# Patient Record
Sex: MALE | Race: WHITE | NOT HISPANIC OR LATINO | Employment: OTHER | URBAN - METROPOLITAN AREA
[De-identification: names, ages, dates, MRNs, and addresses within clinical notes are randomized per-mention and may not be internally consistent; named-entity substitution may affect disease eponyms.]

---

## 2017-01-05 ENCOUNTER — OFFICE VISIT (OUTPATIENT)
Dept: CARDIOLOGY | Facility: MEDICAL CENTER | Age: 67
End: 2017-01-05
Payer: MEDICARE

## 2017-01-05 VITALS
BODY MASS INDEX: 26.53 KG/M2 | HEIGHT: 67 IN | SYSTOLIC BLOOD PRESSURE: 116 MMHG | OXYGEN SATURATION: 96 % | WEIGHT: 169 LBS | DIASTOLIC BLOOD PRESSURE: 68 MMHG | HEART RATE: 90 BPM

## 2017-01-05 DIAGNOSIS — I25.10 CORONARY ARTERY DISEASE DUE TO CALCIFIED CORONARY LESION: ICD-10-CM

## 2017-01-05 DIAGNOSIS — I25.84 CORONARY ARTERY DISEASE DUE TO CALCIFIED CORONARY LESION: ICD-10-CM

## 2017-01-05 DIAGNOSIS — E78.5 DYSLIPIDEMIA: Chronic | ICD-10-CM

## 2017-01-05 PROCEDURE — 99214 OFFICE O/P EST MOD 30 MIN: CPT | Performed by: INTERNAL MEDICINE

## 2017-01-05 PROCEDURE — G8598 ASA/ANTIPLAT THER USED: HCPCS | Performed by: INTERNAL MEDICINE

## 2017-01-05 PROCEDURE — 3017F COLORECTAL CA SCREEN DOC REV: CPT | Mod: 8P | Performed by: INTERNAL MEDICINE

## 2017-01-05 PROCEDURE — G8420 CALC BMI NORM PARAMETERS: HCPCS | Performed by: INTERNAL MEDICINE

## 2017-01-05 PROCEDURE — G8427 DOCREV CUR MEDS BY ELIG CLIN: HCPCS | Performed by: INTERNAL MEDICINE

## 2017-01-05 PROCEDURE — 4004F PT TOBACCO SCREEN RCVD TLK: CPT | Performed by: INTERNAL MEDICINE

## 2017-01-05 NOTE — Clinical Note
[facility]   [address]  [place] [address]  Phone: [phone]  Fax: [phone]              Orlando Arreguin  1950    Encounter Date: 1/5/2017    Gilberto Bryson M.D.          PROGRESS NOTE:  Subjective:   Orlando Arreguin is a 66 y.o. male who presents today for followup of his non-ST segment elevation myocardial infarction. Patient received a drug-eluting stent to the proximal LAD on August 6, 2015. He also had a 70% mid RCA lesion and some distal disease.    At the time of his last visit, we did give him some isosorbide mononitrate to try if he was exerting himself in cold weather to a significant degree. He really has not tried this but has had no significant chest discomfort. He actually cleared this now off his driveway this morning with a snowblower without difficulty.    He started to dyspnea, edema, palpitations or lightheadedness.    Past Medical History   Diagnosis Date   • Gastroparesis    • Hypothyroid    • Diabetes mellitus type II    • Interstitial cystitis    • Sciatic neuritis    • Coronary artery disease due to calcified coronary lesion 8/11/2015   • Hyperlipidemia      Past Surgical History   Procedure Laterality Date   • Appendectomy     • Other       L shoulder surgery (arthroscopic, [place], 2011)   • Pr transurethral elec-surg prostatectom     • Cardiac cath  8/6/15     YESIKA to LAD.  Has RCA 70% occulsion.     Family History   Problem Relation Age of Onset   • Heart Disease Mother    • Heart Attack Mother 55     heart attack     History   Smoking status   • Current Every Day Smoker -- 1.00 packs/day for 30 years   • Types: Cigarettes   Smokeless tobacco   • Never Used     Allergies   Allergen Reactions   • Pcn [Penicillins] Anaphylaxis   • Other Environmental      Hayfever   • Tetanus Toxoid      Outpatient Encounter Prescriptions as of 1/5/2017   Medication Sig Dispense Refill   • aspirin (ASA) 81 MG Chew Tab chewable tablet Take  1 Tab by mouth every day. 100 Tab 11   • alprazolam (XANAX) 0.25 MG Tab Take 1 Tab by mouth at bedtime as needed for Sleep. 30 Tab 0   • isosorbide mononitrate SR (IMDUR) 60 MG TABLET SR 24 HR Take 1 Tab by mouth every morning. 30 Tab 3   • levothyroxine (SYNTHROID) 112 MCG Tab TAKE ONE TABLET BY MOUTH ONCE DAILY IN THE MORNING ON AN EMPTY STOMACH 90 Tab 0   • erythromycin base (UMM-TAB) 250 MG Tab Take 1 Tab by mouth 2 Times a Day. Take with meals for gastroparesis 180 Tab 3   • Insulin Pen Needle (RELION MINI PEN NEEDLES) 31G X 6 MM Misc 1 Device by Other route every day. Use with solostar pen, E11.9 100 Each 3   • TRUEPLUS LANCETS 28G Misc 1 Device by Does not apply route 2 times daily, before breakfast and dinner. E11.9 100 Each 11   • atorvastatin (LIPITOR) 80 MG tablet Take 1 Tab by mouth every evening. 90 Tab 3   • lisinopril (PRINIVIL) 2.5 MG Tab Take 1 Tab by mouth every day. 90 Tab 3   • liothyronine (CYTOMEL) 25 MCG Tab TAKE ONE TABLET BY MOUTH ONCE DAILY 90 Tab 3   • Blood Glucose Monitoring Suppl SUPPLIES Misc Test strips order: Test strips for Relion glucometer, check blood sugar once a day before meal; E11.9 100 Strip 6   • metformin (GLUCOPHAGE) 1000 MG tablet Take 1 Tab by mouth 2 times a day, with meals. 180 Tab 3   • insulin glargine (LANTUS SOLOSTAR) 100 UNIT/ML Solution Pen-injector injection Inject 12 Units as instructed every evening. 1 PEN 11   • nitroglycerin (NITROSTAT) 0.4 MG SL Tab Place 1 Tab under tongue as needed for Chest Pain. 25 Tab 11   • vitamin D (CHOLECALCIFEROL) 1000 UNIT Tab Take 1,000 Units by mouth every day.     • prasugrel (EFFIENT) 10 MG Tab Take 1 Tab by mouth every day. (Patient not taking: Reported on 11/3/2016) 30 Tab 11   • aspirin 81 MG EC tablet Take 1 Tab by mouth every day. 100 Tab 10   • Riboflavin (VITAMIN B-2 PO) Take 1 Tab by mouth every day.       No facility-administered encounter medications on file as of 1/5/2017.     ROS       Objective:   /68 mmHg  " Pulse 90  Ht 1.702 m (5' 7.01\")  Wt 76.658 kg (169 lb)  BMI 26.46 kg/m2  SpO2 96%    Physical Exam   Neck: No JVD present.   Cardiovascular: Normal rate and regular rhythm.  Exam reveals no gallop.    Murmur (2/6 systolic at the base) heard.  Pulmonary/Chest: Effort normal. He has no rales.   Abdominal: Soft. There is no tenderness.   Musculoskeletal: He exhibits no edema.     Lab Results   Component Value Date/Time    CHOLESTEROL,TOT 83* 11/22/2016 09:23 AM    LDL 41 11/22/2016 09:23 AM    HDL 27* 11/22/2016 09:23 AM    TRIGLYCERIDES 73 11/22/2016 09:23 AM       Lab Results   Component Value Date/Time    SODIUM 141 11/22/2016 09:23 AM    POTASSIUM 4.8 11/22/2016 09:23 AM    CHLORIDE 103 11/22/2016 09:23 AM    CO2 22 11/22/2016 09:23 AM    GLUCOSE 125* 11/22/2016 09:23 AM    BUN 20 11/22/2016 09:23 AM    CREATININE 0.96 11/22/2016 09:23 AM    BUN-CREATININE RATIO 21 11/22/2016 09:23 AM     Lab Results   Component Value Date/Time    ALKALINE PHOSPHATASE 111 11/22/2016 09:23 AM    AST(SGOT) 27 11/22/2016 09:23 AM    ALT(SGPT) 29 11/22/2016 09:23 AM    TOTAL BILIRUBIN 0.4 11/22/2016 09:23 AM      Lab Results   Component Value Date/Time    B NATRIURETIC PEPTIDE 178.8* 08/19/2015 10:28 AM            Echo:  CONCLUSIONS  Normal left ventricular size and function.  Left ventricular ejection fraction is 65% to 70%.  Grade I diastolic dysfunction - mitral inflow E/A is <1.0.  Moderate concentric left ventricular hypertrophy.  Mild mitral regurgitation.  Mild aortic insufficiency.  Right ventricular systolic pressure is estimated to be 31 mmHg.  Exam Date: 08/08/2015     Cardiac catheterization:  1. Triple vessel coronary artery disease, but predominantly involved distal   segment of the major epicardial vessels and branches vessels with 95% ostial   left anterior descending artery stenosis and 70% mid right coronary   stenosis.  2. Hypokinetic anterolateral wall with moderately reduced left ventricular   systolic " function. Ejection fraction in the range of 35%-40%.  3. Status post stenting of the ostial left anterior descending artery with a   3.5 x 15 mm Xience Alpine drug-eluting stent.  August 6, 2015    Myocardial perfusion scan:  PERFUSION:  Small basal anterior infarct with small ame infarct ischemia  Moderate sized, moderate severity inferior, inferior-lateral infarct with   reversible ischemia in the infeior and infero-lateral apex.  1/2016  Addendum: Patient's perfusion study was reviewed by myself. Small area of basal anterior/lateral infarction with mild to moderate ischemia. No definite inferior wall infarction.    Assessment:     1. Coronary artery disease due to calcified coronary lesion: Proximal LAD stent in August 2015     2. Dyslipidemia         Medical Decision Making:  Today's Assessment / Status / Plan:     Mr. Arreguin is clinically stable. He has had no recurrent chest discomfort. His lipid status is under excellent control. He will follow-up in about 6 months with repeat lab work.      Charbel Jaffe M.D.  [address]  [address]  [facility] [address]  VIA In Basket

## 2017-01-05 NOTE — PROGRESS NOTES
Subjective:   Orlando Arreguin is a 66 y.o. male who presents today for followup of his non-ST segment elevation myocardial infarction. Patient received a drug-eluting stent to the proximal LAD on August 6, 2015. He also had a 70% mid RCA lesion and some distal disease.    At the time of his last visit, we did give him some isosorbide mononitrate to try if he was exerting himself in cold weather to a significant degree. He really has not tried this but has had no significant chest discomfort. He actually cleared this now off his driveway this morning with a snowblower without difficulty.    He started to dyspnea, edema, palpitations or lightheadedness.    Past Medical History   Diagnosis Date   • Gastroparesis    • Hypothyroid    • Diabetes mellitus type II    • Interstitial cystitis    • Sciatic neuritis    • Coronary artery disease due to calcified coronary lesion 8/11/2015   • Hyperlipidemia      Past Surgical History   Procedure Laterality Date   • Appendectomy     • Other       L shoulder surgery (arthroscopic, [place], 2011)   • Pr transurethral elec-surg prostatectom     • Cardiac cath  8/6/15     YESIKA to LAD.  Has RCA 70% occulsion.     Family History   Problem Relation Age of Onset   • Heart Disease Mother    • Heart Attack Mother 55     heart attack     History   Smoking status   • Current Every Day Smoker -- 1.00 packs/day for 30 years   • Types: Cigarettes   Smokeless tobacco   • Never Used     Allergies   Allergen Reactions   • Pcn [Penicillins] Anaphylaxis   • Other Environmental      Hayfever   • Tetanus Toxoid      Outpatient Encounter Prescriptions as of 1/5/2017   Medication Sig Dispense Refill   • aspirin (ASA) 81 MG Chew Tab chewable tablet Take 1 Tab by mouth every day. 100 Tab 11   • alprazolam (XANAX) 0.25 MG Tab Take 1 Tab by mouth at bedtime as needed for Sleep. 30 Tab 0   • isosorbide mononitrate SR (IMDUR) 60 MG TABLET SR 24 HR Take 1 Tab by mouth every morning. 30 Tab 3   • levothyroxine  "(SYNTHROID) 112 MCG Tab TAKE ONE TABLET BY MOUTH ONCE DAILY IN THE MORNING ON AN EMPTY STOMACH 90 Tab 0   • erythromycin base (UMM-TAB) 250 MG Tab Take 1 Tab by mouth 2 Times a Day. Take with meals for gastroparesis 180 Tab 3   • Insulin Pen Needle (RELION MINI PEN NEEDLES) 31G X 6 MM Misc 1 Device by Other route every day. Use with solostar pen, E11.9 100 Each 3   • TRUEPLUS LANCETS 28G Misc 1 Device by Does not apply route 2 times daily, before breakfast and dinner. E11.9 100 Each 11   • atorvastatin (LIPITOR) 80 MG tablet Take 1 Tab by mouth every evening. 90 Tab 3   • lisinopril (PRINIVIL) 2.5 MG Tab Take 1 Tab by mouth every day. 90 Tab 3   • liothyronine (CYTOMEL) 25 MCG Tab TAKE ONE TABLET BY MOUTH ONCE DAILY 90 Tab 3   • Blood Glucose Monitoring Suppl SUPPLIES Misc Test strips order: Test strips for Relion glucometer, check blood sugar once a day before meal; E11.9 100 Strip 6   • metformin (GLUCOPHAGE) 1000 MG tablet Take 1 Tab by mouth 2 times a day, with meals. 180 Tab 3   • insulin glargine (LANTUS SOLOSTAR) 100 UNIT/ML Solution Pen-injector injection Inject 12 Units as instructed every evening. 1 PEN 11   • nitroglycerin (NITROSTAT) 0.4 MG SL Tab Place 1 Tab under tongue as needed for Chest Pain. 25 Tab 11   • vitamin D (CHOLECALCIFEROL) 1000 UNIT Tab Take 1,000 Units by mouth every day.     • prasugrel (EFFIENT) 10 MG Tab Take 1 Tab by mouth every day. (Patient not taking: Reported on 11/3/2016) 30 Tab 11   • aspirin 81 MG EC tablet Take 1 Tab by mouth every day. 100 Tab 10   • Riboflavin (VITAMIN B-2 PO) Take 1 Tab by mouth every day.       No facility-administered encounter medications on file as of 1/5/2017.     ROS       Objective:   /68 mmHg  Pulse 90  Ht 1.702 m (5' 7.01\")  Wt 76.658 kg (169 lb)  BMI 26.46 kg/m2  SpO2 96%    Physical Exam   Neck: No JVD present.   Cardiovascular: Normal rate and regular rhythm.  Exam reveals no gallop.    Murmur (2/6 systolic at the base) " heard.  Pulmonary/Chest: Effort normal. He has no rales.   Abdominal: Soft. There is no tenderness.   Musculoskeletal: He exhibits no edema.     Lab Results   Component Value Date/Time    CHOLESTEROL,TOT 83* 11/22/2016 09:23 AM    LDL 41 11/22/2016 09:23 AM    HDL 27* 11/22/2016 09:23 AM    TRIGLYCERIDES 73 11/22/2016 09:23 AM       Lab Results   Component Value Date/Time    SODIUM 141 11/22/2016 09:23 AM    POTASSIUM 4.8 11/22/2016 09:23 AM    CHLORIDE 103 11/22/2016 09:23 AM    CO2 22 11/22/2016 09:23 AM    GLUCOSE 125* 11/22/2016 09:23 AM    BUN 20 11/22/2016 09:23 AM    CREATININE 0.96 11/22/2016 09:23 AM    BUN-CREATININE RATIO 21 11/22/2016 09:23 AM     Lab Results   Component Value Date/Time    ALKALINE PHOSPHATASE 111 11/22/2016 09:23 AM    AST(SGOT) 27 11/22/2016 09:23 AM    ALT(SGPT) 29 11/22/2016 09:23 AM    TOTAL BILIRUBIN 0.4 11/22/2016 09:23 AM      Lab Results   Component Value Date/Time    B NATRIURETIC PEPTIDE 178.8* 08/19/2015 10:28 AM            Echo:  CONCLUSIONS  Normal left ventricular size and function.  Left ventricular ejection fraction is 65% to 70%.  Grade I diastolic dysfunction - mitral inflow E/A is <1.0.  Moderate concentric left ventricular hypertrophy.  Mild mitral regurgitation.  Mild aortic insufficiency.  Right ventricular systolic pressure is estimated to be 31 mmHg.  Exam Date: 08/08/2015     Cardiac catheterization:  1. Triple vessel coronary artery disease, but predominantly involved distal   segment of the major epicardial vessels and branches vessels with 95% ostial   left anterior descending artery stenosis and 70% mid right coronary   stenosis.  2. Hypokinetic anterolateral wall with moderately reduced left ventricular   systolic function. Ejection fraction in the range of 35%-40%.  3. Status post stenting of the ostial left anterior descending artery with a   3.5 x 15 mm Xience Alpine drug-eluting stent.  August 6, 2015    Myocardial perfusion scan:  PERFUSION:  Small  basal anterior infarct with small ame infarct ischemia  Moderate sized, moderate severity inferior, inferior-lateral infarct with   reversible ischemia in the infeior and infero-lateral apex.  1/2016  Addendum: Patient's perfusion study was reviewed by myself. Small area of basal anterior/lateral infarction with mild to moderate ischemia. No definite inferior wall infarction.    Assessment:     1. Coronary artery disease due to calcified coronary lesion: Proximal LAD stent in August 2015     2. Dyslipidemia         Medical Decision Making:  Today's Assessment / Status / Plan:     Mr. Arreguin is clinically stable. He has had no recurrent chest discomfort. His lipid status is under excellent control. He will follow-up in about 6 months with repeat lab work.

## 2017-01-05 NOTE — MR AVS SNAPSHOT
"        Orlando Arreguin   2017 2:00 PM   Office Visit   MRN: [id number]    Department:  [facility]   Dept Phone:  [phone]    Description:  Male : 1950   Provider:  Gilberto Bryson M.D.           Reason for Visit     Follow-Up           Allergies as of 2017     Allergen Noted Reactions    Pcn [Penicillins] 2015   Anaphylaxis    Other Environmental 2015       Hayfever    Tetanus Toxoid 2016         You were diagnosed with     Coronary artery disease due to calcified coronary lesion   [4952083]       Dyslipidemia   [830269]         Vital Signs     Blood Pressure Pulse Height Weight Body Mass Index Oxygen Saturation    116/68 mmHg 90 1.702 m (5' 7.01\") 76.658 kg (169 lb) 26.46 kg/m2 96%    Smoking Status                   Current Every Day Smoker           Basic Information     Date Of Birth Sex Race Ethnicity Preferred Language    1950 Male White Non- English      Your appointments     2017  1:00 PM   Established Patient with Alex Stein M.D.   [facility]    [address], [address]  [facility] [id number] [phone]           You will be receiving a confirmation call a few days before your appointment from our automated call confirmation system.            May 30, 2017 11:20 AM   Established Patient with Charbel Jaffe M.D.   [facility]    [address]  [place] [id number] [phone]           You will be receiving a confirmation call a few days before your appointment from our automated call confirmation system.            2017 11:15 AM   FOLLOW UP with Gilberto Bryson M.D.   [facility] (--)    [address], [address]  [place] [address] [phone]              Problem List              ICD-10-CM Priority Class Noted - Resolved    History of allergic rhinitis Z87.09   2010 - Present   " Gastroparesis (Chronic) K31.84   8/21/2010 - Present    History of BPH Z87.438   8/21/2010 - Present    Interstitial cystitis N30.10   8/21/2010 - Present    Postpolio syndrome G14   8/21/2010 - Present    Hypothyroid (Chronic) E03.9   8/21/2010 - Present    On angiotensin-converting enzyme (ACE) inhibitors (for diabetes, not HTN) Z79.811   8/21/2010 - Present    Diabetes mellitus type 2, controlled (HCC) (Chronic) E11.9   9/10/2010 - Present    Retinal hemorrhage H35.60   9/10/2010 - Present    Preventative health care (Chronic) Z00.00   12/9/2010 - Present    Shoulder pain M25.519   12/9/2010 - Present    Insomnia G47.00   4/18/2012 - Present    Dyslipidemia (Chronic) E78.5   5/7/2013 - Present    Tobacco abuse (Chronic) Z72.0   7/29/2015 - Present    History of MI (myocardial infarction) (Chronic) I25.2   8/6/2015 - Present    Coronary artery disease due to calcified coronary lesion: Proximal LAD stent in August 2015 I25.10, I25.84   4/12/2016 - Present      Health Maintenance        Date Due Completion Dates    IMM DTaP/Tdap/Td Vaccine (1 - Tdap) 2/20/1969 ---    RETINAL SCREENING 12/9/2014 12/9/2013    IMM PNEUMOCOCCAL 65+ (ADULT) LOW/MEDIUM RISK SERIES (2 of 2 - PCV13) 8/8/2016 8/8/2015    A1C SCREENING 5/22/2017 11/22/2016, 5/19/2016, 11/23/2015, 7/15/2015, 3/21/2015, 10/17/2014, 9/18/2014 (Declined), 4/16/2014, 8/14/2013, 4/16/2013, 12/22/2011    Override on 9/18/2014: Patient Declined    DIABETES MONOFILAMTENT / LE EXAM 5/31/2017 5/31/2016, 7/31/2015, 9/18/2014, 9/18/2014 (Done), 4/16/2013 (Declined)    Override on 9/18/2014: Done    Override on 4/16/2013: Patient Declined    FASTING LIPID PROFILE 11/22/2017 11/22/2016, 4/5/2016, 10/7/2015, 8/21/2015, 8/8/2015, 9/18/2014 (Declined), 5/7/2013, 4/16/2013 (Declined)    Override on 9/18/2014: Patient Declined    Override on 4/16/2013: Patient Declined    URINE ACR / MICROALBUMIN 11/22/2017 11/22/2016, 5/19/2016, 11/23/2015, 8/21/2015, 9/18/2014 (Declined),  4/16/2013 (Declined)    Override on 9/18/2014: Patient Declined    Override on 4/16/2013: Patient Declined    SERUM CREATININE 11/22/2017 11/22/2016, 5/19/2016, 4/5/2016, 10/7/2015, 8/21/2015, 8/7/2015, 8/6/2015, 9/18/2014 (Declined), 5/7/2013, 5/6/2013, 5/6/2013, 4/16/2013, 2/10/2013, 12/18/2010, 12/17/2010    Override on 9/18/2014: Patient Declined    COLONOSCOPY 5/31/2026 5/31/2016 (Declined), 9/18/2014 (Declined), 4/16/2013 (Declined)    Override on 5/31/2016: Patient Declined    Override on 9/18/2014: Patient Declined    Override on 4/16/2013: Patient Declined            Current Immunizations     Influenza Vaccine Adult HD 12/5/2016, 12/5/2016    Pneumococcal polysaccharide vaccine (PPSV-23) 8/8/2015 10:55 AM    SHINGLES VACCINE 1/12/2016      Below and/or attached are the medications your provider expects you to take. Review all of your home medications and newly ordered medications with your provider and/or pharmacist. Follow medication instructions as directed by your provider and/or pharmacist. Please keep your medication list with you and share with your provider. Update the information when medications are discontinued, doses are changed, or new medications (including over-the-counter products) are added; and carry medication information at all times in the event of emergency situations     Allergies:  PCN - Anaphylaxis     OTHER ENVIRONMENTAL - (reactions not documented)     TETANUS TOXOID - (reactions not documented)               Medications  Valid as of: January 05, 2017 -  2:34 PM    Generic Name Brand Name Tablet Size Instructions for use    ALPRAZolam (Tab) XANAX 0.25 MG Take 1 Tab by mouth at bedtime as needed for Sleep.        Aspirin (Tablet Delayed Response) aspirin 81 MG Take 1 Tab by mouth every day.        Aspirin (Chew Tab) ASA 81 MG Take 1 Tab by mouth every day.        Atorvastatin Calcium (Tab) LIPITOR 80 MG Take 1 Tab by mouth every evening.        Blood Glucose Monitoring Suppl (Misc)  Blood Glucose Monitoring Suppl SUPPLIES Test strips order: Test strips for Relion glucometer, check blood sugar once a day before meal; E11.9        Cholecalciferol (Tab) cholecalciferol 1000 UNIT Take 1,000 Units by mouth every day.        Erythromycin Base (Tab) UMM- MG Take 1 Tab by mouth 2 Times a Day. Take with meals for gastroparesis        Insulin Glargine (Solution Pen-injector) LANTUS 100 UNIT/ML Inject 12 Units as instructed every evening.        Insulin Pen Needle (Misc) Insulin Pen Needle 31G X 6 MM 1 Device by Other route every day. Use with solostar pen, E11.9        Isosorbide Mononitrate (TABLET SR 24 HR) IMDUR 60 MG Take 1 Tab by mouth every morning.        Lancets (Misc) TRUEPLUS LANCETS 28G  1 Device by Does not apply route 2 times daily, before breakfast and dinner. E11.9        Levothyroxine Sodium (Tab) SYNTHROID 112 MCG TAKE ONE TABLET BY MOUTH ONCE DAILY IN THE MORNING ON AN EMPTY STOMACH        Liothyronine Sodium (Tab) CYTOMEL 25 MCG TAKE ONE TABLET BY MOUTH ONCE DAILY        Lisinopril (Tab) PRINIVIL 2.5 MG Take 1 Tab by mouth every day.        MetFORMIN HCl (Tab) GLUCOPHAGE 1000 MG Take 1 Tab by mouth 2 times a day, with meals.        Nitroglycerin (SL Tab) NITROSTAT 0.4 MG Place 1 Tab under tongue as needed for Chest Pain.        Prasugrel HCl (Tab) EFFIENT 10 MG Take 1 Tab by mouth every day.        Riboflavin   Take 1 Tab by mouth every day.        .                 Medicines prescribed today were sent to:     [facility] [place] - [address]    [address] [facility] [address]    Phone: [phone] Fax: [phone]    Open 24 Hours?: No      Medication refill instructions:       If your prescription bottle indicates you have medication refills left, it is not necessary to call your provider’s office. Please contact your pharmacy and they will refill your medication.    If your prescription bottle indicates you do not have any refills left, you  may request refills at any time through one of the following ways: The online [employer] system (except Urgent Care), by calling your provider’s office, or by asking your pharmacy to contact your provider’s office with a refill request. Medication refills are processed only during regular business hours and may not be available until the next business day. Your provider may request additional information or to have a follow-up visit with you prior to refilling your medication.   *Please Note: Medication refills are assigned a new Rx number when refilled electronically. Your pharmacy may indicate that no refills were authorized even though a new prescription for the same medication is available at the pharmacy. Please request the medicine by name with the pharmacy before contacting your provider for a refill.        Your To Do List     Future Labs/Procedures Complete By Expires    COMP METABOLIC PANEL  As directed 1/5/2018    LIPID PROFILE  As directed 1/5/2018      Other Notes About Your Plan     9/17/16 [facility] patient assistance fax number [phone] and phone [phone]  10/6/16 per patient [facility] will no longer provide erythromycin, prescription to be filled at [facility]  11/29/16 refill xanax                         [employer] Access Code: Activation code not generated  Current [employer] Status: Active

## 2017-01-12 RX ORDER — LEVOTHYROXINE SODIUM 112 UG/1
TABLET ORAL
Qty: 90 TAB | Refills: 0 | Status: SHIPPED | OUTPATIENT
Start: 2017-01-12 | End: 2017-05-30

## 2017-02-04 ENCOUNTER — PATIENT OUTREACH (OUTPATIENT)
Dept: HEALTH INFORMATION MANAGEMENT | Facility: OTHER | Age: 67
End: 2017-02-04

## 2017-02-04 NOTE — PROGRESS NOTES
Outcome: Left Message    Attempt # 1st    Annual Wellness Visit Scheduling  Scheduling Status: Left Message      Care Gap Scheduling (Attempt to Schedule EACH Overdue Care Gap!)  Health Maintenance Due   Topic Date Due   • Annual Wellness Visit  1950   • IMM DTaP/Tdap/Td Vaccine (1 - Tdap) 02/20/1969   • IMM PNEUMOCOCCAL 65+ (ADULT) LOW/MEDIUM RISK SERIES (2 of 2 - PCV13) 08/08/2016         MyChart Activation:  Already Active

## 2017-04-06 RX ORDER — LEVOTHYROXINE SODIUM 112 UG/1
TABLET ORAL
Qty: 90 TAB | Refills: 0 | Status: SHIPPED | OUTPATIENT
Start: 2017-04-06 | End: 2017-07-06 | Stop reason: SDUPTHER

## 2017-04-19 ENCOUNTER — OFFICE VISIT (OUTPATIENT)
Dept: ENDOCRINOLOGY | Facility: MEDICAL CENTER | Age: 67
End: 2017-04-19
Payer: MEDICARE

## 2017-04-19 VITALS — HEIGHT: 67 IN | WEIGHT: 168 LBS | BODY MASS INDEX: 26.37 KG/M2

## 2017-04-19 DIAGNOSIS — E03.9 HYPOTHYROIDISM, UNSPECIFIED TYPE: Chronic | ICD-10-CM

## 2017-04-19 PROCEDURE — G8598 ASA/ANTIPLAT THER USED: HCPCS | Performed by: INTERNAL MEDICINE

## 2017-04-19 PROCEDURE — 3017F COLORECTAL CA SCREEN DOC REV: CPT | Mod: 8P | Performed by: INTERNAL MEDICINE

## 2017-04-19 PROCEDURE — 4040F PNEUMOC VAC/ADMIN/RCVD: CPT | Performed by: INTERNAL MEDICINE

## 2017-04-19 PROCEDURE — 4004F PT TOBACCO SCREEN RCVD TLK: CPT | Performed by: INTERNAL MEDICINE

## 2017-04-19 PROCEDURE — 99213 OFFICE O/P EST LOW 20 MIN: CPT | Performed by: INTERNAL MEDICINE

## 2017-04-19 PROCEDURE — G8417 CALC BMI ABV UP PARAM F/U: HCPCS | Performed by: INTERNAL MEDICINE

## 2017-04-19 PROCEDURE — 1101F PT FALLS ASSESS-DOCD LE1/YR: CPT | Performed by: INTERNAL MEDICINE

## 2017-04-19 NOTE — MR AVS SNAPSHOT
"        Orlando Arreguin   2017 1:00 PM   Office Visit   MRN: [id number]    Department:  Endocrinology [facility]   Dept Phone:  [phone]    Description:  Male : 1950   Provider:  Alex Stein M.D.           Reason for Visit     Hypothyroidism           Allergies as of 2017     Allergen Noted Reactions    Pcn [Penicillins] 2015   Anaphylaxis    Other Environmental 2015       Hayfever    Tetanus Toxoid 2016         You were diagnosed with     Hypothyroidism, unspecified type   [8170607]         Vital Signs     Height Weight Body Mass Index Smoking Status          1.702 m (5' 7.01\") 76.204 kg (168 lb) 26.31 kg/m2 Current Every Day Smoker        Basic Information     Date Of Birth Sex Race Ethnicity Preferred Language    1950 Male White Non- English      Your appointments     May 30, 2017 11:20 AM   Established Patient with Charbel Jaffe M.D.   [facility]    [address]  [facility] [id number] [phone]           You will be receiving a confirmation call a few days before your appointment from our automated call confirmation system.            2017 11:15 AM   FOLLOW UP with Gilberto Bryson M.D.   [facility] (--)    [address]  [facility] [place] [id number] [phone]            Oct 18, 2017  1:20 PM   Established Patient with Alex Stein M.D.   [facility]    [address]  [facility] [place] [id number] [phone]           You will be receiving a confirmation call a few days before your appointment from our automated call confirmation system.              Problem List              ICD-10-CM Priority Class Noted - Resolved    History of allergic rhinitis Z87.09   2010 - Present    Gastroparesis (Chronic) K31.84   2010 - Present    History of BPH Z87.438   2010 - Present    Interstitial " cystitis N30.10   8/21/2010 - Present    Postpolio syndrome G14   8/21/2010 - Present    Hypothyroid (Chronic) E03.9   8/21/2010 - Present    On angiotensin-converting enzyme (ACE) inhibitors (for diabetes, not HTN) Z79.811   8/21/2010 - Present    Diabetes mellitus type 2, controlled (CMS-HCC) (Chronic) E11.9   9/10/2010 - Present    Retinal hemorrhage H35.60   9/10/2010 - Present    Preventative health care (Chronic) Z00.00   12/9/2010 - Present    Shoulder pain M25.519   12/9/2010 - Present    Insomnia G47.00   4/18/2012 - Present    Dyslipidemia (Chronic) E78.5   5/7/2013 - Present    Tobacco abuse (Chronic) Z72.0   7/29/2015 - Present    History of MI (myocardial infarction) (Chronic) I25.2   8/6/2015 - Present    Coronary artery disease due to calcified coronary lesion: Proximal LAD stent in August 2015 I25.10, I25.84   4/12/2016 - Present      Health Maintenance        Date Due Completion Dates    IMM DTaP/Tdap/Td Vaccine (1 - Tdap) 2/20/1969 ---    IMM PNEUMOCOCCAL 65+ (ADULT) LOW/MEDIUM RISK SERIES (2 of 2 - PCV13) 8/8/2016 8/8/2015, 10/10/2008    A1C SCREENING 5/22/2017 11/22/2016, 5/19/2016, 11/23/2015, 7/15/2015, 3/21/2015, 10/17/2014, 9/18/2014 (Declined), 4/16/2014, 8/14/2013, 4/16/2013, 12/22/2011    Override on 9/18/2014: Patient Declined    DIABETES MONOFILAMENT / LE EXAM 5/31/2017 5/31/2016, 7/31/2015, 9/18/2014, 9/18/2014 (Done), 4/16/2013 (Declined)    Override on 9/18/2014: Done    Override on 4/16/2013: Patient Declined    FASTING LIPID PROFILE 11/22/2017 11/22/2016, 4/5/2016, 10/7/2015, 8/21/2015, 8/8/2015, 9/18/2014 (Declined), 5/7/2013, 4/16/2013 (Declined)    Override on 9/18/2014: Patient Declined    Override on 4/16/2013: Patient Declined    URINE ACR / MICROALBUMIN 11/22/2017 11/22/2016, 5/19/2016, 11/23/2015, 8/21/2015, 9/18/2014 (Declined), 4/16/2013 (Declined)    Override on 9/18/2014: Patient Declined    Override on 4/16/2013: Patient Declined    SERUM CREATININE 11/22/2017  11/22/2016, 5/19/2016, 4/5/2016, 10/7/2015, 8/21/2015, 8/7/2015, 8/6/2015, 9/18/2014 (Declined), 5/7/2013, 5/6/2013, 5/6/2013, 4/16/2013, 2/10/2013, 12/18/2010, 12/17/2010    Override on 9/18/2014: Patient Declined    RETINAL SCREENING 1/9/2018 1/9/2017 (Done), 6/1/2016, 12/9/2013    Override on 1/9/2017: Done ()    COLONOSCOPY 5/31/2026 5/31/2016 (Declined), 9/18/2014 (Declined), 4/16/2013 (Declined)    Override on 5/31/2016: Patient Declined    Override on 9/18/2014: Patient Declined    Override on 4/16/2013: Patient Declined            Current Immunizations     Influenza Vaccine Adult HD 12/5/2016, 12/5/2016    Pneumococcal polysaccharide vaccine (PPSV-23) 8/8/2015 10:55 AM, 10/10/2008    SHINGLES VACCINE 1/12/2016      Below and/or attached are the medications your provider expects you to take. Review all of your home medications and newly ordered medications with your provider and/or pharmacist. Follow medication instructions as directed by your provider and/or pharmacist. Please keep your medication list with you and share with your provider. Update the information when medications are discontinued, doses are changed, or new medications (including over-the-counter products) are added; and carry medication information at all times in the event of emergency situations     Allergies:  PCN - Anaphylaxis     OTHER ENVIRONMENTAL - (reactions not documented)     TETANUS TOXOID - (reactions not documented)               Medications  Valid as of: April 19, 2017 -  1:38 PM    Generic Name Brand Name Tablet Size Instructions for use    ALPRAZolam (Tab) XANAX 0.25 MG Take 1 Tab by mouth at bedtime as needed for Sleep.        Aspirin (Tablet Delayed Response) aspirin 81 MG Take 1 Tab by mouth every day.        Aspirin (Chew Tab) ASA 81 MG Take 1 Tab by mouth every day.        Atorvastatin Calcium (Tab) LIPITOR 80 MG Take 1 Tab by mouth every evening.        Blood Glucose Monitoring Suppl (Misc) Blood Glucose  Monitoring Suppl SUPPLIES Test strips order: Test strips for Relion glucometer, check blood sugar once a day before meal; E11.9        Cholecalciferol (Tab) cholecalciferol 1000 UNIT Take 1,000 Units by mouth every day.        Erythromycin Base (Tab) UMM- MG Take 1 Tab by mouth 2 Times a Day. Take with meals for gastroparesis        Insulin Glargine (Solution Pen-injector) LANTUS 100 UNIT/ML Inject 12 Units as instructed every evening.        Insulin Pen Needle (Misc) Insulin Pen Needle 31G X 6 MM 1 Device by Other route every day. Use with solostar pen, E11.9        Isosorbide Mononitrate (TABLET SR 24 HR) IMDUR 60 MG Take 1 Tab by mouth every morning.        Lancets (Misc) TRUEPLUS LANCETS 28G  1 Device by Does not apply route 2 times daily, before breakfast and dinner. E11.9        Levothyroxine Sodium (Tab) SYNTHROID 112 MCG TAKE ONE TABLET BY MOUTH ONCE DAILY IN THE MORNING ON AN EMPTY STOMACH        Levothyroxine Sodium (Tab) SYNTHROID 112 MCG TAKE ONE TABLET BY MOUTH ONCE DAILY IN THE MORNING ON AN EMPTY STOMACH        Liothyronine Sodium (Tab) CYTOMEL 25 MCG TAKE ONE TABLET BY MOUTH ONCE DAILY        Lisinopril (Tab) PRINIVIL 2.5 MG Take 1 Tab by mouth every day.        MetFORMIN HCl (Tab) GLUCOPHAGE 1000 MG Take 1 Tab by mouth 2 times a day, with meals.        Nitroglycerin (SL Tab) NITROSTAT 0.4 MG Place 1 Tab under tongue as needed for Chest Pain.        Prasugrel HCl (Tab) EFFIENT 10 MG Take 1 Tab by mouth every day.        Riboflavin   Take 1 Tab by mouth every day.        .                 Medicines prescribed today were sent to:     [facility] [place], [place] - [address]    [address] [facility] [place] [address]    Phone: [phone] Fax: [phone]    Open 24 Hours?: No      Medication refill instructions:       If your prescription bottle indicates you have medication refills left, it is not necessary to call your provider’s office. Please contact your pharmacy and they  will refill your medication.    If your prescription bottle indicates you do not have any refills left, you may request refills at any time through one of the following ways: The online Spot[employer]ate system (except Urgent Care), by calling your provider’s office, or by asking your pharmacy to contact your provider’s office with a refill request. Medication refills are processed only during regular business hours and may not be available until the next business day. Your provider may request additional information or to have a follow-up visit with you prior to refilling your medication.   *Please Note: Medication refills are assigned a new Rx number when refilled electronically. Your pharmacy may indicate that no refills were authorized even though a new prescription for the same medication is available at the pharmacy. Please request the medicine by name with the pharmacy before contacting your provider for a refill.        Your To Do List     Future Labs/Procedures Complete By Expires    FREE THYROXINE  As directed 10/20/2017    T3 FREE  As directed 10/20/2017    TSH  As directed 10/20/2017      Other Notes About Your Plan     9/17/16 [facility] patient assistance fax number [phone] and phone [phone]  10/6/16 per patient [facility] will no longer provide erythromycin, prescription to be filled at [facility]  11/29/16 refill xanax                         Spotivate Access Code: Activation code not generated  Current Spotivate Status: Active          Quit Tobacco Information     Do you want to quit using tobacco?    Quitting tobacco decreases risks of cancer, heart and lung disease, increases life expectancy, improves sense of taste and smell, and increases spending money, among other benefits.    If you are thinking about quitting, we can help.  • [facility] Quit Tobacco Program: [phone]  o Program occurs weekly for four weeks and includes pharmacist consultation on products to support quitting smoking or chewing  tobacco. A provider referral is needed for pharmacist consultation.  • Tobacco Users Help Hotline: 9-800QUIT-NOW (401-6462) or https://[place].quitlogix.org/  o Free, confidential telephone and online coaching for [place] residents. Sessions are designed on a schedule that is convenient for you. Eligible clients receive free nicotine replacement therapy.  • Nationally: www.smokefree.gov  o Information and professional assistance to support both immediate and long-term needs as you become, and remain, a non-smoker. Smokefree.gov allows you to choose the help that best fits your needs.

## 2017-04-19 NOTE — PROGRESS NOTES
Chief Complaint   Patient presents with   • Hypothyroidism        HPI:    Hypothyroidism        The patient has had hypothyroidism for a long time and has a very small gland or substernal. Probably atrophic.         He feels his T4 and T3 level are perfect. Free T3 is upper normal 4.1 free T4 is low normal at 0.9. TSH is suppressed at 0.1 and he wishes were to be zero. He believes his thyroid levels are very beneficial to his gastroparesis. Anything less in terms of his thyroid dose would be harmful to him. He certainly does not show any symptoms or signs of excessive thyroid activity.          He is taking levothyroxine 112 µg trans-buccal every morning and liothyronine 12.5 mg twice a day trans-buccal.    ROS:   All other systems reported as negative or unchanged since last exam      Allergies:   Allergies   Allergen Reactions   • Pcn [Penicillins] Anaphylaxis   • Other Environmental      Hayfever   • Tetanus Toxoid        Current medicines including changes today:  Current Outpatient Prescriptions   Medication Sig Dispense Refill   • levothyroxine (SYNTHROID) 112 MCG Tab TAKE ONE TABLET BY MOUTH ONCE DAILY IN THE MORNING ON AN EMPTY STOMACH 90 Tab 0   • metformin (GLUCOPHAGE) 1000 MG tablet Take 1 Tab by mouth 2 times a day, with meals. 180 Tab 1   • insulin glargine (LANTUS SOLOSTAR) 100 UNIT/ML Solution Pen-injector injection Inject 12 Units as instructed every evening. 15 PEN 6   • levothyroxine (SYNTHROID) 112 MCG Tab TAKE ONE TABLET BY MOUTH ONCE DAILY IN THE MORNING ON AN EMPTY STOMACH 90 Tab 0   • aspirin (ASA) 81 MG Chew Tab chewable tablet Take 1 Tab by mouth every day. 100 Tab 11   • alprazolam (XANAX) 0.25 MG Tab Take 1 Tab by mouth at bedtime as needed for Sleep. 30 Tab 0   • isosorbide mononitrate SR (IMDUR) 60 MG TABLET SR 24 HR Take 1 Tab by mouth every morning. 30 Tab 3   • erythromycin base (UMM-TAB) 250 MG Tab Take 1 Tab by mouth 2 Times a Day. Take with meals for gastroparesis 180 Tab 3   •  "Insulin Pen Needle (RELION MINI PEN NEEDLES) 31G X 6 MM Misc 1 Device by Other route every day. Use with solostar pen, E11.9 100 Each 3   • TRUEPLUS LANCETS 28G Misc 1 Device by Does not apply route 2 times daily, before breakfast and dinner. E11.9 100 Each 11   • atorvastatin (LIPITOR) 80 MG tablet Take 1 Tab by mouth every evening. 90 Tab 3   • lisinopril (PRINIVIL) 2.5 MG Tab Take 1 Tab by mouth every day. 90 Tab 3   • liothyronine (CYTOMEL) 25 MCG Tab TAKE ONE TABLET BY MOUTH ONCE DAILY 90 Tab 3   • Blood Glucose Monitoring Suppl SUPPLIES Misc Test strips order: Test strips for Relion glucometer, check blood sugar once a day before meal; E11.9 100 Strip 6   • nitroglycerin (NITROSTAT) 0.4 MG SL Tab Place 1 Tab under tongue as needed for Chest Pain. 25 Tab 11   • vitamin D (CHOLECALCIFEROL) 1000 UNIT Tab Take 1,000 Units by mouth every day.     • prasugrel (EFFIENT) 10 MG Tab Take 1 Tab by mouth every day. (Patient not taking: Reported on 11/3/2016) 30 Tab 11   • aspirin 81 MG EC tablet Take 1 Tab by mouth every day. 100 Tab 10   • Riboflavin (VITAMIN B-2 PO) Take 1 Tab by mouth every day.       No current facility-administered medications for this visit.        Past Medical History   Diagnosis Date   • Gastroparesis    • Hypothyroid    • Diabetes mellitus type II    • Interstitial cystitis    • Sciatic neuritis    • Coronary artery disease due to calcified coronary lesion 8/11/2015   • Hyperlipidemia        PHYSICAL EXAM:    Ht 1.702 m (5' 7.01\")  Wt 76.204 kg (168 lb)  BMI 26.31 kg/m2    Blood pressure 128/80   pulse originally 98  and settled at 85 while I examine him.    Gen.   appears healthy     Skin   appropriate for sex and age    HEENT  unremarkable    Neck   no palpable thyroid or might be substernal.,    Heart  regular    Extremities  no edema    Neuro  gait and station normal, no tremor    Psych  appropriate, good spirits           ASSESSMENT AND RECOMMENDATIONS    1. Hypothyroidism, unspecified " type            No dose change indicated  - FREE THYROXINE; Future  - TSH; Future  - T3 FREE; Future      DISPOSITION: Return in about 6 months (around 10/19/2017).       Alex Stein M.D.    Copies to: Charbel Jaffe M.D. [phone]

## 2017-05-30 ENCOUNTER — OFFICE VISIT (OUTPATIENT)
Dept: MEDICAL GROUP | Facility: MEDICAL CENTER | Age: 67
End: 2017-05-30
Payer: MEDICARE

## 2017-05-30 VITALS
SYSTOLIC BLOOD PRESSURE: 104 MMHG | DIASTOLIC BLOOD PRESSURE: 66 MMHG | HEART RATE: 87 BPM | OXYGEN SATURATION: 97 % | WEIGHT: 166 LBS | HEIGHT: 67 IN | BODY MASS INDEX: 26.06 KG/M2 | TEMPERATURE: 97.8 F

## 2017-05-30 DIAGNOSIS — G14 POSTPOLIO SYNDROME: ICD-10-CM

## 2017-05-30 DIAGNOSIS — Z23 NEED FOR VACCINATION: ICD-10-CM

## 2017-05-30 DIAGNOSIS — E11.9 CONTROLLED TYPE 2 DIABETES MELLITUS WITHOUT COMPLICATION, WITHOUT LONG-TERM CURRENT USE OF INSULIN (HCC): Chronic | ICD-10-CM

## 2017-05-30 DIAGNOSIS — Z00.00 MEDICARE ANNUAL WELLNESS VISIT, SUBSEQUENT: ICD-10-CM

## 2017-05-30 DIAGNOSIS — E03.9 HYPOTHYROIDISM, UNSPECIFIED TYPE: Chronic | ICD-10-CM

## 2017-05-30 DIAGNOSIS — Z00.00 PREVENTATIVE HEALTH CARE: Chronic | ICD-10-CM

## 2017-05-30 DIAGNOSIS — Z79.899 ON ANGIOTENSIN-CONVERTING ENZYME (ACE) INHIBITORS: ICD-10-CM

## 2017-05-30 DIAGNOSIS — Z72.0 TOBACCO ABUSE: Chronic | ICD-10-CM

## 2017-05-30 DIAGNOSIS — E78.5 DYSLIPIDEMIA: Chronic | ICD-10-CM

## 2017-05-30 PROCEDURE — 99999 PR NO CHARGE: CPT | Performed by: INTERNAL MEDICINE

## 2017-05-30 PROCEDURE — 4004F PT TOBACCO SCREEN RCVD TLK: CPT | Performed by: INTERNAL MEDICINE

## 2017-05-30 PROCEDURE — 3044F HG A1C LEVEL LT 7.0%: CPT | Performed by: INTERNAL MEDICINE

## 2017-05-30 PROCEDURE — G0438 PPPS, INITIAL VISIT: HCPCS | Mod: 25 | Performed by: INTERNAL MEDICINE

## 2017-05-30 PROCEDURE — G0009 ADMIN PNEUMOCOCCAL VACCINE: HCPCS | Performed by: INTERNAL MEDICINE

## 2017-05-30 PROCEDURE — 90670 PCV13 VACCINE IM: CPT | Performed by: INTERNAL MEDICINE

## 2017-05-30 ASSESSMENT — PATIENT HEALTH QUESTIONNAIRE - PHQ9: CLINICAL INTERPRETATION OF PHQ2 SCORE: 0

## 2017-05-30 NOTE — MR AVS SNAPSHOT
"        Orlando Grewalolin   2017 11:20 AM   Office Visit   MRN: [id number]    Department:  [facility]   Dept Phone:  [phone]    Description:  Male : 1950   Provider:  Charbel Jaffe M.D.           Allergies as of 2017     Allergen Noted Reactions    Pcn [Penicillins] 2015   Anaphylaxis    Other Environmental 2015       Hayfever    Tetanus Toxoid 2016         You were diagnosed with     Medicare annual wellness visit, subsequent   [953451]       Tobacco abuse   [134655]       Hypothyroidism, unspecified type   [8083085]       Controlled type 2 diabetes mellitus without complication, without long-term current use of insulin (CMS-HCC)   [3960105]       Dyslipidemia   [787726]       Postpolio syndrome   [656931]       On angiotensin-converting enzyme (ACE) inhibitors   [5581108]       Need for vaccination   [424742]       Preventative health care   [534241]         Vital Signs     Blood Pressure Pulse Temperature Height Weight Body Mass Index    104/66 mmHg 87 36.6 °C (97.8 °F) 1.702 m (5' 7\") 75.297 kg (166 lb) 25.99 kg/m2    Oxygen Saturation Smoking Status                97% Current Every Day Smoker          Basic Information     Date Of Birth Sex Race Ethnicity Preferred Language    1950 Male White Non- English      Your appointments     2017 11:15 AM   FOLLOW UP with Gilberto Bryson M.D.   [facility] (--)    [address]  [place] [address] [phone]            Oct 18, 2017  1:20 PM   Established Patient with Alex Stein M.D.   [facility] ([facility])    [address]  [place] [address] [phone]           You will be receiving a confirmation call a few days before your appointment from our automated call confirmation system.            Dec 05, 2017 11:20 AM   Established Patient with Charbel Jaffe M.D.   [facility]" [facility])    [address]  [place] [address] [phone]           You will be receiving a confirmation call a few days before your appointment from our automated call confirmation system.              Problem List              ICD-10-CM Priority Class Noted - Resolved    History of allergic rhinitis Z87.09   8/21/2010 - Present    Gastroparesis (Chronic) K31.84   8/21/2010 - Present    History of BPH Z87.438   8/21/2010 - Present    Interstitial cystitis N30.10   8/21/2010 - Present    Postpolio syndrome G14   8/21/2010 - Present    Hypothyroid (Chronic) E03.9   8/21/2010 - Present    On angiotensin-converting enzyme (ACE) inhibitors (for diabetes, not HTN) Z79.811   8/21/2010 - Present    Diabetes mellitus type 2, controlled (CMS-HCC) (Chronic) E11.9   9/10/2010 - Present    Preventative health care (Chronic) Z00.00   12/9/2010 - Present    Shoulder pain M25.519   12/9/2010 - Present    Insomnia G47.00   4/18/2012 - Present    Dyslipidemia (Chronic) E78.5   5/7/2013 - Present    Tobacco abuse (Chronic) Z72.0   7/29/2015 - Present    History of MI (myocardial infarction) (Chronic) I25.2   8/6/2015 - Present      Health Maintenance        Date Due Completion Dates    IMM DTaP/Tdap/Td Vaccine (1 - Tdap) 2/20/1969 ---    IMM PNEUMOCOCCAL 65+ (ADULT) LOW/MEDIUM RISK SERIES (2 of 2 - PCV13) 8/8/2016 8/8/2015, 10/10/2008    DIABETES MONOFILAMENT / LE EXAM 5/31/2017 5/31/2016, 7/31/2015, 9/18/2014, 9/18/2014 (Done), 4/16/2013 (Declined)    Override on 9/18/2014: Done    Override on 4/16/2013: Patient Declined    A1C SCREENING 11/23/2017 5/23/2017, 11/22/2016, 5/19/2016, 11/23/2015, 7/15/2015, 3/21/2015, 10/17/2014, 9/18/2014 (Declined), 4/16/2014, 8/14/2013, 4/16/2013, 12/22/2011    Override on 9/18/2014: Patient Declined    RETINAL SCREENING 1/9/2018 1/9/2017 (Done), 6/1/2016, 12/9/2013    Override on 1/9/2017: Done ()    FASTING LIPID PROFILE 5/23/2018 5/23/2017, 11/22/2016,  4/5/2016, 10/7/2015, 8/21/2015, 8/8/2015, 9/18/2014 (Declined), 5/7/2013, 4/16/2013 (Declined)    Override on 9/18/2014: Patient Declined    Override on 4/16/2013: Patient Declined    URINE ACR / MICROALBUMIN 5/23/2018 5/23/2017, 11/22/2016, 5/19/2016, 11/23/2015, 8/21/2015, 9/18/2014 (Declined), 4/16/2013 (Declined)    Override on 9/18/2014: Patient Declined    Override on 4/16/2013: Patient Declined    SERUM CREATININE 5/23/2018 5/23/2017, 11/22/2016, 5/19/2016, 4/5/2016, 10/7/2015, 8/21/2015, 8/7/2015, 8/6/2015, 9/18/2014 (Declined), 5/7/2013, 5/6/2013, 5/6/2013, 4/16/2013, 2/10/2013, 12/18/2010, 12/17/2010    Override on 9/18/2014: Patient Declined    COLONOSCOPY 5/31/2026 5/31/2016 (Declined), 9/18/2014 (Declined), 4/16/2013 (Declined)    Override on 5/31/2016: Patient Declined    Override on 9/18/2014: Patient Declined    Override on 4/16/2013: Patient Declined            Current Immunizations     13-VALENT PCV PREVNAR  Incomplete    Influenza Vaccine Adult HD 12/5/2016, 12/5/2016    Pneumococcal polysaccharide vaccine (PPSV-23) 8/8/2015 10:55 AM, 10/10/2008    SHINGLES VACCINE 1/12/2016      Below and/or attached are the medications your provider expects you to take. Review all of your home medications and newly ordered medications with your provider and/or pharmacist. Follow medication instructions as directed by your provider and/or pharmacist. Please keep your medication list with you and share with your provider. Update the information when medications are discontinued, doses are changed, or new medications (including over-the-counter products) are added; and carry medication information at all times in the event of emergency situations     Allergies:  PCN - Anaphylaxis     OTHER ENVIRONMENTAL - (reactions not documented)     TETANUS TOXOID - (reactions not documented)               Medications  Valid as of: May 30, 2017 - 11:59 AM    Generic Name Brand Name Tablet Size Instructions for use    ALPRAZolam  (Tab) XANAX 0.25 MG Take 1 Tab by mouth at bedtime as needed for Sleep.        Aspirin (Chew Tab) ASA 81 MG Take 1 Tab by mouth every day.        Atorvastatin Calcium (Tab) LIPITOR 80 MG Take 1 Tab by mouth every evening.        Cholecalciferol (Tab) cholecalciferol 1000 UNIT Take 1,000 Units by mouth every day.        Erythromycin Base (Tab) UMM- MG Take 1 Tab by mouth 2 Times a Day. Take with meals for gastroparesis        Glucose Blood (Strip) glucose blood  1 Strip by Other route every day. Check blood sugar once a day before meal,E11.9        Insulin Glargine (Solution Pen-injector) LANTUS 100 UNIT/ML Inject 12 Units as instructed every evening.        Insulin Pen Needle (Misc) Insulin Pen Needle 31G X 6 MM 1 Device by Other route every day. Use with solostar pen, E11.9        Isosorbide Mononitrate (TABLET SR 24 HR) IMDUR 60 MG Take 1 Tab by mouth every morning.        Lancets (Misc) TRUEPLUS LANCETS 28G  1 Device by Does not apply route 2 times daily, before breakfast and dinner. E11.9        Levothyroxine Sodium (Tab) SYNTHROID 112 MCG TAKE ONE TABLET BY MOUTH ONCE DAILY IN THE MORNING ON AN EMPTY STOMACH        Liothyronine Sodium (Tab) CYTOMEL 25 MCG TAKE ONE TABLET BY MOUTH ONCE DAILY        Lisinopril (Tab) PRINIVIL 2.5 MG Take 1 Tab by mouth every day.        MetFORMIN HCl (Tab) GLUCOPHAGE 1000 MG Take 1 Tab by mouth 2 times a day, with meals.        Nitroglycerin (SL Tab) NITROSTAT 0.4 MG Place 1 Tab under tongue as needed for Chest Pain.        .                 Medicines prescribed today were sent to:     [facility] [employer] [place], [place] - [address]    [address] [place] [address]    Phone: [phone] Fax: [phone]    Open 24 Hours?: No      Medication refill instructions:       If your prescription bottle indicates you have medication refills left, it is not necessary to call your provider’s office. Please contact your pharmacy and they will refill your  medication.    If your prescription bottle indicates you do not have any refills left, you may request refills at any time through one of the following ways: The online Attend[employer] system (except Urgent Care), by calling your provider’s office, or by asking your pharmacy to contact your provider’s office with a refill request. Medication refills are processed only during regular business hours and may not be available until the next business day. Your provider may request additional information or to have a follow-up visit with you prior to refilling your medication.   *Please Note: Medication refills are assigned a new Rx number when refilled electronically. Your pharmacy may indicate that no refills were authorized even though a new prescription for the same medication is available at the pharmacy. Please request the medicine by name with the pharmacy before contacting your provider for a refill.        Other Notes About Your Plan     9/17/16 [facility] patient assistance fax number [phone] and phone [phone]  10/6/16 per patient [facility] will no longer provide erythromycin, prescription to be filled at [facility]  11/29/16 refill xanax  prevnar                Tobacco  7/31/15 tobacco 1 ppd not interested in stopping smoking classes or education  8/28/15 tobacco 1 ppd not interested in stopping smoking classes or education  8/28/15 declines PFT    11/30/15 still smoking 20 cigs per day declines stop smoking classes or medications  5/31/16 declines stop smoking classes and medications, not interested in stop smoking classes, smoking ppd x 40 plus years, declines CT lung cancer screening, pulmonary function testing  11/29/16 still smoking 1 ppd, started smoking age 20, declines stop smoking classes and medication, declines lung cancer screening program and PFT    Shoulder pain  12/9/10 left shoulder pain,  note MRI pending  2/11 MRI left shoulder [facility] mild to moderate rotator cuff tendinopathy, adhesive  capsulitis, small anterior and posterior labral tears  7/11 [facility] note  left shoulder adhesive capsulitis rec decompression  12/8/15 xray right shoulder at [facility] mild hydroxyapatite deposition adjacent to the greater tuberosity, no evidence of significant arthritis  5/11/16 MRI right shoulder thickening and increased signal with synovitis, suggestive of adhesive capsulitis, tendinopathy supraspinatus and infraspinatus, fatty atrophy paraspinous muscle  1/4/17 [facility] discharge note    Preventative health  8/23/15 pneumovax  1/12/16 zoster vaccine at [facility]  5/31/16 declines colonoscopy, FIT  11/23/16 psa 2.1  11/23/16 vit d 51  11/29/16 recommend influenza vaccine and pneumococcal 13 at pharmacy since we are out  11/29/16 declines colonoscopy, declines FIT card    Postpolio syndrome  6/04 saw neurology in [place] , notes indicate chronic non specific complaints with normal brain MRI, no evidence to support post polio syndrome.    On ACE inhibitor  4/04 p.thallium no ischemia EF 62%  12/10 p.thallium no ischemia, EF 59%  5/9/11 rec ACE instead of atenolol patient declines  10/17/11 discontinued atenolol, rec start lotensin 10 mg; he declines  5/7/13 echo normal LV function, EF 60%, grade 1 diastolic dysfunction  5/7/13 p.thallium fixed defect mid inferior, inferoseptal, mid inferior walls suggest subdiaphragmatic uptake or prior infarction, no ischemia noted, EF 57%  8/8/15 hospital discharge on coreg 12.5 mg bid,lisinopril 5 mg, brilinta 90 mg bid, asa 81 mg,lipitor 80 mg  8/26/15 cardiology note, decrease coreg to 6.25 mg bid due to lower blood pressure continue lisinopril 5 mg    8/28/15 decrease lisinopril to 2.5 mg daily and cont coreg 6.25 mg bid  11/24/15 urine mac<2.5 on lisinopril 2.5 mg and coreg 6.25 mg bid  4/12/16 cardiology note decrease coreg to 3.125 mg bid consider discontinuation of coreg next evaluation and continue lisinopril 2.5 mg  5/20/16 urine mac <3 on lisinopril 2.5  mg  7/19/16 cardiology note clinically stable, episodes of chest tightness related to stress, blood pressure running low, discontinue carvedilol, follow-up in a few months  11/23/16 urine mac 3.2 on lisinopril 2.5 mg  5/24/17 A1c 6.3% on lantus 12 units and metformin 1000 mg bid     Interstitial cystitis  5/02 urology note [facility] nonbacterial prostatitis vs interstitial cystitis given trial of flomax    Insomnia on Xanax as needed    Hypothyroid  10/08 tsh 0.126,free t4 1.4,free t3 2.7  8/10 tsh 0.199 taking levothyroxine 0.125 6 days a week, and 2 tabs on john  9/10/10 increase to levothyroxine 0.137 mg daily, repeat tsh in 6 weeks  10/10 tsh 0.9  12/10 tsh 1.2, free t4 1.4, free t3 2.9  4/11 tsh 0.8, thyroxine 9.6, free thyroxine uptake 3.4, free t3 uptake 35%  9/11 tsh 0.5, free t4 0.9, free t3 2.4 on levothyroxine 137 mcg  12/11 tsh 0.3,free t4 1.5,free t3 2.7 on levothyroxine 137 mcg  4/16/13 tsh 0.28,free t4 1.1, free t3 2.3 (2.4-4.2); on levothyroxine 137 mcg; change to armour thyroid 60 mg qday  5/6/13 tsh 0.3, free t4 1.0, free t3 2.5 on levothyroxine 137 mcg ? Change to armour 60 mg  5/9/13  note decrease levothyroxine to 125 mcg and add cytomel 5 mg bid  6/13 tsh 0.45, free t4 1.2, free t3 2.9, on synthroid 125 mcg and cytomel 5 mcg bid per   8/14/13 tsh 0.235,free t4 1.1,free t3 2.8 on synthroid 125 mcg and cytomel 5 mcg bid per ;change to synthroid 150 mcg and stop cytomel per pt request  10/16/13 tsh 0.4, free t4 1.34, free t3 2.2 on synthroid 150 mcg  10/23/13  endo note; change to synthroid 125 mcg and cytomel 5 mcg daily  4/16/14 tsh 0.67,free t4 1.0,free t3 2.4  4/23/14  endocrine note, increase levothyroxine to 137 mcg and cont cytomel 5 mcg  10/28/14  endocrine note, tsh 0.6,free t4 1.0, free t3 2.7 on thyroxine 137 mcg and cytomel 5 mcg; gastroparesis symptoms improve with cytomel, will reduce thryoxine to 125 mcg and  use cytomel 25 mcg to cut and take more than once per day as needed, return in 4 months  7/3/15 tsh 0.04, free t4 0.6 and free t3 3.0 on levothyroxine 125 mcg and cytomel 12.5 mcg daily  7/21/15  endocrine note; on levothyroxine 125 mcg and cytomel 12.5 mcg daily, tsh 0.04, free t4 0.6 and free t3 3.0, patient does not want to change dose, no changes continue same dosing regimen; follow up 6 months  8/8/15  endocrine phone note, decrease levothyroxine to 112 mcg daily, continue cytomel 12.5 mg daily  1/13/16 tsh 0.016,free t4 0.9,free t3 2.5 on levothyroxine 112 mcg and cytomel 12.5 mg daily  1/22/16  endocrinology note on levothyroxine 112 mcg and cytomel 12.5 mg daily, patient declines to change dosing regimen  4/5/16 tsh 0.012,free t4 0.97,free t3 3.6 on levothyroxine 112 mcg and cytomel 12.5 mg daily  4/21/16  endocrinology note, no changes  10/13/16 tsh 0.14,free t4 0.87,free t3 3.1 on levothyroxine 112 mcg and cytomel 25 mg  4/17/17 tsh 0.016,free t4 0.9,free t3 4.0 on levothyroxine 112 mcg and cytomel 25 mg  4/19/17  endocrinology note no changes follow up 6 months    History MI  8/6/15 hospital admission non-STEMI inverted T waves in aVL, ST depression in lead 1, initial troponin 3.4  8/6/15   8/6/15 cardiac catheterization left main free of disease, triple vessel disease prominently involving distal segment nature epicardial vessels and branches, 95% ostial LAD descending artery stenosis and 70% mid RCA stenosis, ejection fraction 35-40%, stenting ostial LAD with xience drug-eluting stent  8/7/15 echo normal LV function EF 65-70%, grade 1 diastolic dysfunction, moderate concentric LVH, mild MR and AR  8/8/15 hospital discharge on coreg 12.5 mg bid,lisinopril 5 mg, brilinta 90 mg bid, asa 81 mg,lipitor 80 mg  8/11/15 cardiology note; continue brilenta and asa for one year, some dyspnea, if no improvement could consider another antiplatelet  therapy, will recheck BNP in 2 weeks with followup visit, ultimately will need myocardial perfusion scan but will defer for a few months  8/26/15 cardiology note, decrease coreg to 6.25 mg bid due to lower blood pressure,continue lisinopril 5 mg, asa, lipitor,start effient 10 mg for one year due to brilinta causing shortness of breath, followup 2 months  10/14/15 cardiology note no chnges, repeat myocardial perfusion scan 3 months  11/30/15 cardiac rehab program  1/13/15 cardiology note nitroglycerin when necessary follow-up 3 months  1/12/16 persantine thallium small basal anterior inferior infarct with small ame-infarct ischemia, moderately sized moderate severity inferior, inferior lateral infarct with reversible ischemia  4/12/16 cardiology note decrease coreg to 3.125 mg bid consider discontinuation of coreg next evaluation and continue lisinopril 2.5 mg  7/19/16 cardiology note clinically stable, episodes of chest tightness related to stress, blood pressure running low, discontinue carvedilol, follow-up in a few months  11/3/16 cardiology note, isosorbide mononitrate with heavy exertion, follow-up in a few months to determine if further evaluation is necessary, could consider repeat perfusion study or dobutamine stress echo  1/5/17 cardiology note stable CAD, follow-up 6 months    History of BPH  10/04 cystoscopy and green light photovaporization of prostate in [place], [place]    Gastroparesis  4/04 gastric emptying study severe gatroparesis done in [place], no hx of DM or MS  5/04 CT thorax in [place] negative  5/04 MRI brain in [place] no evid of MS  On Emycin  4/16/13 declined gastric stimulator    Dyslipidemia  5/13 chol 158,trig 204,hdl 36,ldl 81  8/8/15 chol 97,trig 117,hdl 26,ldl 48 started on lipitor 80 mg on hospital discharge  8/21/15 chol 76,trig 85,hdl 24,ldl 35 on lipitor 80 mg  10/7/15 chol 67,trig 77,hdl 22,ldl 30 on lipitor 80 mg per cardiology  4/5/16 chol 64,trig 43,hdl 24,ldl 31 on lipitor 80 mg per  cardiology  11/23/16 chol 83,trig 73,hdl 27,ldl 41 on lipitor 80 mg per cardiology  5/24/17 chol 85,trig 81,hdl 28,ldl 41 on lipitor 80 mg per cardiology    Diabetes  11/08 A1c 6.3%  8/10 A1c 7.9%  10/10 A1c 7.7%  12/10 A1c 8.0%  1/11 add metformin 500 mg bid  2/11 A1c 7.9%  4/11 A1c 8.2% increase metformin to 1000 mg bid  10/11 A1c 6.5 %  12/11 A1c 6.1% on metformin 1000 mg bid  4/16/13 A1c 6.2% on metformin 1000 mg bid; declines to check blood sugars at home; bs 194 non fasting; declines ACE  8/14/13 A1c 6.5% on metformin 1000 mg bid  12/9/13  eye exam grade 1 diabetic background retinopathy  4/16/14 A1c 7.0% per  on metformin 1000 mg bid  7/3/15 A1c 8.3% on metformin 1000 mg bid per endocrine   7/31/15 start lantus 5 units and continue metformin 1000 mg bid, declines ACE,statin,asa  8/8/15 hospital discharge on coreg 12.5 mg bid,lisinopril 5 mg, brilinta 90 mg bid, asa 81 mg,lipitor 80 mg; on lantus 8 units and metformin 1000 mg bid  8/28/15 declines nutrition consultation and diabetic education regarding diet  8/28/15 recommend increasing lantus to 10 units and metformin 1000 mg bid  11/24/15 A1c 6.3% on lantus 10 units and metformin 1000 mg bid  11/30/15 on lantus 12 units and metformin 1000 mg bid  5/20/16 A1c 6.3% on lantus 12 units and metformin 1000 mg bid  11/23/16 A1c 6.1% on lantus 12 units and metformin 1000 mg bid  1/9/17  eye exam  5/24/17 A1c 6.3% on lantus 12 units and metformin 1000 mg bid   5/24/17 urine mac 2.5 on lisinopril 2.5 mg                    MyChart Access Code: Activation code not generated  Current MyChart Status: Active          Quit Tobacco Information     Do you want to quit using tobacco?    Quitting tobacco decreases risks of cancer, heart and lung disease, increases life expectancy, improves sense of taste and smell, and increases spending money, among other benefits.    If you are thinking about quitting, we can help.  • [facility] Quit  Tobacco Program: [phone]  o Program occurs weekly for four weeks and includes pharmacist consultation on products to support quitting smoking or chewing tobacco. A provider referral is needed for pharmacist consultation.  • Tobacco Users Help Hotline: 2-243-QUIT-NOW (276-0003) or https://[place].quitlogix.org/  o Free, confidential telephone and online coaching for [place] residents. Sessions are designed on a schedule that is convenient for you. Eligible clients receive free nicotine replacement therapy.  • Nationally: www.smokefree.gov  o Information and professional assistance to support both immediate and long-term needs as you become, and remain, a non-smoker. Smokefree.gov allows you to choose the help that best fits your needs.

## 2017-05-30 NOTE — PROGRESS NOTES
Subjective:      Orlando Arreguin is a 67 y.o. male who presents with wellness          HPI     HPI:  Orlando is a 67 y.o. here for Health Risk Assessment.     PCP: Charbel Jaffe M.D.  Sees  cardiology  Sees  endocrine  Sees  eye annually   meds and allergies reviewed  Tobacco one pack per day  Checking blood sugars once a day and sugars running , a1c 6.3%, keeps active and tries to limit sweets and candies in diet        Patient Active Problem List    Diagnosis Date Noted   • Coronary artery disease due to calcified coronary lesion: Proximal LAD stent in August 2015 04/12/2016   • History of MI (myocardial infarction) 08/06/2015   • Tobacco abuse 07/29/2015   • Dyslipidemia 05/07/2013   • Insomnia 04/18/2012   • Preventative health care 12/09/2010   • Shoulder pain 12/09/2010   • Diabetes mellitus type 2, controlled (CMS-[facility]) 09/10/2010   • History of allergic rhinitis 08/21/2010   • Gastroparesis 08/21/2010   • History of BPH 08/21/2010   • Interstitial cystitis 08/21/2010   • Postpolio syndrome 08/21/2010   • Hypothyroid 08/21/2010   • On angiotensin-converting enzyme (ACE) inhibitors (for diabetes, not HTN) 08/21/2010     Current Outpatient Prescriptions   Medication Sig Dispense Refill   • glucose blood (RELION PRIME TEST) strip 1 Strip by Other route every day. Check blood sugar once a day before meal,E11.9 100 Strip 3   • levothyroxine (SYNTHROID) 112 MCG Tab TAKE ONE TABLET BY MOUTH ONCE DAILY IN THE MORNING ON AN EMPTY STOMACH 90 Tab 0   • metformin (GLUCOPHAGE) 1000 MG tablet Take 1 Tab by mouth 2 times a day, with meals. 180 Tab 1   • insulin glargine (LANTUS SOLOSTAR) 100 UNIT/ML Solution Pen-injector injection Inject 12 Units as instructed every evening. 15 PEN 6   • aspirin (ASA) 81 MG Chew Tab chewable tablet Take 1 Tab by mouth every day. 100 Tab 11   • alprazolam (XANAX) 0.25 MG Tab Take 1 Tab by mouth at bedtime as needed for Sleep. 30 Tab 0   • isosorbide mononitrate  SR (IMDUR) 60 MG TABLET SR 24 HR Take 1 Tab by mouth every morning. 30 Tab 3   • erythromycin base (UMM-TAB) 250 MG Tab Take 1 Tab by mouth 2 Times a Day. Take with meals for gastroparesis 180 Tab 3   • Insulin Pen Needle (RELION MINI PEN NEEDLES) 31G X 6 MM Misc 1 Device by Other route every day. Use with solostar pen, E11.9 100 Each 3   • TRUEPLUS LANCETS 28G Misc 1 Device by Does not apply route 2 times daily, before breakfast and dinner. E11.9 100 Each 11   • atorvastatin (LIPITOR) 80 MG tablet Take 1 Tab by mouth every evening. 90 Tab 3   • lisinopril (PRINIVIL) 2.5 MG Tab Take 1 Tab by mouth every day. 90 Tab 3   • liothyronine (CYTOMEL) 25 MCG Tab TAKE ONE TABLET BY MOUTH ONCE DAILY 90 Tab 3   • nitroglycerin (NITROSTAT) 0.4 MG SL Tab Place 1 Tab under tongue as needed for Chest Pain. 25 Tab 11   • vitamin D (CHOLECALCIFEROL) 1000 UNIT Tab Take 1,000 Units by mouth every day.       No current facility-administered medications for this visit.      Current supplements: reviwed  Chronic narcotic pain medicines no  Allergies: Pcn; Other environmental; and Tetanus toxoid    Screening:reviewed  Depressive Symptoms: Denies feeling down, depressed or hopeless. Denies loss of interest or pleasure in doing things   ADLs: Denies needing help with using telephone, transportation, shopping, preparing meals, housework, laundry, or managing medication or money  Independent with bathing, hygiene, feeding, toileting, dressing    Memory concerns: Denies difficulty remembering details of conversations, events and upcoming appointments.  Hearing problems: Denies.   Recent falls no    Current social contact/activities: walking taking care of yard   Social History   Substance Use Topics   • Smoking status: Current Every Day Smoker -- 1.00 packs/day for 30 years     Types: Cigarettes   • Smokeless tobacco: Never Used   • Alcohol Use: No       Family History   Problem Relation Age of Onset   • Heart Disease Mother    • Heart Attack  Mother 55     heart attack     Past Surgical History   Procedure Laterality Date   • Appendectomy     • Other       L shoulder surgery (arthroscopic, [place], 2011)   • Pr transurethral elec-surg prostatectom     • Cardiac cath  8/6/15     YESIKA to LAD.  Has RCA 70% occulsion.         Ostomy or other tubes or amputations: no  Chronic oxygen use no  Gait: normal. Uses assistive device no    Health Care Screening recommendations reviewed with patient today and updated or ordered.  DPA/Advanced directive: Completed/Information provided.    Referrals for PT/OT/Nutrition counseling/Behavioral Health/Smoking cessation as above if indicated  Discussion today about general wellness and lifestyle habits:    · Prevent falls and reduce trip hazards;   · Have a working fire alarm and carbon monoxide detector;   · Engage in regular physical activity and social activities;   · Use sun protection when outdoors.    ·   ·           Current Outpatient Prescriptions   Medication Sig Dispense Refill   • glucose blood (RELION PRIME TEST) strip 1 Strip by Other route every day. Check blood sugar once a day before meal,E11.9 100 Strip 3   • levothyroxine (SYNTHROID) 112 MCG Tab TAKE ONE TABLET BY MOUTH ONCE DAILY IN THE MORNING ON AN EMPTY STOMACH 90 Tab 0   • metformin (GLUCOPHAGE) 1000 MG tablet Take 1 Tab by mouth 2 times a day, with meals. 180 Tab 1   • insulin glargine (LANTUS SOLOSTAR) 100 UNIT/ML Solution Pen-injector injection Inject 12 Units as instructed every evening. 15 PEN 6   • aspirin (ASA) 81 MG Chew Tab chewable tablet Take 1 Tab by mouth every day. 100 Tab 11   • alprazolam (XANAX) 0.25 MG Tab Take 1 Tab by mouth at bedtime as needed for Sleep. 30 Tab 0   • isosorbide mononitrate SR (IMDUR) 60 MG TABLET SR 24 HR Take 1 Tab by mouth every morning. 30 Tab 3   • erythromycin base (UMM-TAB) 250 MG Tab Take 1 Tab by mouth 2 Times a Day. Take with meals for gastroparesis 180 Tab 3   • Insulin Pen Needle (RELION MINI PEN NEEDLES)  31G X 6 MM Misc 1 Device by Other route every day. Use with solostar pen, E11.9 100 Each 3   • TRUEPLUS LANCETS 28G Misc 1 Device by Does not apply route 2 times daily, before breakfast and dinner. E11.9 100 Each 11   • atorvastatin (LIPITOR) 80 MG tablet Take 1 Tab by mouth every evening. 90 Tab 3   • lisinopril (PRINIVIL) 2.5 MG Tab Take 1 Tab by mouth every day. 90 Tab 3   • liothyronine (CYTOMEL) 25 MCG Tab TAKE ONE TABLET BY MOUTH ONCE DAILY 90 Tab 3   • nitroglycerin (NITROSTAT) 0.4 MG SL Tab Place 1 Tab under tongue as needed for Chest Pain. 25 Tab 11   • vitamin D (CHOLECALCIFEROL) 1000 UNIT Tab Take 1,000 Units by mouth every day.       No current facility-administered medications for this visit.     Depression Screening    Little interest or pleasure in doing things?  0 - not at all  Feeling down, depressed , or hopeless? 0 - not at all  Patient Health Questionnaire Score: 0   If depressive symptoms identified deferred to follow up visit unless specifically addressed in assessment and plan.    Interpretation of PHQ-9 Total Score   Score Severity   1-4 Minimal Depression   5-9 Mild Depression   10-14 Moderate Depression   15-19 Moderately Severe Depression   20-27 Severe Depression    Screening for Cognitive Impairment    Three Minute Recall (banana, sunrise, fence)  2/3    Draw clock face with all 12 numbers set to the hand to show 10 minures past 11 o'clock  1    If cognitive concerns identified deferred to follow up visit unless specifically addressed in assessment and plan.    Fall Risk Assessment    Has the patient had two or more falls in the last year or any fall with injury in the last year?  No  If Fall Risk identified deferred to follow up visit unless specifically addressed in assessment and plan.    Safety Assessment    Throw rugs on floor.  No  Handrails on all stairs.  Yes  Good lighting in all hallways.  Yes  Difficulty hearing.  No  Patient counseled about all safety risks that were  identified.    Functional Assessment ADLs    Are there any barriers preventing you from cooking for yourself or meeting nutritional needs?  No.    Are there any barriers preventing you from driving safely or obtaining transportation?  No.    Are there any barriers preventing you from using a telephone or calling for help?  No.    Are there any barriers preventing you from shopping?  No.    Are there any barriers preventing you from taking care of your own finances?  No.    Are there any barriers preventing you from managing your medications?  No.    Are currently engaing any exercise or physical activity?  Yes.       Health Maintenance Summary                IMM DTaP/Tdap/Td Vaccine Overdue 2/20/1969     IMM PNEUMOCOCCAL 65+ (ADULT) LOW/MEDIUM RISK SERIES Overdue 8/8/2016      Done 8/8/2015 Imm Admin: Pneumococcal polysaccharide vaccine (PPSV-23)     Patient has more history with this topic...    DIABETES MONOFILAMENT / LE EXAM Next Due 5/31/2017      Done 5/31/2016 AMB DIABETIC MONOFILAMENT LOWER EXTREMITY EXAM     Patient has more history with this topic...    A1C SCREENING Next Due 11/23/2017      Done 5/23/2017 HEMOGLOBIN A1C (A)     Patient has more history with this topic...    RETINAL SCREENING Next Due 1/9/2018      Done 1/9/2017      Patient has more history with this topic...    FASTING LIPID PROFILE Next Due 5/23/2018      Done 5/23/2017 LIPID PANEL (A)     Patient has more history with this topic...    URINE ACR / MICROALBUMIN Next Due 5/23/2018      Done 5/23/2017 MICROALB/CREAT RATIO RAND. UR     Patient has more history with this topic...    SERUM CREATININE Next Due 5/23/2018      Done 5/23/2017 COMP METABOLIC PANEL     Patient has more history with this topic...    COLONOSCOPY Next Due 5/31/2026      Patient Declined 5/31/2016      Patient has more history with this topic...          Patient Care Team:  Charbel Jaffe M.D. as PCP - General  Alex Stein M.D. as Consulting Physician  (Endocrinology)        Tobacco  7/31/15 tobacco 1 ppd not interested in stopping smoking classes or education  8/28/15 tobacco 1 ppd not interested in stopping smoking classes or education  8/28/15 declines PFT    11/30/15 still smoking 20 cigs per day declines stop smoking classes or medications  5/31/16 declines stop smoking classes and medications, not interested in stop smoking classes, smoking ppd x 40 plus years, declines CT lung cancer screening, pulmonary function testing  11/29/16 still smoking 1 ppd, started smoking age 20, declines stop smoking classes and medication, declines lung cancer screening program and PFT    Shoulder pain  12/9/10 left shoulder pain,  note MRI pending  2/11 MRI left shoulder [facility] mild to moderate rotator cuff tendinopathy, adhesive capsulitis, small anterior and posterior labral tears  7/11 [facility] note  left shoulder adhesive capsulitis rec decompression  12/8/15 xray right shoulder at [facility] mild hydroxyapatite deposition adjacent to the greater tuberosity, no evidence of significant arthritis  5/11/16 MRI right shoulder thickening and increased signal with synovitis, suggestive of adhesive capsulitis, tendinopathy supraspinatus and infraspinatus, fatty atrophy paraspinous muscle  1/4/17 [facility] discharge note    Preventative health  8/23/15 pneumovax  1/12/16 zoster vaccine at [facility]  5/31/16 declines colonoscopy, FIT  11/23/16 psa 2.1  11/23/16 vit d 51  11/29/16 recommend influenza vaccine and pneumococcal 13 at pharmacy since we are out  11/29/16 declines colonoscopy, declines FIT card    Postpolio syndrome  6/04 saw neurology in [place] , notes indicate chronic non specific complaints with normal brain MRI, no evidence to support post polio syndrome.    On ACE inhibitor  4/04 p.thallium no ischemia EF 62%  12/10 p.thallium no ischemia, EF 59%  5/9/11 rec ACE instead of atenolol patient declines  10/17/11 discontinued atenolol, rec start lotensin  10 mg; he declines  5/7/13 echo normal LV function, EF 60%, grade 1 diastolic dysfunction  5/7/13 p.thallium fixed defect mid inferior, inferoseptal, mid inferior walls suggest subdiaphragmatic uptake or prior infarction, no ischemia noted, EF 57%  8/8/15 hospital discharge on coreg 12.5 mg bid,lisinopril 5 mg, brilinta 90 mg bid, asa 81 mg,lipitor 80 mg  8/26/15 cardiology note, decrease coreg to 6.25 mg bid due to lower blood pressure continue lisinopril 5 mg    8/28/15 decrease lisinopril to 2.5 mg daily and cont coreg 6.25 mg bid  11/24/15 urine mac<2.5 on lisinopril 2.5 mg and coreg 6.25 mg bid  4/12/16 cardiology note decrease coreg to 3.125 mg bid consider discontinuation of coreg next evaluation and continue lisinopril 2.5 mg  5/20/16 urine mac <3 on lisinopril 2.5 mg  7/19/16 cardiology note clinically stable, episodes of chest tightness related to stress, blood pressure running low, discontinue carvedilol, follow-up in a few months  11/23/16 urine mac 3.2 on lisinopril 2.5 mg  5/24/17 A1c 6.3% on lantus 12 units and metformin 1000 mg bid     Interstitial cystitis  5/02 urology note [facility] nonbacterial prostatitis vs interstitial cystitis given trial of flomax    Insomnia on Xanax as needed    Hypothyroid  10/08 tsh 0.126,free t4 1.4,free t3 2.7  8/10 tsh 0.199 taking levothyroxine 0.125 6 days a week, and 2 tabs on john  9/10/10 increase to levothyroxine 0.137 mg daily, repeat tsh in 6 weeks  10/10 tsh 0.9  12/10 tsh 1.2, free t4 1.4, free t3 2.9  4/11 tsh 0.8, thyroxine 9.6, free thyroxine uptake 3.4, free t3 uptake 35%  9/11 tsh 0.5, free t4 0.9, free t3 2.4 on levothyroxine 137 mcg  12/11 tsh 0.3,free t4 1.5,free t3 2.7 on levothyroxine 137 mcg  4/16/13 tsh 0.28,free t4 1.1, free t3 2.3 (2.4-4.2); on levothyroxine 137 mcg; change to armour thyroid 60 mg qday  5/6/13 tsh 0.3, free t4 1.0, free t3 2.5 on levothyroxine 137 mcg ? Change to armour 60 mg  5/9/13  note decrease  levothyroxine to 125 mcg and add cytomel 5 mg bid  6/13 tsh 0.45, free t4 1.2, free t3 2.9, on synthroid 125 mcg and cytomel 5 mcg bid per   8/14/13 tsh 0.235,free t4 1.1,free t3 2.8 on synthroid 125 mcg and cytomel 5 mcg bid per ;change to synthroid 150 mcg and stop cytomel per pt request  10/16/13 tsh 0.4, free t4 1.34, free t3 2.2 on synthroid 150 mcg  10/23/13  endo note; change to synthroid 125 mcg and cytomel 5 mcg daily  4/16/14 tsh 0.67,free t4 1.0,free t3 2.4  4/23/14  endocrine note, increase levothyroxine to 137 mcg and cont cytomel 5 mcg  10/28/14  endocrine note, tsh 0.6,free t4 1.0, free t3 2.7 on thyroxine 137 mcg and cytomel 5 mcg; gastroparesis symptoms improve with cytomel, will reduce thryoxine to 125 mcg and use cytomel 25 mcg to cut and take more than once per day as needed, return in 4 months  7/3/15 tsh 0.04, free t4 0.6 and free t3 3.0 on levothyroxine 125 mcg and cytomel 12.5 mcg daily  7/21/15  endocrine note; on levothyroxine 125 mcg and cytomel 12.5 mcg daily, tsh 0.04, free t4 0.6 and free t3 3.0, patient does not want to change dose, no changes continue same dosing regimen; follow up 6 months  8/8/15  endocrine phone note, decrease levothyroxine to 112 mcg daily, continue cytomel 12.5 mg daily  1/13/16 tsh 0.016,free t4 0.9,free t3 2.5 on levothyroxine 112 mcg and cytomel 12.5 mg daily  1/22/16  endocrinology note on levothyroxine 112 mcg and cytomel 12.5 mg daily, patient declines to change dosing regimen  4/5/16 tsh 0.012,free t4 0.97,free t3 3.6 on levothyroxine 112 mcg and cytomel 12.5 mg daily  4/21/16  endocrinology note, no changes  10/13/16 tsh 0.14,free t4 0.87,free t3 3.1 on levothyroxine 112 mcg and cytomel 25 mg  4/17/17 tsh 0.016,free t4 0.9,free t3 4.0 on levothyroxine 112 mcg and cytomel 25 mg  4/19/17  endocrinology note no changes follow up 6 months    History  MI  8/6/15 hospital admission non-STEMI inverted T waves in aVL, ST depression in lead 1, initial troponin 3.4  8/6/15   8/6/15 cardiac catheterization left main free of disease, triple vessel disease prominently involving distal segment nature epicardial vessels and branches, 95% ostial LAD descending artery stenosis and 70% mid RCA stenosis, ejection fraction 35-40%, stenting ostial LAD with xience drug-eluting stent  8/7/15 echo normal LV function EF 65-70%, grade 1 diastolic dysfunction, moderate concentric LVH, mild MR and AR  8/8/15 hospital discharge on coreg 12.5 mg bid,lisinopril 5 mg, brilinta 90 mg bid, asa 81 mg,lipitor 80 mg  8/11/15 cardiology note; continue brilenta and asa for one year, some dyspnea, if no improvement could consider another antiplatelet therapy, will recheck BNP in 2 weeks with followup visit, ultimately will need myocardial perfusion scan but will defer for a few months  8/26/15 cardiology note, decrease coreg to 6.25 mg bid due to lower blood pressure,continue lisinopril 5 mg, asa, lipitor,start effient 10 mg for one year due to brilinta causing shortness of breath, followup 2 months  10/14/15 cardiology note no chnges, repeat myocardial perfusion scan 3 months  11/30/15 cardiac rehab program  1/13/15 cardiology note nitroglycerin when necessary follow-up 3 months  1/12/16 persantine thallium small basal anterior inferior infarct with small ame-infarct ischemia, moderately sized moderate severity inferior, inferior lateral infarct with reversible ischemia  4/12/16 cardiology note decrease coreg to 3.125 mg bid consider discontinuation of coreg next evaluation and continue lisinopril 2.5 mg  7/19/16 cardiology note clinically stable, episodes of chest tightness related to stress, blood pressure running low, discontinue carvedilol, follow-up in a few months  11/3/16 cardiology note, isosorbide mononitrate with heavy exertion, follow-up in a few months to determine if further  evaluation is necessary, could consider repeat perfusion study or dobutamine stress echo  1/5/17 cardiology note stable CAD, follow-up 6 months    History of BPH  10/04 cystoscopy and green light photovaporization of prostate in [place], [place]    Gastroparesis  4/04 gastric emptying study severe gatroparesis done in [place], no hx of DM or MS  5/04 CT thorax in [place] negative  5/04 MRI brain in [place] no evid of MS  On Emycin  4/16/13 declined gastric stimulator    Dyslipidemia  5/13 chol 158,trig 204,hdl 36,ldl 81  8/8/15 chol 97,trig 117,hdl 26,ldl 48 started on lipitor 80 mg on hospital discharge  8/21/15 chol 76,trig 85,hdl 24,ldl 35 on lipitor 80 mg  10/7/15 chol 67,trig 77,hdl 22,ldl 30 on lipitor 80 mg per cardiology  4/5/16 chol 64,trig 43,hdl 24,ldl 31 on lipitor 80 mg per cardiology  11/23/16 chol 83,trig 73,hdl 27,ldl 41 on lipitor 80 mg per cardiology  5/24/17 chol 85,trig 81,hdl 28,ldl 41 on lipitor 80 mg per cardiology    Diabetes  11/08 A1c 6.3%  8/10 A1c 7.9%  10/10 A1c 7.7%  12/10 A1c 8.0%  1/11 add metformin 500 mg bid  2/11 A1c 7.9%  4/11 A1c 8.2% increase metformin to 1000 mg bid  10/11 A1c 6.5 %  12/11 A1c 6.1% on metformin 1000 mg bid  4/16/13 A1c 6.2% on metformin 1000 mg bid; declines to check blood sugars at home; bs 194 non fasting; declines ACE  8/14/13 A1c 6.5% on metformin 1000 mg bid  12/9/13  eye exam grade 1 diabetic background retinopathy  4/16/14 A1c 7.0% per  on metformin 1000 mg bid  7/3/15 A1c 8.3% on metformin 1000 mg bid per endocrine   7/31/15 start lantus 5 units and continue metformin 1000 mg bid, declines ACE,statin,asa  8/8/15 hospital discharge on coreg 12.5 mg bid,lisinopril 5 mg, brilinta 90 mg bid, asa 81 mg,lipitor 80 mg; on lantus 8 units and metformin 1000 mg bid  8/28/15 declines nutrition consultation and diabetic education regarding diet  8/28/15 recommend increasing lantus to 10 units and metformin 1000 mg bid  11/24/15 A1c 6.3% on lantus 10  "units and metformin 1000 mg bid  11/30/15 on lantus 12 units and metformin 1000 mg bid  5/20/16 A1c 6.3% on lantus 12 units and metformin 1000 mg bid  11/23/16 A1c 6.1% on lantus 12 units and metformin 1000 mg bid  1/9/17  eye exam  5/24/17 A1c 6.3% on lantus 12 units and metformin 1000 mg bid   5/24/17 urine mac 2.5 on lisinopril 2.5 mg                 ROS       Objective:     /66 mmHg  Pulse 87  Temp(Src) 36.6 °C (97.8 °F)  Ht 1.702 m (5' 7\")  Wt 75.297 kg (166 lb)  BMI 25.99 kg/m2  SpO2 97%     Physical Exam   Constitutional: He appears well-developed and well-nourished. No distress.   HENT:   Head: Normocephalic and atraumatic.   Right Ear: External ear normal.   Left Ear: External ear normal.   Nose: Nose normal.   Mouth/Throat: Oropharynx is clear and moist.   Eyes: Conjunctivae are normal. Left eye exhibits no discharge. No scleral icterus.   Neck: No thyromegaly present.   Cardiovascular: Normal rate and regular rhythm.    No murmur heard.  Pulmonary/Chest: No respiratory distress. He has no wheezes.   Musculoskeletal: He exhibits no edema.   Skin: He is not diaphoretic.   Psychiatric: He has a normal mood and affect. His behavior is normal.   Nursing note and vitals reviewed.        Monofilament testing with a 10 gram force: sensation intact: Sensation intact bilateral Visual Inspection: Feet without maceration, ulcers, fissures.  Pedal pulses: Peripheral pulses dorsalis pedis 2/4 bilateral  No lower extremity edema         Assessment/Plan:     Assessment  #! Wellness assessment    #2 history of MI 8/6/15 cardiac catheterization left main free of disease, triple vessel disease prominently involving distal segment nature epicardial vessels and branches, 95% ostial LAD descending artery stenosis and 70% mid RCA stenosis, ejection fraction 35-40%, stenting ostial LAD with xience drug-eluting stent, followed by cardiology, stable and controlled 1/5/17 cardiology note stable CAD, follow-up " 6 months    #3 Gastroparesis on erythromycin stable and controlled    #4 dyslipidemia on Lipitor stable and controlled with no muscle pain or muscle aches, most recent lipid panel 5/24/17 chol 85,trig 81,hdl 28,ldl 41 on lipitor 80 mg per cardiology    #5 diabetes A1c stable 6.3% most recently, no hypoglycemia 5/24/17 A1c 6.3% on lantus 12 units and metformin 1000 mg bid and lisinopril 2.5 mg daily, statin, aspirin does have gastropathy, no history of neuropathy, retinopathy, nephropathy     #6 history of right shoulder pain, improved and resolved after physical therapy 5/11/16 MRI right shoulder thickening and increased signal with synovitis, suggestive of adhesive capsulitis, tendinopathy supraspinatus and infraspinatus, fatty atrophy paraspinous muscle    #7 Insomnia on Xanax as needed intermittently without side effects of drowsiness, sedation, depression, memory loss    #8 Hypothyroid followed by endocrinology 4/17/17 tsh 0.016,free t4 0.9,free t3 4.0 on levothyroxine 112 mcg and cytomel 25 mg, stable and controlled    #9 Preventative health  8/23/15 pneumovax  1/12/16 zoster vaccine at [facility]  5/31/16 declines colonoscopy, FIT  11/23/16 psa 2.1  11/23/16 vit d 51  11/29/16 recommend influenza vaccine and pneumococcal 13 at pharmacy since we are out  11/29/16 declines colonoscopy, declines FIT card    #10 tobacco abuse one pack per day denies shortness of breath, cough, decreased exercise tolerance, has declined stop smoking classes          Plan  #! Declines stop smoking classes, medications, understands smoking increases long-term risk for recurrent cardiovascular disease, lung cancer, COPD, malignancy, osteoporosis, peptic ulcer disease, vascular disease; declines pulmonary function testing and CT lung cancer screening    #2 health maintenance reviewed and updated    #3 declines physical therapy    #4 declines hearing test     #5 advanced directive paperwork provided as well as class schedule     #6 prevnar  provided today    #7 declines colonoscopy and FIT    #8 has had pneumovax vaccine , shingles vaccine    #9 repeat labs 6 months    #10 health maintenance reviewed and updated    #11 follow up with cardiology and endocrinology    #12 recommend annual ophthalmologic exam, check feet daily for open cuts or sores     #13 follow-up 6 months    #14 continue check blood sugar once a day and record    #15 continue check blood pressure daily and record

## 2017-06-12 RX ORDER — LIOTHYRONINE SODIUM 25 UG/1
12.5 TABLET ORAL DAILY
Qty: 45 TAB | Refills: 2 | Status: SHIPPED | OUTPATIENT
Start: 2017-06-12 | End: 2017-06-13 | Stop reason: SDUPTHER

## 2017-06-13 DIAGNOSIS — E03.9 ACQUIRED HYPOTHYROIDISM: Chronic | ICD-10-CM

## 2017-06-13 RX ORDER — LIOTHYRONINE SODIUM 25 UG/1
12.5 TABLET ORAL 2 TIMES DAILY
Qty: 90 TAB | Refills: 3 | Status: SHIPPED | OUTPATIENT
Start: 2017-06-13 | End: 2018-05-23 | Stop reason: SDUPTHER

## 2017-06-13 RX ORDER — LIOTHYRONINE SODIUM 25 UG/1
12.5 TABLET ORAL 2 TIMES DAILY
Qty: 90 TAB | Refills: 3 | Status: SHIPPED | OUTPATIENT
Start: 2017-06-13 | End: 2018-06-12

## 2017-07-06 DIAGNOSIS — I10 ESSENTIAL HYPERTENSION: ICD-10-CM

## 2017-07-06 DIAGNOSIS — E78.5 DYSLIPIDEMIA: ICD-10-CM

## 2017-07-06 LAB
ALBUMIN SERPL-MCNC: 3.9 G/DL (ref 3.6–4.8)
ALBUMIN/GLOB SERPL: 2 {RATIO} (ref 1.2–2.2)
ALP SERPL-CCNC: 99 IU/L (ref 39–117)
ALT SERPL-CCNC: 29 IU/L (ref 0–44)
AST SERPL-CCNC: 25 IU/L (ref 0–40)
BILIRUB SERPL-MCNC: 0.3 MG/DL (ref 0–1.2)
BUN SERPL-MCNC: 14 MG/DL (ref 8–27)
BUN/CREAT SERPL: 15 (ref 10–24)
CALCIUM SERPL-MCNC: 8.9 MG/DL (ref 8.6–10.2)
CHLORIDE SERPL-SCNC: 105 MMOL/L (ref 96–106)
CHOLEST SERPL-MCNC: 74 MG/DL (ref 100–199)
CO2 SERPL-SCNC: 22 MMOL/L (ref 18–29)
COMMENT 011824: ABNORMAL
CREAT SERPL-MCNC: 0.93 MG/DL (ref 0.76–1.27)
GLOBULIN SER CALC-MCNC: 2 G/DL (ref 1.5–4.5)
GLUCOSE SERPL-MCNC: 130 MG/DL (ref 65–99)
HDLC SERPL-MCNC: 24 MG/DL
LDLC SERPL CALC-MCNC: 32 MG/DL (ref 0–99)
POTASSIUM SERPL-SCNC: 5.1 MMOL/L (ref 3.5–5.2)
PROT SERPL-MCNC: 5.9 G/DL (ref 6–8.5)
SODIUM SERPL-SCNC: 140 MMOL/L (ref 134–144)
TRIGL SERPL-MCNC: 92 MG/DL (ref 0–149)
VLDLC SERPL CALC-MCNC: 18 MG/DL (ref 5–40)

## 2017-07-06 RX ORDER — LEVOTHYROXINE SODIUM 112 UG/1
TABLET ORAL
Qty: 90 TAB | Refills: 0 | Status: SHIPPED | OUTPATIENT
Start: 2017-07-06 | End: 2017-10-04 | Stop reason: SDUPTHER

## 2017-07-06 RX ORDER — LISINOPRIL 2.5 MG/1
2.5 TABLET ORAL DAILY
Qty: 90 TAB | Refills: 3 | OUTPATIENT
Start: 2017-07-06 | End: 2018-06-25 | Stop reason: SDUPTHER

## 2017-07-06 RX ORDER — ATORVASTATIN CALCIUM 80 MG/1
80 TABLET, FILM COATED ORAL EVERY EVENING
Qty: 90 TAB | Refills: 3 | OUTPATIENT
Start: 2017-07-06 | End: 2018-07-02 | Stop reason: SDUPTHER

## 2017-07-12 ENCOUNTER — OFFICE VISIT (OUTPATIENT)
Dept: CARDIOLOGY | Facility: MEDICAL CENTER | Age: 67
End: 2017-07-12
Payer: MEDICARE

## 2017-07-12 VITALS
BODY MASS INDEX: 26.06 KG/M2 | OXYGEN SATURATION: 97 % | WEIGHT: 166 LBS | DIASTOLIC BLOOD PRESSURE: 72 MMHG | HEIGHT: 67 IN | SYSTOLIC BLOOD PRESSURE: 118 MMHG | HEART RATE: 70 BPM

## 2017-07-12 DIAGNOSIS — Z72.0 TOBACCO ABUSE: Chronic | ICD-10-CM

## 2017-07-12 DIAGNOSIS — I25.84 CORONARY ARTERY DISEASE DUE TO CALCIFIED CORONARY LESION: ICD-10-CM

## 2017-07-12 DIAGNOSIS — I25.10 CORONARY ARTERY DISEASE DUE TO CALCIFIED CORONARY LESION: ICD-10-CM

## 2017-07-12 DIAGNOSIS — E78.5 DYSLIPIDEMIA: Chronic | ICD-10-CM

## 2017-07-12 PROCEDURE — 99214 OFFICE O/P EST MOD 30 MIN: CPT | Performed by: INTERNAL MEDICINE

## 2017-07-12 NOTE — MR AVS SNAPSHOT
"        Orlando Grewalolin   2017 11:15 AM   Office Visit   MRN: [id number]    Department:  [facility]   Dept Phone:  [phone]    Description:  Male : 1950   Provider:  Gilberto Bryson M.D.           Reason for Visit     Follow-Up           Allergies as of 2017     Allergen Noted Reactions    Pcn [Penicillins] 2015   Anaphylaxis    Other Environmental 2015       Hayfever    Tetanus Toxoid 2016         You were diagnosed with     Coronary artery disease due to calcified coronary lesion   [9571737]       Dyslipidemia   [397763]       Tobacco abuse   [408490]         Vital Signs     Blood Pressure Pulse Height Weight Body Mass Index Oxygen Saturation    118/72 mmHg 70 1.702 m (5' 7.01\") 75.297 kg (166 lb) 25.99 kg/m2 97%    Smoking Status                   Current Every Day Smoker           Basic Information     Date Of Birth Sex Race Ethnicity Preferred Language    1950 Male White Non- English      Your appointments     Oct 18, 2017  1:20 PM   Established Patient with Alex Stein M.D.   [facility] [place] [id number] [facility], [address]  [facility] [id number] [phone]           You will be receiving a confirmation call a few days before your appointment from our automated call confirmation system.            Dec 05, 2017 11:20 AM   Established Patient with Charbel Jaffe M.D.   [place] [id number]  [facility] [id number] [phone]           You will be receiving a confirmation call a few days before your appointment from our automated call confirmation system.              Problem List              ICD-10-CM Priority Class Noted - Resolved    History of allergic rhinitis Z87.09   2010 - Present    Gastroparesis (Chronic) K31.84   2010 - Present    History of BPH Z87.438   2010 - Present    Interstitial cystitis N30.10   2010 - Present   " Postpolio syndrome G14   8/21/2010 - Present    Hypothyroid (Chronic) E03.9   8/21/2010 - Present    On angiotensin-converting enzyme (ACE) inhibitors (for diabetes, not HTN) Z79.811   8/21/2010 - Present    Diabetes mellitus type 2, controlled (CMS-HCC) (Chronic) E11.9   9/10/2010 - Present    Preventative health care (Chronic) Z00.00   12/9/2010 - Present    Shoulder pain M25.519   12/9/2010 - Present    Insomnia G47.00   4/18/2012 - Present    Dyslipidemia (Chronic) E78.5   5/7/2013 - Present    Tobacco abuse (Chronic) Z72.0   7/29/2015 - Present    History of MI (myocardial infarction) (Chronic) I25.2   8/6/2015 - Present    Coronary artery disease due to calcified coronary lesion: LAD stent in August 2015 I25.10, I25.84   7/12/2017 - Present      Health Maintenance        Date Due Completion Dates    IMM DTaP/Tdap/Td Vaccine (1 - Tdap) 2/20/1969 ---    IMM INFLUENZA (1) 9/1/2017 12/5/2016    A1C SCREENING 11/23/2017 5/23/2017, 11/22/2016, 5/19/2016, 11/23/2015, 7/15/2015, 3/21/2015, 10/17/2014, 9/18/2014 (Declined), 4/16/2014, 8/14/2013, 4/16/2013, 12/22/2011    Override on 9/18/2014: Patient Declined    RETINAL SCREENING 1/9/2018 1/9/2017 (Done), 6/1/2016, 12/9/2013    Override on 1/9/2017: Done ()    URINE ACR / MICROALBUMIN 5/23/2018 5/23/2017, 11/22/2016, 5/19/2016, 11/23/2015, 8/21/2015, 9/18/2014 (Declined), 4/16/2013 (Declined)    Override on 9/18/2014: Patient Declined    Override on 4/16/2013: Patient Declined    DIABETES MONOFILAMENT / LE EXAM 5/30/2018 5/30/2017 (Done), 5/31/2016, 7/31/2015, 9/18/2014, 9/18/2014 (Done), 4/16/2013 (Declined)    Override on 5/30/2017: Done    Override on 9/18/2014: Done    Override on 4/16/2013: Patient Declined    FASTING LIPID PROFILE 7/5/2018 7/5/2017, 5/23/2017, 11/22/2016, 4/5/2016, 10/7/2015, 8/21/2015, 8/8/2015, 9/18/2014 (Declined), 5/7/2013, 4/16/2013 (Declined)    Override on 9/18/2014: Patient Declined    Override on 4/16/2013: Patient Declined     SERUM CREATININE 7/5/2018 7/5/2017, 5/23/2017, 11/22/2016, 5/19/2016, 4/5/2016, 10/7/2015, 8/21/2015, 8/7/2015, 8/6/2015, 9/18/2014 (Declined), 5/7/2013, 5/6/2013, 5/6/2013, 4/16/2013, 2/10/2013, 12/18/2010, 12/17/2010    Override on 9/18/2014: Patient Declined    COLONOSCOPY 5/31/2026 5/31/2016 (Declined), 9/18/2014 (Declined), 4/16/2013 (Declined)    Override on 5/31/2016: Patient Declined    Override on 9/18/2014: Patient Declined    Override on 4/16/2013: Patient Declined            Current Immunizations     13-VALENT PCV PREVNAR 5/30/2017 12:00 PM    Influenza Vaccine Adult HD 12/5/2016, 12/5/2016    Pneumococcal polysaccharide vaccine (PPSV-23) 8/8/2015 10:55 AM, 10/10/2008    SHINGLES VACCINE 1/12/2016      Below and/or attached are the medications your provider expects you to take. Review all of your home medications and newly ordered medications with your provider and/or pharmacist. Follow medication instructions as directed by your provider and/or pharmacist. Please keep your medication list with you and share with your provider. Update the information when medications are discontinued, doses are changed, or new medications (including over-the-counter products) are added; and carry medication information at all times in the event of emergency situations     Allergies:  PCN - Anaphylaxis     OTHER ENVIRONMENTAL - (reactions not documented)     TETANUS TOXOID - (reactions not documented)               Medications  Valid as of: July 12, 2017 - 11:28 AM    Generic Name Brand Name Tablet Size Instructions for use    ALPRAZolam (Tab) XANAX 0.25 MG Take 1 Tab by mouth at bedtime as needed for Sleep.        Aspirin (Chew Tab) ASA 81 MG Take 1 Tab by mouth every day.        Atorvastatin Calcium (Tab) LIPITOR 80 MG Take 1 Tab by mouth every evening.        Cholecalciferol (Tab) cholecalciferol 1000 UNIT Take 1,000 Units by mouth every day.        Erythromycin Base (Tab) UMM- MG Take 1 Tab by mouth 2 Times a  Day. Take with meals for gastroparesis        Glucose Blood (Strip) glucose blood  1 Strip by Other route every day. Check blood sugar once a day before meal,E11.9        Insulin Glargine (Solution Pen-injector) LANTUS 100 UNIT/ML Inject 12 Units as instructed every evening.        Insulin Pen Needle (Misc) Insulin Pen Needle 31G X 6 MM 1 Device by Other route every day. Use with solostar pen, E11.9        Isosorbide Mononitrate (TABLET SR 24 HR) IMDUR 60 MG Take 1 Tab by mouth every morning.        Lancets (Misc) TRUEPLUS LANCETS 28G  1 Device by Does not apply route 2 times daily, before breakfast and dinner. E11.9        Levothyroxine Sodium (Tab) SYNTHROID 112 MCG TAKE ONE TABLET BY MOUTH ONCE DAILY IN THE MORNING ON EMPTY STOMACH        Liothyronine Sodium (Tab) CYTOMEL 25 MCG Take 0.5 Tabs by mouth 2 Times a Day.        Liothyronine Sodium (Tab) CYTOMEL 25 MCG Take 0.5 Tabs by mouth 2 Times a Day.        Lisinopril (Tab) PRINIVIL 2.5 MG Take 1 Tab by mouth every day.        MetFORMIN HCl (Tab) GLUCOPHAGE 1000 MG Take 1 Tab by mouth 2 times a day, with meals.        Nitroglycerin (SL Tab) NITROSTAT 0.4 MG Place 1 Tab under tongue as needed for Chest Pain.        Riboflavin   Take  by mouth.        .                 Medicines prescribed today were sent to:     [facility] [address] [place] [address], [place] [address]    [address] [facility] [place] [address]    Phone: [phone] Fax: [phone]    Open 24 Hours?: No      Medication refill instructions:       If your prescription bottle indicates you have medication refills left, it is not necessary to call your provider’s office. Please contact your pharmacy and they will refill your medication.    If your prescription bottle indicates you do not have any refills left, you may request refills at any time through one of the following ways: The online [employer] system (except Urgent Care), by calling your provider’s office, or by asking your pharmacy to contact  your provider’s office with a refill request. Medication refills are processed only during regular business hours and may not be available until the next business day. Your provider may request additional information or to have a follow-up visit with you prior to refilling your medication.   *Please Note: Medication refills are assigned a new Rx number when refilled electronically. Your pharmacy may indicate that no refills were authorized even though a new prescription for the same medication is available at the pharmacy. Please request the medicine by name with the pharmacy before contacting your provider for a refill.        Your To Do List     Future Labs/Procedures Complete By Expires    COMP METABOLIC PANEL  As directed 7/12/2018    LIPID PROFILE  As directed 7/12/2018      Instructions    e4       Other Notes About Your Plan     9/17/16 [facility] patient assistance fax number [phone] and phone [phone]  10/6/16 per patient [facility] will no longer provide erythromycin, prescription to be filled at [facility]  11/29/16 refill xanax                          MyChart Access Code: Activation code not generated  Current MyChart Status: Active          Quit Tobacco Information     Do you want to quit using tobacco?    Quitting tobacco decreases risks of cancer, heart and lung disease, increases life expectancy, improves sense of taste and smell, and increases spending money, among other benefits.    If you are thinking about quitting, we can help.  • [facility] Quit Tobacco Program: [phone]  o Program occurs weekly for four weeks and includes pharmacist consultation on products to support quitting smoking or chewing tobacco. A provider referral is needed for pharmacist consultation.  • Tobacco Users Help Hotline: 1-169-QUIT-NOW (661-2038) or https://[place].quitlogix.org/  o Free, confidential telephone and online coaching for [place] residents. Sessions are designed on a schedule that is convenient for you.  Eligible clients receive free nicotine replacement therapy.  • Nationally: www.smokefree.gov  o Information and professional assistance to support both immediate and long-term needs as you become, and remain, a non-smoker. Smokefree.gov allows you to choose the help that best fits your needs.

## 2017-07-12 NOTE — Clinical Note
[facility]   [address]  [place] [address]  Phone: [phone]  Fax: [phone]              Orlando Arreguin  1950    Encounter Date: 7/12/2017    Gilberto Bryson M.D.          PROGRESS NOTE:  Subjective:   Orlando Arreguin is a 67 y.o. male who presents today for followup of his non-ST segment elevation myocardial infarction. Patient received a drug-eluting stent to the proximal LAD on August 6, 2015. He also had a 70% mid RCA lesion and some distal disease.    He's had no chest discomfort, dyspnea, edema, palpitations or lightheadedness.    Past Medical History   Diagnosis Date   • Gastroparesis    • Hypothyroid    • Diabetes mellitus type II    • Interstitial cystitis    • Sciatic neuritis    • Coronary artery disease due to calcified coronary lesion 8/11/2015   • Hyperlipidemia      Past Surgical History   Procedure Laterality Date   • Appendectomy     • Other       L shoulder surgery (arthroscopic, [place], 2011)   • Pr transurethral elec-surg prostatectom     • Cardiac cath  8/6/15     YESIKA to LAD.  Has RCA 70% occulsion.     Family History   Problem Relation Age of Onset   • Heart Disease Mother    • Heart Attack Mother 55     heart attack     History   Smoking status   • Current Every Day Smoker -- 1.00 packs/day for 30 years   • Types: Cigarettes   Smokeless tobacco   • Never Used     Allergies   Allergen Reactions   • Pcn [Penicillins] Anaphylaxis   • Other Environmental      Hayfever   • Tetanus Toxoid      Outpatient Encounter Prescriptions as of 7/12/2017   Medication Sig Dispense Refill   • Riboflavin (VITAMIN B-2 PO) Take  by mouth.     • lisinopril (PRINIVIL) 2.5 MG Tab Take 1 Tab by mouth every day. 90 Tab 3   • atorvastatin (LIPITOR) 80 MG tablet Take 1 Tab by mouth every evening. 90 Tab 3   • levothyroxine (SYNTHROID) 112 MCG Tab TAKE ONE TABLET BY MOUTH ONCE DAILY IN THE MORNING ON EMPTY STOMACH 90 Tab 0   • liothyronine (CYTOMEL) 25  "MCG Tab Take 0.5 Tabs by mouth 2 Times a Day. 90 Tab 3   • glucose blood (RELION PRIME TEST) strip 1 Strip by Other route every day. Check blood sugar once a day before meal,E11.9 100 Strip 3   • metformin (GLUCOPHAGE) 1000 MG tablet Take 1 Tab by mouth 2 times a day, with meals. 180 Tab 1   • insulin glargine (LANTUS SOLOSTAR) 100 UNIT/ML Solution Pen-injector injection Inject 12 Units as instructed every evening. 15 PEN 6   • aspirin (ASA) 81 MG Chew Tab chewable tablet Take 1 Tab by mouth every day. 100 Tab 11   • alprazolam (XANAX) 0.25 MG Tab Take 1 Tab by mouth at bedtime as needed for Sleep. 30 Tab 0   • isosorbide mononitrate SR (IMDUR) 60 MG TABLET SR 24 HR Take 1 Tab by mouth every morning. 30 Tab 3   • erythromycin base (UMM-TAB) 250 MG Tab Take 1 Tab by mouth 2 Times a Day. Take with meals for gastroparesis 180 Tab 3   • Insulin Pen Needle (RELION MINI PEN NEEDLES) 31G X 6 MM Misc 1 Device by Other route every day. Use with solostar pen, E11.9 100 Each 3   • TRUEPLUS LANCETS 28G Misc 1 Device by Does not apply route 2 times daily, before breakfast and dinner. E11.9 100 Each 11   • nitroglycerin (NITROSTAT) 0.4 MG SL Tab Place 1 Tab under tongue as needed for Chest Pain. 25 Tab 11   • vitamin D (CHOLECALCIFEROL) 1000 UNIT Tab Take 1,000 Units by mouth every day.     • liothyronine (CYTOMEL) 25 MCG Tab Take 0.5 Tabs by mouth 2 Times a Day. 90 Tab 3     No facility-administered encounter medications on file as of 7/12/2017.     ROS       Objective:   /72 mmHg  Pulse 70  Ht 1.702 m (5' 7.01\")  Wt 75.297 kg (166 lb)  BMI 25.99 kg/m2  SpO2 97%    Physical Exam   Neck: No JVD present.   Cardiovascular: Normal rate and regular rhythm.  Exam reveals no gallop.    Murmur (2/6 systolic at the base) heard.  Pulmonary/Chest: Effort normal. He has rhonchi. He has no rales.   Abdominal: Soft. There is no tenderness.   Musculoskeletal: He exhibits no edema.     Lab Results   Component Value Date/Time    " CHOLESTEROL,TOT 74* 07/05/2017 10:09 AM    LDL 32 07/05/2017 10:09 AM    HDL 24* 07/05/2017 10:09 AM    TRIGLYCERIDES 92 07/05/2017 10:09 AM       Lab Results   Component Value Date/Time    SODIUM 140 07/05/2017 10:09 AM    POTASSIUM 5.1 07/05/2017 10:09 AM    CHLORIDE 105 07/05/2017 10:09 AM    CO2 22 07/05/2017 10:09 AM    GLUCOSE 130* 07/05/2017 10:09 AM    BUN 14 07/05/2017 10:09 AM    CREATININE 0.93 07/05/2017 10:09 AM    BUN-CREATININE RATIO 15 07/05/2017 10:09 AM     Lab Results   Component Value Date/Time    ALKALINE PHOSPHATASE 99 07/05/2017 10:09 AM    AST(SGOT) 25 07/05/2017 10:09 AM    ALT(SGPT) 29 07/05/2017 10:09 AM    TOTAL BILIRUBIN 0.3 07/05/2017 10:09 AM      Lab Results   Component Value Date/Time    B NATRIURETIC PEPTIDE 178.8* 08/19/2015 10:28 AM            Echo:  CONCLUSIONS  Normal left ventricular size and function.  Left ventricular ejection fraction is 65% to 70%.  Grade I diastolic dysfunction - mitral inflow E/A is <1.0.  Moderate concentric left ventricular hypertrophy.  Mild mitral regurgitation.  Mild aortic insufficiency.  Right ventricular systolic pressure is estimated to be 31 mmHg.  Exam Date: 08/08/2015     Cardiac catheterization:  1. Triple vessel coronary artery disease, but predominantly involved distal   segment of the major epicardial vessels and branches vessels with 95% ostial   left anterior descending artery stenosis and 70% mid right coronary   stenosis.  2. Hypokinetic anterolateral wall with moderately reduced left ventricular   systolic function. Ejection fraction in the range of 35%-40%.  3. Status post stenting of the ostial left anterior descending artery with a   3.5 x 15 mm Xience Alpine drug-eluting stent.  August 6, 2015    Myocardial perfusion scan:  PERFUSION:  Small basal anterior infarct with small ame infarct ischemia  Moderate sized, moderate severity inferior, inferior-lateral infarct with   reversible ischemia in the infeior and infero-lateral  apex.  1/2016  Addendum: Patient's perfusion study was reviewed by myself. Small area of basal anterior/lateral infarction with mild to moderate ischemia. No definite inferior wall infarction.    Assessment:     1. Coronary artery disease due to calcified coronary lesion: LAD stent in August 2015     2. Dyslipidemia     3. Tobacco abuse         Medical Decision Making:  Today's Assessment / Status / Plan:     Mr. Arreguin is clinically stable. He is asymptomatic from a cardiovascular standpoint. His lipid status appears to be under excellent control. We discussed smoking cessation. At the present time, he is not inclined to quit. He will follow-up in a year with repeat lab work.      Charbel Jaffe M.D.  [address]  [address]  [facility] [address]  VIA In Basket

## 2017-07-12 NOTE — PROGRESS NOTES
Subjective:   Orlando Arreguin is a 67 y.o. male who presents today for followup of his non-ST segment elevation myocardial infarction. Patient received a drug-eluting stent to the proximal LAD on August 6, 2015. He also had a 70% mid RCA lesion and some distal disease.    He's had no chest discomfort, dyspnea, edema, palpitations or lightheadedness.    Past Medical History   Diagnosis Date   • Gastroparesis    • Hypothyroid    • Diabetes mellitus type II    • Interstitial cystitis    • Sciatic neuritis    • Coronary artery disease due to calcified coronary lesion 8/11/2015   • Hyperlipidemia      Past Surgical History   Procedure Laterality Date   • Appendectomy     • Other       L shoulder surgery (arthroscopic, [place], 2011)   • Pr transurethral elec-surg prostatectom     • Cardiac cath  8/6/15     YESIKA to LAD.  Has RCA 70% occulsion.     Family History   Problem Relation Age of Onset   • Heart Disease Mother    • Heart Attack Mother 55     heart attack     History   Smoking status   • Current Every Day Smoker -- 1.00 packs/day for 30 years   • Types: Cigarettes   Smokeless tobacco   • Never Used     Allergies   Allergen Reactions   • Pcn [Penicillins] Anaphylaxis   • Other Environmental      Hayfever   • Tetanus Toxoid      Outpatient Encounter Prescriptions as of 7/12/2017   Medication Sig Dispense Refill   • Riboflavin (VITAMIN B-2 PO) Take  by mouth.     • lisinopril (PRINIVIL) 2.5 MG Tab Take 1 Tab by mouth every day. 90 Tab 3   • atorvastatin (LIPITOR) 80 MG tablet Take 1 Tab by mouth every evening. 90 Tab 3   • levothyroxine (SYNTHROID) 112 MCG Tab TAKE ONE TABLET BY MOUTH ONCE DAILY IN THE MORNING ON EMPTY STOMACH 90 Tab 0   • liothyronine (CYTOMEL) 25 MCG Tab Take 0.5 Tabs by mouth 2 Times a Day. 90 Tab 3   • glucose blood (RELION PRIME TEST) strip 1 Strip by Other route every day. Check blood sugar once a day before meal,E11.9 100 Strip 3   • metformin (GLUCOPHAGE) 1000 MG tablet Take 1 Tab by mouth 2 times  "a day, with meals. 180 Tab 1   • insulin glargine (LANTUS SOLOSTAR) 100 UNIT/ML Solution Pen-injector injection Inject 12 Units as instructed every evening. 15 PEN 6   • aspirin (ASA) 81 MG Chew Tab chewable tablet Take 1 Tab by mouth every day. 100 Tab 11   • alprazolam (XANAX) 0.25 MG Tab Take 1 Tab by mouth at bedtime as needed for Sleep. 30 Tab 0   • isosorbide mononitrate SR (IMDUR) 60 MG TABLET SR 24 HR Take 1 Tab by mouth every morning. 30 Tab 3   • erythromycin base (UMM-TAB) 250 MG Tab Take 1 Tab by mouth 2 Times a Day. Take with meals for gastroparesis 180 Tab 3   • Insulin Pen Needle (RELION MINI PEN NEEDLES) 31G X 6 MM Misc 1 Device by Other route every day. Use with solostar pen, E11.9 100 Each 3   • TRUEPLUS LANCETS 28G Misc 1 Device by Does not apply route 2 times daily, before breakfast and dinner. E11.9 100 Each 11   • nitroglycerin (NITROSTAT) 0.4 MG SL Tab Place 1 Tab under tongue as needed for Chest Pain. 25 Tab 11   • vitamin D (CHOLECALCIFEROL) 1000 UNIT Tab Take 1,000 Units by mouth every day.     • liothyronine (CYTOMEL) 25 MCG Tab Take 0.5 Tabs by mouth 2 Times a Day. 90 Tab 3     No facility-administered encounter medications on file as of 7/12/2017.     ROS       Objective:   /72 mmHg  Pulse 70  Ht 1.702 m (5' 7.01\")  Wt 75.297 kg (166 lb)  BMI 25.99 kg/m2  SpO2 97%    Physical Exam   Neck: No JVD present.   Cardiovascular: Normal rate and regular rhythm.  Exam reveals no gallop.    Murmur (2/6 systolic at the base) heard.  Pulmonary/Chest: Effort normal. He has rhonchi. He has no rales.   Abdominal: Soft. There is no tenderness.   Musculoskeletal: He exhibits no edema.     Lab Results   Component Value Date/Time    CHOLESTEROL,TOT 74* 07/05/2017 10:09 AM    LDL 32 07/05/2017 10:09 AM    HDL 24* 07/05/2017 10:09 AM    TRIGLYCERIDES 92 07/05/2017 10:09 AM       Lab Results   Component Value Date/Time    SODIUM 140 07/05/2017 10:09 AM    POTASSIUM 5.1 07/05/2017 10:09 AM    " CHLORIDE 105 07/05/2017 10:09 AM    CO2 22 07/05/2017 10:09 AM    GLUCOSE 130* 07/05/2017 10:09 AM    BUN 14 07/05/2017 10:09 AM    CREATININE 0.93 07/05/2017 10:09 AM    BUN-CREATININE RATIO 15 07/05/2017 10:09 AM     Lab Results   Component Value Date/Time    ALKALINE PHOSPHATASE 99 07/05/2017 10:09 AM    AST(SGOT) 25 07/05/2017 10:09 AM    ALT(SGPT) 29 07/05/2017 10:09 AM    TOTAL BILIRUBIN 0.3 07/05/2017 10:09 AM      Lab Results   Component Value Date/Time    B NATRIURETIC PEPTIDE 178.8* 08/19/2015 10:28 AM            Echo:  CONCLUSIONS  Normal left ventricular size and function.  Left ventricular ejection fraction is 65% to 70%.  Grade I diastolic dysfunction - mitral inflow E/A is <1.0.  Moderate concentric left ventricular hypertrophy.  Mild mitral regurgitation.  Mild aortic insufficiency.  Right ventricular systolic pressure is estimated to be 31 mmHg.  Exam Date: 08/08/2015     Cardiac catheterization:  1. Triple vessel coronary artery disease, but predominantly involved distal   segment of the major epicardial vessels and branches vessels with 95% ostial   left anterior descending artery stenosis and 70% mid right coronary   stenosis.  2. Hypokinetic anterolateral wall with moderately reduced left ventricular   systolic function. Ejection fraction in the range of 35%-40%.  3. Status post stenting of the ostial left anterior descending artery with a   3.5 x 15 mm Xience Alpine drug-eluting stent.  August 6, 2015    Myocardial perfusion scan:  PERFUSION:  Small basal anterior infarct with small ame infarct ischemia  Moderate sized, moderate severity inferior, inferior-lateral infarct with   reversible ischemia in the infeior and infero-lateral apex.  1/2016  Addendum: Patient's perfusion study was reviewed by myself. Small area of basal anterior/lateral infarction with mild to moderate ischemia. No definite inferior wall infarction.    Assessment:     1. Coronary artery disease due to calcified coronary  lesion: LAD stent in August 2015     2. Dyslipidemia     3. Tobacco abuse         Medical Decision Making:  Today's Assessment / Status / Plan:     Mr. Arreguin is clinically stable. He is asymptomatic from a cardiovascular standpoint. His lipid status appears to be under excellent control. We discussed smoking cessation. At the present time, he is not inclined to quit. He will follow-up in a year with repeat lab work.

## 2017-10-04 RX ORDER — LEVOTHYROXINE SODIUM 112 UG/1
TABLET ORAL
Qty: 90 TAB | Refills: 0 | Status: SHIPPED | OUTPATIENT
Start: 2017-10-04 | End: 2018-01-02 | Stop reason: SDUPTHER

## 2017-10-10 DIAGNOSIS — K31.84 GASTROPARESIS: Chronic | ICD-10-CM

## 2017-10-10 RX ORDER — ERYTHROMYCIN 250 MG/1
250 TABLET, COATED ORAL 2 TIMES DAILY
Qty: 180 TAB | Refills: 3 | Status: SHIPPED | OUTPATIENT
Start: 2017-10-10 | End: 2017-10-13 | Stop reason: SDUPTHER

## 2017-10-11 ENCOUNTER — TELEPHONE (OUTPATIENT)
Dept: MEDICAL GROUP | Facility: MEDICAL CENTER | Age: 67
End: 2017-10-11

## 2017-10-11 DIAGNOSIS — E78.5 DYSLIPIDEMIA: Chronic | ICD-10-CM

## 2017-10-11 NOTE — TELEPHONE ENCOUNTER
Please call the patient, the pharmacist did notify us that the erythromycin and the atorvastatin that he has been on for quite some time potentially may have an interaction causing muscle pain or muscle aches.  Even though he has been on the atorvastatin and erythromycin for at least 2 years, I would recommend considering changing the atorvastatin to a different cholesterol medication in the same family, a medication called rosuvastatin that does not have the potential interaction with erythromycin.  If he is in agreement I can send a new prescription to his pharmacy for the rosuvastatin instead of the atorvastatin.

## 2017-10-11 NOTE — TELEPHONE ENCOUNTER
Pt returned call and states he has had muscle aches for years and does not have any way of knowing if this is caused by the medication. Pt is not wanting to change any medication until he discusses this with his Cardiologist. Notified pt you are advising this change and Pt is in no hurry to discuss this with his Cardiologist as he stated he may see him in January.

## 2017-10-12 LAB
T3FREE SERPL-MCNC: 3.5 PG/ML (ref 2–4.4)
T4 FREE SERPL-MCNC: 1 NG/DL (ref 0.82–1.77)
TSH SERPL DL<=0.005 MIU/L-ACNC: 0.01 UIU/ML (ref 0.45–4.5)

## 2017-10-16 ENCOUNTER — TELEPHONE (OUTPATIENT)
Dept: MEDICAL GROUP | Facility: MEDICAL CENTER | Age: 67
End: 2017-10-16

## 2017-10-16 DIAGNOSIS — K31.84 GASTROPARESIS: Chronic | ICD-10-CM

## 2017-10-16 NOTE — TELEPHONE ENCOUNTER
Ok to change to the coated tab, does it come in 250 mg coated?  Please check with the pharmacy.  I cannot find erythromycin 250 mg coated tablets in our formulary

## 2017-10-16 NOTE — TELEPHONE ENCOUNTER
1. Caller Name: Dianne @ [facility]                       Call Back Number: [phone]    2. Message: Pt Rx'd Erythrimyacin.    Pt preferes the coated tabs would like permission to change from ER to the coated tabs. Please advise.     3. Patient approves office to leave a detailed voicemail/MyChart message: N\A

## 2017-10-18 ENCOUNTER — OFFICE VISIT (OUTPATIENT)
Dept: ENDOCRINOLOGY | Facility: MEDICAL CENTER | Age: 67
End: 2017-10-18
Payer: MEDICARE

## 2017-10-18 VITALS
BODY MASS INDEX: 26.53 KG/M2 | SYSTOLIC BLOOD PRESSURE: 116 MMHG | HEIGHT: 67 IN | DIASTOLIC BLOOD PRESSURE: 70 MMHG | WEIGHT: 169 LBS | HEART RATE: 95 BPM | OXYGEN SATURATION: 96 %

## 2017-10-18 DIAGNOSIS — E03.9 ACQUIRED HYPOTHYROIDISM: Chronic | ICD-10-CM

## 2017-10-19 NOTE — PROGRESS NOTES
Chief Complaint   Patient presents with   • Hypothyroidism        HPI:    Hypothyroidism          The patient indicates he is doing very well. He has reviewed his recent thyroid tests. We have previously discussed the meaning and nothing has changed. He is satisfied with what he is doing with his thyroid as previously discussed.    ROS:   Our meeting today was cut short because I was running late and he was running late for another appointment. He had no questions about his status. He feels comfortable with thyroid replacement as is.      Allergies:   Allergies   Allergen Reactions   • Pcn [Penicillins] Anaphylaxis   • Other Environmental      Hayfever   • Tetanus Toxoid        Current medicines including changes today:  Current Outpatient Prescriptions   Medication Sig Dispense Refill   • erythromycin base (UMM-TAB) 250 MG Tablet Delayed Response Take 1 Tab by mouth 2 times a day. 180 Tab 1   • levothyroxine (SYNTHROID) 112 MCG Tab TAKE ONE TABLET BY MOUTH ONCE DAILY IN THE MORNING ON AN EMPTY STOMACH 90 Tab 0   • metformin (GLUCOPHAGE) 1000 MG tablet Take 1 Tab by mouth 2 times a day, with meals. 180 Tab 2   • TRUEPLUS LANCETS 28G Misc 1 Device by Other route 2 times a day. Before meals, E11.9 100 Each 11   • Riboflavin (VITAMIN B-2 PO) Take  by mouth.     • lisinopril (PRINIVIL) 2.5 MG Tab Take 1 Tab by mouth every day. 90 Tab 3   • atorvastatin (LIPITOR) 80 MG tablet Take 1 Tab by mouth every evening. 90 Tab 3   • liothyronine (CYTOMEL) 25 MCG Tab Take 0.5 Tabs by mouth 2 Times a Day. 90 Tab 3   • glucose blood (RELION PRIME TEST) strip 1 Strip by Other route every day. Check blood sugar once a day before meal,E11.9 100 Strip 3   • insulin glargine (LANTUS SOLOSTAR) 100 UNIT/ML Solution Pen-injector injection Inject 12 Units as instructed every evening. 15 PEN 6   • aspirin (ASA) 81 MG Chew Tab chewable tablet Take 1 Tab by mouth every day. 100 Tab 11   • isosorbide mononitrate SR (IMDUR) 60 MG TABLET SR 24 HR  "Take 1 Tab by mouth every morning. 30 Tab 3   • Insulin Pen Needle (RELION MINI PEN NEEDLES) 31G X 6 MM Misc 1 Device by Other route every day. Use with solostar pen, E11.9 100 Each 3   • vitamin D (CHOLECALCIFEROL) 1000 UNIT Tab Take 1,000 Units by mouth every day.     • liothyronine (CYTOMEL) 25 MCG Tab Take 0.5 Tabs by mouth 2 Times a Day. 90 Tab 3   • alprazolam (XANAX) 0.25 MG Tab Take 1 Tab by mouth at bedtime as needed for Sleep. 30 Tab 0   • nitroglycerin (NITROSTAT) 0.4 MG SL Tab Place 1 Tab under tongue as needed for Chest Pain. 25 Tab 11     No current facility-administered medications for this visit.         Past Medical History:   Diagnosis Date   • Coronary artery disease due to calcified coronary lesion 8/11/2015   • Diabetes mellitus type II    • Gastroparesis    • Hyperlipidemia    • Hypothyroid    • Interstitial cystitis    • Sciatic neuritis        PHYSICAL EXAM:    /70   Pulse 95   Ht 1.702 m (5' 7\")   Wt 76.7 kg (169 lb)   SpO2 96%   BMI 26.47 kg/m²     No examination done today. He was late for an appointment and felt his thyroid status is adequately treated per our previous discussions.    ASSESSMENT AND RECOMMENDATIONS    1. Acquired hypothyroidism             No change in status and no change in medications indicated  - FREE THYROXINE; Standing  - T3 FREE; Standing  - TSH; Standing      DISPOSITION: Return in about 6 months (around 4/18/2018).       Alex Stein M.D.    Copies to: Charbel Jaffe M.D. [phone]  "

## 2017-11-29 LAB
ALBUMIN SERPL-MCNC: 4.1 G/DL (ref 3.6–4.8)
ALBUMIN/CREAT UR: <4 MG/G CREAT (ref 0–30)
ALBUMIN/GLOB SERPL: 1.8 {RATIO} (ref 1.2–2.2)
ALP SERPL-CCNC: 106 IU/L (ref 39–117)
ALT SERPL-CCNC: 36 IU/L (ref 0–44)
AST SERPL-CCNC: 30 IU/L (ref 0–40)
BILIRUB SERPL-MCNC: 0.3 MG/DL (ref 0–1.2)
BUN SERPL-MCNC: 20 MG/DL (ref 8–27)
BUN/CREAT SERPL: 24 (ref 10–24)
CALCIUM SERPL-MCNC: 8.9 MG/DL (ref 8.6–10.2)
CHLORIDE SERPL-SCNC: 102 MMOL/L (ref 96–106)
CHOLEST SERPL-MCNC: 72 MG/DL (ref 100–199)
CO2 SERPL-SCNC: 23 MMOL/L (ref 18–29)
COMMENT 011824: ABNORMAL
CREAT SERPL-MCNC: 0.84 MG/DL (ref 0.76–1.27)
CREAT UR-MCNC: 74.9 MG/DL
GFR SERPLBLD CREATININE-BSD FMLA CKD-EPI: 105 ML/MIN/1.73
GFR SERPLBLD CREATININE-BSD FMLA CKD-EPI: 91 ML/MIN/1.73
GLOBULIN SER CALC-MCNC: 2.3 G/DL (ref 1.5–4.5)
GLUCOSE SERPL-MCNC: 113 MG/DL (ref 65–99)
HBA1C MFR BLD: 6.1 % (ref 4.8–5.6)
HDLC SERPL-MCNC: 24 MG/DL
LDLC SERPL CALC-MCNC: 39 MG/DL (ref 0–99)
MICROALBUMIN UR-MCNC: <3 UG/ML
POTASSIUM SERPL-SCNC: 4.7 MMOL/L (ref 3.5–5.2)
PROT SERPL-MCNC: 6.4 G/DL (ref 6–8.5)
SODIUM SERPL-SCNC: 139 MMOL/L (ref 134–144)
TRIGL SERPL-MCNC: 47 MG/DL (ref 0–149)
VLDLC SERPL CALC-MCNC: 9 MG/DL (ref 5–40)

## 2017-12-05 ENCOUNTER — OFFICE VISIT (OUTPATIENT)
Dept: MEDICAL GROUP | Facility: MEDICAL CENTER | Age: 67
End: 2017-12-05
Payer: MEDICARE

## 2017-12-05 VITALS
HEART RATE: 74 BPM | BODY MASS INDEX: 26.06 KG/M2 | HEIGHT: 67 IN | OXYGEN SATURATION: 98 % | DIASTOLIC BLOOD PRESSURE: 72 MMHG | TEMPERATURE: 97.7 F | SYSTOLIC BLOOD PRESSURE: 126 MMHG | WEIGHT: 166 LBS

## 2017-12-05 DIAGNOSIS — E78.5 DYSLIPIDEMIA: Chronic | ICD-10-CM

## 2017-12-05 DIAGNOSIS — I65.23 BILATERAL CAROTID ARTERY STENOSIS: ICD-10-CM

## 2017-12-05 DIAGNOSIS — Z23 NEEDS FLU SHOT: ICD-10-CM

## 2017-12-05 DIAGNOSIS — I73.9 PERIPHERAL VASCULAR DISEASE (HCC): ICD-10-CM

## 2017-12-05 DIAGNOSIS — Z79.899 ON ANGIOTENSIN-CONVERTING ENZYME (ACE) INHIBITORS: ICD-10-CM

## 2017-12-05 DIAGNOSIS — Z72.0 TOBACCO ABUSE: Chronic | ICD-10-CM

## 2017-12-05 DIAGNOSIS — I25.2 HISTORY OF MI (MYOCARDIAL INFARCTION): Chronic | ICD-10-CM

## 2017-12-05 DIAGNOSIS — K31.84 GASTROPARESIS: Chronic | ICD-10-CM

## 2017-12-05 DIAGNOSIS — E11.9 CONTROLLED TYPE 2 DIABETES MELLITUS WITHOUT COMPLICATION, WITHOUT LONG-TERM CURRENT USE OF INSULIN (HCC): Chronic | ICD-10-CM

## 2017-12-05 PROCEDURE — 99214 OFFICE O/P EST MOD 30 MIN: CPT | Mod: 25 | Performed by: INTERNAL MEDICINE

## 2017-12-05 PROCEDURE — 90662 IIV NO PRSV INCREASED AG IM: CPT | Performed by: INTERNAL MEDICINE

## 2017-12-05 PROCEDURE — G0008 ADMIN INFLUENZA VIRUS VAC: HCPCS | Performed by: INTERNAL MEDICINE

## 2017-12-05 NOTE — PROGRESS NOTES
Subjective:      Orlando Arreguin is a 67 y.o. male who presents with diabetes          HPI     Here follow up diabetes on metformin checking blood once a day sugars running 100-115 on lantus 12 units and metformin 1000 mg bid, no hypoglycemia, no vision changes, no numbness or tingling lower extremity bilateral, no sores feet.  Occasional hip, low back stiffness and walking.  Denies any color change lower extremities.  No muscle ache calf area.  Just hip stiffness at times.  Still smokes 1 ppd, not interested in smoking cessation , denies chest pain, shortness of breath, cough, fevers, chills, hemoptysis  CAD followed by cardiology, denies chest pain, palpitations, lightheadedness, syncope  Does not check blood pressure on a regular basis  Continues to smoke, no alcohol  Eye exam in january has seen    No open sores feet   Remains on Lipitor and erythromycin, denies any muscle pain or muscle aches related to statin therapy, no history of rhabdomyolysis, as been on this combination for over 2 years without side effects          Current Outpatient Prescriptions   Medication Sig Dispense Refill   • Insulin Pen Needle ([employer] MINI PEN NEEDLES) 31G X 6 MM Misc 1 Device by Other route every day. E11.9 100 Each 3   • erythromycin base (UMM-TAB) 250 MG Tablet Delayed Response Take 1 Tab by mouth 2 times a day. 180 Tab 1   • levothyroxine (SYNTHROID) 112 MCG Tab TAKE ONE TABLET BY MOUTH ONCE DAILY IN THE MORNING ON AN EMPTY STOMACH 90 Tab 0   • metformin (GLUCOPHAGE) 1000 MG tablet Take 1 Tab by mouth 2 times a day, with meals. 180 Tab 2   • TRUEPLUS LANCETS 28G Misc 1 Device by Other route 2 times a day. Before meals, E11.9 100 Each 11   • Riboflavin (VITAMIN B-2 PO) Take  by mouth.     • lisinopril (PRINIVIL) 2.5 MG Tab Take 1 Tab by mouth every day. 90 Tab 3   • atorvastatin (LIPITOR) 80 MG tablet Take 1 Tab by mouth every evening. 90 Tab 3   • liothyronine (CYTOMEL) 25 MCG Tab Take 0.5 Tabs by mouth 2 Times a  Day. 90 Tab 3   • liothyronine (CYTOMEL) 25 MCG Tab Take 0.5 Tabs by mouth 2 Times a Day. 90 Tab 3   • glucose blood (RELION PRIME TEST) strip 1 Strip by Other route every day. Check blood sugar once a day before meal,E11.9 100 Strip 3   • insulin glargine (LANTUS SOLOSTAR) 100 UNIT/ML Solution Pen-injector injection Inject 12 Units as instructed every evening. 15 PEN 6   • aspirin (ASA) 81 MG Chew Tab chewable tablet Take 1 Tab by mouth every day. 100 Tab 11   • alprazolam (XANAX) 0.25 MG Tab Take 1 Tab by mouth at bedtime as needed for Sleep. 30 Tab 0   • isosorbide mononitrate SR (IMDUR) 60 MG TABLET SR 24 HR Take 1 Tab by mouth every morning. 30 Tab 3   • nitroglycerin (NITROSTAT) 0.4 MG SL Tab Place 1 Tab under tongue as needed for Chest Pain. 25 Tab 11   • vitamin D (CHOLECALCIFEROL) 1000 UNIT Tab Take 1,000 Units by mouth every day.       No current facility-administered medications for this visit.      Patient Active Problem List   Diagnosis   • History of allergic rhinitis   • Gastroparesis   • History of BPH   • Interstitial cystitis   • Postpolio syndrome   • Hypothyroid   • On angiotensin-converting enzyme (ACE) inhibitors (for diabetes, not HTN)   • Diabetes mellitus type 2, controlled (CMS-[facility])   • Preventative health care   • Shoulder pain   • Insomnia   • Dyslipidemia   • Tobacco abuse   • History of MI (myocardial infarction)   • Coronary artery disease due to calcified coronary lesion: LAD stent in August 2015          Tobacco  7/31/15 tobacco 1 ppd not interested in stopping smoking classes or education  8/28/15 tobacco 1 ppd not interested in stopping smoking classes or education  8/28/15 declines PFT    11/30/15 still smoking 20 cigs per day declines stop smoking classes or medications  5/31/16 declines stop smoking classes and medications, not interested in stop smoking classes, smoking ppd x 40 plus years, declines CT lung cancer screening, pulmonary function testing  11/29/16 still smoking 1  ppd, started smoking age 20, declines stop smoking classes and medication, declines lung cancer screening program and PFT  5/30/17 still smoking 1 ppd declines stop smoking classes and medications, and lung cancer screening program and PFT     Shoulder pain  12/9/10 left shoulder pain,  note MRI pending  2/11 MRI left shoulder [facility] mild to moderate rotator cuff tendinopathy, adhesive capsulitis, small anterior and posterior labral tears  7/11 [facility] note  left shoulder adhesive capsulitis rec decompression  12/8/15 xray right shoulder at [facility] mild hydroxyapatite deposition adjacent to the greater tuberosity, no evidence of significant arthritis  5/11/16 MRI right shoulder thickening and increased signal with synovitis, suggestive of adhesive capsulitis, tendinopathy supraspinatus and infraspinatus, fatty atrophy paraspinous muscle  1/4/17 [facility] discharge note     Preventative health  8/23/15 pneumovax  1/12/16 zoster vaccine at [facility]  11/23/16 psa 2.1  11/23/16 vit d 51  5/30/17 declines colonoscopy, declines FIT card  5/30/17 prevnar      Postpolio syndrome  6/04 saw neurology in [place] , notes indicate chronic non specific complaints with normal brain MRI, no evidence to support post polio syndrome.     On ACE inhibitor  4/04 p.thallium no ischemia EF 62%  12/10 p.thallium no ischemia, EF 59%  5/9/11 rec ACE instead of atenolol patient declines  10/17/11 discontinued atenolol, rec start lotensin 10 mg; he declines  5/7/13 echo normal LV function, EF 60%, grade 1 diastolic dysfunction  5/7/13 p.thallium fixed defect mid inferior, inferoseptal, mid inferior walls suggest subdiaphragmatic uptake or prior infarction, no ischemia noted, EF 57%  8/8/15 hospital discharge on coreg 12.5 mg bid,lisinopril 5 mg, brilinta 90 mg bid, asa 81 mg,lipitor 80 mg  8/26/15 cardiology note, decrease coreg to 6.25 mg bid due to lower blood pressure continue lisinopril 5 mg    8/28/15 decrease  lisinopril to 2.5 mg daily and cont coreg 6.25 mg bid  11/24/15 urine mac<2.5 on lisinopril 2.5 mg and coreg 6.25 mg bid  4/12/16 cardiology note decrease coreg to 3.125 mg bid consider discontinuation of coreg next evaluation and continue lisinopril 2.5 mg  5/20/16 urine mac <3 on lisinopril 2.5 mg  7/19/16 cardiology note clinically stable, episodes of chest tightness related to stress, blood pressure running low, discontinue carvedilol, follow-up in a few months  11/23/16 urine mac 3.2 on lisinopril 2.5 mg  11/29/17 urine mac<4 on lisinopril 2.5 mg     Interstitial cystitis  5/02 urology note [facility] nonbacterial prostatitis vs interstitial cystitis given trial of flomax     Insomnia on Xanax as needed     Hypothyroid  10/08 tsh 0.126,free t4 1.4,free t3 2.7  8/10 tsh 0.199 taking levothyroxine 0.125 6 days a week, and 2 tabs on john  9/10/10 increase to levothyroxine 0.137 mg daily, repeat tsh in 6 weeks  10/10 tsh 0.9  12/10 tsh 1.2, free t4 1.4, free t3 2.9  4/11 tsh 0.8, thyroxine 9.6, free thyroxine uptake 3.4, free t3 uptake 35%  9/11 tsh 0.5, free t4 0.9, free t3 2.4 on levothyroxine 137 mcg  12/11 tsh 0.3,free t4 1.5,free t3 2.7 on levothyroxine 137 mcg  4/16/13 tsh 0.28,free t4 1.1, free t3 2.3 (2.4-4.2); on levothyroxine 137 mcg; change to armour thyroid 60 mg qday  5/6/13 tsh 0.3, free t4 1.0, free t3 2.5 on levothyroxine 137 mcg ? Change to armour 60 mg  5/9/13  note decrease levothyroxine to 125 mcg and add cytomel 5 mg bid  6/13 tsh 0.45, free t4 1.2, free t3 2.9, on synthroid 125 mcg and cytomel 5 mcg bid per   8/14/13 tsh 0.235,free t4 1.1,free t3 2.8 on synthroid 125 mcg and cytomel 5 mcg bid per ;change to synthroid 150 mcg and stop cytomel per pt request  10/16/13 tsh 0.4, free t4 1.34, free t3 2.2 on synthroid 150 mcg  10/23/13  endo note; change to synthroid 125 mcg and cytomel 5 mcg daily  4/16/14 tsh 0.67,free t4 1.0,free t3  2.4  4/23/14  endocrine note, increase levothyroxine to 137 mcg and cont cytomel 5 mcg  10/28/14  endocrine note, tsh 0.6,free t4 1.0, free t3 2.7 on thyroxine 137 mcg and cytomel 5 mcg; gastroparesis symptoms improve with cytomel, will reduce thryoxine to 125 mcg and use cytomel 25 mcg to cut and take more than once per day as needed, return in 4 months  7/3/15 tsh 0.04, free t4 0.6 and free t3 3.0 on levothyroxine 125 mcg and cytomel 12.5 mcg daily  7/21/15  endocrine note; on levothyroxine 125 mcg and cytomel 12.5 mcg daily, tsh 0.04, free t4 0.6 and free t3 3.0, patient does not want to change dose, no changes continue same dosing regimen; follow up 6 months  8/8/15  endocrine phone note, decrease levothyroxine to 112 mcg daily, continue cytomel 12.5 mg daily  1/13/16 tsh 0.016,free t4 0.9,free t3 2.5 on levothyroxine 112 mcg and cytomel 12.5 mg daily  1/22/16  endocrinology note on levothyroxine 112 mcg and cytomel 12.5 mg daily, patient declines to change dosing regimen  4/5/16 tsh 0.012,free t4 0.97,free t3 3.6 on levothyroxine 112 mcg and cytomel 12.5 mg daily  4/21/16  endocrinology note, no changes  10/13/16 tsh 0.14,free t4 0.87,free t3 3.1 on levothyroxine 112 mcg and cytomel 25 mg  4/17/17 tsh 0.016,free t4 0.9,free t3 4.0 on levothyroxine 112 mcg and cytomel 25 mg  4/19/17  endocrinology note no changes follow up 6 months  10/18/17  endocrine note no changes     History MI  8/6/15 hospital admission non-STEMI inverted T waves in aVL, ST depression in lead 1, initial troponin 3.4  8/6/15   8/6/15 cardiac catheterization left main free of disease, triple vessel disease prominently involving distal segment nature epicardial vessels and branches, 95% ostial LAD descending artery stenosis and 70% mid RCA stenosis, ejection fraction 35-40%, stenting ostial LAD with xience drug-eluting stent  8/7/15 echo normal LV  function EF 65-70%, grade 1 diastolic dysfunction, moderate concentric LVH, mild MR and AR  8/8/15 hospital discharge on coreg 12.5 mg bid,lisinopril 5 mg, brilinta 90 mg bid, asa 81 mg,lipitor 80 mg  8/11/15 cardiology note; continue brilenta and asa for one year, some dyspnea, if no improvement could consider another antiplatelet therapy, will recheck BNP in 2 weeks with followup visit, ultimately will need myocardial perfusion scan but will defer for a few months  8/26/15 cardiology note, decrease coreg to 6.25 mg bid due to lower blood pressure,continue lisinopril 5 mg, asa, lipitor,start effient 10 mg for one year due to brilinta causing shortness of breath, followup 2 months  10/14/15 cardiology note no chnges, repeat myocardial perfusion scan 3 months  11/30/15 cardiac rehab program  1/13/15 cardiology note nitroglycerin when necessary follow-up 3 months  1/12/16 persantine thallium small basal anterior inferior infarct with small ame-infarct ischemia, moderately sized moderate severity inferior, inferior lateral infarct with reversible ischemia  4/12/16 cardiology note decrease coreg to 3.125 mg bid consider discontinuation of coreg next evaluation and continue lisinopril 2.5 mg  7/19/16 cardiology note clinically stable, episodes of chest tightness related to stress, blood pressure running low, discontinue carvedilol, follow-up in a few months  11/3/16 cardiology note, isosorbide mononitrate with heavy exertion, follow-up in a few months to determine if further evaluation is necessary, could consider repeat perfusion study or dobutamine stress echo  1/5/17 cardiology note stable CAD, follow-up 6 months  7/12/17 cardiology note, likely stable discussed smoking cessation, patient declines to quit smoking, follow-up one year     History of BPH  10/04 cystoscopy and green light photovaporization of prostate in [place], [place]     Gastroparesis  4/04 gastric emptying study severe gatroparesis done in [place], no hx of  DM or MS  5/04 CT thorax in [place] negative  5/04 MRI brain in [place] no evid of MS  On Emycin  4/16/13 declined gastric stimulator     Dyslipidemia  5/13 chol 158,trig 204,hdl 36,ldl 81  8/8/15 chol 97,trig 117,hdl 26,ldl 48 started on lipitor 80 mg on hospital discharge  8/21/15 chol 76,trig 85,hdl 24,ldl 35 on lipitor 80 mg  10/7/15 chol 67,trig 77,hdl 22,ldl 30 on lipitor 80 mg per cardiology  4/5/16 chol 64,trig 43,hdl 24,ldl 31 on lipitor 80 mg per cardiology  11/23/16 chol 83,trig 73,hdl 27,ldl 41 on lipitor 80 mg per cardiology  5/24/17 chol 85,trig 81,hdl 28,ldl 41 on lipitor 80 mg per cardiology  10/10/17 pharmacy known interaction with lipitor and erythromycin base, consider changing to crestor, offer made to patient to change to crestor but he would like to discuss with his cardiologist first  11/29/17 chol 72,trig 47,hdl 24,ldl 39 on lipitor 80 mg and erythromycin base, previously recommended changing to crestor because of potential interaction, patient would like to discuss with cardiology first     Diabetes  11/08 A1c 6.3%  8/10 A1c 7.9%  10/10 A1c 7.7%  12/10 A1c 8.0%  1/11 add metformin 500 mg bid  2/11 A1c 7.9%  4/11 A1c 8.2% increase metformin to 1000 mg bid  10/11 A1c 6.5 %  12/11 A1c 6.1% on metformin 1000 mg bid  4/16/13 A1c 6.2% on metformin 1000 mg bid; declines to check blood sugars at home; bs 194 non fasting; declines ACE  8/14/13 A1c 6.5% on metformin 1000 mg bid  12/9/13  eye exam grade 1 diabetic background retinopathy  4/16/14 A1c 7.0% per  on metformin 1000 mg bid  7/3/15 A1c 8.3% on metformin 1000 mg bid per endocrine   7/31/15 start lantus 5 units and continue metformin 1000 mg bid, declines ACE,statin,asa  8/8/15 hospital discharge on coreg 12.5 mg bid,lisinopril 5 mg, brilinta 90 mg bid, asa 81 mg,lipitor 80 mg; on lantus 8 units and metformin 1000 mg bid  8/28/15 declines nutrition consultation and diabetic education regarding diet  8/28/15 recommend  increasing lantus to 10 units and metformin 1000 mg bid  11/24/15 A1c 6.3% on lantus 10 units and metformin 1000 mg bid  11/30/15 on lantus 12 units and metformin 1000 mg bid  5/20/16 A1c 6.3% on lantus 12 units and metformin 1000 mg bid  11/23/16 A1c 6.1% on lantus 12 units and metformin 1000 mg bid  1/9/17  eye exam  5/24/17 A1c 6.3% on lantus 12 units and metformin 1000 mg bid   5/24/17 urine mac 2.5 on lisinopril 2.5 mg  11/29/17 A1c 6.1% on lantus 12 units and metformin 1000 mg bid           Health Maintenance Summary                IMM DTaP/Tdap/Td Vaccine Overdue 2/20/1969     IMM INFLUENZA Overdue 9/1/2017      Done 12/5/2016 Imm Admin: Influenza Vaccine Adult HD    RETINAL SCREENING Next Due 1/9/2018      Done 1/9/2017      Patient has more history with this topic...    A1C SCREENING Next Due 5/28/2018      Done 11/28/2017 HEMOGLOBIN A1C (A)     Patient has more history with this topic...    DIABETES MONOFILAMENT / LE EXAM Next Due 5/30/2018      Done 5/30/2017      Patient has more history with this topic...    Annual Wellness Visit Next Due 5/31/2018      Done 5/30/2017 Visit Dx: Medicare annual wellness visit, subsequent    FASTING LIPID PROFILE Next Due 11/28/2018      Done 11/28/2017 LIPID PANEL (A)     Patient has more history with this topic...    URINE ACR / MICROALBUMIN Next Due 11/28/2018      Done 11/28/2017 MICROALB/CREAT RATIO RAND. UR     Patient has more history with this topic...    SERUM CREATININE Next Due 11/28/2018      Done 11/28/2017 COMP METABOLIC PANEL (A)     Patient has more history with this topic...    COLONOSCOPY Next Due 5/31/2026      Patient Declined 5/31/2016      Patient has more history with this topic...          Patient Care Team:  Charbel Jaffe M.D. as PCP - General  Alex Stein M.D. as Consulting Physician (Endocrinology)        ROS       Objective:          Physical Exam   Constitutional: He appears well-developed and well-nourished. No  distress.   HENT:   Head: Normocephalic and atraumatic.   Mouth/Throat: Oropharynx is clear and moist.   Eyes: Conjunctivae are normal. Right eye exhibits no discharge. Left eye exhibits no discharge.   Neck: No JVD present. No thyromegaly present.   Cardiovascular: Normal rate and regular rhythm.    Pulmonary/Chest: Effort normal and breath sounds normal. No respiratory distress. He has no wheezes.   Abdominal: He exhibits no distension.   Musculoskeletal: He exhibits no edema.   Neurological: He is alert.   Skin: He is not diaphoretic.   Psychiatric: He has a normal mood and affect. His behavior is normal.   Nursing note and vitals reviewed.       question carotid bruit       Assessment/Plan:     Assessment  #1 Diabetes mellitus recent A1c 6.1% on Lantus 12 units at metformin 1000 mg twice daily    #2 dyslipidemia 11/29/17 chol 72,trig 47,hdl 24,ldl 39 on lipitor 80 mg, Stable, no muscle pain or muscle aches on statin therapy.  Also on chronic erythromycin for gastroparesis, has not experienced any significant muscle cramping or muscle aches on combination pill for the last 2 years    #3 gastroparesis on chronic erythromycin has declined gastric stimulator    #4 history of MI no recurrent symptoms followed by cardiology    #5 hypothyroid on replacement followed by endocrinology     #6 tobacco abuse one pack per day, denies shortness of breath, cough, has declined pulm a function testing, CT lung cancer screening, smoking cessation classes    #7 occasional hip stiffness, does not sound like rhabdomyolysis, consider peripheral vascular disease given smoking history and diabetes        Plan  #1 smoking cessation recommended, understanding smoking increases his risk of COPD, cardiovascular disease, peripheral vascular disease, lung cancer, osteoporosis, malignancy    #2  declines stop smoking classes and medications, and lung cancer screening program and PFT     #3 follow up cardiology    #4 flu shot today    #5  declines colonoscopy and FIT understanding increased risk of colon cancer with aging    #6 continue check blood sugars daily, monitor for hypoglycemia, eye exam annually, check feet daily    #7 JOSSUE lower extremity bilateral    #8 carotid ultrasound    #9 patient understands increased risk of rhabdomyolysis or potential interaction with erythromycin and Lipitor, advised him to consider changing to a different statin, he declines, states that he has been fine for 2 years on this combination, and he understands the potential risk of interaction with the medications and rhabdomyolysis, he is competent to make that decision    #10 nutrition, exercise discussed    #12 follow-up 6 months, repeat labs prior to visit, slip provided

## 2017-12-22 ENCOUNTER — TELEPHONE (OUTPATIENT)
Dept: MEDICAL GROUP | Facility: MEDICAL CENTER | Age: 67
End: 2017-12-22

## 2017-12-22 ENCOUNTER — HOSPITAL ENCOUNTER (OUTPATIENT)
Dept: RADIOLOGY | Facility: MEDICAL CENTER | Age: 67
End: 2017-12-22
Attending: INTERNAL MEDICINE
Payer: MEDICARE

## 2017-12-22 DIAGNOSIS — I65.23 BILATERAL CAROTID ARTERY STENOSIS: ICD-10-CM

## 2017-12-22 PROCEDURE — 93880 EXTRACRANIAL BILAT STUDY: CPT

## 2017-12-23 NOTE — TELEPHONE ENCOUNTER
Called patient and left message, please notify him that the ultrasound of the blood vessels of his neck shows no significant blockage.

## 2017-12-26 ENCOUNTER — TELEPHONE (OUTPATIENT)
Dept: MEDICAL GROUP | Facility: MEDICAL CENTER | Age: 67
End: 2017-12-26

## 2017-12-26 NOTE — TELEPHONE ENCOUNTER
----- Message from Charbel Jaffe M.D. sent at 12/22/2017  5:15 PM PST -----  Called patient and left message, please notify him that the ultrasound of the blood vessels of his neck shows no significant blockage.

## 2017-12-28 ENCOUNTER — HOSPITAL ENCOUNTER (OUTPATIENT)
Dept: RADIOLOGY | Facility: MEDICAL CENTER | Age: 67
End: 2017-12-28
Attending: INTERNAL MEDICINE
Payer: MEDICARE

## 2017-12-28 ENCOUNTER — TELEPHONE (OUTPATIENT)
Dept: MEDICAL GROUP | Facility: MEDICAL CENTER | Age: 67
End: 2017-12-28

## 2017-12-28 DIAGNOSIS — I73.9 PERIPHERAL VASCULAR DISEASE (HCC): ICD-10-CM

## 2017-12-28 PROCEDURE — 93925 LOWER EXTREMITY STUDY: CPT

## 2017-12-28 PROCEDURE — 93922 UPR/L XTREMITY ART 2 LEVELS: CPT

## 2017-12-29 ENCOUNTER — TELEPHONE (OUTPATIENT)
Dept: MEDICAL GROUP | Facility: MEDICAL CENTER | Age: 67
End: 2017-12-29

## 2017-12-29 NOTE — TELEPHONE ENCOUNTER
Please notify patient that the ultrasound of the blood vessels in his legs do show some decreased blood flow, with some areas of flow around the blocked blood vessels, the block blood vessels or areas of decreased flow  Are related to smoking, have him continue to optimize his work on smoking cessation

## 2017-12-29 NOTE — TELEPHONE ENCOUNTER
----- Message from Charbel Jaffe M.D. sent at 12/28/2017 10:05 PM PST -----  Please notify patient that the ultrasound of the blood vessels in his legs do show some decreased blood flow, with some areas of flow around the blocked blood vessels, the block blood vessels or areas of decreased flow  Are related to smoking, have him continue to optimize his work on smoking cessation

## 2018-01-03 RX ORDER — LEVOTHYROXINE SODIUM 112 UG/1
TABLET ORAL
Qty: 90 TAB | Refills: 1 | Status: SHIPPED | OUTPATIENT
Start: 2018-01-03 | End: 2018-07-02 | Stop reason: SDUPTHER

## 2018-03-08 DIAGNOSIS — G47.00 INSOMNIA, UNSPECIFIED TYPE: ICD-10-CM

## 2018-03-08 RX ORDER — ALPRAZOLAM 0.25 MG/1
0.25 TABLET ORAL NIGHTLY PRN
Qty: 30 TAB | Refills: 0 | Status: SHIPPED
Start: 2018-03-08 | End: 2018-04-07

## 2018-04-11 LAB
T3FREE SERPL-MCNC: 4.7 PG/ML (ref 2–4.4)
T4 FREE SERPL-MCNC: 0.99 NG/DL (ref 0.82–1.77)
TSH SERPL DL<=0.005 MIU/L-ACNC: 0.01 UIU/ML (ref 0.45–4.5)

## 2018-04-18 ENCOUNTER — OFFICE VISIT (OUTPATIENT)
Dept: ENDOCRINOLOGY | Facility: MEDICAL CENTER | Age: 68
End: 2018-04-18
Payer: MEDICARE

## 2018-04-18 VITALS
BODY MASS INDEX: 25.46 KG/M2 | HEART RATE: 75 BPM | HEIGHT: 68 IN | SYSTOLIC BLOOD PRESSURE: 116 MMHG | OXYGEN SATURATION: 96 % | DIASTOLIC BLOOD PRESSURE: 74 MMHG | WEIGHT: 168 LBS

## 2018-04-18 DIAGNOSIS — E03.9 ACQUIRED HYPOTHYROIDISM: Chronic | ICD-10-CM

## 2018-04-18 PROCEDURE — 99213 OFFICE O/P EST LOW 20 MIN: CPT | Performed by: INTERNAL MEDICINE

## 2018-04-18 NOTE — PROGRESS NOTES
Chief Complaint   Patient presents with   • Hypothyroidism        HPI:    See assessment and recommendations below    ROS:   All other systems reported as negative or unchanged since last exam      Allergies:   Allergies   Allergen Reactions   • Pcn [Penicillins] Anaphylaxis   • Other Environmental      Hayfever   • Tetanus Toxoid        Current medicines including changes today:  Current Outpatient Prescriptions   Medication Sig Dispense Refill   • erythromycin base (UMM-TAB) 250 MG Tablet Delayed Response Take 1 Tab by mouth 2 times a day. 180 Tab 1   • metformin (GLUCOPHAGE) 1000 MG tablet Take 1 Tab by mouth 2 times a day, with meals. 180 Tab 2   • TRUEPLUS LANCETS 28G Misc 1 Device by Other route 2 times a day. Before meals, E11.9 100 Each 11   • Riboflavin (VITAMIN B-2 PO) Take  by mouth.     • lisinopril (PRINIVIL) 2.5 MG Tab Take 1 Tab by mouth every day. 90 Tab 3   • atorvastatin (LIPITOR) 80 MG tablet Take 1 Tab by mouth every evening. 90 Tab 3   • liothyronine (CYTOMEL) 25 MCG Tab Take 0.5 Tabs by mouth 2 Times a Day. 90 Tab 3   • glucose blood (RELION PRIME TEST) strip 1 Strip by Other route every day. Check blood sugar once a day before meal,E11.9 100 Strip 3   • insulin glargine (LANTUS SOLOSTAR) 100 UNIT/ML Solution Pen-injector injection Inject 12 Units as instructed every evening. 15 PEN 6   • aspirin (ASA) 81 MG Chew Tab chewable tablet Take 1 Tab by mouth every day. 100 Tab 11   • isosorbide mononitrate SR (IMDUR) 60 MG TABLET SR 24 HR Take 1 Tab by mouth every morning. 30 Tab 3   • vitamin D (CHOLECALCIFEROL) 1000 UNIT Tab Take 1,000 Units by mouth every day.     • levothyroxine (SYNTHROID) 112 MCG Tab TAKE ONE TABLET BY MOUTH IN THE MORNING ON  AN  EMPTY  STOMACH 90 Tab 1   • Insulin Pen Needle (ULTICARE MINI PEN NEEDLES) 31G X 6 MM Misc 1 Device by Other route every day. E11.9 100 Each 3   • liothyronine (CYTOMEL) 25 MCG Tab Take 0.5 Tabs by mouth 2 Times a Day. 90 Tab 3   • nitroglycerin  "(NITROSTAT) 0.4 MG SL Tab Place 1 Tab under tongue as needed for Chest Pain. 25 Tab 11     No current facility-administered medications for this visit.         Past Medical History:   Diagnosis Date   • Coronary artery disease due to calcified coronary lesion 8/11/2015   • Diabetes mellitus type II    • Gastroparesis    • Hyperlipidemia    • Hypothyroid    • Interstitial cystitis    • Sciatic neuritis        PHYSICAL EXAM:    /74   Pulse 75   Ht 1.715 m (5' 7.5\")   Wt 76.2 kg (168 lb)   SpO2 96%   BMI 25.92 kg/m²      Gen.   appears healthy     Skin   appropriate for sex and age    HEENT  unremarkable    Neck   no palpable thyroid. I think it is atrophic    Heart  regular    Extremities  no edema    Neuro  gait and station normal, no tremor     Psych  Appropriate, good spirits      ASSESSMENT AND RECOMMENDATIONS    1. Acquired hypothyroidism           This patient has gastroparesis which interferes with the absorption of his thyroid hormone. Most of the time he takes his thyroid transbuccal            Is taking levothyroxine 112 µg per day and liothyronine 12.5 µg twice a day           He is clinically euthyroid feeling quite well.           Current TSH is suppressed at 0.01. Free T4 is low normal at 0.9 and free T3 slightly elevated at 4.7 (2.0-4.4).           This T3 level is usually within the norms so the current level is a bit of an outlier. Could be lab variation because he has had no change in clinical status. When he swallows his thyroid gastroparesis with retained contents may change levels from time to time           He is feeling very well and does not want to change his dosing. We will leave as is for the moment and review again in 6 months. We can modify his dosing easily by eliminating one dose of liothyronine once or twice a week but for the moment not necessary.  - FREE THYROXINE; Future  - T3 FREE; Future  - TSH; Future      DISPOSITION: Return in 6 months       Alex Stein, " M.D.    Copies to: Charbel Jaffe M.D. [phone]

## 2018-05-24 RX ORDER — LIOTHYRONINE SODIUM 25 UG/1
TABLET ORAL
Qty: 990 TAB | Refills: 3 | Status: SHIPPED | OUTPATIENT
Start: 2018-05-24 | End: 2019-05-21 | Stop reason: SDUPTHER

## 2018-06-07 LAB
ALBUMIN SERPL-MCNC: 4.1 G/DL (ref 3.6–4.8)
ALBUMIN/CREAT UR: 3.8 MG/G CREAT (ref 0–30)
ALBUMIN/GLOB SERPL: 1.9 {RATIO} (ref 1.2–2.2)
ALP SERPL-CCNC: 102 IU/L (ref 39–117)
ALT SERPL-CCNC: 36 IU/L (ref 0–44)
APPEARANCE UR: CLEAR
AST SERPL-CCNC: 29 IU/L (ref 0–40)
BACTERIA #/AREA URNS HPF: NORMAL /[HPF]
BASOPHILS # BLD AUTO: 0 X10E3/UL (ref 0–0.2)
BASOPHILS NFR BLD AUTO: 0 %
BILIRUB SERPL-MCNC: 0.3 MG/DL (ref 0–1.2)
BILIRUB UR QL STRIP: NEGATIVE
BUN SERPL-MCNC: 15 MG/DL (ref 8–27)
BUN/CREAT SERPL: 17 (ref 10–24)
CALCIUM SERPL-MCNC: 8.8 MG/DL (ref 8.6–10.2)
CASTS URNS MICRO: NORMAL
CASTS URNS QL MICRO: NORMAL
CHLORIDE SERPL-SCNC: 106 MMOL/L (ref 96–106)
CHOLEST SERPL-MCNC: 78 MG/DL (ref 100–199)
CK SERPL-CCNC: 69 U/L (ref 24–204)
CO2 SERPL-SCNC: 18 MMOL/L (ref 18–29)
COLOR UR: YELLOW
CREAT SERPL-MCNC: 0.88 MG/DL (ref 0.76–1.27)
CREAT UR-MCNC: 87.2 MG/DL
CRYSTALS URNS MICRO: NORMAL
EOSINOPHIL # BLD AUTO: 0.1 X10E3/UL (ref 0–0.4)
EOSINOPHIL NFR BLD AUTO: 2 %
EPI CELLS #/AREA URNS HPF: NORMAL /HPF
ERYTHROCYTE [DISTWIDTH] IN BLOOD BY AUTOMATED COUNT: 14 % (ref 12.3–15.4)
GFR SERPLBLD CREATININE-BSD FMLA CKD-EPI: 102 ML/MIN/1.73
GFR SERPLBLD CREATININE-BSD FMLA CKD-EPI: 88 ML/MIN/1.73
GLOBULIN SER CALC-MCNC: 2.2 G/DL (ref 1.5–4.5)
GLUCOSE SERPL-MCNC: 157 MG/DL (ref 65–99)
GLUCOSE UR QL: NEGATIVE
HBA1C MFR BLD: 5.9 % (ref 4.8–5.6)
HCT VFR BLD AUTO: 43.7 % (ref 37.5–51)
HDLC SERPL-MCNC: 25 MG/DL
HGB BLD-MCNC: 15 G/DL (ref 13–17.7)
HGB UR QL STRIP: NEGATIVE
IMM GRANULOCYTES # BLD: 0 X10E3/UL (ref 0–0.1)
IMM GRANULOCYTES NFR BLD: 0 %
IMMATURE CELLS  115398: NORMAL
KETONES UR QL STRIP: NEGATIVE
LABORATORY COMMENT REPORT: ABNORMAL
LDLC SERPL CALC-MCNC: 39 MG/DL (ref 0–99)
LEUKOCYTE ESTERASE UR QL STRIP: NEGATIVE
LYMPHOCYTES # BLD AUTO: 1.7 X10E3/UL (ref 0.7–3.1)
LYMPHOCYTES NFR BLD AUTO: 25 %
MCH RBC QN AUTO: 30.4 PG (ref 26.6–33)
MCHC RBC AUTO-ENTMCNC: 34.3 G/DL (ref 31.5–35.7)
MCV RBC AUTO: 89 FL (ref 79–97)
MICRO URNS: NORMAL
MICRO URNS: NORMAL
MICROALBUMIN UR-MCNC: 3.3 UG/ML
MONOCYTES # BLD AUTO: 0.5 X10E3/UL (ref 0.1–0.9)
MONOCYTES NFR BLD AUTO: 7 %
MORPHOLOGY BLD-IMP: NORMAL
MUCOUS THREADS URNS QL MICRO: PRESENT
NEUTROPHILS # BLD AUTO: 4.5 X10E3/UL (ref 1.4–7)
NEUTROPHILS NFR BLD AUTO: 66 %
NITRITE UR QL STRIP: NEGATIVE
NRBC BLD AUTO-RTO: NORMAL %
PH UR STRIP: 5.5 [PH] (ref 5–7.5)
PLATELET # BLD AUTO: 234 X10E3/UL (ref 150–379)
POTASSIUM SERPL-SCNC: 4.6 MMOL/L (ref 3.5–5.2)
PROT SERPL-MCNC: 6.3 G/DL (ref 6–8.5)
PROT UR QL STRIP: NEGATIVE
RBC # BLD AUTO: 4.94 X10E6/UL (ref 4.14–5.8)
RBC #/AREA URNS HPF: NORMAL /HPF
RENAL EPI CELLS #/AREA URNS HPF: NORMAL /[HPF]
SODIUM SERPL-SCNC: 137 MMOL/L (ref 134–144)
SP GR UR: 1.02 (ref 1–1.03)
T VAGINALIS URNS QL MICRO: NORMAL
TRIGL SERPL-MCNC: 68 MG/DL (ref 0–149)
UNIDENT CRYS URNS QL MICRO: NORMAL
URINALYSIS REFLEX  377202: NORMAL
URNS CMNT MICRO: NORMAL
UROBILINOGEN UR STRIP-MCNC: 0.2 MG/DL (ref 0.2–1)
VLDLC SERPL CALC-MCNC: 14 MG/DL (ref 5–40)
WBC # BLD AUTO: 6.9 X10E3/UL (ref 3.4–10.8)
WBC #/AREA URNS HPF: NORMAL /HPF
YEAST #/AREA URNS HPF: NORMAL /[HPF]

## 2018-06-12 ENCOUNTER — OFFICE VISIT (OUTPATIENT)
Dept: MEDICAL GROUP | Facility: MEDICAL CENTER | Age: 68
End: 2018-06-12
Payer: MEDICARE

## 2018-06-12 VITALS
TEMPERATURE: 97.6 F | OXYGEN SATURATION: 96 % | DIASTOLIC BLOOD PRESSURE: 66 MMHG | WEIGHT: 166 LBS | SYSTOLIC BLOOD PRESSURE: 116 MMHG | HEART RATE: 86 BPM | BODY MASS INDEX: 25.62 KG/M2

## 2018-06-12 DIAGNOSIS — E11.9 CONTROLLED TYPE 2 DIABETES MELLITUS WITHOUT COMPLICATION, WITHOUT LONG-TERM CURRENT USE OF INSULIN (HCC): Chronic | ICD-10-CM

## 2018-06-12 DIAGNOSIS — E78.5 DYSLIPIDEMIA: Chronic | ICD-10-CM

## 2018-06-12 DIAGNOSIS — Z23 NEED FOR ZOSTER VACCINATION: ICD-10-CM

## 2018-06-12 DIAGNOSIS — E03.9 ACQUIRED HYPOTHYROIDISM: Chronic | ICD-10-CM

## 2018-06-12 DIAGNOSIS — I25.2 HISTORY OF MI (MYOCARDIAL INFARCTION): Chronic | ICD-10-CM

## 2018-06-12 DIAGNOSIS — Z00.00 PREVENTATIVE HEALTH CARE: Chronic | ICD-10-CM

## 2018-06-12 DIAGNOSIS — Z72.0 TOBACCO ABUSE: Chronic | ICD-10-CM

## 2018-06-12 PROCEDURE — 99214 OFFICE O/P EST MOD 30 MIN: CPT | Performed by: INTERNAL MEDICINE

## 2018-06-12 ASSESSMENT — PATIENT HEALTH QUESTIONNAIRE - PHQ9: CLINICAL INTERPRETATION OF PHQ2 SCORE: 0

## 2018-07-02 DIAGNOSIS — E78.5 DYSLIPIDEMIA: ICD-10-CM

## 2018-07-04 RX ORDER — ATORVASTATIN CALCIUM 80 MG/1
TABLET, FILM COATED ORAL
Qty: 90 TAB | Refills: 3 | Status: SHIPPED | OUTPATIENT
Start: 2018-07-04 | End: 2019-06-21 | Stop reason: SDUPTHER

## 2018-07-11 LAB
ALBUMIN SERPL-MCNC: 4 G/DL (ref 3.6–4.8)
ALBUMIN/GLOB SERPL: 1.6 {RATIO} (ref 1.2–2.2)
ALP SERPL-CCNC: 97 IU/L (ref 39–117)
ALT SERPL-CCNC: 29 IU/L (ref 0–44)
AST SERPL-CCNC: 25 IU/L (ref 0–40)
BILIRUB SERPL-MCNC: 0.3 MG/DL (ref 0–1.2)
BUN SERPL-MCNC: 15 MG/DL (ref 8–27)
BUN/CREAT SERPL: 16 (ref 10–24)
CALCIUM SERPL-MCNC: 9 MG/DL (ref 8.6–10.2)
CHLORIDE SERPL-SCNC: 106 MMOL/L (ref 96–106)
CHOLEST SERPL-MCNC: 85 MG/DL (ref 100–199)
CO2 SERPL-SCNC: 19 MMOL/L (ref 20–29)
CREAT SERPL-MCNC: 0.94 MG/DL (ref 0.76–1.27)
GLOBULIN SER CALC-MCNC: 2.5 G/DL (ref 1.5–4.5)
GLUCOSE SERPL-MCNC: 141 MG/DL (ref 65–99)
HDLC SERPL-MCNC: 25 MG/DL
IF AFRICAN AMERICAN  100797: 96 ML/MIN/1.73
IF NON AFRICAN AMER 100791: 83 ML/MIN/1.73
LABORATORY COMMENT REPORT: ABNORMAL
LDLC SERPL CALC-MCNC: 40 MG/DL (ref 0–99)
POTASSIUM SERPL-SCNC: 5 MMOL/L (ref 3.5–5.2)
PROT SERPL-MCNC: 6.5 G/DL (ref 6–8.5)
SODIUM SERPL-SCNC: 141 MMOL/L (ref 134–144)
TRIGL SERPL-MCNC: 102 MG/DL (ref 0–149)
VLDLC SERPL CALC-MCNC: 20 MG/DL (ref 5–40)

## 2018-07-23 ENCOUNTER — OFFICE VISIT (OUTPATIENT)
Dept: CARDIOLOGY | Facility: MEDICAL CENTER | Age: 68
End: 2018-07-23
Payer: MEDICARE

## 2018-07-23 VITALS
OXYGEN SATURATION: 96 % | HEART RATE: 86 BPM | DIASTOLIC BLOOD PRESSURE: 60 MMHG | SYSTOLIC BLOOD PRESSURE: 100 MMHG | WEIGHT: 166 LBS | BODY MASS INDEX: 25.62 KG/M2

## 2018-07-23 DIAGNOSIS — Z72.0 TOBACCO ABUSE: Chronic | ICD-10-CM

## 2018-07-23 DIAGNOSIS — I25.10 CORONARY ARTERY DISEASE DUE TO CALCIFIED CORONARY LESION: ICD-10-CM

## 2018-07-23 DIAGNOSIS — I25.84 CORONARY ARTERY DISEASE DUE TO CALCIFIED CORONARY LESION: ICD-10-CM

## 2018-07-23 DIAGNOSIS — E78.5 DYSLIPIDEMIA: Chronic | ICD-10-CM

## 2018-07-23 PROCEDURE — 99214 OFFICE O/P EST MOD 30 MIN: CPT | Performed by: INTERNAL MEDICINE

## 2018-07-23 NOTE — PROGRESS NOTES
Chief Complaint   Patient presents with   • Congestive Heart Failure     Follow up       Subjective:   Orlando Arreguin is a 68 y.o. male who presents today for follow-up of his coronary artery disease with prior history of LAD stent in 2015.  He also has dyslipidemia and continues to smoke.    He has been doing well clinically.  He does yard work and does about a 20 minute walk daily.  He is generally limited by orthopedic problems and not dyspnea.  He has a history of polio as a child and has chronic pain and weakness in his legs.  He denies any chest discomfort, dyspnea, edema, palpitations or lightheadedness.    He continues to smoke about a pack a day.    Past Medical History:   Diagnosis Date   • Coronary artery disease due to calcified coronary lesion 8/11/2015   • Diabetes mellitus type II    • Gastroparesis    • Hyperlipidemia    • Hypothyroid    • Interstitial cystitis    • Sciatic neuritis      Past Surgical History:   Procedure Laterality Date   • CARDIAC CATH  8/6/15    YESIKA to LAD.  Has RCA 70% occulsion.   • APPENDECTOMY     • OTHER      L shoulder surgery (arthroscopic, [facility], 2011)   • PB TRANSURETHRAL ELEC-SURG PBOSTATECTOM       Family History   Problem Relation Age of Onset   • Heart Disease Mother    • Heart Attack Mother 55     heart attack     Social History     Social History   • Marital status: Single     Spouse name: N/A   • Number of children: N/A   • Years of education: N/A     Occupational History   • Not on file.     Social History Main Topics   • Smoking status: Current Every Day Smoker     Packs/day: 1.00     Years: 30.00     Types: Cigarettes   • Smokeless tobacco: Never Used   • Alcohol use No   • Drug use: No   • Sexual activity: Not on file     Other Topics Concern   • Not on file     Social History Narrative   • No narrative on file     Allergies   Allergen Reactions   • Pcn [Penicillins] Anaphylaxis   • Other Environmental      Hayfever   • Tetanus Toxoid      Outpatient Encounter  Prescriptions as of 7/23/2018   Medication Sig Dispense Refill   • atorvastatin (LIPITOR) 80 MG tablet TAKE ONE TABLET BY MOUTH ONCE DAILY AT BEDTIME 90 Tab 3   • levothyroxine (SYNTHROID) 112 MCG Tab TAKE ONE TABLET BY MOUTH ONCE DAILY IN THE MORNING ON  AN  EMPTY  STOMACH 90 Tab 0   • lisinopril (PRINIVIL) 2.5 MG Tab TAKE ONE TABLET BY MOUTH ONCE DAILY 90 Tab 0   • glucose blood (RELION PRIME TEST) strip 1 Strip by Other route every day. Check blood sugar once a day before meal, E11.9 100 Strip 3   • metformin (GLUCOPHAGE) 1000 MG tablet Take 1 Tab by mouth 2 times a day, with meals. 180 Tab 3   • insulin glargine (LANTUS SOLOSTAR) 100 UNIT/ML Solution Pen-injector injection Inject 12 Units as instructed every evening. 15 PEN 6   • liothyronine (CYTOMEL) 25 MCG Tab TAKE ONE-HALF TABLET BY MOUTH TWICE DAILY 990 Tab 3   • Insulin Pen Needle (ULTICARE MINI PEN NEEDLES) 31G X 6 MM Misc 1 Device by Other route every day. E11.9 100 Each 3   • erythromycin base (UMM-TAB) 250 MG Tablet Delayed Response Take 1 Tab by mouth 2 times a day. 180 Tab 1   • TRUEPLUS LANCETS 28G Misc 1 Device by Other route 2 times a day. Before meals, E11.9 100 Each 11   • Riboflavin (VITAMIN B-2 PO) Take  by mouth.     • aspirin (ASA) 81 MG Chew Tab chewable tablet Take 1 Tab by mouth every day. 100 Tab 11     No facility-administered encounter medications on file as of 7/23/2018.      ROS     Objective:   /60   Pulse 86   Wt 75.3 kg (166 lb)   SpO2 96%   BMI 25.62 kg/m²     Physical Exam   Constitutional: He appears well-developed and well-nourished.   Neck: No JVD present.   Cardiovascular: Normal rate and regular rhythm.    No murmur heard.  Pulmonary/Chest: Effort normal and breath sounds normal. He has no rales.   Abdominal: Soft. There is no tenderness.   Musculoskeletal: He exhibits no edema.     Lab Results   Component Value Date/Time    CHOLSTRLTOT 85 (L) 07/10/2018 10:20 AM    CHOLSTRLTOT 97 (L) 08/08/2015 02:13 AM    LDL 40  07/10/2018 10:20 AM    LDL 48 08/08/2015 02:13 AM    HDL 25 (L) 07/10/2018 10:20 AM    HDL 26 (A) 08/08/2015 02:13 AM    TRIGLYCERIDE 102 07/10/2018 10:20 AM    TRIGLYCERIDE 117 08/08/2015 02:13 AM       Lab Results   Component Value Date/Time    SODIUM 141 07/10/2018 10:20 AM    SODIUM 137 08/07/2015 03:30 AM    POTASSIUM 5.0 07/10/2018 10:20 AM    POTASSIUM 3.8 08/07/2015 03:30 AM    CHLORIDE 106 07/10/2018 10:20 AM    CHLORIDE 108 08/07/2015 03:30 AM    CO2 19 (L) 07/10/2018 10:20 AM    CO2 20 08/07/2015 03:30 AM    GLUCOSE 141 (H) 07/10/2018 10:20 AM    GLUCOSE 176 (H) 08/07/2015 03:30 AM    BUN 15 07/10/2018 10:20 AM    BUN 13 08/07/2015 03:30 AM    CREATININE 0.94 07/10/2018 10:20 AM    CREATININE 0.83 08/07/2015 03:30 AM    BUNCREATRAT 16 07/10/2018 10:20 AM     Lab Results   Component Value Date/Time    ALKPHOSPHAT 97 07/10/2018 10:20 AM    ALKPHOSPHAT 88 08/06/2015 04:59 AM    ASTSGOT 25 07/10/2018 10:20 AM    ASTSGOT 49 (H) 08/06/2015 04:59 AM    ALTSGPT 29 07/10/2018 10:20 AM    ALTSGPT 33 08/06/2015 04:59 AM    TBILIRUBIN 0.3 07/10/2018 10:20 AM    TBILIRUBIN 0.3 08/06/2015 04:59 AM      Lab Results   Component Value Date/Time    BNPBTYPENAT 178.8 (H) 08/19/2015 10:28 AM    BNPBTYPENAT 299 (H) 08/06/2015 04:59 AM          Assessment:     1. Coronary artery disease due to calcified coronary lesion: LAD stent in August 2015     2. Dyslipidemia     3. Tobacco abuse         Medical Decision Making:  Today's Assessment / Status / Plan:     Mr. Arreguin is clinically stable.  He is asymptomatic from a cardiovascular standpoint.  Unfortunately, he is under significant life stress at the present time and is not inclined to cut back or discontinue smoking.  His lipid status appears to be under excellent control.  I feel he can follow-up in about a year with repeat lab work.  He does need to take erythromycin with his statin therapy.  He takes erythromycin for his gastroparesis.  He has been tolerating this with  his atorvastatin and has had no side effects.  I feel he can continue this combination as long as he develops no side effects.

## 2018-07-23 NOTE — LETTER
[facility]   [address], [facility], [place] [address]  [place] [address]  Phone: [phone]  Fax: [phone]              Orlando Arreguin  1950    Encounter Date: 7/23/2018    Gilberto Bryson M.D.          PROGRESS NOTE:  Chief Complaint   Patient presents with   • Congestive Heart Failure     Follow up       Subjective:   Orlando Arreguin is a 68 y.o. male who presents today for follow-up of his coronary artery disease with prior history of LAD stent in 2015.  He also has dyslipidemia and continues to smoke.    He has been doing well clinically.  He does yard work and does about a 20 minute walk daily.  He is generally limited by orthopedic problems and not dyspnea.  He has a history of polio as a child and has chronic pain and weakness in his legs.  He denies any chest discomfort, dyspnea, edema, palpitations or lightheadedness.    He continues to smoke about a pack a day.    Past Medical History:   Diagnosis Date   • Coronary artery disease due to calcified coronary lesion 8/11/2015   • Diabetes mellitus type II    • Gastroparesis    • Hyperlipidemia    • Hypothyroid    • Interstitial cystitis    • Sciatic neuritis      Past Surgical History:   Procedure Laterality Date   • CARDIAC CATH  8/6/15    YESIKA to LAD.  Has RCA 70% occulsion.   • APPENDECTOMY     • OTHER      L shoulder surgery (arthroscopic, [facility], 2011)   • PB TRANSURETHRAL ELEC-SURG PBOSTATECTOM       Family History   Problem Relation Age of Onset   • Heart Disease Mother    • Heart Attack Mother 55     heart attack     Social History     Social History   • Marital status: Single     Spouse name: N/A   • Number of children: N/A   • Years of education: N/A     Occupational History   • Not on file.     Social History Main Topics   • Smoking status: Current Every Day Smoker     Packs/day: 1.00     Years: 30.00     Types: Cigarettes   • Smokeless tobacco: Never Used   • Alcohol use No   • Drug use: No   • Sexual  activity: Not on file     Other Topics Concern   • Not on file     Social History Narrative   • No narrative on file     Allergies   Allergen Reactions   • Pcn [Penicillins] Anaphylaxis   • Other Environmental      Hayfever   • Tetanus Toxoid      Outpatient Encounter Prescriptions as of 7/23/2018   Medication Sig Dispense Refill   • atorvastatin (LIPITOR) 80 MG tablet TAKE ONE TABLET BY MOUTH ONCE DAILY AT BEDTIME 90 Tab 3   • levothyroxine (SYNTHROID) 112 MCG Tab TAKE ONE TABLET BY MOUTH ONCE DAILY IN THE MORNING ON  AN  EMPTY  STOMACH 90 Tab 0   • lisinopril (PRINIVIL) 2.5 MG Tab TAKE ONE TABLET BY MOUTH ONCE DAILY 90 Tab 0   • glucose blood (RELION PRIME TEST) strip 1 Strip by Other route every day. Check blood sugar once a day before meal, E11.9 100 Strip 3   • metformin (GLUCOPHAGE) 1000 MG tablet Take 1 Tab by mouth 2 times a day, with meals. 180 Tab 3   • insulin glargine (LANTUS SOLOSTAR) 100 UNIT/ML Solution Pen-injector injection Inject 12 Units as instructed every evening. 15 PEN 6   • liothyronine (CYTOMEL) 25 MCG Tab TAKE ONE-HALF TABLET BY MOUTH TWICE DAILY 990 Tab 3   • Insulin Pen Needle (ULTICARE MINI PEN NEEDLES) 31G X 6 MM Misc 1 Device by Other route every day. E11.9 100 Each 3   • erythromycin base (UMM-TAB) 250 MG Tablet Delayed Response Take 1 Tab by mouth 2 times a day. 180 Tab 1   • TRUEPLUS LANCETS 28G Misc 1 Device by Other route 2 times a day. Before meals, E11.9 100 Each 11   • Riboflavin (VITAMIN B-2 PO) Take  by mouth.     • aspirin (ASA) 81 MG Chew Tab chewable tablet Take 1 Tab by mouth every day. 100 Tab 11     No facility-administered encounter medications on file as of 7/23/2018.      ROS     Objective:   /60   Pulse 86   Wt 75.3 kg (166 lb)   SpO2 96%   BMI 25.62 kg/m²      Physical Exam   Constitutional: He appears well-developed and well-nourished.   Neck: No JVD present.   Cardiovascular: Normal rate and regular rhythm.    No murmur heard.  Pulmonary/Chest: Effort  normal and breath sounds normal. He has no rales.   Abdominal: Soft. There is no tenderness.   Musculoskeletal: He exhibits no edema.     Lab Results   Component Value Date/Time    CHOLSTRLTOT 85 (L) 07/10/2018 10:20 AM    CHOLSTRLTOT 97 (L) 08/08/2015 02:13 AM    LDL 40 07/10/2018 10:20 AM    LDL 48 08/08/2015 02:13 AM    HDL 25 (L) 07/10/2018 10:20 AM    HDL 26 (A) 08/08/2015 02:13 AM    TRIGLYCERIDE 102 07/10/2018 10:20 AM    TRIGLYCERIDE 117 08/08/2015 02:13 AM       Lab Results   Component Value Date/Time    SODIUM 141 07/10/2018 10:20 AM    SODIUM 137 08/07/2015 03:30 AM    POTASSIUM 5.0 07/10/2018 10:20 AM    POTASSIUM 3.8 08/07/2015 03:30 AM    CHLORIDE 106 07/10/2018 10:20 AM    CHLORIDE 108 08/07/2015 03:30 AM    CO2 19 (L) 07/10/2018 10:20 AM    CO2 20 08/07/2015 03:30 AM    GLUCOSE 141 (H) 07/10/2018 10:20 AM    GLUCOSE 176 (H) 08/07/2015 03:30 AM    BUN 15 07/10/2018 10:20 AM    BUN 13 08/07/2015 03:30 AM    CREATININE 0.94 07/10/2018 10:20 AM    CREATININE 0.83 08/07/2015 03:30 AM    BUNCREATRAT 16 07/10/2018 10:20 AM     Lab Results   Component Value Date/Time    ALKPHOSPHAT 97 07/10/2018 10:20 AM    ALKPHOSPHAT 88 08/06/2015 04:59 AM    ASTSGOT 25 07/10/2018 10:20 AM    ASTSGOT 49 (H) 08/06/2015 04:59 AM    ALTSGPT 29 07/10/2018 10:20 AM    ALTSGPT 33 08/06/2015 04:59 AM    TBILIRUBIN 0.3 07/10/2018 10:20 AM    TBILIRUBIN 0.3 08/06/2015 04:59 AM      Lab Results   Component Value Date/Time    BNPBTYPENAT 178.8 (H) 08/19/2015 10:28 AM    BNPBTYPENAT 299 (H) 08/06/2015 04:59 AM          Assessment:     1. Coronary artery disease due to calcified coronary lesion: LAD stent in August 2015     2. Dyslipidemia     3. Tobacco abuse         Medical Decision Making:  Today's Assessment / Status / Plan:     Mr. Arreguin is clinically stable.  He is asymptomatic from a cardiovascular standpoint.  Unfortunately, he is under significant life stress at the present time and is not inclined to cut back or  discontinue smoking.  His lipid status appears to be under excellent control.  I feel he can follow-up in about a year with repeat lab work.  He does need to take erythromycin with his statin therapy.  He takes erythromycin for his gastroparesis.  He has been tolerating his atorvastatin and has had no side effects.  I feel he can continue this combination as long as he develops no side effects.      Charbel Jaffe M.D.  [address]  [id number]  [facility] [address]  VIA In Basket

## 2018-09-19 DIAGNOSIS — I10 ESSENTIAL HYPERTENSION: ICD-10-CM

## 2018-09-20 RX ORDER — LISINOPRIL 2.5 MG/1
TABLET ORAL
Qty: 90 TAB | Refills: 2 | Status: SHIPPED | OUTPATIENT
Start: 2018-09-20 | End: 2019-06-12 | Stop reason: SDUPTHER

## 2018-09-26 DIAGNOSIS — K31.84 GASTROPARESIS: Chronic | ICD-10-CM

## 2018-09-26 RX ORDER — ERYTHROMYCIN 250 MG/1
250 TABLET, COATED ORAL 2 TIMES DAILY
Qty: 180 TAB | Refills: 3 | Status: SHIPPED | OUTPATIENT
Start: 2018-09-26 | End: 2020-03-15

## 2018-10-06 LAB
T3FREE SERPL-MCNC: 3.3 PG/ML (ref 2–4.4)
T4 FREE SERPL-MCNC: 1 NG/DL (ref 0.82–1.77)
TSH SERPL DL<=0.005 MIU/L-ACNC: 0.01 UIU/ML (ref 0.45–4.5)

## 2018-10-15 ENCOUNTER — OFFICE VISIT (OUTPATIENT)
Dept: ENDOCRINOLOGY | Facility: MEDICAL CENTER | Age: 68
End: 2018-10-15
Payer: MEDICARE

## 2018-10-15 VITALS
WEIGHT: 166 LBS | SYSTOLIC BLOOD PRESSURE: 104 MMHG | HEART RATE: 76 BPM | HEIGHT: 68 IN | OXYGEN SATURATION: 97 % | RESPIRATION RATE: 16 BRPM | DIASTOLIC BLOOD PRESSURE: 66 MMHG | BODY MASS INDEX: 25.16 KG/M2

## 2018-10-15 DIAGNOSIS — E03.9 ACQUIRED HYPOTHYROIDISM: Chronic | ICD-10-CM

## 2018-10-15 PROCEDURE — 99213 OFFICE O/P EST LOW 20 MIN: CPT | Performed by: INTERNAL MEDICINE

## 2018-10-15 NOTE — PROGRESS NOTES
Chief Complaint   Patient presents with   • Hypothyroidism        HPI:    Hypothyroidism          Clinically euthyroid taking levothyroxine 112 µg per day liothyronine 12.5 µg twice a day. He insists this is his best dose and he would be nonfunctional with less.          He does not have symptoms of thyrotoxicosis. Current TSH is suppressed as before 0.01. Free thyroxine mid range at 1.0 and free T3 also mid range at 3.3.          We have discussed previously he could have TRH/TSH defect leading to this circumstance. He understands and potential for side effects if he really is overtreated with thyroid. Sudden arrhythmia could occur. Complications related to his coronary disease could occur.             One adverse effect he might not be aware of his accelerated bone loss. I would like him to do a bone density to monitor this possibility. He will not do a bone density if Medicare does not cover it. I'm having difficulty finding a code for Medicare coverage right now. I will ask...    ROS:  Gastroparesis is effectively treated with erythromycin  All other systems reported as negative or unchanged since last exam      Allergies:   Allergies   Allergen Reactions   • Pcn [Penicillins] Anaphylaxis   • Other Environmental      Hayfever   • Tetanus Toxoid        Current medicines including changes today:  Current Outpatient Prescriptions   Medication Sig Dispense Refill   • TRUEPLUS LANCETS 28G Misc 1 Device by Does not apply route every day. Check blood sugar once a day, trueplus lancet, E11.9 100 Each 3   • erythromycin base (UMM-TAB) 250 MG Tab Take 1 Tab by mouth 2 Times a Day. 180 Tab 3   • lisinopril (PRINIVIL) 2.5 MG Tab TAKE 1 TABLET BY MOUTH ONCE DAILY 90 Tab 2   • atorvastatin (LIPITOR) 80 MG tablet TAKE ONE TABLET BY MOUTH ONCE DAILY AT BEDTIME 90 Tab 3   • levothyroxine (SYNTHROID) 112 MCG Tab TAKE ONE TABLET BY MOUTH ONCE DAILY IN THE MORNING ON  AN  EMPTY  STOMACH 90 Tab 0   • glucose blood (RELION PRIME  "TEST) strip 1 Strip by Other route every day. Check blood sugar once a day before meal, E11.9 100 Strip 3   • metformin (GLUCOPHAGE) 1000 MG tablet Take 1 Tab by mouth 2 times a day, with meals. 180 Tab 3   • insulin glargine (LANTUS SOLOSTAR) 100 UNIT/ML Solution Pen-injector injection Inject 12 Units as instructed every evening. 15 PEN 6   • liothyronine (CYTOMEL) 25 MCG Tab TAKE ONE-HALF TABLET BY MOUTH TWICE DAILY 990 Tab 3   • Insulin Pen Needle (ULTICARE MINI PEN NEEDLES) 31G X 6 MM Misc 1 Device by Other route every day. E11.9 100 Each 3   • Riboflavin (VITAMIN B-2 PO) Take  by mouth.     • aspirin (ASA) 81 MG Chew Tab chewable tablet Take 1 Tab by mouth every day. 100 Tab 11     No current facility-administered medications for this visit.         Past Medical History:   Diagnosis Date   • Coronary artery disease due to calcified coronary lesion 8/11/2015   • Diabetes mellitus type II    • Gastroparesis    • Hyperlipidemia    • Hypothyroid    • Interstitial cystitis    • Sciatic neuritis        PHYSICAL EXAM:    /66 (BP Location: Left arm, Patient Position: Sitting, BP Cuff Size: Adult)   Pulse 76   Resp 16   Ht 1.715 m (5' 7.5\")   Wt 75.3 kg (166 lb)   SpO2 97%   BMI 25.62 kg/m²      Gen.   appears healthy     Skin   appropriate for sex and age    HEENT  unremarkable    Neck   No palpable thyroid. It could be substernal  , no tremor    Heart  regular    Extremities  no edema    Neuro  gait and station normal    Psych  appropriate, calm, pleasant      ASSESSMENT AND RECOMMENDATIONS    1. Acquired hypothyroidism  Question of appropriate replacement dose. See history of present illness above                - FREE THYROXINE; Future  - T3 FREE; Future  - TSH; Future      DISPOSITION:  If stable return in 6 months       Alex Stein M.D.    Copies to: Charbel Jaffe M.D. [phone]  "

## 2018-10-31 DIAGNOSIS — E03.9 ACQUIRED HYPOTHYROIDISM: Primary | Chronic | ICD-10-CM

## 2018-10-31 RX ORDER — LEVOTHYROXINE SODIUM 112 UG/1
TABLET ORAL
Qty: 90 TAB | Refills: 0 | Status: SHIPPED | OUTPATIENT
Start: 2018-10-31 | End: 2018-10-31

## 2018-10-31 RX ORDER — LEVOTHYROXINE SODIUM 112 UG/1
112 TABLET ORAL
Qty: 90 TAB | Refills: 3 | Status: SHIPPED | OUTPATIENT
Start: 2018-10-31 | End: 2019-12-31

## 2018-12-04 LAB
ALBUMIN SERPL-MCNC: 4.2 G/DL (ref 3.6–4.8)
ALBUMIN/CREAT UR: <7.9 MG/G CREAT (ref 0–30)
ALBUMIN/GLOB SERPL: 1.9 {RATIO} (ref 1.2–2.2)
ALP SERPL-CCNC: 108 IU/L (ref 39–117)
ALT SERPL-CCNC: 44 IU/L (ref 0–44)
APPEARANCE UR: CLEAR
AST SERPL-CCNC: 36 IU/L (ref 0–40)
BACTERIA #/AREA URNS HPF: NORMAL /[HPF]
BASOPHILS # BLD AUTO: 0 X10E3/UL (ref 0–0.2)
BASOPHILS NFR BLD AUTO: 0 %
BILIRUB SERPL-MCNC: 0.4 MG/DL (ref 0–1.2)
BILIRUB UR QL STRIP: NEGATIVE
BUN SERPL-MCNC: 16 MG/DL (ref 8–27)
BUN/CREAT SERPL: 21 (ref 10–24)
CALCIUM SERPL-MCNC: 9.3 MG/DL (ref 8.6–10.2)
CASTS URNS MICRO: NORMAL
CASTS URNS QL MICRO: NORMAL
CHLORIDE SERPL-SCNC: 105 MMOL/L (ref 96–106)
CHOLEST SERPL-MCNC: 81 MG/DL (ref 100–199)
CK SERPL-CCNC: 81 U/L (ref 24–204)
CO2 SERPL-SCNC: 21 MMOL/L (ref 20–29)
COLOR UR: YELLOW
CREAT SERPL-MCNC: 0.77 MG/DL (ref 0.76–1.27)
CREAT UR-MCNC: 38.2 MG/DL
CRYSTALS URNS MICRO: NORMAL
EOSINOPHIL # BLD AUTO: 0.1 X10E3/UL (ref 0–0.4)
EOSINOPHIL NFR BLD AUTO: 1 %
EPI CELLS #/AREA URNS HPF: NORMAL /HPF
ERYTHROCYTE [DISTWIDTH] IN BLOOD BY AUTOMATED COUNT: 13.9 % (ref 12.3–15.4)
GLOBULIN SER CALC-MCNC: 2.2 G/DL (ref 1.5–4.5)
GLUCOSE SERPL-MCNC: 121 MG/DL (ref 65–99)
GLUCOSE UR QL: NEGATIVE
HBA1C MFR BLD: 6.2 % (ref 4.8–5.6)
HCT VFR BLD AUTO: 47.9 % (ref 37.5–51)
HDLC SERPL-MCNC: 26 MG/DL
HGB BLD-MCNC: 16.2 G/DL (ref 13–17.7)
HGB UR QL STRIP: NEGATIVE
IF AFRICAN AMERICAN  100797: 108 ML/MIN/1.73
IF NON AFRICAN AMER 100791: 93 ML/MIN/1.73
IMM GRANULOCYTES # BLD: 0 X10E3/UL (ref 0–0.1)
IMM GRANULOCYTES NFR BLD: 0 %
IMMATURE CELLS  115398: NORMAL
KETONES UR QL STRIP: NEGATIVE
LABORATORY COMMENT REPORT: ABNORMAL
LDLC SERPL CALC-MCNC: 38 MG/DL (ref 0–99)
LEUKOCYTE ESTERASE UR QL STRIP: NEGATIVE
LYMPHOCYTES # BLD AUTO: 2 X10E3/UL (ref 0.7–3.1)
LYMPHOCYTES NFR BLD AUTO: 24 %
MCH RBC QN AUTO: 30.3 PG (ref 26.6–33)
MCHC RBC AUTO-ENTMCNC: 33.8 G/DL (ref 31.5–35.7)
MCV RBC AUTO: 90 FL (ref 79–97)
MICRO URNS: NORMAL
MICRO URNS: NORMAL
MICROALBUMIN UR-MCNC: <3 UG/ML
MONOCYTES # BLD AUTO: 0.7 X10E3/UL (ref 0.1–0.9)
MONOCYTES NFR BLD AUTO: 8 %
MORPHOLOGY BLD-IMP: NORMAL
MUCOUS THREADS URNS QL MICRO: PRESENT
NEUTROPHILS # BLD AUTO: 5.4 X10E3/UL (ref 1.4–7)
NEUTROPHILS NFR BLD AUTO: 67 %
NITRITE UR QL STRIP: NEGATIVE
NRBC BLD AUTO-RTO: NORMAL %
PH UR STRIP: 5.5 [PH] (ref 5–7.5)
PLATELET # BLD AUTO: 240 X10E3/UL (ref 150–379)
POTASSIUM SERPL-SCNC: 5 MMOL/L (ref 3.5–5.2)
PROT SERPL-MCNC: 6.4 G/DL (ref 6–8.5)
PROT UR QL STRIP: NEGATIVE
RBC # BLD AUTO: 5.35 X10E6/UL (ref 4.14–5.8)
RBC #/AREA URNS HPF: NORMAL /HPF
RENAL EPI CELLS #/AREA URNS HPF: NORMAL /[HPF]
SODIUM SERPL-SCNC: 139 MMOL/L (ref 134–144)
SP GR UR: 1.02 (ref 1–1.03)
T VAGINALIS URNS QL MICRO: NORMAL
TRIGL SERPL-MCNC: 85 MG/DL (ref 0–149)
UNIDENT CRYS URNS QL MICRO: NORMAL
URINALYSIS REFLEX  377202: NORMAL
URNS CMNT MICRO: NORMAL
UROBILINOGEN UR STRIP-MCNC: 0.2 MG/DL (ref 0.2–1)
VLDLC SERPL CALC-MCNC: 17 MG/DL (ref 5–40)
WBC # BLD AUTO: 8.2 X10E3/UL (ref 3.4–10.8)
WBC #/AREA URNS HPF: NORMAL /HPF
YEAST #/AREA URNS HPF: NORMAL /[HPF]

## 2018-12-18 ENCOUNTER — OFFICE VISIT (OUTPATIENT)
Dept: MEDICAL GROUP | Facility: MEDICAL CENTER | Age: 68
End: 2018-12-18
Payer: MEDICARE

## 2018-12-18 VITALS
WEIGHT: 165 LBS | OXYGEN SATURATION: 98 % | BODY MASS INDEX: 25.9 KG/M2 | HEART RATE: 82 BPM | TEMPERATURE: 98.2 F | DIASTOLIC BLOOD PRESSURE: 62 MMHG | SYSTOLIC BLOOD PRESSURE: 134 MMHG | HEIGHT: 67 IN

## 2018-12-18 DIAGNOSIS — E11.9 CONTROLLED TYPE 2 DIABETES MELLITUS WITHOUT COMPLICATION, WITHOUT LONG-TERM CURRENT USE OF INSULIN (HCC): Chronic | ICD-10-CM

## 2018-12-18 DIAGNOSIS — E03.9 ACQUIRED HYPOTHYROIDISM: Chronic | ICD-10-CM

## 2018-12-18 DIAGNOSIS — E78.5 DYSLIPIDEMIA: Chronic | ICD-10-CM

## 2018-12-18 DIAGNOSIS — I73.9 PVD (PERIPHERAL VASCULAR DISEASE) (HCC): ICD-10-CM

## 2018-12-18 DIAGNOSIS — Z72.0 TOBACCO ABUSE: Chronic | ICD-10-CM

## 2018-12-18 DIAGNOSIS — Z00.00 PREVENTATIVE HEALTH CARE: Chronic | ICD-10-CM

## 2018-12-18 DIAGNOSIS — Z12.5 PROSTATE CANCER SCREENING: ICD-10-CM

## 2018-12-18 PROCEDURE — 99214 OFFICE O/P EST MOD 30 MIN: CPT | Performed by: INTERNAL MEDICINE

## 2018-12-18 ASSESSMENT — ENCOUNTER SYMPTOMS: GENERAL WELL-BEING: FAIR

## 2018-12-18 ASSESSMENT — ACTIVITIES OF DAILY LIVING (ADL): BATHING_REQUIRES_ASSISTANCE: 0

## 2018-12-18 ASSESSMENT — PATIENT HEALTH QUESTIONNAIRE - PHQ9: CLINICAL INTERPRETATION OF PHQ2 SCORE: 0

## 2018-12-18 NOTE — PROGRESS NOTES
Subjective:      Orlando Arreguin is a 68 y.o. male who presents diabetes        HPI   Here for follow up diabetes checking blood sugars daily running , no hypoglycemia, on lantus 12 units and metformin 1000 mg bid, no recent eye exam. No vision changes.  No history of retinopathy, neuropathy, nephropathy.  Sees Dr. Scott for annual eye exam.  Checks feet daily, no open sores, lesions, ulcerations, normal sensation lower extremities.  History of MI followed by cardiology no chest pain, shortness of breath, lightheadedness, syncope, palpitations remains on Lipitor no muscle pain or muscle aches with statin therapy, low-dose lisinopril 2.5 mg, hypothyroid on Synthroid 112 mcg followed by endocrinology.  Continues to smoke 1 pack/day not interested in cessation, denies shortness of breath, cough, hemoptysis, fevers chills.  Peripheral vascular disease denies any calf pain with ambulation, no color changes feet.  Declines follow-up vascular surgery.  No alcohol        Current Outpatient Prescriptions   Medication Sig Dispense Refill   • Insulin Pen Needle (RELION PEN NEEDLES) 31G X 6 MM Misc 1 Device by Does not apply route every day. Use with lantus E11.9 100 Each 6   • levothyroxine (SYNTHROID) 112 MCG Tab Take 1 Tab by mouth Every morning on an empty stomach. 90 Tab 3   • TRUEPLUS LANCETS 28G Misc 1 Device by Does not apply route every day. Check blood sugar once a day, trueplus lancet, E11.9 100 Each 3   • erythromycin base (UMM-TAB) 250 MG Tab Take 1 Tab by mouth 2 Times a Day. 180 Tab 3   • lisinopril (PRINIVIL) 2.5 MG Tab TAKE 1 TABLET BY MOUTH ONCE DAILY 90 Tab 2   • atorvastatin (LIPITOR) 80 MG tablet TAKE ONE TABLET BY MOUTH ONCE DAILY AT BEDTIME 90 Tab 3   • glucose blood (RELION PRIME TEST) strip 1 Strip by Other route every day. Check blood sugar once a day before meal, E11.9 100 Strip 3   • metformin (GLUCOPHAGE) 1000 MG tablet Take 1 Tab by mouth 2 times a day, with meals. 180 Tab 3   • insulin  glargine (LANTUS SOLOSTAR) 100 UNIT/ML Solution Pen-injector injection Inject 12 Units as instructed every evening. 15 PEN 6   • liothyronine (CYTOMEL) 25 MCG Tab TAKE ONE-HALF TABLET BY MOUTH TWICE DAILY 990 Tab 3   • Riboflavin (VITAMIN B-2 PO) Take  by mouth.     • aspirin (ASA) 81 MG Chew Tab chewable tablet Take 1 Tab by mouth every day. 100 Tab 11     No current facility-administered medications for this visit.      Tobacco  7/31/15 tobacco 1 ppd not interested in stopping smoking classes or education  8/28/15 tobacco 1 ppd not interested in stopping smoking classes or education  8/28/15 declines PFT    11/30/15 still smoking 20 cigs per day declines stop smoking classes or medications  5/31/16 declines stop smoking classes and medications, not interested in stop smoking classes, smoking ppd x 40 plus years, declines CT lung cancer screening, pulmonary function testing  11/29/16 still smoking 1 ppd, started smoking age 20, declines stop smoking classes and medication, declines lung cancer screening program and PFT  5/30/17 still smoking 1 ppd declines stop smoking classes and medications, and lung cancer screening program and PFT  12/5/17 still smokes 1 ppd declines stop smoking classes and medications, and lung cancer screening program and PFT   6/12/18  still smokes 1 ppd declines stop smoking classes and medications, and lung cancer screening program and PFT      Shoulder pain  12/9/10 left shoulder pain,  note MRI pending  2/11 MRI left shoulder [facility] mild to moderate rotator cuff tendinopathy, adhesive capsulitis, small anterior and posterior labral tears  7/11 [facility] note  left shoulder adhesive capsulitis rec decompression  12/8/15 xray right shoulder at [facility] mild hydroxyapatite deposition adjacent to the greater tuberosity, no evidence of significant arthritis  5/11/16 MRI right shoulder thickening and increased signal with synovitis, suggestive of adhesive capsulitis, tendinopathy  supraspinatus and infraspinatus, fatty atrophy paraspinous muscle  1/4/17 [facility] discharge note     Preventative health  8/23/15 pneumovax  1/12/16 zoster vaccine at [facility]  11/23/16 psa 2.1  11/23/16 vit d 51  5/30/17 declines colonoscopy, declines FIT card  5/30/17 prevnar   6/12/18 declines colonoscopy and FIT   6/12/18 shingrix vaccine written to get at pharmacy  10/9/18 flu at [facility]      Postpolio syndrome  6/04 saw neurology in [place] , notes indicate chronic non specific complaints with normal brain MRI, no evidence to support post polio syndrome.     Peripheral vascular disease  12/22/17 ultrasound carotid less than 50% stenosis bilateral  12/28/17 JOSSUE lower extremities, normal at rest, post exercise right JOSSUE 0.8, left 0.8 mild-to-moderate arterial disease bilateral  12/28/17 ultrasound vascular lower extremities, no arterial occlusive disease from common femoral to popliteal bilateral, 50-75% stenosis proximal right external iliac, 50-75% stenosis distal left external iliac, patent right tibial arteries, left posterior tibial appears occluded distally, normal flow left anterior tibial, peroneal arteries retrograde suggesting proximal occlusion     On ACE inhibitor  4/04 p.thallium no ischemia EF 62%  12/10 p.thallium no ischemia, EF 59%  5/9/11 rec ACE instead of atenolol patient declines  10/17/11 discontinued atenolol, rec start lotensin 10 mg; he declines  5/7/13 echo normal LV function, EF 60%, grade 1 diastolic dysfunction  5/7/13 p.thallium fixed defect mid inferior, inferoseptal, mid inferior walls suggest subdiaphragmatic uptake or prior infarction, no ischemia noted, EF 57%  8/8/15 hospital discharge on coreg 12.5 mg bid,lisinopril 5 mg, brilinta 90 mg bid, asa 81 mg,lipitor 80 mg  8/26/15 cardiology note, decrease coreg to 6.25 mg bid due to lower blood pressure continue lisinopril 5 mg    8/28/15 decrease lisinopril to 2.5 mg daily and cont coreg 6.25 mg bid  11/24/15 urine  mac<2.5 on lisinopril 2.5 mg and coreg 6.25 mg bid  4/12/16 cardiology note decrease coreg to 3.125 mg bid consider discontinuation of coreg next evaluation and continue lisinopril 2.5 mg  5/20/16 urine mac <3 on lisinopril 2.5 mg  7/19/16 cardiology note clinically stable, episodes of chest tightness related to stress, blood pressure running low, discontinue carvedilol, follow-up in a few months  11/23/16 urine mac 3.2 on lisinopril 2.5 mg  11/29/17 urine mac<4 on lisinopril 2.5 mg  6/7/18 urine mac 3.8 on lisinopril 2.5 mg     Interstitial cystitis  5/02 urology note [facility] nonbacterial prostatitis vs interstitial cystitis given trial of flomax     Insomnia on Xanax as needed     Hypothyroid  10/08 tsh 0.126,free t4 1.4,free t3 2.7  8/10 tsh 0.199 taking levothyroxine 0.125 6 days a week, and 2 tabs on john  9/10/10 increase to levothyroxine 0.137 mg daily, repeat tsh in 6 weeks  10/10 tsh 0.9  12/10 tsh 1.2, free t4 1.4, free t3 2.9  4/11 tsh 0.8, thyroxine 9.6, free thyroxine uptake 3.4, free t3 uptake 35%  9/11 tsh 0.5, free t4 0.9, free t3 2.4 on levothyroxine 137 mcg  12/11 tsh 0.3,free t4 1.5,free t3 2.7 on levothyroxine 137 mcg  4/16/13 tsh 0.28,free t4 1.1, free t3 2.3 (2.4-4.2); on levothyroxine 137 mcg; change to armour thyroid 60 mg qday  5/6/13 tsh 0.3, free t4 1.0, free t3 2.5 on levothyroxine 137 mcg ? Change to armour 60 mg  5/9/13  note decrease levothyroxine to 125 mcg and add cytomel 5 mg bid  6/13 tsh 0.45, free t4 1.2, free t3 2.9, on synthroid 125 mcg and cytomel 5 mcg bid per   8/14/13 tsh 0.235,free t4 1.1,free t3 2.8 on synthroid 125 mcg and cytomel 5 mcg bid per ;change to synthroid 150 mcg and stop cytomel per pt request  10/16/13 tsh 0.4, free t4 1.34, free t3 2.2 on synthroid 150 mcg  10/23/13  endo note; change to synthroid 125 mcg and cytomel 5 mcg daily  4/16/14 tsh 0.67,free t4 1.0,free t3 2.4  4/23/14  endocrine  note, increase levothyroxine to 137 mcg and cont cytomel 5 mcg  10/28/14  endocrine note, tsh 0.6,free t4 1.0, free t3 2.7 on thyroxine 137 mcg and cytomel 5 mcg; gastroparesis symptoms improve with cytomel, will reduce thryoxine to 125 mcg and use cytomel 25 mcg to cut and take more than once per day as needed, return in 4 months  7/3/15 tsh 0.04, free t4 0.6 and free t3 3.0 on levothyroxine 125 mcg and cytomel 12.5 mcg daily  7/21/15  endocrine note; on levothyroxine 125 mcg and cytomel 12.5 mcg daily, tsh 0.04, free t4 0.6 and free t3 3.0, patient does not want to change dose, no changes continue same dosing regimen; follow up 6 months  8/8/15  endocrine phone note, decrease levothyroxine to 112 mcg daily, continue cytomel 12.5 mg daily  1/13/16 tsh 0.016,free t4 0.9,free t3 2.5 on levothyroxine 112 mcg and cytomel 12.5 mg daily  1/22/16  endocrinology note on levothyroxine 112 mcg and cytomel 12.5 mg daily, patient declines to change dosing regimen  4/5/16 tsh 0.012,free t4 0.97,free t3 3.6 on levothyroxine 112 mcg and cytomel 12.5 mg daily  4/21/16  endocrinology note, no changes  10/13/16 tsh 0.14,free t4 0.87,free t3 3.1 on levothyroxine 112 mcg and cytomel 25 mg  4/17/17 tsh 0.016,free t4 0.9,free t3 4.0 on levothyroxine 112 mcg and cytomel 25 mg  4/19/17  endocrinology note no changes follow up 6 months  10/18/17  endocrine note no changes  4/12/18 tsh 0.012, free t4 0.99, free t3 4.7 on levothyroxine 112 mcg and cytomel 25 mg  4/18/18  endocrinology note, on levothyroxine 112 mcg daily and cytomel 12.5 mcg bid tsh 0.01, free 4 0.9, free t3 4.7, clinically feeling fine, no changes  10/15/18 endocrinology note repeat labs follow-up 6 months     History MI  8/6/15 hospital admission non-STEMI inverted T waves in aVL, ST depression in lead 1, initial troponin 3.4  8/6/15   8/6/15 cardiac catheterization left main  free of disease, triple vessel disease prominently involving distal segment nature epicardial vessels and branches, 95% ostial LAD descending artery stenosis and 70% mid RCA stenosis, ejection fraction 35-40%, stenting ostial LAD with xience drug-eluting stent  8/7/15 echo normal LV function EF 65-70%, grade 1 diastolic dysfunction, moderate concentric LVH, mild MR and AR  8/8/15 hospital discharge on coreg 12.5 mg bid,lisinopril 5 mg, brilinta 90 mg bid, asa 81 mg,lipitor 80 mg  8/11/15 cardiology note; continue brilenta and asa for one year, some dyspnea, if no improvement could consider another antiplatelet therapy, will recheck BNP in 2 weeks with followup visit, ultimately will need myocardial perfusion scan but will defer for a few months  8/26/15 cardiology note, decrease coreg to 6.25 mg bid due to lower blood pressure,continue lisinopril 5 mg, asa, lipitor,start effient 10 mg for one year due to brilinta causing shortness of breath, followup 2 months  10/14/15 cardiology note no chnges, repeat myocardial perfusion scan 3 months  11/30/15 cardiac rehab program  1/13/15 cardiology note nitroglycerin when necessary follow-up 3 months  1/12/16 persantine thallium small basal anterior inferior infarct with small ame-infarct ischemia, moderately sized moderate severity inferior, inferior lateral infarct with reversible ischemia  4/12/16 cardiology note decrease coreg to 3.125 mg bid consider discontinuation of coreg next evaluation and continue lisinopril 2.5 mg  7/19/16 cardiology note clinically stable, episodes of chest tightness related to stress, blood pressure running low, discontinue carvedilol, follow-up in a few months  11/3/16 cardiology note, isosorbide mononitrate with heavy exertion, follow-up in a few months to determine if further evaluation is necessary, could consider repeat perfusion study or dobutamine stress echo  1/5/17 cardiology note stable CAD, follow-up 6 months  7/12/17 cardiology note,  likely stable discussed smoking cessation, patient declines to quit smoking, follow-up one year  12/22/17 ultrasound carotid less than 50% stenosis bilateral  7/23/18 cardiology note asymptomatic, increased stress however, continues to smoke, follow-up 1 year continue atorvastatin plus erythromycin     History of BPH  10/04 cystoscopy and green light photovaporization of prostate in [place], [place]     Gastroparesis  4/04 gastric emptying study severe gatroparesis done in [place], no hx of DM or MS  5/04 CT thorax in [place] negative  5/04 MRI brain in [place] no evid of MS  On Emycin  4/16/13 declined gastric stimulator  12/5/17 remains on erythromycin     Dyslipidemia  5/13 chol 158,trig 204,hdl 36,ldl 81  8/8/15 chol 97,trig 117,hdl 26,ldl 48 started on lipitor 80 mg on hospital discharge  8/21/15 chol 76,trig 85,hdl 24,ldl 35 on lipitor 80 mg  10/7/15 chol 67,trig 77,hdl 22,ldl 30 on lipitor 80 mg per cardiology  4/5/16 chol 64,trig 43,hdl 24,ldl 31 on lipitor 80 mg per cardiology  11/23/16 chol 83,trig 73,hdl 27,ldl 41 on lipitor 80 mg per cardiology  5/24/17 chol 85,trig 81,hdl 28,ldl 41 on lipitor 80 mg per cardiology  10/10/17 pharmacy known interaction with lipitor and erythromycin base, consider changing to crestor, offer made to patient to change to crestor but he would like to discuss with his cardiologist first  11/29/17 chol 72,trig 47,hdl 24,ldl 39 on lipitor 80 mg and erythromycin base, previously recommended changing to crestor because of potential interaction, patient would like to discuss with cardiology first  12/5/17 declines to change from lipitor understanding potential interaction with erythromycin  6/7/18 chol 78,trig 68,hdl 25,ldl 39,cpk 69 on lipitor 80 mg  7/23/18 cardiology note asymptomatic, increased stress however, continues to smoke, follow-up 1 year continue atorvastatin plus erythromycin  12/3/18 chol 81,trig 85,hdl 26,ldl 38 on lipitor 80 mg     Diabetes  11/08 A1c 6.3%  8/10 A1c 7.9%  10/10 A1c  7.7%  12/10 A1c 8.0%  1/11 add metformin 500 mg bid  2/11 A1c 7.9%  4/11 A1c 8.2% increase metformin to 1000 mg bid  10/11 A1c 6.5 %  12/11 A1c 6.1% on metformin 1000 mg bid  4/16/13 A1c 6.2% on metformin 1000 mg bid; declines to check blood sugars at home; bs 194 non fasting; declines ACE  8/14/13 A1c 6.5% on metformin 1000 mg bid  12/9/13  eye exam grade 1 diabetic background retinopathy  4/16/14 A1c 7.0% per  on metformin 1000 mg bid  7/3/15 A1c 8.3% on metformin 1000 mg bid per endocrine   7/31/15 start lantus 5 units and continue metformin 1000 mg bid, declines ACE,statin,asa  8/8/15 hospital discharge on coreg 12.5 mg bid,lisinopril 5 mg, brilinta 90 mg bid, asa 81 mg,lipitor 80 mg; on lantus 8 units and metformin 1000 mg bid  8/28/15 declines nutrition consultation and diabetic education regarding diet  8/28/15 recommend increasing lantus to 10 units and metformin 1000 mg bid  11/24/15 A1c 6.3% on lantus 10 units and metformin 1000 mg bid  11/30/15 on lantus 12 units and metformin 1000 mg bid  5/20/16 A1c 6.3% on lantus 12 units and metformin 1000 mg bid  11/23/16 A1c 6.1% on lantus 12 units and metformin 1000 mg bid  1/9/17  eye exam  5/24/17 A1c 6.3% on lantus 12 units and metformin 1000 mg bid   5/24/17 urine mac 2.5 on lisinopril 2.5 mg  11/29/17 A1c 6.1% on lantus 12 units and metformin 1000 mg bid   6/7/18 A1c 5.9% on lantus 12 units and metformin 1000 mg bid   12/3/18 A1c 6.2% and urine mac< 3on lantus 12 units and metformin 1000 mg bid        Depression Screening    Little interest or pleasure in doing things?  0 - not at all  Feeling down, depressed , or hopeless? 0 - not at all  Patient Health Questionnaire Score: 0     If depressive symptoms identified deferred to follow up visit unless specifically addressed in assessment and plan.    Interpretation of PHQ-9 Total Score   Score Severity   1-4 No Depression   5-9 Mild Depression   10-14 Moderate Depression    15-19 Moderately Severe Depression   20-27 Severe Depression    Screening for Cognitive Impairment    Three Minute Recall (leader, season, table) 2/3    Jesus clock face with all 12 numbers and set the hands to show 10 past 11.  No    Cognitive concerns identified deferred for follow up unless specifically addressed in assessment and plan.    Fall Risk Assessment    Has the patient had two or more falls in the last year or any fall with injury in the last year?  No    Safety Assessment    Throw rugs on floor.  No  Handrails on all stairs.  Yes  Good lighting in all hallways.  Yes  Difficulty hearing.  No  Patient counseled about all safety risks that were identified.    Functional Assessment ADLs    Are there any barriers preventing you from cooking for yourself or meeting nutritional needs?  No.    Are there any barriers preventing you from driving safely or obtaining transportation?  No.    Are there any barriers preventing you from using a telephone or calling for help?  No.    Are there any barriers preventing you from shopping?  No.    Are there any barriers preventing you from taking care of your own finances?  No.    Are there any barriers preventing you from managing your medications?  No.    Are there any barriers preventing you from showering, bathing or dressing yourself?  No.    Are you currently engaging in any exercise or physical activity?  No.     What is your perception of your health?  Fair.      Health Maintenance Summary                IMM HEP B VACCINE Overdue 2[birth year]20/1969     IMM DTaP/Tdap/Td Vaccine Overdue 2/20/1969     IMM ZOSTER VACCINES Overdue 3/8/2016      Done 1/12/2016 Imm Admin: Zoster Vaccine Live (ZVL) (Zostavax)    RETINAL SCREENING Overdue 1/9/2018      Done 1/9/2017      Patient has more history with this topic...    Annual Wellness Visit Overdue 5/31/2018      Done 5/30/2017 Visit Dx: Medicare annual wellness visit, subsequent    A1C SCREENING Next Due 6/3/2019      Done  12/3/2018 HEMOGLOBIN A1C      Patient has more history with this topic...    DIABETES MONOFILAMENT / LE EXAM Next Due 6/12/2019      Done 6/12/2018 AMB DIABETIC MONOFILAMENT LOWER EXTREMITY EXAM     Patient has more history with this topic...    FASTING LIPID PROFILE Next Due 12/3/2019      Done 12/3/2018 LIPID PANEL      Patient has more history with this topic...    URINE ACR / MICROALBUMIN Next Due 12/3/2019      Done 12/3/2018 MICROALB/CREAT RATIO RAND. UR     Patient has more history with this topic...    SERUM CREATININE Next Due 12/3/2019      Done 12/3/2018 COMP METABOLIC PANEL      Patient has more history with this topic...    COLONOSCOPY Next Due 5/31/2026      Patient Declined 5/31/2016      Patient has more history with this topic...          Patient Care Team:  Charbel Jaffe M.D. as PCP - General  Alex Stein M.D. as Consulting Physician (Endocrinology)            Patient Active Problem List   Diagnosis   • History of allergic rhinitis   • Gastroparesis   • History of BPH   • Interstitial cystitis   • Postpolio syndrome   • Hypothyroid   • On angiotensin-converting enzyme (ACE) inhibitors (for diabetes, not HTN)   • Diabetes mellitus type 2, controlled ([facility])   • Preventative health care   • Shoulder pain   • Insomnia   • Dyslipidemia   • Tobacco abuse   • History of MI (myocardial infarction)   • Coronary artery disease due to calcified coronary lesion: LAD stent in August 2015   • PVD (peripheral vascular disease) ([facility])         ROS       Objective:        Physical Exam   Constitutional: He appears well-developed and well-nourished. No distress.   HENT:   Head: Normocephalic and atraumatic.   Eyes: Conjunctivae are normal. Right eye exhibits no discharge. Left eye exhibits no discharge.   Cardiovascular: Normal rate and regular rhythm.    Skin: He is not diaphoretic.   Nursing note and vitals reviewed.     Monofilament testing with a 10 gram force: sensation intact: intact Visual Inspection:  Feet without maceration, ulcers, fissures.  Pedal pulses: Dorsalis pedis 2/4 bilateral            Assessment/Plan:     Assessment  #!  Diabetes mellitus controlled on Lantus 12 units and metformin thousand milligrams twice daily with no hypoglycemia, blood sugars have been stable running  during the day, no retinopathy, neuropathy, nephropathy, on ACE, Lipitor    #2 tobacco abuse continues to smoke a pack of cigars per day    #3 dyslipidemia followed by cardiology 12/3/18 chol 81,trig 85,hdl 26,ldl 38 on lipitor 80 mg no muscle pain or muscle aches on statin therapy    #4 history of MI followed by cardiology asymptomatic no chest pain status post stent placement 2015 LAD    #5 peripheral vascular disease by ultrasound vascular, currently asymptomatic, no open sores or lesions lower extremities    #6 hypothyroid followed by endocrinology    #7 gastroparesis stable on erythromycin stable    Plan  #1 declines stop smoking classes and medications, and lung cancer screening program and PFT understands tobacco increases risk of lung cancer, recurrent cardiovascular disease, COPD, worsening peripheral vascular disease    #2 declines vascular surgery evaluation pvd    #3 has had flu shot, declines hep b and tdap    #4 shingrix will get at pharmacy     #5 declines colonoscopy and FIT     #6 eye exam annually, referral placed    #7 check blood sugars once a day and record    #8 check blood pressure periodically and record    #9 follow-up cardiology    #10 hydration emphasized, nutrition, diet, exercise discussed    #11 follow-up 6 months, repeat labs ordered

## 2019-04-09 LAB
T3FREE SERPL-MCNC: 3.4 PG/ML (ref 2–4.4)
T4 FREE SERPL-MCNC: 1.09 NG/DL (ref 0.82–1.77)
TSH SERPL DL<=0.005 MIU/L-ACNC: 0.01 UIU/ML (ref 0.45–4.5)

## 2019-04-15 ENCOUNTER — OFFICE VISIT (OUTPATIENT)
Dept: ENDOCRINOLOGY | Facility: MEDICAL CENTER | Age: 69
End: 2019-04-15
Payer: MEDICARE

## 2019-04-15 VITALS
BODY MASS INDEX: 26.02 KG/M2 | DIASTOLIC BLOOD PRESSURE: 68 MMHG | OXYGEN SATURATION: 96 % | SYSTOLIC BLOOD PRESSURE: 122 MMHG | WEIGHT: 165.8 LBS | HEIGHT: 67 IN | HEART RATE: 87 BPM

## 2019-04-15 DIAGNOSIS — E03.9 ACQUIRED HYPOTHYROIDISM: Chronic | ICD-10-CM

## 2019-04-15 DIAGNOSIS — K31.84 GASTROPARESIS: Chronic | ICD-10-CM

## 2019-04-15 PROCEDURE — 99213 OFFICE O/P EST LOW 20 MIN: CPT | Performed by: INTERNAL MEDICINE

## 2019-04-15 NOTE — PROGRESS NOTES
Chief Complaint   Patient presents with   • Hypothyroidism     Gastroparesis is partially treated with thyroid        HPI:    Hypothyroidism         I do not know the basis for this gentleman is hypothyroidism.  I do not think he has had antibodies measured and I have not been able to feel his thyroid which I assume is atrophic or substernal.          The importance of his thyroid replacement from his point of view is that he believes it is an effective treatment for his gastroparesis.  Apparently he has serious gastroparesis and he has found that is current thyroid replacement is essential for his treatment.  His T4 and T3 are usually in the expected range and TSH usually suppressed.  If he takes any less thyroid than he is right now he has difficulty with gastroparesis.  He never exhibited symptoms related to excessive thyroid replacement.          His current TSH is 0.  01 which is usual for him.  Free T4 is mid range at 1.0 and free T3 also mid range at 3.4.  He is taking levothyroxine 112 mcg daily and liothyronine 12.5 mcg twice daily    ROS:  Not much of an appetite but his weight is stable and he feels well.  No vomiting  All other systems reported as negative or unchanged since last exam      Allergies:   Allergies   Allergen Reactions   • Pcn [Penicillins] Anaphylaxis   • Other Environmental      Hayfever   • Tetanus Toxoid        Current medicines including changes today:  Current Outpatient Prescriptions   Medication Sig Dispense Refill   • Insulin Pen Needle (RELION PEN NEEDLES) 31G X 6 MM Misc 1 Device by Does not apply route every day. Use with lantus E11.9 100 Each 6   • levothyroxine (SYNTHROID) 112 MCG Tab Take 1 Tab by mouth Every morning on an empty stomach. 90 Tab 3   • TRUEPLUS LANCETS 28G Misc 1 Device by Does not apply route every day. Check blood sugar once a day, trueplus lancet, E11.9 100 Each 3   • erythromycin base (UMM-TAB) 250 MG Tab Take 1 Tab by mouth 2 Times a Day. 180 Tab 3   •  "lisinopril (PRINIVIL) 2.5 MG Tab TAKE 1 TABLET BY MOUTH ONCE DAILY 90 Tab 2   • atorvastatin (LIPITOR) 80 MG tablet TAKE ONE TABLET BY MOUTH ONCE DAILY AT BEDTIME 90 Tab 3   • glucose blood (RELION PRIME TEST) strip 1 Strip by Other route every day. Check blood sugar once a day before meal, E11.9 100 Strip 3   • metformin (GLUCOPHAGE) 1000 MG tablet Take 1 Tab by mouth 2 times a day, with meals. 180 Tab 3   • insulin glargine (LANTUS SOLOSTAR) 100 UNIT/ML Solution Pen-injector injection Inject 12 Units as instructed every evening. 15 PEN 6   • liothyronine (CYTOMEL) 25 MCG Tab TAKE ONE-HALF TABLET BY MOUTH TWICE DAILY 990 Tab 3   • Riboflavin (VITAMIN B-2 PO) Take  by mouth.     • aspirin (ASA) 81 MG Chew Tab chewable tablet Take 1 Tab by mouth every day. 100 Tab 11     No current facility-administered medications for this visit.         Past Medical History:   Diagnosis Date   • Coronary artery disease due to calcified coronary lesion 8/11/2015   • Diabetes mellitus type II    • Gastroparesis    • Hyperlipidemia    • Hypothyroid    • Interstitial cystitis    • Sciatic neuritis        PHYSICAL EXAM:    /68 (BP Location: Left arm, Patient Position: Sitting, BP Cuff Size: Adult)   Pulse 87   Ht 1.702 m (5' 7.01\")   Wt 75.2 kg (165 lb 12.8 oz)   SpO2 96%   BMI 25.96 kg/m²     Gen.   appears healthy.  Does not appear to be overtly thyrotoxic    Skin   appropriate for sex and age    HEENT  unremarkable    Neck   no palpable thyroid.  It might be atrophic or substernal    Heart  regular    Extremities  no edema    Neuro  gait and station normal, no tremor    Psych  appropriate, calm, pleasant    ASSESSMENT AND RECOMMENDATIONS    1. Acquired hypothyroidism               See HPI  - FREE THYROXINE; Future  - T3 FREE; Future  - TSH; Future                      I have tried to order a bone density but I cannot find a code that Medicare will accept  2. Gastroparesis            He insists that the current dose of " thyroid is necessary to treat his gastroparesis.             Anything less does not work.    DISPOSITION: Follow-up in 6 months      Alex Stein M.D.    Copies to: Charbel Jaffe M.D. [phone]

## 2019-05-21 DIAGNOSIS — E11.9 CONTROLLED TYPE 2 DIABETES MELLITUS WITHOUT COMPLICATION, WITH LONG-TERM CURRENT USE OF INSULIN (HCC): ICD-10-CM

## 2019-05-21 DIAGNOSIS — Z79.4 CONTROLLED TYPE 2 DIABETES MELLITUS WITHOUT COMPLICATION, WITH LONG-TERM CURRENT USE OF INSULIN (HCC): ICD-10-CM

## 2019-05-21 RX ORDER — LIOTHYRONINE SODIUM 25 UG/1
TABLET ORAL
Qty: 90 TAB | Refills: 3 | Status: SHIPPED | OUTPATIENT
Start: 2019-05-21 | End: 2020-02-10

## 2019-05-21 NOTE — TELEPHONE ENCOUNTER
Was the patient seen in the last year in this department? Yes 04/15/19    Does patient have an active prescription for medications requested? No     Received Request Via: Pharmacy     liothyronine (CYTOMEL) 25 MCG Tab  TAKE 1/2 (ONE-HALF) TABLET BY MOUTH TWICE DAILY

## 2019-05-30 DIAGNOSIS — G47.00 INSOMNIA, UNSPECIFIED TYPE: ICD-10-CM

## 2019-05-30 RX ORDER — ALPRAZOLAM 0.25 MG/1
0.25 TABLET ORAL NIGHTLY PRN
Qty: 30 TAB | Refills: 0 | Status: SHIPPED
Start: 2019-05-30 | End: 2019-06-29

## 2019-06-04 PROBLEM — E11.3299 MILD NON PROLIFERATIVE DIABETIC RETINOPATHY (HCC): Status: ACTIVE | Noted: 2019-06-04

## 2019-06-10 ENCOUNTER — HOSPITAL ENCOUNTER (OUTPATIENT)
Dept: LAB | Facility: MEDICAL CENTER | Age: 69
End: 2019-06-10
Attending: INTERNAL MEDICINE
Payer: MEDICARE

## 2019-06-10 DIAGNOSIS — Z12.5 PROSTATE CANCER SCREENING: ICD-10-CM

## 2019-06-10 DIAGNOSIS — E11.9 CONTROLLED TYPE 2 DIABETES MELLITUS WITHOUT COMPLICATION, WITHOUT LONG-TERM CURRENT USE OF INSULIN (HCC): Chronic | ICD-10-CM

## 2019-06-10 DIAGNOSIS — E78.5 DYSLIPIDEMIA: Chronic | ICD-10-CM

## 2019-06-10 LAB
ALBUMIN SERPL BCP-MCNC: 4.2 G/DL (ref 3.2–4.9)
ALBUMIN/GLOB SERPL: 1.6 G/DL
ALP SERPL-CCNC: 98 U/L (ref 30–99)
ALT SERPL-CCNC: 33 U/L (ref 2–50)
ANION GAP SERPL CALC-SCNC: 5 MMOL/L (ref 0–11.9)
APPEARANCE UR: CLEAR
AST SERPL-CCNC: 28 U/L (ref 12–45)
BASOPHILS # BLD AUTO: 0.9 % (ref 0–1.8)
BASOPHILS # BLD: 0.07 K/UL (ref 0–0.12)
BILIRUB SERPL-MCNC: 0.4 MG/DL (ref 0.1–1.5)
BILIRUB UR QL STRIP.AUTO: NEGATIVE
BUN SERPL-MCNC: 18 MG/DL (ref 8–22)
CALCIUM SERPL-MCNC: 8.8 MG/DL (ref 8.5–10.5)
CHLORIDE SERPL-SCNC: 108 MMOL/L (ref 96–112)
CHOLEST SERPL-MCNC: 64 MG/DL (ref 100–199)
CO2 SERPL-SCNC: 24 MMOL/L (ref 20–33)
COLOR UR: YELLOW
CREAT SERPL-MCNC: 0.97 MG/DL (ref 0.5–1.4)
EOSINOPHIL # BLD AUTO: 0.1 K/UL (ref 0–0.51)
EOSINOPHIL NFR BLD: 1.3 % (ref 0–6.9)
ERYTHROCYTE [DISTWIDTH] IN BLOOD BY AUTOMATED COUNT: 47.5 FL (ref 35.9–50)
FASTING STATUS PATIENT QL REPORTED: NORMAL
GLOBULIN SER CALC-MCNC: 2.7 G/DL (ref 1.9–3.5)
GLUCOSE SERPL-MCNC: 140 MG/DL (ref 65–99)
GLUCOSE UR STRIP.AUTO-MCNC: NEGATIVE MG/DL
HCT VFR BLD AUTO: 49.4 % (ref 42–52)
HDLC SERPL-MCNC: 20 MG/DL
HGB BLD-MCNC: 15.7 G/DL (ref 14–18)
IMM GRANULOCYTES # BLD AUTO: 0.02 K/UL (ref 0–0.11)
IMM GRANULOCYTES NFR BLD AUTO: 0.3 % (ref 0–0.9)
KETONES UR STRIP.AUTO-MCNC: NEGATIVE MG/DL
LDLC SERPL CALC-MCNC: 30 MG/DL
LEUKOCYTE ESTERASE UR QL STRIP.AUTO: NEGATIVE
LYMPHOCYTES # BLD AUTO: 1.92 K/UL (ref 1–4.8)
LYMPHOCYTES NFR BLD: 25.7 % (ref 22–41)
MCH RBC QN AUTO: 29.3 PG (ref 27–33)
MCHC RBC AUTO-ENTMCNC: 31.8 G/DL (ref 33.7–35.3)
MCV RBC AUTO: 92.3 FL (ref 81.4–97.8)
MICRO URNS: NORMAL
MONOCYTES # BLD AUTO: 0.58 K/UL (ref 0–0.85)
MONOCYTES NFR BLD AUTO: 7.8 % (ref 0–13.4)
NEUTROPHILS # BLD AUTO: 4.79 K/UL (ref 1.82–7.42)
NEUTROPHILS NFR BLD: 64 % (ref 44–72)
NITRITE UR QL STRIP.AUTO: NEGATIVE
NRBC # BLD AUTO: 0 K/UL
NRBC BLD-RTO: 0 /100 WBC
PH UR STRIP.AUTO: 5.5 [PH]
PLATELET # BLD AUTO: 218 K/UL (ref 164–446)
PMV BLD AUTO: 10.5 FL (ref 9–12.9)
POTASSIUM SERPL-SCNC: 4.7 MMOL/L (ref 3.6–5.5)
PROT SERPL-MCNC: 6.9 G/DL (ref 6–8.2)
PROT UR QL STRIP: NEGATIVE MG/DL
RBC # BLD AUTO: 5.35 M/UL (ref 4.7–6.1)
RBC UR QL AUTO: NEGATIVE
SODIUM SERPL-SCNC: 137 MMOL/L (ref 135–145)
SP GR UR STRIP.AUTO: 1.01
TRIGL SERPL-MCNC: 70 MG/DL (ref 0–149)
UROBILINOGEN UR STRIP.AUTO-MCNC: 0.2 MG/DL
WBC # BLD AUTO: 7.5 K/UL (ref 4.8–10.8)

## 2019-06-10 PROCEDURE — 82570 ASSAY OF URINE CREATININE: CPT

## 2019-06-10 PROCEDURE — 81003 URINALYSIS AUTO W/O SCOPE: CPT

## 2019-06-10 PROCEDURE — 85025 COMPLETE CBC W/AUTO DIFF WBC: CPT

## 2019-06-10 PROCEDURE — 36415 COLL VENOUS BLD VENIPUNCTURE: CPT

## 2019-06-10 PROCEDURE — 80061 LIPID PANEL: CPT

## 2019-06-10 PROCEDURE — 84443 ASSAY THYROID STIM HORMONE: CPT

## 2019-06-10 PROCEDURE — 80053 COMPREHEN METABOLIC PANEL: CPT

## 2019-06-10 PROCEDURE — 82043 UR ALBUMIN QUANTITATIVE: CPT

## 2019-06-10 PROCEDURE — 84153 ASSAY OF PSA TOTAL: CPT | Mod: GA

## 2019-06-10 PROCEDURE — 83036 HEMOGLOBIN GLYCOSYLATED A1C: CPT | Mod: GA

## 2019-06-11 LAB
CREAT UR-MCNC: 35.2 MG/DL
EST. AVERAGE GLUCOSE BLD GHB EST-MCNC: 137 MG/DL
HBA1C MFR BLD: 6.4 % (ref 0–5.6)
MICROALBUMIN UR-MCNC: <0.7 MG/DL
MICROALBUMIN/CREAT UR: NORMAL MG/G (ref 0–30)
PSA SERPL-MCNC: 1.98 NG/ML (ref 0–4)
TSH SERPL DL<=0.005 MIU/L-ACNC: 0.02 UIU/ML (ref 0.38–5.33)

## 2019-06-12 DIAGNOSIS — I10 ESSENTIAL HYPERTENSION: ICD-10-CM

## 2019-06-12 RX ORDER — LISINOPRIL 2.5 MG/1
TABLET ORAL
Qty: 90 TAB | Refills: 0 | Status: SHIPPED | OUTPATIENT
Start: 2019-06-12 | End: 2019-09-09 | Stop reason: SDUPTHER

## 2019-06-18 ENCOUNTER — OFFICE VISIT (OUTPATIENT)
Dept: MEDICAL GROUP | Facility: MEDICAL CENTER | Age: 69
End: 2019-06-18
Payer: MEDICARE

## 2019-06-18 ENCOUNTER — TELEPHONE (OUTPATIENT)
Dept: MEDICAL GROUP | Facility: MEDICAL CENTER | Age: 69
End: 2019-06-18

## 2019-06-18 VITALS
OXYGEN SATURATION: 96 % | HEART RATE: 87 BPM | BODY MASS INDEX: 26.06 KG/M2 | HEIGHT: 67 IN | SYSTOLIC BLOOD PRESSURE: 108 MMHG | WEIGHT: 166 LBS | TEMPERATURE: 99 F | DIASTOLIC BLOOD PRESSURE: 64 MMHG

## 2019-06-18 DIAGNOSIS — E11.3293 MILD NONPROLIFERATIVE DIABETIC RETINOPATHY OF BOTH EYES WITHOUT MACULAR EDEMA ASSOCIATED WITH TYPE 2 DIABETES MELLITUS (HCC): ICD-10-CM

## 2019-06-18 DIAGNOSIS — I73.9 PVD (PERIPHERAL VASCULAR DISEASE) (HCC): ICD-10-CM

## 2019-06-18 DIAGNOSIS — Z00.00 PREVENTATIVE HEALTH CARE: Chronic | ICD-10-CM

## 2019-06-18 DIAGNOSIS — E03.9 ACQUIRED HYPOTHYROIDISM: Chronic | ICD-10-CM

## 2019-06-18 DIAGNOSIS — E11.9 CONTROLLED TYPE 2 DIABETES MELLITUS WITHOUT COMPLICATION, WITHOUT LONG-TERM CURRENT USE OF INSULIN (HCC): Chronic | ICD-10-CM

## 2019-06-18 DIAGNOSIS — Z72.0 TOBACCO ABUSE: Chronic | ICD-10-CM

## 2019-06-18 PROCEDURE — 99214 OFFICE O/P EST MOD 30 MIN: CPT | Performed by: INTERNAL MEDICINE

## 2019-06-18 ASSESSMENT — PATIENT HEALTH QUESTIONNAIRE - PHQ9: CLINICAL INTERPRETATION OF PHQ2 SCORE: 0

## 2019-06-18 NOTE — PROGRESS NOTES
Subjective:      Orlando Arreguin is a 69 y.o. male who presents with diabetes Follow-Up            HPI    Patient here follow-up diabetes.  Currently on metformin thousand milligrams twice daily and Lantus 12 units daily.  Patient has been diligently checking his blood sugars daily.  Brings his blood pressure log with him for evaluation, his daily blood sugars are running , high sugars 120s on lantus 12 units and metformin 1000 mg bid and A1c 6.4% done 6/10/19 with no hypoglycemia. Eye exam  mild diabetic non proliferative retiopathy, no history of diabetic retinopathy or neuropathy.  Hypothyroid followed by endocrinology  CAD status post stent followed by cardiology still keeps active, no chest pain with activity, continues to smoke 1 pack/day denies any shortness of breath, no recurrent upper respiratory tract infections.  No claudication or leg pain symptoms during the day, occasional leg cramping at night.  Dyslipidemia on Lipitor no previous statin intolerance with muscle pain or muscle aches related to therapy  Remains on low-dose lisinopril 2.5 mg with no lightheadedness or dizziness  Peripheral vascular disease by vascular ultrasound December 2017, continues to smoke 1 pack/day  Hypothyroid on replacement followed by endocrinology  Occasional Xanax for anxiety, does not cause drowsiness, sedation, memory loss, confusion, 30 tablets last 1 year, no alcohol with medication  Occasional left hip pain, no radiation down his legs, no trauma  Occasional left elbow pain, no trauma, no repetitive use over use, no sensory changes      Current Outpatient Prescriptions   Medication Sig Dispense Refill   • lisinopril (PRINIVIL) 2.5 MG Tab TAKE 1 TABLET BY MOUTH ONCE DAILY 90 Tab 0   • metformin (GLUCOPHAGE) 1000 MG tablet Take 1 Tab by mouth 2 times a day, with meals. 180 Tab 1   • liothyronine (CYTOMEL) 25 MCG Tab TAKE 1/2 (ONE-HALF) TABLET BY MOUTH TWICE DAILY 90 Tab 3   • levothyroxine (SYNTHROID) 112 MCG  "Tab Take 1 Tab by mouth Every morning on an empty stomach. 90 Tab 3   • erythromycin base (UMM-TAB) 250 MG Tab Take 1 Tab by mouth 2 Times a Day. 180 Tab 3   • atorvastatin (LIPITOR) 80 MG tablet TAKE ONE TABLET BY MOUTH ONCE DAILY AT BEDTIME 90 Tab 3   • insulin glargine (LANTUS SOLOSTAR) 100 UNIT/ML Solution Pen-injector injection Inject 12 Units as instructed every evening. 15 PEN 6   • Riboflavin (VITAMIN B-2 PO) Take  by mouth.     • aspirin (ASA) 81 MG Chew Tab chewable tablet Take 1 Tab by mouth every day. 100 Tab 11   • ALPRAZolam (XANAX) 0.25 MG Tab Take 1 Tab by mouth at bedtime as needed for Sleep for up to 30 days. 30 Tab 0   • Insulin Pen Needle (RELION PEN NEEDLES) 31G X 6 MM Misc 1 Device by Does not apply route every day. Use with lantus E11.9 100 Each 6   • TRUEPLUS LANCETS 28G Misc 1 Device by Does not apply route every day. Check blood sugar once a day, trueplus lancet, E11.9 100 Each 3   • glucose blood (RELION PRIME TEST) strip 1 Strip by Other route every day. Check blood sugar once a day before meal, E11.9 100 Strip 3     No current facility-administered medications for this visit.      Patient Active Problem List   Diagnosis   • History of allergic rhinitis   • Gastroparesis   • History of BPH   • Interstitial cystitis   • Postpolio syndrome   • Hypothyroid   • On angiotensin-converting enzyme (ACE) inhibitors (for diabetes, not HTN)   • Diabetes mellitus type 2, controlled ([facility])   • Preventative health care   • Shoulder pain   • Insomnia   • Dyslipidemia   • Tobacco abuse   • History of MI (myocardial infarction)   • Coronary artery disease due to calcified coronary lesion: LAD stent in August 2015   • PVD (peripheral vascular disease) ([facility])   • Mild non proliferative diabetic retinopathy ([facility])       ROS       Objective:     /64 (BP Location: Right arm, Patient Position: Sitting, BP Cuff Size: Adult)   Pulse 87   Temp 37.2 °C (99 °F)   Ht 1.702 m (5' 7\")   Wt 75.3 kg (166 lb)   " SpO2 96%   BMI 26.00 kg/m²      Physical Exam   Constitutional: He appears well-developed and well-nourished. No distress.   HENT:   Head: Normocephalic and atraumatic.   Right Ear: External ear normal.   Left Ear: External ear normal.   Eyes: Conjunctivae are normal. Right eye exhibits no discharge. Left eye exhibits no discharge.   Neck: Neck supple. No JVD present. No thyromegaly present.   Cardiovascular: Normal rate and regular rhythm.    Pulmonary/Chest: Effort normal and breath sounds normal. No respiratory distress.   Musculoskeletal: He exhibits no edema.   Neurological: He is alert.   Skin: Skin is warm. He is not diaphoretic.   Psychiatric: He has a normal mood and affect. His behavior is normal.   Nursing note and vitals reviewed.    Left elbow normal range of motion, no edema, normal  strength, mild tenderness lateral epicondyle region  Left hip no tenderness to palpation, normal flexion, extension, internal and external rotation, normal hip flexion extension and back flexion extension            Assessment/Plan:     Assessment  #! Diabetes type 2 on lantus 12 units and metformin 1000 mg twice daily    #2 recent eye exam 6/4/19  eye exam, mild nonproliferative retinopathy stable    #3 peripheral vascular disease by ultrasound no claudication symptoms during the day, some nighttime symptoms question restless leg 12/28/17 ultrasound vascular lower extremities, no arterial occlusive disease from common femoral to popliteal bilateral, 50-75% stenosis proximal right external iliac, 50-75% stenosis distal left external iliac, patent right tibial arteries, left posterior tibial appears occluded distally, normal flow left anterior tibial, peroneal arteries retrograde suggesting proximal occlusion    #4 tobacco abuse 1 pack/day    #5 history of MI status post stent followed by cardiology no chest pain, remains on Lipitor 80 mg, aspirin 81 mg, lisinopril 2.5 mg    #6 left elbow tendinitis    #7  left hip discomfort question arthritis, no evidence of sciatica    #8 occasional Xanax use anxiety/insomnia 30 tablets last 1 year no daytime drowsiness or hangover effect      Plan  #! Eye exam annually    #2 declines vaccines tdap, hep b, shingrix     #3 declines stop smoking classes and meds, declines PFT, CT screening understanding tobacco increases risk for COPD and lung disease, recurrent cardiovascular disease, peripheral vascular disease    #4 declines colonoscopy and cologuard for colon cancer screening    #5 benzodiazepine use discussed and risk of dementia    #6 ultrasound arterial lower extremities follow-up peripheral vascular disease    #7 continue check blood sugars once a day and record monitor for hypoglycemia    #8 Ensure adequate hydration avoid dehydration    #9 offered physical therapy for possible arthritis, hip pain, he declines, no need for imaging at this time    #10 follow-up 6 months lab slips printed to mail

## 2019-06-21 DIAGNOSIS — E78.5 DYSLIPIDEMIA: ICD-10-CM

## 2019-06-24 RX ORDER — ATORVASTATIN CALCIUM 80 MG/1
TABLET, FILM COATED ORAL
Qty: 90 TAB | Refills: 3 | Status: SHIPPED | OUTPATIENT
Start: 2019-06-24 | End: 2020-05-26 | Stop reason: SDUPTHER

## 2019-07-04 ENCOUNTER — TELEPHONE (OUTPATIENT)
Dept: MEDICAL GROUP | Facility: MEDICAL CENTER | Age: 69
End: 2019-07-04

## 2019-07-05 NOTE — TELEPHONE ENCOUNTER
----- Message from Fatemeh Talley sent at 7/2/2019  9:45 AM PDT -----  Regarding: FW: Procedure Question  Contact: [phone]      ----- Message -----  From: Orlando Arreguin  Sent: 7/2/2019   9:42 AM  To: Snehal Zafar  Subject: Procedure Question                               Dear Dr. Jaffe,    Please order the Lipid Profile test for our next visit. Otherwise it will be almost a year before I have it again.    If that is ok, please have it mailed to me. I tried the [facility] on [place] (again) and was not happy with how it went (again) so I will be having future tests done at [facility].    Regards,    Orlando Arreguin

## 2019-07-11 ENCOUNTER — HOSPITAL ENCOUNTER (OUTPATIENT)
Dept: RADIOLOGY | Facility: MEDICAL CENTER | Age: 69
End: 2019-07-11
Attending: INTERNAL MEDICINE
Payer: MEDICARE

## 2019-07-11 ENCOUNTER — TELEPHONE (OUTPATIENT)
Dept: MEDICAL GROUP | Facility: MEDICAL CENTER | Age: 69
End: 2019-07-11

## 2019-07-11 DIAGNOSIS — I73.9 PVD (PERIPHERAL VASCULAR DISEASE) (HCC): ICD-10-CM

## 2019-07-11 PROCEDURE — 93922 UPR/L XTREMITY ART 2 LEVELS: CPT

## 2019-07-11 PROCEDURE — 93925 LOWER EXTREMITY STUDY: CPT

## 2019-07-12 ENCOUNTER — TELEPHONE (OUTPATIENT)
Dept: MEDICAL GROUP | Facility: MEDICAL CENTER | Age: 69
End: 2019-07-12

## 2019-07-12 NOTE — TELEPHONE ENCOUNTER
Pt would like you to place that referral.     Pt is very upset that I will not give him my personal extension and says that the switchboard states that you have reached [facility]  Would you like to pick another provider. He stated that if this was your way of telling him it was time to get another provider, he wasn't impressed.     I called myself and that was not the message I got. He took my name and then stated that he was not satisfied with my response and that he wanted to know what I did with the REAL Mariam.     He is very agitated.

## 2019-07-12 NOTE — TELEPHONE ENCOUNTER
----- Message from Charbel Jaffe M.D. sent at 7/11/2019 10:52 PM PDT -----  Called patient left message.  Please notify him that the ultrasound study of the blood flow in his legs does show some plaque buildup, and it is more significant on the left side.  The blockage can cause pain in his legs, I would recommend seeing a blood , as at some point it may require correction to improve the blood flow.    If he is interested in seeing the blood vessel/vascular specialist let me know and I will place that referral.

## 2019-07-12 NOTE — TELEPHONE ENCOUNTER
Called patient left message.  Please notify him that the ultrasound study of the blood flow in his legs does show some plaque buildup, and it is more significant on the left side.  The blockage can cause pain in his legs, I would recommend seeing a blood , as at some point it may require correction to improve the blood flow.    If he is interested in seeing the blood vessel/vascular specialist let me know and I will place that referral.

## 2019-07-14 ENCOUNTER — TELEPHONE (OUTPATIENT)
Dept: MEDICAL GROUP | Facility: MEDICAL CENTER | Age: 69
End: 2019-07-14

## 2019-07-14 DIAGNOSIS — I73.9 PVD (PERIPHERAL VASCULAR DISEASE) (HCC): ICD-10-CM

## 2019-07-15 ENCOUNTER — OFFICE VISIT (OUTPATIENT)
Dept: CARDIOLOGY | Facility: MEDICAL CENTER | Age: 69
End: 2019-07-15
Payer: MEDICARE

## 2019-07-15 VITALS
HEART RATE: 84 BPM | OXYGEN SATURATION: 97 % | BODY MASS INDEX: 25.9 KG/M2 | SYSTOLIC BLOOD PRESSURE: 122 MMHG | DIASTOLIC BLOOD PRESSURE: 60 MMHG | HEIGHT: 67 IN | WEIGHT: 165 LBS

## 2019-07-15 DIAGNOSIS — I10 ESSENTIAL HYPERTENSION: ICD-10-CM

## 2019-07-15 DIAGNOSIS — I25.84 CORONARY ARTERY DISEASE DUE TO CALCIFIED CORONARY LESION: ICD-10-CM

## 2019-07-15 DIAGNOSIS — I25.10 CORONARY ARTERY DISEASE DUE TO CALCIFIED CORONARY LESION: ICD-10-CM

## 2019-07-15 DIAGNOSIS — E78.5 DYSLIPIDEMIA: Chronic | ICD-10-CM

## 2019-07-15 PROCEDURE — 99214 OFFICE O/P EST MOD 30 MIN: CPT | Performed by: INTERNAL MEDICINE

## 2019-07-15 NOTE — PROGRESS NOTES
Chief Complaint   Patient presents with   • Coronary Artery Disease       Subjective:   Orlando Arreguin is a 69 y.o. male who presents today for follow-up of his coronary artery disease with prior history of LAD stent in 2015.  He also has dyslipidemia and continues to smoke.    He did have an episode of hypotension with a blood pressure of about 95/60.  This occurred after he was working in his yard for about an hour and a half in the heat.    He has had no recurrent episodes of hypotension.    He denies any chest discomfort, dyspnea, edema, palpitations or lightheadedness.       Past Medical History:   Diagnosis Date   • Coronary artery disease due to calcified coronary lesion 8/11/2015   • Diabetes mellitus type II    • Gastroparesis    • Hyperlipidemia    • Hypothyroid    • Interstitial cystitis    • Sciatic neuritis      Past Surgical History:   Procedure Laterality Date   • ZZZ CARDIAC CATH  8/6/15    YESIKA to LAD.  Has RCA 70% occulsion.   • APPENDECTOMY     • OTHER      L shoulder surgery (arthroscopic, [place], 2011)   • PB TRANSURETHRAL ELEC-SURG PBOSTATECTOM       Family History   Problem Relation Age of Onset   • Heart Disease Mother    • Heart Attack Mother 55        heart attack     Social History     Social History   • Marital status: Single     Spouse name: N/A   • Number of children: N/A   • Years of education: N/A     Occupational History   • Not on file.     Social History Main Topics   • Smoking status: Current Every Day Smoker     Packs/day: 1.00     Years: 30.00     Types: Cigarettes   • Smokeless tobacco: Never Used   • Alcohol use No   • Drug use: No   • Sexual activity: Not on file     Other Topics Concern   • Not on file     Social History Narrative   • No narrative on file     Allergies   Allergen Reactions   • Pcn [Penicillins] Anaphylaxis   • Other Environmental      Hayfever   • Tetanus Toxoid      Outpatient Encounter Prescriptions as of 7/15/2019   Medication Sig Dispense Refill   •  "glucose blood (RELION PRIME TEST) strip 1 Strip by Other route every day. Check blood sugar once a day before meals. E11.9 100 Strip 3   • atorvastatin (LIPITOR) 80 MG tablet TAKE 1 TABLET BY MOUTH ONCE DAILY AT BEDTIME 90 Tab 3   • lisinopril (PRINIVIL) 2.5 MG Tab TAKE 1 TABLET BY MOUTH ONCE DAILY 90 Tab 0   • metformin (GLUCOPHAGE) 1000 MG tablet Take 1 Tab by mouth 2 times a day, with meals. 180 Tab 1   • liothyronine (CYTOMEL) 25 MCG Tab TAKE 1/2 (ONE-HALF) TABLET BY MOUTH TWICE DAILY 90 Tab 3   • Insulin Pen Needle (RELION PEN NEEDLES) 31G X 6 MM Misc 1 Device by Does not apply route every day. Use with lantus E11.9 100 Each 6   • levothyroxine (SYNTHROID) 112 MCG Tab Take 1 Tab by mouth Every morning on an empty stomach. 90 Tab 3   • TRUEPLUS LANCETS 28G Misc 1 Device by Does not apply route every day. Check blood sugar once a day, trueplus lancet, E11.9 100 Each 3   • erythromycin base (UMM-TAB) 250 MG Tab Take 1 Tab by mouth 2 Times a Day. 180 Tab 3   • insulin glargine (LANTUS SOLOSTAR) 100 UNIT/ML Solution Pen-injector injection Inject 12 Units as instructed every evening. 15 PEN 6   • Riboflavin (VITAMIN B-2 PO) Take  by mouth.     • aspirin (ASA) 81 MG Chew Tab chewable tablet Take 1 Tab by mouth every day. 100 Tab 11     No facility-administered encounter medications on file as of 7/15/2019.      ROS     Objective:   /60 (BP Location: Left arm, Patient Position: Sitting, BP Cuff Size: Adult)   Pulse 84   Ht 1.702 m (5' 7\")   Wt 74.8 kg (165 lb)   SpO2 97%   BMI 25.84 kg/m²     Physical Exam   Constitutional: He appears well-developed and well-nourished.   Neck: No JVD present.   Cardiovascular: Normal rate and regular rhythm.    No murmur heard.  Pulmonary/Chest: Effort normal and breath sounds normal. He has no rales.   Patient had some transient rhonchi which cleared with coughing   Abdominal: Soft. There is no tenderness.   Musculoskeletal: He exhibits no edema.     Lab Results   Component " Value Date/Time    CHOLSTRLTOT 64 (L) 06/10/2019 11:14 AM    LDL 30 06/10/2019 11:14 AM    HDL 20 (A) 06/10/2019 11:14 AM    TRIGLYCERIDE 70 06/10/2019 11:14 AM       Lab Results   Component Value Date/Time    SODIUM 137 06/10/2019 11:14 AM    POTASSIUM 4.7 06/10/2019 11:14 AM    CHLORIDE 108 06/10/2019 11:14 AM    CO2 24 06/10/2019 11:14 AM    GLUCOSE 140 (H) 06/10/2019 11:14 AM    BUN 18 06/10/2019 11:14 AM    CREATININE 0.97 06/10/2019 11:14 AM    BUNCREATRAT 21 12/03/2018 11:25 AM     Lab Results   Component Value Date/Time    ALKPHOSPHAT 98 06/10/2019 11:14 AM    ASTSGOT 28 06/10/2019 11:14 AM    ALTSGPT 33 06/10/2019 11:14 AM    TBILIRUBIN 0.4 06/10/2019 11:14 AM      Lab Results   Component Value Date/Time    BNPBTYPENAT 178.8 (H) 08/19/2015 10:28 AM    BNPBTYPENAT 299 (H) 08/06/2015 04:59 AM          Assessment:     1. Coronary artery disease due to calcified coronary lesion: LAD stent in August 2015     2. Dyslipidemia     3. Essential hypertension         Medical Decision Making:  Today's Assessment / Status / Plan:     Mr. Arreguin is clinically stable.  Unfortunately, he continues to smoke and is not inclined to quit.  His blood pressure and lipid status are under excellent control.  He will follow-up in about a year with repeat lab work.

## 2019-07-15 NOTE — LETTER
[facility]   [address]   [address]  [place] [address]  Phone: [phone]  Fax: [phone]              Orlando Arreguin  1950    Encounter Date: 7/15/2019    Gilberto Bryson M.D.          PROGRESS NOTE:  Chief Complaint   Patient presents with   • Coronary Artery Disease       Subjective:   Orlando Arreguin is a 69 y.o. male who presents today for follow-up of his coronary artery disease with prior history of LAD stent in 2015.  He also has dyslipidemia and continues to smoke.    He did have an episode of hypotension with a blood pressure of about 95/60.  This occurred after he was working in his yard for about an hour and a half in the heat.    He has had no recurrent episodes of hypotension.    He denies any chest discomfort, dyspnea, edema, palpitations or lightheadedness.       Past Medical History:   Diagnosis Date   • Coronary artery disease due to calcified coronary lesion 8/11/2015   • Diabetes mellitus type II    • Gastroparesis    • Hyperlipidemia    • Hypothyroid    • Interstitial cystitis    • Sciatic neuritis      Past Surgical History:   Procedure Laterality Date   • ZZZ CARDIAC CATH  8/6/15    YESIKA to LAD.  Has RCA 70% occulsion.   • APPENDECTOMY     • OTHER      L shoulder surgery (arthroscopic, [place], 2011)   • PB TRANSURETHRAL ELEC-SURG PBOSTATECTOM       Family History   Problem Relation Age of Onset   • Heart Disease Mother    • Heart Attack Mother 55        heart attack     Social History     Social History   • Marital status: Single     Spouse name: N/A   • Number of children: N/A   • Years of education: N/A     Occupational History   • Not on file.     Social History Main Topics   • Smoking status: Current Every Day Smoker     Packs/day: 1.00     Years: 30.00     Types: Cigarettes   • Smokeless tobacco: Never Used   • Alcohol use No   • Drug use: No   • Sexual activity: Not on file     Other Topics Concern   • Not on file  "    Social History Narrative   • No narrative on file     Allergies   Allergen Reactions   • Pcn [Penicillins] Anaphylaxis   • Other Environmental      Hayfever   • Tetanus Toxoid      Outpatient Encounter Prescriptions as of 7/15/2019   Medication Sig Dispense Refill   • glucose blood (RELION PRIME TEST) strip 1 Strip by Other route every day. Check blood sugar once a day before meals. E11.9 100 Strip 3   • atorvastatin (LIPITOR) 80 MG tablet TAKE 1 TABLET BY MOUTH ONCE DAILY AT BEDTIME 90 Tab 3   • lisinopril (PRINIVIL) 2.5 MG Tab TAKE 1 TABLET BY MOUTH ONCE DAILY 90 Tab 0   • metformin (GLUCOPHAGE) 1000 MG tablet Take 1 Tab by mouth 2 times a day, with meals. 180 Tab 1   • liothyronine (CYTOMEL) 25 MCG Tab TAKE 1/2 (ONE-HALF) TABLET BY MOUTH TWICE DAILY 90 Tab 3   • Insulin Pen Needle (RELION PEN NEEDLES) 31G X 6 MM Misc 1 Device by Does not apply route every day. Use with lantus E11.9 100 Each 6   • levothyroxine (SYNTHROID) 112 MCG Tab Take 1 Tab by mouth Every morning on an empty stomach. 90 Tab 3   • TRUEPLUS LANCETS 28G Misc 1 Device by Does not apply route every day. Check blood sugar once a day, trueplus lancet, E11.9 100 Each 3   • erythromycin base (UMM-TAB) 250 MG Tab Take 1 Tab by mouth 2 Times a Day. 180 Tab 3   • insulin glargine (LANTUS SOLOSTAR) 100 UNIT/ML Solution Pen-injector injection Inject 12 Units as instructed every evening. 15 PEN 6   • Riboflavin (VITAMIN B-2 PO) Take  by mouth.     • aspirin (ASA) 81 MG Chew Tab chewable tablet Take 1 Tab by mouth every day. 100 Tab 11     No facility-administered encounter medications on file as of 7/15/2019.      ROS     Objective:   /60 (BP Location: Left arm, Patient Position: Sitting, BP Cuff Size: Adult)   Pulse 84   Ht 1.702 m (5' 7\")   Wt 74.8 kg (165 lb)   SpO2 97%   BMI 25.84 kg/m²      Physical Exam   Constitutional: He appears well-developed and well-nourished.   Neck: No JVD present.   Cardiovascular: Normal rate and regular " rhythm.    No murmur heard.  Pulmonary/Chest: Effort normal and breath sounds normal. He has no rales.   Patient had some transient rhonchi which cleared with coughing   Abdominal: Soft. There is no tenderness.   Musculoskeletal: He exhibits no edema.     Lab Results   Component Value Date/Time    CHOLSTRLTOT 64 (L) 06/10/2019 11:14 AM    LDL 30 06/10/2019 11:14 AM    HDL 20 (A) 06/10/2019 11:14 AM    TRIGLYCERIDE 70 06/10/2019 11:14 AM       Lab Results   Component Value Date/Time    SODIUM 137 06/10/2019 11:14 AM    POTASSIUM 4.7 06/10/2019 11:14 AM    CHLORIDE 108 06/10/2019 11:14 AM    CO2 24 06/10/2019 11:14 AM    GLUCOSE 140 (H) 06/10/2019 11:14 AM    BUN 18 06/10/2019 11:14 AM    CREATININE 0.97 06/10/2019 11:14 AM    BUNCREATRAT 21 12/03/2018 11:25 AM     Lab Results   Component Value Date/Time    ALKPHOSPHAT 98 06/10/2019 11:14 AM    ASTSGOT 28 06/10/2019 11:14 AM    ALTSGPT 33 06/10/2019 11:14 AM    TBILIRUBIN 0.4 06/10/2019 11:14 AM      Lab Results   Component Value Date/Time    BNPBTYPENAT 178.8 (H) 08/19/2015 10:28 AM    BNPBTYPENAT 299 (H) 08/06/2015 04:59 AM          Assessment:     1. Coronary artery disease due to calcified coronary lesion: LAD stent in August 2015     2. Dyslipidemia     3. Essential hypertension         Medical Decision Making:  Today's Assessment / Status / Plan:     Mr. Arreguin is clinically stable.  Unfortunately, he continues to smoke and is not inclined to quit.  His blood pressure and lipid status are under excellent control.  He will follow-up in about a year with repeat lab work.      Charbel Jaffe M.D.  [address]  [address]  [place] [id number]  VIA In Basket

## 2019-09-09 DIAGNOSIS — I10 ESSENTIAL HYPERTENSION: ICD-10-CM

## 2019-09-10 DIAGNOSIS — I10 ESSENTIAL HYPERTENSION: ICD-10-CM

## 2019-09-11 RX ORDER — LISINOPRIL 2.5 MG/1
2.5 TABLET ORAL DAILY
Qty: 90 TAB | Refills: 3 | Status: SHIPPED | OUTPATIENT
Start: 2019-09-11 | End: 2020-06-18

## 2019-09-11 RX ORDER — LISINOPRIL 2.5 MG/1
TABLET ORAL
Qty: 90 TAB | Refills: 3 | Status: SHIPPED | OUTPATIENT
Start: 2019-09-11 | End: 2019-09-11

## 2019-10-08 ENCOUNTER — HOSPITAL ENCOUNTER (OUTPATIENT)
Dept: LAB | Facility: MEDICAL CENTER | Age: 69
End: 2019-10-08
Attending: INTERNAL MEDICINE
Payer: MEDICARE

## 2019-10-08 DIAGNOSIS — E03.9 ACQUIRED HYPOTHYROIDISM: Chronic | ICD-10-CM

## 2019-10-08 LAB
T3FREE SERPL-MCNC: 2.9 PG/ML (ref 2.4–4.2)
T4 FREE SERPL-MCNC: 0.8 NG/DL (ref 0.53–1.43)
TSH SERPL DL<=0.005 MIU/L-ACNC: 0.01 UIU/ML (ref 0.38–5.33)

## 2019-10-08 PROCEDURE — 84439 ASSAY OF FREE THYROXINE: CPT

## 2019-10-08 PROCEDURE — 84443 ASSAY THYROID STIM HORMONE: CPT

## 2019-10-08 PROCEDURE — 84481 FREE ASSAY (FT-3): CPT

## 2019-10-08 PROCEDURE — 36415 COLL VENOUS BLD VENIPUNCTURE: CPT

## 2019-10-14 ENCOUNTER — OFFICE VISIT (OUTPATIENT)
Dept: ENDOCRINOLOGY | Facility: MEDICAL CENTER | Age: 69
End: 2019-10-14
Payer: MEDICARE

## 2019-10-14 VITALS — HEIGHT: 67 IN | BODY MASS INDEX: 25.62 KG/M2 | WEIGHT: 163.2 LBS

## 2019-10-14 DIAGNOSIS — E03.9 ACQUIRED HYPOTHYROIDISM: Chronic | ICD-10-CM

## 2019-10-14 DIAGNOSIS — K31.84 GASTROPARESIS: Chronic | ICD-10-CM

## 2019-10-14 PROCEDURE — 99213 OFFICE O/P EST LOW 20 MIN: CPT | Performed by: INTERNAL MEDICINE

## 2019-11-07 DIAGNOSIS — E11.9 DIABETES MELLITUS WITHOUT COMPLICATION (HCC): ICD-10-CM

## 2019-11-07 DIAGNOSIS — Z79.4 CONTROLLED TYPE 2 DIABETES MELLITUS WITHOUT COMPLICATION, WITH LONG-TERM CURRENT USE OF INSULIN (HCC): ICD-10-CM

## 2019-11-07 DIAGNOSIS — E11.9 CONTROLLED TYPE 2 DIABETES MELLITUS WITHOUT COMPLICATION, WITH LONG-TERM CURRENT USE OF INSULIN (HCC): ICD-10-CM

## 2019-11-07 RX ORDER — LANCETS 28 GAUGE
1 EACH MISCELLANEOUS DAILY
Qty: 100 EACH | Refills: 3 | Status: SHIPPED | OUTPATIENT
Start: 2019-11-07 | End: 2019-11-08

## 2019-11-07 RX ORDER — GLUCOSAM/CHON-MSM1/C/MANG/BOSW 500-416.6
1 TABLET ORAL DAILY
Qty: 100 EACH | Refills: 3 | OUTPATIENT
Start: 2019-11-07

## 2019-11-07 NOTE — TELEPHONE ENCOUNTER
Lancets (Pt requesting Relion)   Metformin    Was the patient seen in the last year in this department? Yes    Does patient have an active prescription for medications requested? No     Received Request Via: Pharmacy

## 2019-11-08 ENCOUNTER — TELEPHONE (OUTPATIENT)
Dept: MEDICAL GROUP | Facility: MEDICAL CENTER | Age: 69
End: 2019-11-08

## 2019-11-08 DIAGNOSIS — E11.9 DIABETES MELLITUS WITHOUT COMPLICATION (HCC): ICD-10-CM

## 2019-11-08 RX ORDER — LANCETS 30 GAUGE
EACH MISCELLANEOUS
Qty: 100 EACH | Refills: 3 | Status: SHIPPED | OUTPATIENT
Start: 2019-11-08 | End: 2020-06-18

## 2019-11-08 NOTE — TELEPHONE ENCOUNTER
When you refilled the pts lancet yesterday you specified a specific brand name. Because it is Medicare they have to follow the Rx to the letter. Pharmacy would like you to send a new RX stating LANCETS 100 qty, They can then give the pt the one he requested which was Relion.

## 2019-12-04 ENCOUNTER — HOSPITAL ENCOUNTER (OUTPATIENT)
Dept: LAB | Facility: MEDICAL CENTER | Age: 69
End: 2019-12-04
Attending: INTERNAL MEDICINE
Payer: MEDICARE

## 2019-12-04 DIAGNOSIS — E11.9 CONTROLLED TYPE 2 DIABETES MELLITUS WITHOUT COMPLICATION, WITHOUT LONG-TERM CURRENT USE OF INSULIN (HCC): Chronic | ICD-10-CM

## 2019-12-04 LAB
ALBUMIN SERPL BCP-MCNC: 3.9 G/DL (ref 3.2–4.9)
ALBUMIN/GLOB SERPL: 1.8 G/DL
ALP SERPL-CCNC: 100 U/L (ref 30–99)
ALT SERPL-CCNC: 34 U/L (ref 2–50)
ANION GAP SERPL CALC-SCNC: 4 MMOL/L (ref 0–11.9)
AST SERPL-CCNC: 23 U/L (ref 12–45)
BILIRUB SERPL-MCNC: 0.3 MG/DL (ref 0.1–1.5)
BUN SERPL-MCNC: 19 MG/DL (ref 8–22)
CALCIUM SERPL-MCNC: 8.5 MG/DL (ref 8.5–10.5)
CHLORIDE SERPL-SCNC: 111 MMOL/L (ref 96–112)
CO2 SERPL-SCNC: 23 MMOL/L (ref 20–33)
CREAT SERPL-MCNC: 0.83 MG/DL (ref 0.5–1.4)
CREAT UR-MCNC: 103.7 MG/DL
FASTING STATUS PATIENT QL REPORTED: NORMAL
GLOBULIN SER CALC-MCNC: 2.2 G/DL (ref 1.9–3.5)
GLUCOSE SERPL-MCNC: 166 MG/DL (ref 65–99)
MICROALBUMIN UR-MCNC: <0.7 MG/DL
MICROALBUMIN/CREAT UR: NORMAL MG/G (ref 0–30)
POTASSIUM SERPL-SCNC: 4.7 MMOL/L (ref 3.6–5.5)
PROT SERPL-MCNC: 6.1 G/DL (ref 6–8.2)
SODIUM SERPL-SCNC: 138 MMOL/L (ref 135–145)

## 2019-12-04 PROCEDURE — 82570 ASSAY OF URINE CREATININE: CPT

## 2019-12-04 PROCEDURE — 80053 COMPREHEN METABOLIC PANEL: CPT

## 2019-12-04 PROCEDURE — 36415 COLL VENOUS BLD VENIPUNCTURE: CPT

## 2019-12-04 PROCEDURE — 83036 HEMOGLOBIN GLYCOSYLATED A1C: CPT | Mod: GA

## 2019-12-04 PROCEDURE — 82043 UR ALBUMIN QUANTITATIVE: CPT

## 2019-12-05 LAB
EST. AVERAGE GLUCOSE BLD GHB EST-MCNC: 131 MG/DL
HBA1C MFR BLD: 6.2 % (ref 0–5.6)

## 2019-12-09 DIAGNOSIS — E11.9 DIABETES MELLITUS WITHOUT COMPLICATION (HCC): ICD-10-CM

## 2019-12-10 ENCOUNTER — OFFICE VISIT (OUTPATIENT)
Dept: MEDICAL GROUP | Facility: MEDICAL CENTER | Age: 69
End: 2019-12-10
Payer: MEDICARE

## 2019-12-10 VITALS
OXYGEN SATURATION: 98 % | HEART RATE: 79 BPM | TEMPERATURE: 97.8 F | HEIGHT: 67 IN | BODY MASS INDEX: 25.71 KG/M2 | SYSTOLIC BLOOD PRESSURE: 138 MMHG | WEIGHT: 163.8 LBS | DIASTOLIC BLOOD PRESSURE: 70 MMHG

## 2019-12-10 DIAGNOSIS — Z12.5 PROSTATE CANCER SCREENING: ICD-10-CM

## 2019-12-10 DIAGNOSIS — L98.9 SKIN LESION: ICD-10-CM

## 2019-12-10 DIAGNOSIS — Z72.0 TOBACCO ABUSE: Chronic | ICD-10-CM

## 2019-12-10 DIAGNOSIS — E11.65 CONTROLLED TYPE 2 DIABETES MELLITUS WITH HYPERGLYCEMIA, WITHOUT LONG-TERM CURRENT USE OF INSULIN (HCC): Chronic | ICD-10-CM

## 2019-12-10 DIAGNOSIS — Z00.00 PREVENTATIVE HEALTH CARE: Chronic | ICD-10-CM

## 2019-12-10 DIAGNOSIS — E78.5 DYSLIPIDEMIA: Chronic | ICD-10-CM

## 2019-12-10 DIAGNOSIS — I73.9 PVD (PERIPHERAL VASCULAR DISEASE) (HCC): ICD-10-CM

## 2019-12-10 PROCEDURE — 17110 DESTRUCTION B9 LES UP TO 14: CPT | Performed by: INTERNAL MEDICINE

## 2019-12-10 PROCEDURE — 99213 OFFICE O/P EST LOW 20 MIN: CPT | Mod: 25 | Performed by: INTERNAL MEDICINE

## 2019-12-10 NOTE — PROGRESS NOTES
Subjective:      Orlando Arreguin is a 69 y.o. male who presents with diabetes Follow-Up            HPI     Here follow up diabetes on lantus 12 units and metformin 1000 mg bid, low blood sugar 86 but asymptomatic.  Has been checking his blood sugars daily, and is doing an excellent job documenting his blood sugars.  Patient keeps active, walks around his property on a regular basis but not as much during the winter, does not use his exercise bike consistently.  Tries to limit the amount of sweets and candies.  Dyslipidemia remains on Lipitor followed by cardiology, hypothyroid on replacement followed by endocrinology, history of MI followed by cardiology, continues to smoke, no recurrent chest pain  Has noticed decreased hearing left ear, chronic tinnitus no change, has not had a hearing check recently  Skin lesion left cheek in front of left ear, no personal history of skin cancer  Peripheral vascular disease by JOSSUE, did not follow-up with vascular surgery, denies pain in calves or legs with ambulation, no open sores or lesions lower extremities, continues to smoke a pack per day not interested in smoking cessation or cancer screening    Current Outpatient Medications   Medication Sig Dispense Refill   • metformin (GLUCOPHAGE) 1000 MG tablet Take 1 Tab by mouth 2 times a day, with meals. 180 Tab 1   • lisinopril (PRINIVIL) 2.5 MG Tab Take 1 Tab by mouth every day. 90 Tab 3   • insulin glargine (LANTUS SOLOSTAR) 100 UNIT/ML Solution Pen-injector injection Inject 12 Units as instructed every evening. 15 mL 6   • atorvastatin (LIPITOR) 80 MG tablet TAKE 1 TABLET BY MOUTH ONCE DAILY AT BEDTIME 90 Tab 3   • liothyronine (CYTOMEL) 25 MCG Tab TAKE 1/2 (ONE-HALF) TABLET BY MOUTH TWICE DAILY 90 Tab 3   • levothyroxine (SYNTHROID) 112 MCG Tab Take 1 Tab by mouth Every morning on an empty stomach. 90 Tab 3   • erythromycin base (UMM-TAB) 250 MG Tab Take 1 Tab by mouth 2 Times a Day. 180 Tab 3   • Riboflavin (VITAMIN B-2 PO) Take   by mouth.     • aspirin (ASA) 81 MG Chew Tab chewable tablet Take 1 Tab by mouth every day. 100 Tab 11   • Insulin Pen Needle (RELION PEN NEEDLES) 31G X 6 MM Misc 1 Device by Other route every day. Use with lantus once daily E11.9 100 Each 6   • Lancets Lancets check blood sugar once a day, E11.9 100 Each 3   • glucose blood (RELION PRIME TEST) strip 1 Strip by Other route every day. Check blood sugar once a day before meals. E11.9 100 Strip 3     No current facility-administered medications for this visit.        Patient Active Problem List   Diagnosis   • History of allergic rhinitis   • Gastroparesis   • History of BPH   • Interstitial cystitis   • Postpolio syndrome   • Hypothyroid   • On angiotensin-converting enzyme (ACE) inhibitors (for diabetes, not HTN)   • Diabetes mellitus type 2, controlled (HCC)   • Preventative health care   • Shoulder pain   • Insomnia   • Dyslipidemia   • Tobacco abuse   • History of MI (myocardial infarction)   • Coronary artery disease due to calcified coronary lesion: LAD stent in August 2015   • PVD (peripheral vascular disease) (HCC)   • Mild non proliferative diabetic retinopathy (HCC)            Health Maintenance Summary                HEPATITIS C SCREENING Overdue 1950     IMM ZOSTER VACCINES Overdue 3/8/2016      Done 1/12/2016 Imm Admin: Zoster Vaccine Live (ZVL) (Zostavax)    Annual Wellness Visit Overdue 5/31/2018      Done 5/30/2017 Visit Dx: Medicare annual wellness visit, subsequent    DIABETES MONOFILAMENT / LE EXAM Next Due 12/18/2019      Done 12/18/2018 AMB DIABETIC MONOFILAMENT LOWER EXTREMITY EXAM     Patient has more history with this topic...    IMM DTaP/Tdap/Td Vaccine Postponed 12/10/2020 Originally 2/20/1961. Patient Refused    A1C SCREENING Next Due 6/4/2020      Done 12/4/2019 HEMOGLOBIN A1C     Patient has more history with this topic...    FASTING LIPID PROFILE Next Due 6/10/2020      Done 6/10/2019 LIPID PROFILE     Patient has more history with  "this topic...    URINE ACR / MICROALBUMIN Next Due 12/4/2020      Done 12/4/2019 MICROALBUMIN CREAT RATIO URINE     Patient has more history with this topic...    SERUM CREATININE Next Due 12/4/2020      Done 12/4/2019 COMP METABOLIC PANEL     Patient has more history with this topic...    RETINAL SCREENING Next Due 12/10/2020      Done 12/10/2019 6/4/19      Patient has more history with this topic...    COLONOSCOPY Next Due 5/31/2026      Patient Declined 5/31/2016      Patient has more history with this topic...          Patient Care Team:  Charbel Jaffe M.D. as PCP - General  Alex Stein M.D. as Consulting Physician (Endocrinology)      ROS       Objective:     /70 (BP Location: Right arm, Patient Position: Sitting, BP Cuff Size: Adult)   Pulse 79   Temp 36.6 °C (97.8 °F)   Ht 1.702 m (5' 7\")   Wt 74.3 kg (163 lb 12.8 oz)   SpO2 98%   BMI 25.65 kg/m²      Physical Exam  Vitals signs and nursing note reviewed.   Constitutional:       Appearance: Normal appearance.   HENT:      Head: Normocephalic and atraumatic.   Eyes:      General:         Right eye: No discharge.         Left eye: No discharge.   Cardiovascular:      Rate and Rhythm: Normal rate and regular rhythm.   Pulmonary:      Effort: Pulmonary effort is normal.      Breath sounds: Normal breath sounds.   Abdominal:      General: There is no distension.   Musculoskeletal:         General: No swelling.   Skin:     General: Skin is warm.   Neurological:      General: No focal deficit present.      Mental Status: He is alert.   Psychiatric:         Mood and Affect: Mood normal.         Behavior: Behavior normal.     Skin lesion anterior aspect left ear mandible region, 1 x 1 cm irregular border, raised tannish colored lesion consistent with benign skin lesion type wart, no drainage, discharge, no erythema or induration       Monofilament testing with a 10 gram force: sensation intact:  intact  Visual Inspection: Feet without " maceration, ulcers, fissures.  Pedal pulses:  intact       Assessment/Plan:       Assessment  #! Diabetes mellitus type II A1c 6.2% on Lantus 12 units daily and metformin 1000 mg twice daily no hypoglycemia, good control of blood sugars    #2 dyslipidemia followed by cardiology on Lipitor 80 mg no side effects of muscle pain    #3 tobacco abuse pack per day, asymptomatic, declines smoking cessation, lung cancer screening, PFT    #4 decrease hearing left ear    #5 skin lesion anterior left mandible area 1 x 1 cm benign-appearing    #6 peripheral vascular disease by JOSSUE, asymptomatic referral made to vascular surgery, patient did not go to vascular surgery, denies any symptoms    Plan  #! Has had flu vaccine    #2 declines tdap, hep b vaccine    #3 declines colonoscopy and cologuard     #4 recommend [place] [facility] referral patient declines    #5 recommend cessation tobacco understanding smoking increases risk for COPD, lung cancer, peripheral vascular disease, cardiovascular disease, patient declines smoking cessation clinic    #6 declines PFT and CT lung cancer screening    #7 declines audiology referral     #8 liquid nitrogen cryotherapy to skin lesion left mandible anterior ear area without complications, avoid sun exposure    #9 follow up cardiology     #10 continue check blood sugar daily and record    #11 follow-up 6 months    #12 annual ophthalmologic exam, check feet daily

## 2019-12-30 DIAGNOSIS — E03.9 ACQUIRED HYPOTHYROIDISM: Chronic | ICD-10-CM

## 2019-12-31 RX ORDER — LEVOTHYROXINE SODIUM 112 UG/1
112 TABLET ORAL
Qty: 90 TAB | Refills: 3 | Status: SHIPPED | OUTPATIENT
Start: 2019-12-31 | End: 2020-06-18

## 2019-12-31 RX ORDER — LEVOTHYROXINE SODIUM 112 UG/1
TABLET ORAL
Qty: 90 TAB | Refills: 0 | Status: SHIPPED | OUTPATIENT
Start: 2019-12-31 | End: 2020-06-18

## 2019-12-31 NOTE — TELEPHONE ENCOUNTER
Was the patient seen in the last year in this department? Yes    Does patient have an active prescription for medications requested? No     Received Request Via: Pharmacy       Last seen 10/14/2019

## 2020-02-10 RX ORDER — LIOTHYRONINE SODIUM 25 UG/1
TABLET ORAL
Qty: 90 TAB | Refills: 0 | Status: SHIPPED | OUTPATIENT
Start: 2020-02-10 | End: 2020-02-10 | Stop reason: SDUPTHER

## 2020-02-10 RX ORDER — LIOTHYRONINE SODIUM 25 UG/1
TABLET ORAL
Qty: 90 TAB | Refills: 0 | Status: SHIPPED | OUTPATIENT
Start: 2020-02-10 | End: 2020-04-27 | Stop reason: SDUPTHER

## 2020-02-10 NOTE — TELEPHONE ENCOUNTER
Received request via: Pharmacy    Was the patient seen in the last year in this department? Yes LOV: 10/14/19    Does the patient have an active prescription (recently filled or refills available) for medication(s) requested? No     liothyronine (CYTOMEL) 25 MCG Tab        Sig: TAKE 1/2 (ONE-HALF) TABLET BY MOUTH TWICE DAILY

## 2020-04-14 ENCOUNTER — HOSPITAL ENCOUNTER (OUTPATIENT)
Dept: LAB | Facility: MEDICAL CENTER | Age: 70
End: 2020-04-14
Attending: INTERNAL MEDICINE
Payer: MEDICARE

## 2020-04-14 DIAGNOSIS — E03.9 ACQUIRED HYPOTHYROIDISM: Chronic | ICD-10-CM

## 2020-04-14 LAB
T3FREE SERPL-MCNC: 3.65 PG/ML (ref 2.4–4.2)
T4 FREE SERPL-MCNC: 1.05 NG/DL (ref 0.53–1.43)
TSH SERPL DL<=0.005 MIU/L-ACNC: 0.01 UIU/ML (ref 0.38–5.33)

## 2020-04-14 PROCEDURE — 84481 FREE ASSAY (FT-3): CPT

## 2020-04-14 PROCEDURE — 84439 ASSAY OF FREE THYROXINE: CPT

## 2020-04-14 PROCEDURE — 36415 COLL VENOUS BLD VENIPUNCTURE: CPT

## 2020-04-14 PROCEDURE — 84443 ASSAY THYROID STIM HORMONE: CPT

## 2020-04-20 ENCOUNTER — OFFICE VISIT (OUTPATIENT)
Dept: ENDOCRINOLOGY | Facility: MEDICAL CENTER | Age: 70
End: 2020-04-20
Payer: MEDICARE

## 2020-04-20 ENCOUNTER — TELEPHONE (OUTPATIENT)
Dept: ENDOCRINOLOGY | Facility: MEDICAL CENTER | Age: 70
End: 2020-04-20

## 2020-04-20 DIAGNOSIS — E03.9 ACQUIRED HYPOTHYROIDISM: Chronic | ICD-10-CM

## 2020-04-20 DIAGNOSIS — K31.84 GASTROPARESIS: Chronic | ICD-10-CM

## 2020-04-20 PROCEDURE — 99213 OFFICE O/P EST LOW 20 MIN: CPT | Performed by: INTERNAL MEDICINE

## 2020-04-21 NOTE — PROGRESS NOTES
Telephone Appointment Visit   As a means of avoiding spread of COVID-19, this visit is being conducted by telephone. This telephone visit was initiated by the patient and they [id number] consented.    Time at start of call: 11:06am    Reason for Call:  Medication Follow-up    Patient Comments / History:        Patient has continued to tolerate a a dose of thyroid that successfully treats his gastroparesis.  This is very important to him and he does not want to lower the dose.  His free T4 and free T3 are in good range but TSH remains suppressed at 0.006.  He had one brief episode of a palpitation lasting a second or 2.  1 only.  No chest pain and not alarming to him.  If it begins to recur he will let me know adjust his dose.    Labs / Images Reviewed   Current TSH is 0.006.  Free T4 is mid range at 1.0 and free T3 is upper normal at 3.6    Assessment and Plan:     1. Acquired hypothyroidism              As above he is not willing to adjust his thyroid because it has been successful in controlling gastroparesis  2. Gastroparesis              Effectively treated thyroid per the above    Follow-up: Return in 6 months    Time at end of call: 11:15  Total Time Spent: 5-10 minutes    Alex Stein M.D.

## 2020-04-27 RX ORDER — LIOTHYRONINE SODIUM 25 UG/1
TABLET ORAL
Qty: 90 TAB | Refills: 0 | Status: SHIPPED | OUTPATIENT
Start: 2020-04-27 | End: 2020-06-18

## 2020-04-27 NOTE — TELEPHONE ENCOUNTER
Received request via: Patient    Was the patient seen in the last year in this department? Yes    Does the patient have an active prescription (recently filled or refills available) for medication(s) requested? No      liothyronine (CYTOMEL) 25 MCG Tab

## 2020-05-26 DIAGNOSIS — E78.5 DYSLIPIDEMIA: ICD-10-CM

## 2020-05-28 RX ORDER — ATORVASTATIN CALCIUM 80 MG/1
80 TABLET, FILM COATED ORAL DAILY
Qty: 90 TAB | Refills: 0 | Status: SHIPPED | OUTPATIENT
Start: 2020-05-28 | End: 2020-06-18

## 2020-06-10 ENCOUNTER — HOSPITAL ENCOUNTER (OUTPATIENT)
Dept: LAB | Facility: MEDICAL CENTER | Age: 70
End: 2020-06-10
Attending: INTERNAL MEDICINE
Payer: MEDICARE

## 2020-06-10 DIAGNOSIS — E11.65 CONTROLLED TYPE 2 DIABETES MELLITUS WITH HYPERGLYCEMIA, WITHOUT LONG-TERM CURRENT USE OF INSULIN (HCC): Chronic | ICD-10-CM

## 2020-06-10 DIAGNOSIS — Z12.5 PROSTATE CANCER SCREENING: ICD-10-CM

## 2020-06-10 DIAGNOSIS — E78.5 DYSLIPIDEMIA: Chronic | ICD-10-CM

## 2020-06-10 LAB
ALBUMIN SERPL BCP-MCNC: 4.3 G/DL (ref 3.2–4.9)
ALBUMIN/GLOB SERPL: 2 G/DL
ALP SERPL-CCNC: 100 U/L (ref 30–99)
ALT SERPL-CCNC: 37 U/L (ref 2–50)
ANION GAP SERPL CALC-SCNC: 8 MMOL/L (ref 7–16)
AST SERPL-CCNC: 31 U/L (ref 12–45)
BASOPHILS # BLD AUTO: 0.7 % (ref 0–1.8)
BASOPHILS # BLD: 0.05 K/UL (ref 0–0.12)
BILIRUB SERPL-MCNC: 0.3 MG/DL (ref 0.1–1.5)
BUN SERPL-MCNC: 12 MG/DL (ref 8–22)
CALCIUM SERPL-MCNC: 8.5 MG/DL (ref 8.5–10.5)
CHLORIDE SERPL-SCNC: 110 MMOL/L (ref 96–112)
CO2 SERPL-SCNC: 20 MMOL/L (ref 20–33)
CREAT SERPL-MCNC: 0.83 MG/DL (ref 0.5–1.4)
EOSINOPHIL # BLD AUTO: 0.09 K/UL (ref 0–0.51)
EOSINOPHIL NFR BLD: 1.2 % (ref 0–6.9)
ERYTHROCYTE [DISTWIDTH] IN BLOOD BY AUTOMATED COUNT: 45.8 FL (ref 35.9–50)
EST. AVERAGE GLUCOSE BLD GHB EST-MCNC: 140 MG/DL
GLOBULIN SER CALC-MCNC: 2.1 G/DL (ref 1.9–3.5)
GLUCOSE SERPL-MCNC: 138 MG/DL (ref 65–99)
HBA1C MFR BLD: 6.5 % (ref 0–5.6)
HCT VFR BLD AUTO: 47.9 % (ref 42–52)
HGB BLD-MCNC: 15.4 G/DL (ref 14–18)
IMM GRANULOCYTES # BLD AUTO: 0.03 K/UL (ref 0–0.11)
IMM GRANULOCYTES NFR BLD AUTO: 0.4 % (ref 0–0.9)
LYMPHOCYTES # BLD AUTO: 1.51 K/UL (ref 1–4.8)
LYMPHOCYTES NFR BLD: 20.5 % (ref 22–41)
MCH RBC QN AUTO: 29.7 PG (ref 27–33)
MCHC RBC AUTO-ENTMCNC: 32.2 G/DL (ref 33.7–35.3)
MCV RBC AUTO: 92.5 FL (ref 81.4–97.8)
MONOCYTES # BLD AUTO: 0.59 K/UL (ref 0–0.85)
MONOCYTES NFR BLD AUTO: 8 % (ref 0–13.4)
NEUTROPHILS # BLD AUTO: 5.11 K/UL (ref 1.82–7.42)
NEUTROPHILS NFR BLD: 69.2 % (ref 44–72)
NRBC # BLD AUTO: 0 K/UL
NRBC BLD-RTO: 0 /100 WBC
PLATELET # BLD AUTO: 227 K/UL (ref 164–446)
PMV BLD AUTO: 10.6 FL (ref 9–12.9)
POTASSIUM SERPL-SCNC: 4.3 MMOL/L (ref 3.6–5.5)
PROT SERPL-MCNC: 6.4 G/DL (ref 6–8.2)
RBC # BLD AUTO: 5.18 M/UL (ref 4.7–6.1)
SODIUM SERPL-SCNC: 138 MMOL/L (ref 135–145)
WBC # BLD AUTO: 7.4 K/UL (ref 4.8–10.8)

## 2020-06-10 PROCEDURE — 83036 HEMOGLOBIN GLYCOSYLATED A1C: CPT | Mod: GA

## 2020-06-10 PROCEDURE — 80053 COMPREHEN METABOLIC PANEL: CPT

## 2020-06-10 PROCEDURE — 81003 URINALYSIS AUTO W/O SCOPE: CPT

## 2020-06-10 PROCEDURE — 85025 COMPLETE CBC W/AUTO DIFF WBC: CPT

## 2020-06-10 PROCEDURE — 84153 ASSAY OF PSA TOTAL: CPT | Mod: GA

## 2020-06-10 PROCEDURE — 82043 UR ALBUMIN QUANTITATIVE: CPT

## 2020-06-10 PROCEDURE — 82570 ASSAY OF URINE CREATININE: CPT

## 2020-06-10 PROCEDURE — 36415 COLL VENOUS BLD VENIPUNCTURE: CPT | Mod: GA

## 2020-06-10 PROCEDURE — 84443 ASSAY THYROID STIM HORMONE: CPT

## 2020-06-10 PROCEDURE — 80061 LIPID PANEL: CPT

## 2020-06-11 LAB
APPEARANCE UR: CLEAR
BILIRUB UR QL STRIP.AUTO: NEGATIVE
COLOR UR: NORMAL
CREAT UR-MCNC: 135.44 MG/DL
GLUCOSE UR STRIP.AUTO-MCNC: NEGATIVE MG/DL
KETONES UR STRIP.AUTO-MCNC: NEGATIVE MG/DL
LEUKOCYTE ESTERASE UR QL STRIP.AUTO: NEGATIVE
MICRO URNS: NORMAL
MICROALBUMIN UR-MCNC: <1.2 MG/DL
MICROALBUMIN/CREAT UR: NORMAL MG/G (ref 0–30)
NITRITE UR QL STRIP.AUTO: NEGATIVE
PH UR STRIP.AUTO: 5 [PH] (ref 5–8)
PROT UR QL STRIP: NEGATIVE MG/DL
PSA SERPL-MCNC: 1.86 NG/ML (ref 0–4)
RBC UR QL AUTO: NEGATIVE
SP GR UR STRIP.AUTO: 1.02
TSH SERPL DL<=0.005 MIU/L-ACNC: 0.01 UIU/ML (ref 0.38–5.33)
UROBILINOGEN UR STRIP.AUTO-MCNC: 0.2 MG/DL

## 2020-06-12 ENCOUNTER — TELEPHONE (OUTPATIENT)
Dept: MEDICAL GROUP | Facility: MEDICAL CENTER | Age: 70
End: 2020-06-12

## 2020-06-12 DIAGNOSIS — E78.5 DYSLIPIDEMIA: Chronic | ICD-10-CM

## 2020-06-12 LAB
CHOLEST SERPL-MCNC: 81 MG/DL (ref 100–199)
HDLC SERPL-MCNC: 25 MG/DL
LDLC SERPL CALC-MCNC: 41 MG/DL
TRIGL SERPL-MCNC: 74 MG/DL (ref 0–149)

## 2020-06-18 ENCOUNTER — APPOINTMENT (OUTPATIENT)
Dept: RADIOLOGY | Facility: MEDICAL CENTER | Age: 70
End: 2020-06-18
Attending: EMERGENCY MEDICINE
Payer: MEDICARE

## 2020-06-18 ENCOUNTER — HOSPITAL ENCOUNTER (OUTPATIENT)
Facility: MEDICAL CENTER | Age: 70
End: 2020-06-19
Attending: EMERGENCY MEDICINE | Admitting: INTERNAL MEDICINE
Payer: MEDICARE

## 2020-06-18 ENCOUNTER — OFFICE VISIT (OUTPATIENT)
Dept: MEDICAL GROUP | Facility: MEDICAL CENTER | Age: 70
End: 2020-06-18
Payer: MEDICARE

## 2020-06-18 VITALS
TEMPERATURE: 98.3 F | HEART RATE: 97 BPM | DIASTOLIC BLOOD PRESSURE: 70 MMHG | HEIGHT: 67 IN | WEIGHT: 159 LBS | BODY MASS INDEX: 24.96 KG/M2 | SYSTOLIC BLOOD PRESSURE: 140 MMHG | OXYGEN SATURATION: 97 %

## 2020-06-18 DIAGNOSIS — R07.9 CHEST PAIN, UNSPECIFIED TYPE: ICD-10-CM

## 2020-06-18 DIAGNOSIS — Z95.5 H/O HEART ARTERY STENT: ICD-10-CM

## 2020-06-18 DIAGNOSIS — I73.9 PVD (PERIPHERAL VASCULAR DISEASE) (HCC): ICD-10-CM

## 2020-06-18 DIAGNOSIS — R07.9 ACUTE CHEST PAIN: ICD-10-CM

## 2020-06-18 DIAGNOSIS — I25.119 CORONARY ARTERY DISEASE INVOLVING NATIVE CORONARY ARTERY OF NATIVE HEART WITH ANGINA PECTORIS (HCC): ICD-10-CM

## 2020-06-18 DIAGNOSIS — E78.5 DYSLIPIDEMIA: ICD-10-CM

## 2020-06-18 DIAGNOSIS — E03.9 ACQUIRED HYPOTHYROIDISM: ICD-10-CM

## 2020-06-18 DIAGNOSIS — E11.65 CONTROLLED TYPE 2 DIABETES MELLITUS WITH HYPERGLYCEMIA, WITHOUT LONG-TERM CURRENT USE OF INSULIN (HCC): ICD-10-CM

## 2020-06-18 DIAGNOSIS — R79.89 ELEVATED TROPONIN: ICD-10-CM

## 2020-06-18 LAB
ALBUMIN SERPL BCP-MCNC: 4.4 G/DL (ref 3.2–4.9)
ALBUMIN/GLOB SERPL: 1.5 G/DL
ALP SERPL-CCNC: 105 U/L (ref 30–99)
ALT SERPL-CCNC: 27 U/L (ref 2–50)
ANION GAP SERPL CALC-SCNC: 13 MMOL/L (ref 7–16)
AST SERPL-CCNC: 24 U/L (ref 12–45)
BASOPHILS # BLD AUTO: 0.6 % (ref 0–1.8)
BASOPHILS # BLD: 0.04 K/UL (ref 0–0.12)
BILIRUB SERPL-MCNC: 0.6 MG/DL (ref 0.1–1.5)
BUN SERPL-MCNC: 13 MG/DL (ref 8–22)
CALCIUM SERPL-MCNC: 9.3 MG/DL (ref 8.4–10.2)
CHLORIDE SERPL-SCNC: 108 MMOL/L (ref 96–112)
CO2 SERPL-SCNC: 18 MMOL/L (ref 20–33)
CREAT SERPL-MCNC: 0.93 MG/DL (ref 0.5–1.4)
EKG IMPRESSION: NORMAL
EOSINOPHIL # BLD AUTO: 0.02 K/UL (ref 0–0.51)
EOSINOPHIL NFR BLD: 0.3 % (ref 0–6.9)
ERYTHROCYTE [DISTWIDTH] IN BLOOD BY AUTOMATED COUNT: 43.7 FL (ref 35.9–50)
GLOBULIN SER CALC-MCNC: 2.9 G/DL (ref 1.9–3.5)
GLUCOSE BLD-MCNC: 116 MG/DL (ref 65–99)
GLUCOSE BLD-MCNC: 156 MG/DL (ref 65–99)
GLUCOSE SERPL-MCNC: 114 MG/DL (ref 65–99)
HCT VFR BLD AUTO: 50.1 % (ref 42–52)
HGB BLD-MCNC: 16.5 G/DL (ref 14–18)
IMM GRANULOCYTES # BLD AUTO: 0.02 K/UL (ref 0–0.11)
IMM GRANULOCYTES NFR BLD AUTO: 0.3 % (ref 0–0.9)
LYMPHOCYTES # BLD AUTO: 1.07 K/UL (ref 1–4.8)
LYMPHOCYTES NFR BLD: 16.4 % (ref 22–41)
MCH RBC QN AUTO: 29.4 PG (ref 27–33)
MCHC RBC AUTO-ENTMCNC: 32.9 G/DL (ref 33.7–35.3)
MCV RBC AUTO: 89.1 FL (ref 81.4–97.8)
MONOCYTES # BLD AUTO: 0.41 K/UL (ref 0–0.85)
MONOCYTES NFR BLD AUTO: 6.3 % (ref 0–13.4)
NEUTROPHILS # BLD AUTO: 4.95 K/UL (ref 1.82–7.42)
NEUTROPHILS NFR BLD: 76.1 % (ref 44–72)
NRBC # BLD AUTO: 0 K/UL
NRBC BLD-RTO: 0 /100 WBC
PLATELET # BLD AUTO: 246 K/UL (ref 164–446)
PMV BLD AUTO: 9.9 FL (ref 9–12.9)
POTASSIUM SERPL-SCNC: 4.1 MMOL/L (ref 3.6–5.5)
PROT SERPL-MCNC: 7.3 G/DL (ref 6–8.2)
RBC # BLD AUTO: 5.62 M/UL (ref 4.7–6.1)
SODIUM SERPL-SCNC: 139 MMOL/L (ref 135–145)
TROPONIN T SERPL-MCNC: 112 NG/L (ref 6–19)
TROPONIN T SERPL-MCNC: 171 NG/L (ref 6–19)
TROPONIN T SERPL-MCNC: 27 NG/L (ref 6–19)
WBC # BLD AUTO: 6.5 K/UL (ref 4.8–10.8)

## 2020-06-18 PROCEDURE — 96372 THER/PROPH/DIAG INJ SC/IM: CPT

## 2020-06-18 PROCEDURE — 93005 ELECTROCARDIOGRAM TRACING: CPT | Performed by: EMERGENCY MEDICINE

## 2020-06-18 PROCEDURE — G0378 HOSPITAL OBSERVATION PER HR: HCPCS

## 2020-06-18 PROCEDURE — 99285 EMERGENCY DEPT VISIT HI MDM: CPT

## 2020-06-18 PROCEDURE — A9270 NON-COVERED ITEM OR SERVICE: HCPCS | Performed by: INTERNAL MEDICINE

## 2020-06-18 PROCEDURE — 700111 HCHG RX REV CODE 636 W/ 250 OVERRIDE (IP): Performed by: INTERNAL MEDICINE

## 2020-06-18 PROCEDURE — 99214 OFFICE O/P EST MOD 30 MIN: CPT | Performed by: INTERNAL MEDICINE

## 2020-06-18 PROCEDURE — 71045 X-RAY EXAM CHEST 1 VIEW: CPT

## 2020-06-18 PROCEDURE — 700102 HCHG RX REV CODE 250 W/ 637 OVERRIDE(OP): Performed by: INTERNAL MEDICINE

## 2020-06-18 PROCEDURE — A9270 NON-COVERED ITEM OR SERVICE: HCPCS | Performed by: EMERGENCY MEDICINE

## 2020-06-18 PROCEDURE — 82962 GLUCOSE BLOOD TEST: CPT | Mod: 91

## 2020-06-18 PROCEDURE — 700102 HCHG RX REV CODE 250 W/ 637 OVERRIDE(OP): Performed by: EMERGENCY MEDICINE

## 2020-06-18 PROCEDURE — 84484 ASSAY OF TROPONIN QUANT: CPT

## 2020-06-18 PROCEDURE — 80053 COMPREHEN METABOLIC PANEL: CPT

## 2020-06-18 PROCEDURE — 93005 ELECTROCARDIOGRAM TRACING: CPT

## 2020-06-18 PROCEDURE — 99220 PR INITIAL OBSERVATION CARE,LEVL III: CPT | Mod: 25 | Performed by: INTERNAL MEDICINE

## 2020-06-18 PROCEDURE — 99406 BEHAV CHNG SMOKING 3-10 MIN: CPT | Performed by: INTERNAL MEDICINE

## 2020-06-18 PROCEDURE — 85025 COMPLETE CBC W/AUTO DIFF WBC: CPT

## 2020-06-18 RX ORDER — ERYTHROMYCIN 250 MG/1
250 TABLET, COATED ORAL DAILY
COMMUNITY
End: 2023-07-06

## 2020-06-18 RX ORDER — AMINOPHYLLINE 25 MG/ML
100 INJECTION, SOLUTION INTRAVENOUS
Status: DISCONTINUED | OUTPATIENT
Start: 2020-06-18 | End: 2020-06-19 | Stop reason: HOSPADM

## 2020-06-18 RX ORDER — LIOTHYRONINE SODIUM 25 UG/1
12.5 TABLET ORAL 2 TIMES DAILY
COMMUNITY
End: 2020-11-02

## 2020-06-18 RX ORDER — ATORVASTATIN CALCIUM 40 MG/1
80 TABLET, FILM COATED ORAL NIGHTLY
Status: DISCONTINUED | OUTPATIENT
Start: 2020-06-18 | End: 2020-06-19 | Stop reason: HOSPADM

## 2020-06-18 RX ORDER — LEVOTHYROXINE SODIUM 112 UG/1
112 TABLET ORAL
COMMUNITY
End: 2020-12-19

## 2020-06-18 RX ORDER — LEVOTHYROXINE SODIUM 112 UG/1
112 TABLET ORAL
Status: DISCONTINUED | OUTPATIENT
Start: 2020-06-18 | End: 2020-06-19 | Stop reason: HOSPADM

## 2020-06-18 RX ORDER — ONDANSETRON 2 MG/ML
4 INJECTION INTRAMUSCULAR; INTRAVENOUS EVERY 4 HOURS PRN
Status: DISCONTINUED | OUTPATIENT
Start: 2020-06-18 | End: 2020-06-19

## 2020-06-18 RX ORDER — ASPIRIN 81 MG/1
324 TABLET, CHEWABLE ORAL ONCE
Status: COMPLETED | OUTPATIENT
Start: 2020-06-18 | End: 2020-06-18

## 2020-06-18 RX ORDER — INSULIN GLARGINE 100 [IU]/ML
12 INJECTION, SOLUTION SUBCUTANEOUS EVERY EVENING
Status: DISCONTINUED | OUTPATIENT
Start: 2020-06-18 | End: 2020-06-19 | Stop reason: HOSPADM

## 2020-06-18 RX ORDER — ONDANSETRON 4 MG/1
4 TABLET, ORALLY DISINTEGRATING ORAL EVERY 4 HOURS PRN
Status: DISCONTINUED | OUTPATIENT
Start: 2020-06-18 | End: 2020-06-19

## 2020-06-18 RX ORDER — ATORVASTATIN CALCIUM 80 MG/1
80 TABLET, FILM COATED ORAL NIGHTLY
COMMUNITY
End: 2020-09-04 | Stop reason: SDUPTHER

## 2020-06-18 RX ORDER — AMOXICILLIN 250 MG
2 CAPSULE ORAL 2 TIMES DAILY
Status: DISCONTINUED | OUTPATIENT
Start: 2020-06-18 | End: 2020-06-19

## 2020-06-18 RX ORDER — LISINOPRIL 2.5 MG/1
2.5 TABLET ORAL DAILY
COMMUNITY
End: 2020-09-15 | Stop reason: SDUPTHER

## 2020-06-18 RX ORDER — DEXTROSE MONOHYDRATE 25 G/50ML
50 INJECTION, SOLUTION INTRAVENOUS
Status: DISCONTINUED | OUTPATIENT
Start: 2020-06-18 | End: 2020-06-19

## 2020-06-18 RX ORDER — ACETAMINOPHEN 325 MG/1
650 TABLET ORAL EVERY 6 HOURS PRN
Status: DISCONTINUED | OUTPATIENT
Start: 2020-06-18 | End: 2020-06-19

## 2020-06-18 RX ORDER — LISINOPRIL 2.5 MG/1
2.5 TABLET ORAL DAILY
Status: DISCONTINUED | OUTPATIENT
Start: 2020-06-18 | End: 2020-06-19 | Stop reason: HOSPADM

## 2020-06-18 RX ORDER — REGADENOSON 0.08 MG/ML
0.4 INJECTION, SOLUTION INTRAVENOUS
Status: DISCONTINUED | OUTPATIENT
Start: 2020-06-18 | End: 2020-06-19 | Stop reason: HOSPADM

## 2020-06-18 RX ORDER — POLYETHYLENE GLYCOL 3350 17 G/17G
1 POWDER, FOR SOLUTION ORAL
Status: DISCONTINUED | OUTPATIENT
Start: 2020-06-18 | End: 2020-06-19

## 2020-06-18 RX ORDER — BISACODYL 10 MG
10 SUPPOSITORY, RECTAL RECTAL
Status: DISCONTINUED | OUTPATIENT
Start: 2020-06-18 | End: 2020-06-19

## 2020-06-18 RX ORDER — LIOTHYRONINE SODIUM 5 UG/1
12.5 TABLET ORAL 2 TIMES DAILY
Status: DISCONTINUED | OUTPATIENT
Start: 2020-06-18 | End: 2020-06-19

## 2020-06-18 RX ORDER — ERYTHROMYCIN 250 MG/1
250 CAPSULE, DELAYED RELEASE ORAL DAILY
Status: DISCONTINUED | OUTPATIENT
Start: 2020-06-18 | End: 2020-06-19 | Stop reason: HOSPADM

## 2020-06-18 RX ADMIN — ERYTHROMYCIN 250 MG: 250 CAPSULE, DELAYED RELEASE PELLETS ORAL at 17:58

## 2020-06-18 RX ADMIN — ASPIRIN 81 MG CHEWABLE TABLET 324 MG: 81 TABLET CHEWABLE at 13:07

## 2020-06-18 RX ADMIN — ATORVASTATIN CALCIUM 80 MG: 40 TABLET, FILM COATED ORAL at 21:24

## 2020-06-18 RX ADMIN — ENOXAPARIN SODIUM 80 MG: 100 INJECTION SUBCUTANEOUS at 22:18

## 2020-06-18 RX ADMIN — INSULIN GLARGINE 12 UNITS: 100 INJECTION, SOLUTION SUBCUTANEOUS at 18:01

## 2020-06-18 RX ADMIN — LIOTHYRONINE SODIUM 12.5 MCG: 5 TABLET ORAL at 17:59

## 2020-06-18 RX ADMIN — LISINOPRIL 2.5 MG: 5 TABLET ORAL at 17:51

## 2020-06-18 ASSESSMENT — COGNITIVE AND FUNCTIONAL STATUS - GENERAL
SUGGESTED CMS G CODE MODIFIER DAILY ACTIVITY: CH
MOBILITY SCORE: 24
DAILY ACTIVITIY SCORE: 24
SUGGESTED CMS G CODE MODIFIER MOBILITY: CH

## 2020-06-18 ASSESSMENT — ENCOUNTER SYMPTOMS
WHEEZING: 0
BACK PAIN: 0
SHORTNESS OF BREATH: 1
DIZZINESS: 0
HEARTBURN: 0
HALLUCINATIONS: 0
FOCAL WEAKNESS: 0
NAUSEA: 0
DEPRESSION: 0
DIARRHEA: 0
CHILLS: 0
BLOOD IN STOOL: 0
COUGH: 0
WEAKNESS: 0
PALPITATIONS: 1
FEVER: 0
VOMITING: 0
SORE THROAT: 0
MYALGIAS: 0
ABDOMINAL PAIN: 0
HEADACHES: 0
PND: 0

## 2020-06-18 ASSESSMENT — LIFESTYLE VARIABLES
CONSUMPTION TOTAL: NEGATIVE
HAVE PEOPLE ANNOYED YOU BY CRITICIZING YOUR DRINKING: NO
ON A TYPICAL DAY WHEN YOU DRINK ALCOHOL HOW MANY DRINKS DO YOU HAVE: 0
TOTAL SCORE: 0
ALCOHOL_USE: NO
HOW MANY TIMES IN THE PAST YEAR HAVE YOU HAD 5 OR MORE DRINKS IN A DAY: 0
AVERAGE NUMBER OF DAYS PER WEEK YOU HAVE A DRINK CONTAINING ALCOHOL: 0
TOTAL SCORE: 0
EVER FELT BAD OR GUILTY ABOUT YOUR DRINKING: NO
EVER_SMOKED: YES
EVER HAD A DRINK FIRST THING IN THE MORNING TO STEADY YOUR NERVES TO GET RID OF A HANGOVER: NO
TOTAL SCORE: 0
HAVE YOU EVER FELT YOU SHOULD CUT DOWN ON YOUR DRINKING: NO

## 2020-06-18 ASSESSMENT — PATIENT HEALTH QUESTIONNAIRE - PHQ9
1. LITTLE INTEREST OR PLEASURE IN DOING THINGS: NOT AT ALL
CLINICAL INTERPRETATION OF PHQ2 SCORE: 0
SUM OF ALL RESPONSES TO PHQ9 QUESTIONS 1 AND 2: 0
2. FEELING DOWN, DEPRESSED, IRRITABLE, OR HOPELESS: NOT AT ALL

## 2020-06-18 ASSESSMENT — COPD QUESTIONNAIRES
IN THE PAST 12 MONTHS DO YOU DO LESS THAN YOU USED TO BECAUSE OF YOUR BREATHING PROBLEMS: DISAGREE/UNSURE
COPD SCREENING SCORE: 6
HAVE YOU SMOKED AT LEAST 100 CIGARETTES IN YOUR ENTIRE LIFE: YES
DO YOU EVER COUGH UP ANY MUCUS OR PHLEGM?: YES, A FEW DAYS A WEEK OR MONTH
DURING THE PAST 4 WEEKS HOW MUCH DID YOU FEEL SHORT OF BREATH: SOME OF THE TIME

## 2020-06-18 ASSESSMENT — FIBROSIS 4 INDEX
FIB4 SCORE: 1.57
FIB4 SCORE: 1.57

## 2020-06-18 NOTE — PROGRESS NOTES
Subjective:      Orlando Arreguin is a 70 y.o. male who presents with Follow-Up (chest discomfort started 6/18 am)            HPI      Patient was scheduled for his normal diabetes follow-up appointment, but he had a new complaint. This morning patient had onset of chest pain while changing cat litter, onset mid chest area described as a dull ache with no radiation lasted from 830 to 1000, although he has had intermittent jaw pain since Monday, he states he normally has throat discomfort with elevated blood pressure, and his blood pressure at 830 this morning was 150/82 and his blood pressure will normally run 114/70, patient did have sob this am, none currently, no cough, no palpitations today but did have some Monday, no lightheadedness or syncope, no nausea today but has had some nausea off and on since Monday.  History of CAD. Sees cardiology for history of MI 2015 and LAD stent and recently saw cardiology.  On lisinopril low-dose 2.5 mg, aspirin, Lipitor 80 mg.  Hypothyroid followed by endocrinology on Synthroid and Cytomel patient states his TSH is always decreased and myself and endocrinology recommended that he cut back on his thyroid dose however he states that causes worsening gastroparesis.  Gastroparesis since 2004, on erythromycin, has declined a gastric stimulator in the past  Dyslipidemia currently on Lipitor no muscle pain or muscle aches  Diabetes on metformin and Lantus, blood sugars have been stable no hypoglycemia, most recent A1c on June 11 6.5%  Continues to smoke      Current Outpatient Medications   Medication Sig Dispense Refill   • Cholecalciferol (VITAMIN D3 PO) Take  by mouth.     • atorvastatin (LIPITOR) 80 MG tablet Take 1 Tab by mouth every day. For further refills please contact new cardiologist. Thank you 90 Tab 0   • metformin (GLUCOPHAGE) 1000 MG tablet Take 1 Tab by mouth 2 times a day, with meals. 180 Tab 1   • liothyronine (CYTOMEL) 25 MCG Tab TAKE 1/2 (ONE-HALF) TABLET BY MOUTH  TWICE DAILY 90 Tab 0   • erythromycin base (UMM-TAB) 250 MG Tab TAKE 1 TABLET BY MOUTH TWICE DAILY 180 Tab 3   • levothyroxine (SYNTHROID) 112 MCG Tab Take 1 Tab by mouth Every morning on an empty stomach. 90 Tab 3   • lisinopril (PRINIVIL) 2.5 MG Tab Take 1 Tab by mouth every day. 90 Tab 3   • insulin glargine (LANTUS SOLOSTAR) 100 UNIT/ML Solution Pen-injector injection Inject 12 Units as instructed every evening. 15 mL 6   • Riboflavin (VITAMIN B-2 PO) Take  by mouth.     • aspirin (ASA) 81 MG Chew Tab chewable tablet Take 1 Tab by mouth every day. 100 Tab 11   • levothyroxine (SYNTHROID) 112 MCG Tab TAKE 1 TABLET BY MOUTH ONCE DAILY IN THE MORNING ON  AN  EMPTY  STOMACH 90 Tab 0   • Insulin Pen Needle (RELION PEN NEEDLES) 31G X 6 MM Misc 1 Device by Other route every day. Use with lantus once daily E11.9 100 Each 6   • Lancets Lancets check blood sugar once a day, E11.9 100 Each 3   • glucose blood (RELION PRIME TEST) strip 1 Strip by Other route every day. Check blood sugar once a day before meals. E11.9 100 Strip 3     No current facility-administered medications for this visit.      Patient Active Problem List   Diagnosis   • History of allergic rhinitis   • Gastroparesis   • History of BPH   • Interstitial cystitis   • Postpolio syndrome   • Hypothyroid   • On angiotensin-converting enzyme (ACE) inhibitors (for diabetes, not HTN)   • Diabetes mellitus type 2, controlled ([facility])   • Preventative health care   • history shoulder pain   • Insomnia   • Dyslipidemia   • Tobacco abuse   • History of MI (myocardial infarction)   • Coronary artery disease due to calcified coronary lesion: LAD stent in August 2015   • PVD (peripheral vascular disease) ([facility])   • Mild non proliferative diabetic retinopathy ([facility])     Depression Screening    Little interest or pleasure in doing things?  0 - not at all  Feeling down, depressed , or hopeless? 0 - not at all  Patient Health Questionnaire Score: 0        Tobacco  7/31/15  tobacco 1 ppd not interested in stopping smoking classes or education  8/28/15 tobacco 1 ppd not interested in stopping smoking classes or education  8/28/15 declines PFT    11/30/15 still smoking 20 cigs per day declines stop smoking classes or medications  5/31/16 declines stop smoking classes and medications, not interested in stop smoking classes, smoking ppd x 40 plus years, declines CT lung cancer screening, pulmonary function testing  11/29/16 still smoking 1 ppd, started smoking age 20, declines stop smoking classes and medication, declines lung cancer screening program and PFT  5/30/17 still smoking 1 ppd declines stop smoking classes and medications, and lung cancer screening program and PFT  12/5/17 still smokes 1 ppd declines stop smoking classes and medications, and lung cancer screening program and PFT   6/12/18  still smokes 1 ppd declines stop smoking classes and medications, and lung cancer screening program and PFT   6/18/19 still smokes 1 ppd declines stop smoking classes and medications, and lung cancer screening program and PFT   12/10/19 still smoking 1 pack/day, declines stop smoking classes, medications, lung cancer screening, PFT     Preventative health  8/23/15 pneumovax  1/12/16 zoster vaccine at [facility]  11/23/16 psa 2.1  11/23/16 vit d 51  5/30/17 declines colonoscopy, declines FIT card  5/30/17 prevnar   6/12/18 declines colonoscopy and FIT   6/12/18 shingrix vaccine written to get at pharmacy  10/9/18 flu at [facility]      Postpolio syndrome  6/04 saw neurology in [place] , notes indicate chronic non specific complaints with normal brain MRI, no evidence to support post polio syndrome.     Peripheral vascular disease  12/22/17 ultrasound carotid less than 50% stenosis bilateral  12/28/17 JOSSUE lower extremities, normal at rest, post exercise right JOSSUE 0.8, left 0.8 mild-to-moderate arterial disease bilateral  12/28/17 ultrasound vascular lower extremities, no arterial  occlusive disease from common femoral to popliteal bilateral, 50-75% stenosis proximal right external iliac, 50-75% stenosis distal left external iliac, patent right tibial arteries, left posterior tibial appears occluded distally, normal flow left anterior tibial, peroneal arteries retrograde suggesting proximal occlusion  7/11/19 ultrasound arterial lower extremities, duplex right stenosis distal external iliac greater than 50%, elevated velocities proximal portion posterior tibial and peroneal, left greater than 50% stenosis mid external iliac artery, occluded proximal portion posterior tibial artery with distal reconstitution ankle, short segment occlusion mid peroneal artery right JOSSUE 1.09, left JOSSUE 1.07  7/14/19 referral vascular surgery placed  12/10/19 declines vascular surgery follow-up asymptomatic     On ACE inhibitor  4/04 p.thallium no ischemia EF 62%  12/10 p.thallium no ischemia, EF 59%  5/9/11 rec ACE instead of atenolol patient declines  10/17/11 discontinued atenolol, rec start lotensin 10 mg; he declines  5/7/13 echo normal LV function, EF 60%, grade 1 diastolic dysfunction  5/7/13 p.thallium fixed defect mid inferior, inferoseptal, mid inferior walls suggest subdiaphragmatic uptake or prior infarction, no ischemia noted, EF 57%  8/8/15 hospital discharge on coreg 12.5 mg bid,lisinopril 5 mg, brilinta 90 mg bid, asa 81 mg,lipitor 80 mg  8/26/15 cardiology note, decrease coreg to 6.25 mg bid due to lower blood pressure continue lisinopril 5 mg    8/28/15 decrease lisinopril to 2.5 mg daily and cont coreg 6.25 mg bid  11/24/15 urine mac<2.5 on lisinopril 2.5 mg and coreg 6.25 mg bid  4/12/16 cardiology note decrease coreg to 3.125 mg bid consider discontinuation of coreg next evaluation and continue lisinopril 2.5 mg  5/20/16 urine mac <3 on lisinopril 2.5 mg  7/19/16 cardiology note clinically stable, episodes of chest tightness related to stress, blood pressure running low, discontinue carvedilol,  follow-up in a few months  11/23/16 urine mac 3.2 on lisinopril 2.5 mg  11/29/17 urine mac<4 on lisinopril 2.5 mg  6/7/18 urine mac 3.8 on lisinopril 2.5 mg  6/10/19 urine mac<0.7 on lisinopril 2.5 mg  12/5/19 urine mac<0.7 on lisinopril 2.5 mg  6/11/20 urine mac<1.2 on lisinopril 2.5 mg    mild nonproliferative diabetic retinopathy  6/4/19  eye exam, mild nonproliferative retinopathy     Interstitial cystitis  5/02 urology note [facility] nonbacterial prostatitis vs interstitial cystitis given trial of flomax     Insomnia on Xanax as needed     Hypothyroid  10/08 tsh 0.126,free t4 1.4,free t3 2.7  8/10 tsh 0.199 taking levothyroxine 0.125 6 days a week, and 2 tabs on john  9/10/10 increase to levothyroxine 0.137 mg daily, repeat tsh in 6 weeks  10/10 tsh 0.9  12/10 tsh 1.2, free t4 1.4, free t3 2.9  4/11 tsh 0.8, thyroxine 9.6, free thyroxine uptake 3.4, free t3 uptake 35%  9/11 tsh 0.5, free t4 0.9, free t3 2.4 on levothyroxine 137 mcg  12/11 tsh 0.3,free t4 1.5,free t3 2.7 on levothyroxine 137 mcg  4/16/13 tsh 0.28,free t4 1.1, free t3 2.3 (2.4-4.2); on levothyroxine 137 mcg; change to armour thyroid 60 mg qday  5/6/13 tsh 0.3, free t4 1.0, free t3 2.5 on levothyroxine 137 mcg ? Change to armour 60 mg  5/9/13  note decrease levothyroxine to 125 mcg and add cytomel 5 mg bid  6/13 tsh 0.45, free t4 1.2, free t3 2.9, on synthroid 125 mcg and cytomel 5 mcg bid per   8/14/13 tsh 0.235,free t4 1.1,free t3 2.8 on synthroid 125 mcg and cytomel 5 mcg bid per ;change to synthroid 150 mcg and stop cytomel per pt request  10/16/13 tsh 0.4, free t4 1.34, free t3 2.2 on synthroid 150 mcg  10/23/13  endo note; change to synthroid 125 mcg and cytomel 5 mcg daily  4/16/14 tsh 0.67,free t4 1.0,free t3 2.4  4/23/14  endocrine note, increase levothyroxine to 137 mcg and cont cytomel 5 mcg  10/28/14  endocrine note, tsh 0.6,free t4 1.0, free t3 2.7 on  thyroxine 137 mcg and cytomel 5 mcg; gastroparesis symptoms improve with cytomel, will reduce thryoxine to 125 mcg and use cytomel 25 mcg to cut and take more than once per day as needed, return in 4 months  7/3/15 tsh 0.04, free t4 0.6 and free t3 3.0 on levothyroxine 125 mcg and cytomel 12.5 mcg daily  7/21/15  endocrine note; on levothyroxine 125 mcg and cytomel 12.5 mcg daily, tsh 0.04, free t4 0.6 and free t3 3.0, patient does not want to change dose, no changes continue same dosing regimen; follow up 6 months  8/8/15  endocrine phone note, decrease levothyroxine to 112 mcg daily, continue cytomel 12.5 mg daily  1/13/16 tsh 0.016,free t4 0.9,free t3 2.5 on levothyroxine 112 mcg and cytomel 12.5 mg daily  1/22/16  endocrinology note on levothyroxine 112 mcg and cytomel 12.5 mg daily, patient declines to change dosing regimen  4/5/16 tsh 0.012,free t4 0.97,free t3 3.6 on levothyroxine 112 mcg and cytomel 12.5 mg daily  4/21/16  endocrinology note, no changes  10/13/16 tsh 0.14,free t4 0.87,free t3 3.1 on levothyroxine 112 mcg and cytomel 25 mg  4/17/17 tsh 0.016,free t4 0.9,free t3 4.0 on levothyroxine 112 mcg and cytomel 25 mg  4/19/17  endocrinology note no changes follow up 6 months  10/18/17  endocrine note no changes  4/12/18 tsh 0.012, free t4 0.99, free t3 4.7 on levothyroxine 112 mcg and cytomel 25 mg  4/18/18  endocrinology note, on levothyroxine 112 mcg daily and cytomel 12.5 mcg bid tsh 0.01, free 4 0.9, free t3 4.7, clinically feeling fine, no changes  10/15/18 endocrinology note repeat labs follow-up 6 months  10/14/19 endocrinology note, continue same thyroid medication dosing no changes follow-up 6 months  4/20/20 endocrinology note patient not willing to adjust his thyroid as it has been successful in controlling gastroparesis, follow-up 6 months  6/12/20 tsh 0.006 on levothyroxine 112 mcg daily and cytomel 12.5 mcg bid  per endocrinology    History shoulder pain  12/9/10 left shoulder pain,  note MRI pending  2/11 MRI left shoulder [facility] mild to moderate rotator cuff tendinopathy, adhesive capsulitis, small anterior and posterior labral tears  7/11 [facility] note  left shoulder adhesive capsulitis rec decompression  12/8/15 xray right shoulder at [facility] mild hydroxyapatite deposition adjacent to the greater tuberosity, no evidence of significant arthritis  5/11/16 MRI right shoulder thickening and increased signal with synovitis, suggestive of adhesive capsulitis, tendinopathy supraspinatus and infraspinatus, fatty atrophy paraspinous muscle  1/4/17 [facility] discharge note     History MI  8/6/15 hospital admission non-STEMI inverted T waves in aVL, ST depression in lead 1, initial troponin 3.4  8/6/15   8/6/15 cardiac catheterization left main free of disease, triple vessel disease prominently involving distal segment nature epicardial vessels and branches, 95% ostial LAD descending artery stenosis and 70% mid RCA stenosis, ejection fraction 35-40%, stenting ostial LAD with xience drug-eluting stent  8/7/15 echo normal LV function EF 65-70%, grade 1 diastolic dysfunction, moderate concentric LVH, mild MR and AR  8/8/15 hospital discharge on coreg 12.5 mg bid,lisinopril 5 mg, brilinta 90 mg bid, asa 81 mg,lipitor 80 mg  8/11/15 cardiology note; continue brilenta and asa for one year, some dyspnea, if no improvement could consider another antiplatelet therapy, will recheck BNP in 2 weeks with followup visit, ultimately will need myocardial perfusion scan but will defer for a few months  8/26/15 cardiology note, decrease coreg to 6.25 mg bid due to lower blood pressure,continue lisinopril 5 mg, asa, lipitor,start effient 10 mg for one year due to brilinta causing shortness of breath, followup 2 months  10/14/15 cardiology note no chnges, repeat myocardial perfusion scan 3 months  11/30/15 cardiac rehab  program  1/13/15 cardiology note nitroglycerin when necessary follow-up 3 months  1/12/16 persantine thallium small basal anterior inferior infarct with small ame-infarct ischemia, moderately sized moderate severity inferior, inferior lateral infarct with reversible ischemia  4/12/16 cardiology note decrease coreg to 3.125 mg bid consider discontinuation of coreg next evaluation and continue lisinopril 2.5 mg  7/19/16 cardiology note clinically stable, episodes of chest tightness related to stress, blood pressure running low, discontinue carvedilol, follow-up in a few months  11/3/16 cardiology note, isosorbide mononitrate with heavy exertion, follow-up in a few months to determine if further evaluation is necessary, could consider repeat perfusion study or dobutamine stress echo  1/5/17 cardiology note stable CAD, follow-up 6 months  7/12/17 cardiology note, likely stable discussed smoking cessation, patient declines to quit smoking, follow-up one year  12/22/17 ultrasound carotid less than 50% stenosis bilateral  7/23/18 cardiology note asymptomatic, increased stress however, continues to smoke, follow-up 1 year continue atorvastatin plus erythromycin  7/15/19 cardiology note stable continues to smoke advised to quit follow-up 1 year     History of BPH  10/04 cystoscopy and green light photovaporization of prostate in [place], [place]     Gastroparesis  4/04 gastric emptying study severe gatroparesis done in [place], no hx of DM or MS  5/04 CT thorax in [place] negative  5/04 MRI brain in [place] no evid of MS  On Emycin  4/16/13 declined gastric stimulator  12/5/17 remains on erythromycin     Dyslipidemia  5/13 chol 158,trig 204,hdl 36,ldl 81  8/8/15 chol 97,trig 117,hdl 26,ldl 48 started on lipitor 80 mg on hospital discharge  8/21/15 chol 76,trig 85,hdl 24,ldl 35 on lipitor 80 mg  10/7/15 chol 67,trig 77,hdl 22,ldl 30 on lipitor 80 mg per cardiology  4/5/16 chol 64,trig 43,hdl 24,ldl 31 on lipitor 80 mg per cardiology  11/23/16  chol 83,trig 73,hdl 27,ldl 41 on lipitor 80 mg per cardiology  5/24/17 chol 85,trig 81,hdl 28,ldl 41 on lipitor 80 mg per cardiology  10/10/17 pharmacy known interaction with lipitor and erythromycin base, consider changing to crestor, offer made to patient to change to crestor but he would like to discuss with his cardiologist first  11/29/17 chol 72,trig 47,hdl 24,ldl 39 on lipitor 80 mg and erythromycin base, previously recommended changing to crestor because of potential interaction, patient would like to discuss with cardiology first  12/5/17 declines to change from lipitor understanding potential interaction with erythromycin  6/7/18 chol 78,trig 68,hdl 25,ldl 39,cpk 69 on lipitor 80 mg  7/23/18 cardiology note asymptomatic, increased stress however, continues to smoke, follow-up 1 year continue atorvastatin plus erythromycin  12/3/18 chol 81,trig 85,hdl 26,ldl 38 on lipitor 80 mg  6/10/19 chol 64,trig 70,hdl 20,ldl 30 on lipitor 80 mg  6/10/20 chol 81,trig 74,hdl 25,ldl 41 on lipitor 80 mg    Diabetes  11/08 A1c 6.3%  8/10 A1c 7.9%  10/10 A1c 7.7%  12/10 A1c 8.0%  1/11 add metformin 500 mg bid  2/11 A1c 7.9%  4/11 A1c 8.2% increase metformin to 1000 mg bid  10/11 A1c 6.5 %  12/11 A1c 6.1% on metformin 1000 mg bid  4/16/13 A1c 6.2% on metformin 1000 mg bid; declines to check blood sugars at home; bs 194 non fasting; declines ACE  8/14/13 A1c 6.5% on metformin 1000 mg bid  12/9/13  eye exam grade 1 diabetic background retinopathy  4/16/14 A1c 7.0% per  on metformin 1000 mg bid  7/3/15 A1c 8.3% on metformin 1000 mg bid per endocrine   7/31/15 start lantus 5 units and continue metformin 1000 mg bid, declines ACE,statin,asa  8/8/15 hospital discharge on coreg 12.5 mg bid,lisinopril 5 mg, brilinta 90 mg bid, asa 81 mg,lipitor 80 mg; on lantus 8 units and metformin 1000 mg bid  8/28/15 declines nutrition consultation and diabetic education regarding diet  8/28/15 recommend increasing  lantus to 10 units and metformin 1000 mg bid  11/24/15 A1c 6.3% on lantus 10 units and metformin 1000 mg bid  11/30/15 on lantus 12 units and metformin 1000 mg bid  5/20/16 A1c 6.3% on lantus 12 units and metformin 1000 mg bid  11/23/16 A1c 6.1% on lantus 12 units and metformin 1000 mg bid  1/9/17  eye exam  5/24/17 A1c 6.3% on lantus 12 units and metformin 1000 mg bid   5/24/17 urine mac 2.5 on lisinopril 2.5 mg  11/29/17 A1c 6.1% on lantus 12 units and metformin 1000 mg bid   6/7/18 A1c 5.9% on lantus 12 units and metformin 1000 mg bid   12/3/18 A1c 6.2% and urine mac< 3on lantus 12 units and metformin 1000 mg bid   6/4/19  eye exam, mild nonproliferative retinopathy  6/10/19 A1c 6.4% on lantus 12 units and metformin 1000 mg bid   12/5/19 A1c 6.2% on lantus 12 units and metformin 1000 mg bid   6/11/20 A1c 6.5% on lantus 12 units and metformin 1000 mg bid                 Health Maintenance Summary                HEPATITIS C SCREENING Overdue 1950     IMM ZOSTER VACCINES Overdue 3/8/2016      Done 1/12/2016 Imm Admin: Zoster Vaccine Live (ZVL) (Zostavax)    Annual Wellness Visit Overdue 5/31/2018      Done 5/30/2017 Visit Dx: Medicare annual wellness visit, subsequent    IMM DTaP/Tdap/Td Vaccine Postponed 12/10/2020 Originally 2/20/1969. Patient Refused    RETINAL SCREENING Next Due 12/10/2020      Done 12/10/2019 6/4/19      Patient has more history with this topic...    A1C SCREENING Next Due 12/10/2020      Done 6/10/2020 HEMOGLOBIN A1C     Patient has more history with this topic...    DIABETES MONOFILAMENT / LE EXAM Next Due 12/10/2020      Done 12/10/2019 AMB DIABETIC MONOFILAMENT LOWER EXTREMITY EXAM     Patient has more history with this topic...    FASTING LIPID PROFILE Next Due 6/10/2021      Done 6/10/2020 LIPID PROFILE     Patient has more history with this topic...    URINE ACR / MICROALBUMIN Next Due 6/10/2021      Done 6/10/2020 MICROALBUMIN CREAT RATIO URINE      "Patient has more history with this topic...    SERUM CREATININE Next Due 6/10/2021      Done 6/10/2020 COMP METABOLIC PANEL     Patient has more history with this topic...    COLONOSCOPY Next Due 5/31/2026      Patient Declined 5/31/2016      Patient has more history with this topic...          Patient Care Team:  Charbel Jaffe M.D. as PCP - General  Alex Stein M.D. as Consulting Physician (Endocrinology)      ROS       Objective:     /70 (BP Location: Left arm, Patient Position: Sitting, BP Cuff Size: Adult)   Pulse 97   Temp 36.8 °C (98.3 °F)   Ht 1.702 m (5' 7\")   Wt 72.1 kg (159 lb)   SpO2 97%   BMI 24.90 kg/m²      Physical Exam  Vitals signs and nursing note reviewed.   Constitutional:       Appearance: Normal appearance.   HENT:      Head: Normocephalic and atraumatic.   Eyes:      Conjunctiva/sclera: Conjunctivae normal.   Neck:      Musculoskeletal: Neck supple.   Cardiovascular:      Rate and Rhythm: Normal rate and regular rhythm.      Heart sounds: Normal heart sounds. No murmur.   Pulmonary:      Effort: Pulmonary effort is normal. No respiratory distress.      Breath sounds: Normal breath sounds.   Abdominal:      General: There is no distension.   Musculoskeletal:         General: No swelling.   Skin:     General: Skin is warm.   Neurological:      General: No focal deficit present.      Mental Status: He is alert.   Psychiatric:         Mood and Affect: Mood normal.         Behavior: Behavior normal.                 Assessment/Plan:     Assessment  #1 new onset chest pain this morning, currently pain-free on exam, had chest pain described as dull ache which resolved only since he has been in the exam room, vital signs stable, EKG shows no acute changes, patient with history MI in 2015, cardiac catheterization and stent LAD placed at that time followed by cardiology most recent visit July of last year, last Persantine thallium in 2016    #2 diabetes mellitus stable, Lantus 12 " units, and metformin thousand milligrams twice daily, A1c on June 11 6.5%, on low-dose ACE inhibitor with no history of hypertension    #3 dyslipidemia on Lipitor 80 mg, most recent cholesterol panel showed 10 cholesterol 81, HDL 25, LDL 41 on Lipitor 80 mg    #4 hypothyroid on replacement followed by endocrinology, on levothyroxine 112 mcg and Cytomel 12.5 mcg twice daily, most recent TSH 0.006, he has been advised numerous times by myself and endocrinology to decrease his thyroid dose since hypothyroidism may increase his risk for palpitations and cardiac arrhythmia, patient has always declined stating that his current thyroid level helps with his gastroparesis    #5 peripheral vascular disease, asymptomatic, patient declined vascular surgery evaluation    Plan  #1 given new onset chest pain with cardiac history, despite EKG being normal, recommend patient go to the ER for further evaluation, patient understands and agrees    #2 spoke to emergency room physician at [facility], about patient coming in for evaluation    #3 I took the patient via wheelchair myself to the emergency room to check in with the nurse    #4 follow-up after disposition arranged    #5 follow-up cardiology    #6 continue check blood sugars daily and record    #7 again advised patient that he needs to decrease his thyroid dose as recommended by endocrinology, and he declines understanding risk of arrhythmia and cardiac disease with hyperthyroid state    #8 smoking cessation recommended

## 2020-06-18 NOTE — ED NOTES
Med Rec Updated and Complete per Pt at bedside  Allergies Reviewed    Pt is on a maintenance dose of Erythromycin Base 250mg ONCE daily. Pt reports he is supposed to be taking this medication twice daily but reduces it to once daily due to the cost of the medication.

## 2020-06-18 NOTE — ED TRIAGE NOTES
Pt sent by PCP. Pt has CHF and hx of MI with a stent. Pt states on Monday had an episode of heart palpitations with increase in BP. Pt thought he was dehydrated so drank a lot of water and states the palpitations went away. States has loss of appetite and fatigue. States this morning had an episode of high blood pressure with CP and jaw pain and SOB.

## 2020-06-18 NOTE — ASSESSMENT & PLAN NOTE
Recent lipid panel well controlled  Continue atorvastatin 80   Urology Progress Note





- Objective


Lab Studies: Reviewed (for now - no prostate biopsy  we need to discuss voiding 

issues and eloquis)


Lab Results Last 24 Hours: 


 Laboratory Results - last 24 hr











  02/19/18 02/19/18





  07:00 07:00


 


WBC  4.4 L 


 


RBC  4.70 


 


Hgb  13.3 L 


 


Hct  40.4 L 


 


MCV  86.0 


 


MCH  28.3 


 


MCHC  32.9 


 


RDW  15.4 H 


 


Plt Count  158 


 


MPV  9.9 


 


Gran %  52.2 


 


Lymph % (Auto)  26.4 


 


Mono % (Auto)  18.2 H 


 


Eos % (Auto)  2.3 


 


Baso % (Auto)  0.9 


 


Gran #  2.32 


 


Lymph # (Auto)  1.2 


 


Mono # (Auto)  0.8 H 


 


Eos # (Auto)  0.1 


 


Baso # (Auto)  0.04 


 


PT   16.7 H


 


INR   1.44 H


 


APTT   43.5 H











Intake & Output: 


 Intake & Output











 02/18/18 02/19/18 02/19/18





 18:59 06:59 18:59


 


Intake Total 780  


 


Output Total 1500  


 


Balance -720  


 


Intake:   


 


  Oral 780  


 


Output:   


 


  Urine 1500  


 


    Urethral (Fraire) 1500  


 


Other:   


 


  # Bowel Movements 1  











Vital Signs: 


 Vital Signs - 24 hr











  02/18/18 02/18/18 02/18/18





  11:01 11:05 12:00


 


Temperature   98 F


 


Pulse Rate 85 85 88


 


Respiratory   20





Rate   


 


Blood Pressure 113/81 113/81 135/88


 


O2 Sat by Pulse   





Oximetry   














  02/18/18 02/18/18 02/19/18





  17:25 17:48 06:00


 


Temperature 97.7 F  97.9 F


 


Pulse Rate 62 74 88


 


Respiratory 18  18





Rate   


 


Blood Pressure 104/79 136/76 115/88


 


O2 Sat by Pulse 100  100





Oximetry

## 2020-06-18 NOTE — ASSESSMENT & PLAN NOTE
Due to longstanding diabetes.  A1c recently, 1 week ago was 6.5.  Continue Lantus 12 units daily  Add sliding scale  Add PPI due to intermittent reflux symptoms

## 2020-06-18 NOTE — ASSESSMENT & PLAN NOTE
He often gets chest and epigastric pain from gastroparesis, but this time he had associated palpitations.  Trop was mildly elevated at 27 but has trended up to 229  Cardiology - Dr Wilcox to consult.  EKG no ischemia  GI cocktail  He does NOT want reglan  Trial of PPI or pepcid  ASA, statin  Recent A1C and FLP reviewed and well controlled

## 2020-06-18 NOTE — ASSESSMENT & PLAN NOTE
He had a stent placed in 2016.  Lately he has been having decreased exercise tolerance and increased shortness of breath with minimal exertion.  Cardiology consult

## 2020-06-18 NOTE — H&P
Hospital Medicine History & Physical Note    Date of Service  6/18/2020    Primary Care Physician  Charbel Jaffe M.D.    Code Status  Full Code    Chief Complaint  Chief Complaint   Patient presents with   • Chest Pain   • Shortness of Breath       History of Presenting Illness  70 y.o. male with a history of tobacco abuse, hyperlipidemia, coronary artery disease status post stent, type 2 diabetes complicated by gastroparesis who presented 6/18/2020 with chest and epigastric pain.    Patient states that over the last few weeks he has felt more fatigued than usual with minimal exertion.  He feels slightly short of breath when he exerts himself, especially when he bends over.  He has not had any swelling of the legs, cough or dizziness.  For the last 3 days he has been having palpitations.  Drinking water helps this and made the palpitations resolve.  This morning, he developed epigastric discomfort.  Usually, with his history of gastroparesis he does experience epigastric pain intermittently however, this was different as he was also having intermittent palpitations.  This was enough to bring him to the ER for evaluation.    Review of Systems  Review of Systems   Constitutional: Negative for chills, fever and malaise/fatigue.   HENT: Negative for sore throat.    Respiratory: Positive for shortness of breath. Negative for cough (Baseline) and wheezing.    Cardiovascular: Positive for chest pain and palpitations. Negative for leg swelling and PND.   Gastrointestinal: Negative for abdominal pain, blood in stool, diarrhea, heartburn, nausea and vomiting.   Genitourinary: Negative for dysuria and frequency.   Musculoskeletal: Negative for back pain and myalgias.   Neurological: Negative for dizziness, focal weakness, weakness and headaches.   Psychiatric/Behavioral: Negative for depression and hallucinations.   All other systems reviewed and are negative.      Past Medical History   has a past medical history of  Coronary artery disease due to calcified coronary lesion (8/11/2015), Diabetes mellitus type II, Gastroparesis, Hyperlipidemia, Hypothyroid, Interstitial cystitis, and Sciatic neuritis.    Surgical History   has a past surgical history that includes appendectomy; other; pr transurethral elec-surg prostatectom; and zzz cardiac cath (8/6/15).     Family History  family history includes Heart Attack (age of onset: 55) in his mother; Heart Disease in his mother.     Social History   reports that he has been smoking cigarettes. He has a 30.00 pack-year smoking history. He has never used smokeless tobacco. He reports that he does not drink alcohol or use drugs.    Allergies  Allergies   Allergen Reactions   • Pcn [Penicillins] Anaphylaxis   • Other Environmental      Hayfever   • Tetanus Toxoid        Medications  Prior to Admission Medications   Prescriptions Last Dose Informant Patient Reported? Taking?   Cholecalciferol (VITAMIN D3 PO) 6/17/2020 at 1200 Patient Yes No   Sig: Take 1 Dose by mouth every day. Unknown OTC Strength.   Riboflavin (VITAMIN B-2 PO) 6/17/2020 at 1200 Patient Yes No   Sig: Take 1 Dose by mouth every day. Unknown OTC Strength.   atorvastatin (LIPITOR) 80 MG tablet 6/17/2020 at PM Patient Yes Yes   Sig: Take 80 mg by mouth every evening.   erythromycin base (UMM-TAB) 250 MG Tab 6/17/2020 at 1200 Patient Yes Yes   Sig: Take 250 mg by mouth every day. Maintenance Medication.   insulin glargine (LANTUS SOLOSTAR) 100 UNIT/ML Solution Pen-injector injection 6/17/2020 at PM Patient No No   Sig: Inject 12 Units as instructed every evening.   levothyroxine (SYNTHROID) 112 MCG Tab 6/17/2020 at PM Patient Yes Yes   Sig: Take 112 mcg by mouth Every morning on an empty stomach.   liothyronine (CYTOMEL) 25 MCG Tab 6/18/2020 at AM Patient Yes Yes   Sig: Take 12.5 mcg by mouth 2 Times a Day.   lisinopril (PRINIVIL) 2.5 MG Tab 6/17/2020 at 1200 Patient Yes Yes   Sig: Take 2.5 mg by mouth every day.   metformin  (GLUCOPHAGE) 1000 MG tablet 6/17/2020 at PM Patient Yes Yes   Sig: Take 1,000 mg by mouth 2 times a day, with meals.      Facility-Administered Medications: None       Physical Exam  Temp:  [36.5 °C (97.7 °F)] 36.5 °C (97.7 °F)  Pulse:  [73-87] 75  Resp:  [18-19] 19  BP: (106-127)/(62-72) 112/69  SpO2:  [97 %-98 %] 97 %    Physical Exam  Constitutional:       Appearance: Normal appearance.   HENT:      Head: Normocephalic and atraumatic.      Nose: Nose normal.      Mouth/Throat:      Mouth: Mucous membranes are moist.      Comments: White coating over tongue, mild  Eyes:      Extraocular Movements: Extraocular movements intact.      Pupils: Pupils are equal, round, and reactive to light.   Neck:      Musculoskeletal: Normal range of motion and neck supple.   Cardiovascular:      Rate and Rhythm: Normal rate and regular rhythm.      Pulses: Normal pulses.      Heart sounds: No murmur.   Pulmonary:      Effort: Pulmonary effort is normal. No respiratory distress.      Breath sounds: No stridor. Rhonchi (Cleared with cough) present.   Abdominal:      General: Abdomen is flat. Bowel sounds are normal. There is no distension.      Palpations: Abdomen is soft.      Tenderness: There is no abdominal tenderness.   Musculoskeletal:         General: No swelling, tenderness or deformity.   Skin:     General: Skin is warm and dry.      Coloration: Skin is not pale.      Comments: Very long nails   Neurological:      General: No focal deficit present.      Mental Status: He is alert and oriented to person, place, and time. Mental status is at baseline.   Psychiatric:         Mood and Affect: Mood normal.         Behavior: Behavior normal.         Thought Content: Thought content normal.         Laboratory:  Recent Labs     06/18/20  1109   WBC 6.5   RBC 5.62   HEMOGLOBIN 16.5   HEMATOCRIT 50.1   MCV 89.1   MCH 29.4   MCHC 32.9*   RDW 43.7   PLATELETCT 246   MPV 9.9     Recent Labs     06/18/20  1109   SODIUM 139   POTASSIUM 4.1    CHLORIDE 108   CO2 18*   GLUCOSE 114*   BUN 13   CREATININE 0.93   CALCIUM 9.3     Recent Labs     06/18/20  1109   ALTSGPT 27   ASTSGOT 24   ALKPHOSPHAT 105*   TBILIRUBIN 0.6   GLUCOSE 114*         No results for input(s): NTPROBNP in the last 72 hours.      Recent Labs     06/18/20  1109   TROPONINT 27*       Imaging:  DX-CHEST-PORTABLE (1 VIEW)   Final Result         1. No acute cardiopulmonary abnormalities are identified.      NM-CARDIAC STRESS TEST    (Results Pending)         Assessment/Plan:    * Chest pain  Assessment & Plan  He often gets chest and epigastric pain from gastroparesis, but this time he had associated palpitations.  Trop mildly elevated at 27  EKG no ischemia  Trend trop  Tele  Stress in AM  GI cocktail  He does NOT want reglan  Trial of PPI or pepcid  ASA, statin  Recent A1C and FLP reviewed and well controlled    History of MI (myocardial infarction)- (present on admission)  Assessment & Plan  He had a stent placed in 2016.  Lately he has been having decreased exercise tolerance and increased shortness of breath with minimal exertion.  Stress test in a.m.    Tobacco abuse- (present on admission)  Assessment & Plan  -counseled for 4 minutes on tobacco cessation [id number]  NO patch due to stress test in AM    Dyslipidemia- (present on admission)  Assessment & Plan  Recent lipid panel well controlled  Continue atorvastatin 80    Diabetes mellitus type 2, controlled (HCC)- (present on admission)  Assessment & Plan  Continue Lantus 12 units daily  Hold metformin while in the hospital  Sliding scale  Recent A1c 6.5    Hypothyroid- (present on admission)  Assessment & Plan  Continue Synthroid  He had a recent TSH 1 week ago    History of BPH- (present on admission)  Assessment & Plan  .    Gastroparesis- (present on admission)  Assessment & Plan  Due to longstanding diabetes.  A1c recently, 1 week ago was 6.5.  Continue Lantus 12 units daily  Add sliding scale  Add PPI due to intermittent reflux  symptoms

## 2020-06-18 NOTE — ASSESSMENT & PLAN NOTE
Continue Lantus 12 units daily  Hold metformin while in the hospital  Sliding scale  Recent A1c 6.5

## 2020-06-18 NOTE — ASSESSMENT & PLAN NOTE
-on admission he was counseled for 4 minutes on tobacco cessation [id number]  Nicotine patch ordered as no stress test planned.

## 2020-06-18 NOTE — ED PROVIDER NOTES
ED Provider Note    CHIEF COMPLAINT  Chief Complaint   Patient presents with   • Chest Pain   • Shortness of Breath       HPI  Orlando Arreguin is a 70 y.o. male who presents to the emergency department with a chief complaint of chest discomfort.  He is been having some palpitations and some chest discomfort associated with shortness of breath.  He says his shortness of breath is out of proportion to his physical exertion.  He gets short of breath simply was trying to change his fabricio litter.  He describes it as a chest discomfort.  He has some pain in his jaw and correlates this to when his blood pressure has been elevated.  Patient does have a history of coronary artery disease.  He is previously had a stent placed.  The patient saw his primary care doctor this morning and given his history and symptoms he referred him to the emergency department for evaluation.    REVIEW OF SYSTEMS  See HPI for further details. All other systems are negative.     PAST MEDICAL HISTORY  Past Medical History:   Diagnosis Date   • Coronary artery disease due to calcified coronary lesion 8/11/2015   • Diabetes mellitus type II    • Gastroparesis    • Hyperlipidemia    • Hypothyroid    • Interstitial cystitis    • Sciatic neuritis        FAMILY HISTORY  Family History   Problem Relation Age of Onset   • Heart Disease Mother    • Heart Attack Mother 55        heart attack       SOCIAL HISTORY  Social History     Socioeconomic History   • Marital status: Single     Spouse name: Not on file   • Number of children: Not on file   • Years of education: Not on file   • Highest education level: Not on file   Occupational History   • Not on file   Social Needs   • Financial resource strain: Not on file   • Food insecurity     Worry: Not on file     Inability: Not on file   • Transportation needs     Medical: Not on file     Non-medical: Not on file   Tobacco Use   • Smoking status: Current Every Day Smoker     Packs/day: 1.00     Years: 30.00      Pack years: 30.00     Types: Cigarettes   • Smokeless tobacco: Never Used   Substance and Sexual Activity   • Alcohol use: No     Alcohol/week: 0.0 oz   • Drug use: No   • Sexual activity: Not on file   Lifestyle   • Physical activity     Days per week: Not on file     Minutes per session: Not on file   • Stress: Not on file   Relationships   • Social connections     Talks on phone: Not on file     Gets together: Not on file     Attends Sikhism service: Not on file     Active member of club or organization: Not on file     Attends meetings of clubs or organizations: Not on file     Relationship status: Not on file   • Intimate partner violence     Fear of current or ex partner: Not on file     Emotionally abused: Not on file     Physically abused: Not on file     Forced sexual activity: Not on file   Other Topics Concern   • Not on file   Social History Narrative   • Not on file       SURGICAL HISTORY  Past Surgical History:   Procedure Laterality Date   • ZZZ CARDIAC CATH  8/6/15    YESIKA to LAD.  Has RCA 70% occulsion.   • APPENDECTOMY     • OTHER      L shoulder surgery (arthroscopic, [place], 2011)   • PB TRANSURETHRAL ELEC-SURG PBOSTATECTOM         CURRENT MEDICATIONS  Home Medications     Reviewed by Kathie Freedman (Pharmacy Tech) on 06/18/20 at 1111  Med List Status: Complete   Medication Last Dose Status   atorvastatin (LIPITOR) 80 MG tablet 6/17/2020 Active   Cholecalciferol (VITAMIN D3 PO) 6/17/2020 Active   erythromycin base (UMM-TAB) 250 MG Tab 6/17/2020 Active   insulin glargine (LANTUS SOLOSTAR) 100 UNIT/ML Solution Pen-injector injection 6/17/2020 Active   levothyroxine (SYNTHROID) 112 MCG Tab 6/17/2020 Active   liothyronine (CYTOMEL) 25 MCG Tab 6/18/2020 Active   lisinopril (PRINIVIL) 2.5 MG Tab 6/17/2020 Active   metformin (GLUCOPHAGE) 1000 MG tablet 6/17/2020 Active   Riboflavin (VITAMIN B-2 PO) 6/17/2020 Active                ALLERGIES  Allergies   Allergen Reactions   • Pcn [Penicillins]  "Anaphylaxis   • Other Environmental      Hayfever   • Tetanus Toxoid        PHYSICAL EXAM  VITAL SIGNS: /72   Pulse 87   Temp 36.5 °C (97.7 °F)   Resp 18   Ht 1.702 m (5' 7\")   Wt 72.1 kg (158 lb 15.2 oz)   SpO2 98%   BMI 24.90 kg/m²    Constitutional: Well developed, Well nourished, No acute distress, Non-toxic appearance.   HENT: Normocephalic, Atraumatic, Bilateral external ears normal, Oropharynx moist, No oral exudates, Nose normal.   Eyes: PERRLA, EOMI, Conjunctiva normal, No discharge.   Neck: Normal range of motion, No tenderness, Supple, No stridor.   Cardiovascular: Regular rate and rhythm, no audible murmurs.  Thorax & Lungs: There to auscultation bilaterally.  No rales, rhonchi, or wheezing.  Abdomen: Bowel sounds normal, Soft, No tenderness, No masses, No pulsatile masses.   Skin: Warm, Dry, No erythema, No rash.   Back: No tenderness, No CVA tenderness.   Extremities: Intact distal pulses, No tenderness, No cyanosis, No clubbing.  No edema.  No calf tenderness.  Neurologic: Alert & oriented x 3, Normal motor function, Normal sensory function, No focal deficits noted.     EKG  Results for orders placed or performed during the hospital encounter of 06/18/20   CBC with Differential   Result Value Ref Range    WBC 6.5 4.8 - 10.8 K/uL    RBC 5.62 4.70 - 6.10 M/uL    Hemoglobin 16.5 14.0 - 18.0 g/dL    Hematocrit 50.1 42.0 - 52.0 %    MCV 89.1 81.4 - 97.8 fL    MCH 29.4 27.0 - 33.0 pg    MCHC 32.9 (L) 33.7 - 35.3 g/dL    RDW 43.7 35.9 - 50.0 fL    Platelet Count 246 164 - 446 K/uL    MPV 9.9 9.0 - 12.9 fL    Neutrophils-Polys 76.10 (H) 44.00 - 72.00 %    Lymphocytes 16.40 (L) 22.00 - 41.00 %    Monocytes 6.30 0.00 - 13.40 %    Eosinophils 0.30 0.00 - 6.90 %    Basophils 0.60 0.00 - 1.80 %    Immature Granulocytes 0.30 0.00 - 0.90 %    Nucleated RBC 0.00 /100 WBC    Neutrophils (Absolute) 4.95 1.82 - 7.42 K/uL    Lymphs (Absolute) 1.07 1.00 - 4.80 K/uL    Monos (Absolute) 0.41 0.00 - 0.85 K/uL    " Eos (Absolute) 0.02 0.00 - 0.51 K/uL    Baso (Absolute) 0.04 0.00 - 0.12 K/uL    Immature Granulocytes (abs) 0.02 0.00 - 0.11 K/uL    NRBC (Absolute) 0.00 K/uL   Complete Metabolic Panel (CMP)   Result Value Ref Range    Sodium 139 135 - 145 mmol/L    Potassium 4.1 3.6 - 5.5 mmol/L    Chloride 108 96 - 112 mmol/L    Co2 18 (L) 20 - 33 mmol/L    Anion Gap 13.0 7.0 - 16.0    Glucose 114 (H) 65 - 99 mg/dL    Bun 13 8 - 22 mg/dL    Creatinine 0.93 0.50 - 1.40 mg/dL    Calcium 9.3 8.4 - 10.2 mg/dL    AST(SGOT) 24 12 - 45 U/L    ALT(SGPT) 27 2 - 50 U/L    Alkaline Phosphatase 105 (H) 30 - 99 U/L    Total Bilirubin 0.6 0.1 - 1.5 mg/dL    Albumin 4.4 3.2 - 4.9 g/dL    Total Protein 7.3 6.0 - 8.2 g/dL    Globulin 2.9 1.9 - 3.5 g/dL    A-G Ratio 1.5 g/dL   Troponin   Result Value Ref Range    Troponin T 27 (H) 6 - 19 ng/L   ESTIMATED GFR   Result Value Ref Range    GFR If African American >60 >60 mL/min/1.73 m 2    GFR If Non African American >60 >60 mL/min/1.73 m 2   EKG   Result Value Ref Range    Report       [facility] Emergency Dept.    Test Date:  2020  Pt Name:    ZOHAIB BHATT        Department: [facility]  MRN:        [id number]                      [facility]  Gender:     Male                         Technician: [id number]  :        1950                   Requested By:ER TRIAGE PROTOCOL  Order #:    [id number]                    Reading MD: MANINDER GUILLORY MD    Measurements  Intervals                                Axis  Rate:       83                           P:          64  TN:         160                          QRS:        -4  QRSD:       95                           T:          73  QT:         373  QTc:        439    Interpretive Statements  Sinus rhythm  Low voltage, extremity leads  Compared to ECG 2015 10:44:28  T-wave abnormality no longer present  Electronically Signed On 2020 11:54:24 PDT by MANINDER GUILLORY MD            RADIOLOGY/PROCEDURES  DX-CHEST-PORTABLE (1 VIEW)   Final Result         1. No acute cardiopulmonary abnormalities are identified.        Results for orders placed or performed during the hospital encounter of 06/18/20   CBC with Differential   Result Value Ref Range    WBC 6.5 4.8 - 10.8 K/uL    RBC 5.62 4.70 - 6.10 M/uL    Hemoglobin 16.5 14.0 - 18.0 g/dL    Hematocrit 50.1 42.0 - 52.0 %    MCV 89.1 81.4 - 97.8 fL    MCH 29.4 27.0 - 33.0 pg    MCHC 32.9 (L) 33.7 - 35.3 g/dL    RDW 43.7 35.9 - 50.0 fL    Platelet Count 246 164 - 446 K/uL    MPV 9.9 9.0 - 12.9 fL    Neutrophils-Polys 76.10 (H) 44.00 - 72.00 %    Lymphocytes 16.40 (L) 22.00 - 41.00 %    Monocytes 6.30 0.00 - 13.40 %    Eosinophils 0.30 0.00 - 6.90 %    Basophils 0.60 0.00 - 1.80 %    Immature Granulocytes 0.30 0.00 - 0.90 %    Nucleated RBC 0.00 /100 WBC    Neutrophils (Absolute) 4.95 1.82 - 7.42 K/uL    Lymphs (Absolute) 1.07 1.00 - 4.80 K/uL    Monos (Absolute) 0.41 0.00 - 0.85 K/uL    Eos (Absolute) 0.02 0.00 - 0.51 K/uL    Baso (Absolute) 0.04 0.00 - 0.12 K/uL    Immature Granulocytes (abs) 0.02 0.00 - 0.11 K/uL    NRBC (Absolute) 0.00 K/uL   Complete Metabolic Panel (CMP)   Result Value Ref Range    Sodium 139 135 - 145 mmol/L    Potassium 4.1 3.6 - 5.5 mmol/L    Chloride 108 96 - 112 mmol/L    Co2 18 (L) 20 - 33 mmol/L    Anion Gap 13.0 7.0 - 16.0    Glucose 114 (H) 65 - 99 mg/dL    Bun 13 8 - 22 mg/dL    Creatinine 0.93 0.50 - 1.40 mg/dL    Calcium 9.3 8.4 - 10.2 mg/dL    AST(SGOT) 24 12 - 45 U/L    ALT(SGPT) 27 2 - 50 U/L    Alkaline Phosphatase 105 (H) 30 - 99 U/L    Total Bilirubin 0.6 0.1 - 1.5 mg/dL    Albumin 4.4 3.2 - 4.9 g/dL    Total Protein 7.3 6.0 - 8.2 g/dL    Globulin 2.9 1.9 - 3.5 g/dL    A-G Ratio 1.5 g/dL   Troponin   Result Value Ref Range    Troponin T 27 (H) 6 - 19 ng/L   ESTIMATED GFR   Result Value Ref Range    GFR If African American >60 >60 mL/min/1.73 m 2    GFR If Non African American >60 >60 mL/min/1.73 m 2    EKG   Result Value Ref Range    Report       [facility] Emergency Dept.    Test Date:  2020  Pt Name:    ZOHAIB BHATT        Department: [facility]  MRN:        [id number]                      Room:       -ROOM 10  Gender:     Male                         Technician: [id number]  :        1950                   Requested By:ER TRIAGE PROTOCOL  Order #:    [id number]                    Reading MD: PATRICIO GUILLORY MD    Measurements  Intervals                                Axis  Rate:       83                           P:          64  AR:         160                          QRS:        -4  QRSD:       95                           T:          73  QT:         373  QTc:        439    Interpretive Statements  Sinus rhythm  Low voltage, extremity leads  Compared to ECG 2015 10:44:28  T-wave abnormality no longer present  Electronically Signed On 2020 11:54:24 PDT by PATRICIO GUILLORY MD           COURSE & MEDICAL DECISION MAKING  Pertinent Labs & Imaging studies reviewed. (See chart for details)    Patient presents today with a history of chest discomfort associated with shortness of breath on exertion.  EKG does not demonstrate any evidence of acute ischemia at this time.  Fairly nonspecific changes.  Actually improved from prior.  An IV is established.  Laboratory studies are obtained.  CBC and metabolic panel are unrevealing.  Troponin is mildly elevated.  Given the patient's history and elevated troponin he will be admitted to the hospital for further evaluation.  I spoke with the on-call hospitalist Dr. Huang for admission.      FINAL IMPRESSION  1. Elevated troponin    2. Acute chest pain    3. H/O heart artery stent    4. Coronary artery disease involving native coronary artery of native heart with angina pectoris (HCC)              Electronically signed by: Patricio Guillory M.D., 2020 12:00 PM

## 2020-06-19 ENCOUNTER — APPOINTMENT (OUTPATIENT)
Dept: CARDIOLOGY | Facility: MEDICAL CENTER | Age: 70
DRG: 247 | End: 2020-06-19
Attending: NURSE PRACTITIONER
Payer: MEDICARE

## 2020-06-19 ENCOUNTER — HOSPITAL ENCOUNTER (INPATIENT)
Facility: MEDICAL CENTER | Age: 70
LOS: 2 days | DRG: 247 | End: 2020-06-21
Attending: INTERNAL MEDICINE | Admitting: INTERNAL MEDICINE
Payer: MEDICARE

## 2020-06-19 ENCOUNTER — APPOINTMENT (OUTPATIENT)
Dept: RADIOLOGY | Facility: MEDICAL CENTER | Age: 70
End: 2020-06-19
Attending: INTERNAL MEDICINE
Payer: MEDICARE

## 2020-06-19 VITALS
SYSTOLIC BLOOD PRESSURE: 116 MMHG | BODY MASS INDEX: 24.95 KG/M2 | HEART RATE: 82 BPM | HEIGHT: 67 IN | OXYGEN SATURATION: 92 % | DIASTOLIC BLOOD PRESSURE: 58 MMHG | RESPIRATION RATE: 16 BRPM | WEIGHT: 158.95 LBS | TEMPERATURE: 98.5 F

## 2020-06-19 DIAGNOSIS — I21.4 NSTEMI (NON-ST ELEVATED MYOCARDIAL INFARCTION) (HCC): ICD-10-CM

## 2020-06-19 LAB
ALBUMIN SERPL BCP-MCNC: 3.6 G/DL (ref 3.2–4.9)
APTT PPP: 29.7 SEC (ref 24.7–36)
BASOPHILS # BLD AUTO: 0.5 % (ref 0–1.8)
BASOPHILS # BLD: 0.04 K/UL (ref 0–0.12)
BUN SERPL-MCNC: 17 MG/DL (ref 8–22)
CALCIUM SERPL-MCNC: 8.7 MG/DL (ref 8.4–10.2)
CHLORIDE SERPL-SCNC: 108 MMOL/L (ref 96–112)
CO2 SERPL-SCNC: 20 MMOL/L (ref 20–33)
CREAT SERPL-MCNC: 0.87 MG/DL (ref 0.5–1.4)
EOSINOPHIL # BLD AUTO: 0.13 K/UL (ref 0–0.51)
EOSINOPHIL NFR BLD: 1.8 % (ref 0–6.9)
ERYTHROCYTE [DISTWIDTH] IN BLOOD BY AUTOMATED COUNT: 42.8 FL (ref 35.9–50)
GLUCOSE BLD-MCNC: 135 MG/DL (ref 65–99)
GLUCOSE BLD-MCNC: 163 MG/DL (ref 65–99)
GLUCOSE BLD-MCNC: 167 MG/DL (ref 65–99)
GLUCOSE BLD-MCNC: 227 MG/DL (ref 65–99)
GLUCOSE SERPL-MCNC: 116 MG/DL (ref 65–99)
HCT VFR BLD AUTO: 43 % (ref 42–52)
HGB BLD-MCNC: 14.4 G/DL (ref 14–18)
IMM GRANULOCYTES # BLD AUTO: 0.02 K/UL (ref 0–0.11)
IMM GRANULOCYTES NFR BLD AUTO: 0.3 % (ref 0–0.9)
LYMPHOCYTES # BLD AUTO: 2.07 K/UL (ref 1–4.8)
LYMPHOCYTES NFR BLD: 28.4 % (ref 22–41)
MAGNESIUM SERPL-MCNC: 2 MG/DL (ref 1.5–2.5)
MCH RBC QN AUTO: 29.3 PG (ref 27–33)
MCHC RBC AUTO-ENTMCNC: 33.5 G/DL (ref 33.7–35.3)
MCV RBC AUTO: 87.6 FL (ref 81.4–97.8)
MONOCYTES # BLD AUTO: 0.7 K/UL (ref 0–0.85)
MONOCYTES NFR BLD AUTO: 9.6 % (ref 0–13.4)
NEUTROPHILS # BLD AUTO: 4.34 K/UL (ref 1.82–7.42)
NEUTROPHILS NFR BLD: 59.4 % (ref 44–72)
NRBC # BLD AUTO: 0 K/UL
NRBC BLD-RTO: 0 /100 WBC
PHOSPHATE SERPL-MCNC: 3.7 MG/DL (ref 2.5–4.5)
PLATELET # BLD AUTO: 205 K/UL (ref 164–446)
PMV BLD AUTO: 9.7 FL (ref 9–12.9)
POTASSIUM SERPL-SCNC: 4 MMOL/L (ref 3.6–5.5)
RBC # BLD AUTO: 4.91 M/UL (ref 4.7–6.1)
SODIUM SERPL-SCNC: 137 MMOL/L (ref 135–145)
TROPONIN T SERPL-MCNC: 172 NG/L (ref 6–19)
TROPONIN T SERPL-MCNC: 229 NG/L (ref 6–19)
WBC # BLD AUTO: 7.3 K/UL (ref 4.8–10.8)

## 2020-06-19 PROCEDURE — 700102 HCHG RX REV CODE 250 W/ 637 OVERRIDE(OP): Performed by: INTERNAL MEDICINE

## 2020-06-19 PROCEDURE — A9270 NON-COVERED ITEM OR SERVICE: HCPCS | Performed by: INTERNAL MEDICINE

## 2020-06-19 PROCEDURE — 700102 HCHG RX REV CODE 250 W/ 637 OVERRIDE(OP): Performed by: NURSE PRACTITIONER

## 2020-06-19 PROCEDURE — 85025 COMPLETE CBC W/AUTO DIFF WBC: CPT

## 2020-06-19 PROCEDURE — 82962 GLUCOSE BLOOD TEST: CPT

## 2020-06-19 PROCEDURE — 82962 GLUCOSE BLOOD TEST: CPT | Mod: 91

## 2020-06-19 PROCEDURE — 85730 THROMBOPLASTIN TIME PARTIAL: CPT

## 2020-06-19 PROCEDURE — 80069 RENAL FUNCTION PANEL: CPT

## 2020-06-19 PROCEDURE — 36415 COLL VENOUS BLD VENIPUNCTURE: CPT

## 2020-06-19 PROCEDURE — 700111 HCHG RX REV CODE 636 W/ 250 OVERRIDE (IP): Performed by: INTERNAL MEDICINE

## 2020-06-19 PROCEDURE — 84484 ASSAY OF TROPONIN QUANT: CPT

## 2020-06-19 PROCEDURE — 770020 HCHG ROOM/CARE - TELE (206)

## 2020-06-19 PROCEDURE — 99223 1ST HOSP IP/OBS HIGH 75: CPT | Mod: AI | Performed by: INTERNAL MEDICINE

## 2020-06-19 PROCEDURE — 83735 ASSAY OF MAGNESIUM: CPT

## 2020-06-19 PROCEDURE — G0378 HOSPITAL OBSERVATION PER HR: HCPCS

## 2020-06-19 PROCEDURE — A9270 NON-COVERED ITEM OR SERVICE: HCPCS | Performed by: NURSE PRACTITIONER

## 2020-06-19 PROCEDURE — 96372 THER/PROPH/DIAG INJ SC/IM: CPT

## 2020-06-19 PROCEDURE — 99215 OFFICE O/P EST HI 40 MIN: CPT | Performed by: INTERNAL MEDICINE

## 2020-06-19 RX ORDER — ONDANSETRON 2 MG/ML
4 INJECTION INTRAMUSCULAR; INTRAVENOUS EVERY 4 HOURS PRN
Status: DISCONTINUED | OUTPATIENT
Start: 2020-06-19 | End: 2020-06-21 | Stop reason: HOSPADM

## 2020-06-19 RX ORDER — ONDANSETRON 2 MG/ML
4 INJECTION INTRAMUSCULAR; INTRAVENOUS EVERY 4 HOURS PRN
Status: CANCELLED | OUTPATIENT
Start: 2020-06-19

## 2020-06-19 RX ORDER — NICOTINE 21 MG/24HR
14 PATCH, TRANSDERMAL 24 HOURS TRANSDERMAL
Status: DISCONTINUED | OUTPATIENT
Start: 2020-06-20 | End: 2020-06-21 | Stop reason: HOSPADM

## 2020-06-19 RX ORDER — AMINOPHYLLINE 25 MG/ML
100 INJECTION, SOLUTION INTRAVENOUS
Status: CANCELLED | OUTPATIENT
Start: 2020-06-19

## 2020-06-19 RX ORDER — HEPARIN SODIUM 5000 [USP'U]/100ML
INJECTION, SOLUTION INTRAVENOUS CONTINUOUS
Status: DISCONTINUED | OUTPATIENT
Start: 2020-06-19 | End: 2020-06-20

## 2020-06-19 RX ORDER — ERYTHROMYCIN 250 MG/1
250 CAPSULE, DELAYED RELEASE ORAL DAILY
Status: CANCELLED | OUTPATIENT
Start: 2020-06-20 | End: 2020-06-23

## 2020-06-19 RX ORDER — POLYETHYLENE GLYCOL 3350 17 G/17G
1 POWDER, FOR SOLUTION ORAL
Status: CANCELLED | OUTPATIENT
Start: 2020-06-19

## 2020-06-19 RX ORDER — LIOTHYRONINE SODIUM 5 UG/1
12.5 TABLET ORAL 2 TIMES DAILY
Status: CANCELLED | OUTPATIENT
Start: 2020-06-19

## 2020-06-19 RX ORDER — NICOTINE 21 MG/24HR
14 PATCH, TRANSDERMAL 24 HOURS TRANSDERMAL
Status: CANCELLED | OUTPATIENT
Start: 2020-06-20

## 2020-06-19 RX ORDER — NICOTINE 21 MG/24HR
14 PATCH, TRANSDERMAL 24 HOURS TRANSDERMAL
Status: DISCONTINUED | OUTPATIENT
Start: 2020-06-19 | End: 2020-06-19 | Stop reason: HOSPADM

## 2020-06-19 RX ORDER — DEXTROSE MONOHYDRATE 25 G/50ML
50 INJECTION, SOLUTION INTRAVENOUS
Status: CANCELLED | OUTPATIENT
Start: 2020-06-19

## 2020-06-19 RX ORDER — AMOXICILLIN 250 MG
2 CAPSULE ORAL 2 TIMES DAILY
Status: DISCONTINUED | OUTPATIENT
Start: 2020-06-19 | End: 2020-06-21 | Stop reason: HOSPADM

## 2020-06-19 RX ORDER — ONDANSETRON 4 MG/1
4 TABLET, ORALLY DISINTEGRATING ORAL EVERY 4 HOURS PRN
Status: CANCELLED | OUTPATIENT
Start: 2020-06-19

## 2020-06-19 RX ORDER — LEVOTHYROXINE SODIUM 112 UG/1
112 TABLET ORAL
Status: DISCONTINUED | OUTPATIENT
Start: 2020-06-20 | End: 2020-06-21 | Stop reason: HOSPADM

## 2020-06-19 RX ORDER — AMOXICILLIN 250 MG
2 CAPSULE ORAL 2 TIMES DAILY
Status: CANCELLED | OUTPATIENT
Start: 2020-06-19

## 2020-06-19 RX ORDER — LIOTHYRONINE SODIUM 25 UG/1
12.5 TABLET ORAL 2 TIMES DAILY
Status: DISCONTINUED | OUTPATIENT
Start: 2020-06-19 | End: 2020-06-21 | Stop reason: HOSPADM

## 2020-06-19 RX ORDER — ERYTHROMYCIN 250 MG/1
250 CAPSULE, DELAYED RELEASE ORAL DAILY
Status: DISCONTINUED | OUTPATIENT
Start: 2020-06-20 | End: 2020-06-20

## 2020-06-19 RX ORDER — LISINOPRIL 2.5 MG/1
2.5 TABLET ORAL DAILY
Status: CANCELLED | OUTPATIENT
Start: 2020-06-20

## 2020-06-19 RX ORDER — INSULIN GLARGINE 100 [IU]/ML
12 INJECTION, SOLUTION SUBCUTANEOUS EVERY EVENING
Status: CANCELLED | OUTPATIENT
Start: 2020-06-19

## 2020-06-19 RX ORDER — LIOTHYRONINE SODIUM 25 UG/1
12.5 TABLET ORAL
Status: DISCONTINUED | OUTPATIENT
Start: 2020-06-19 | End: 2020-06-19

## 2020-06-19 RX ORDER — POLYETHYLENE GLYCOL 3350 17 G/17G
1 POWDER, FOR SOLUTION ORAL
Status: DISCONTINUED | OUTPATIENT
Start: 2020-06-19 | End: 2020-06-21 | Stop reason: HOSPADM

## 2020-06-19 RX ORDER — ATORVASTATIN CALCIUM 80 MG/1
80 TABLET, FILM COATED ORAL NIGHTLY
Status: DISCONTINUED | OUTPATIENT
Start: 2020-06-19 | End: 2020-06-21 | Stop reason: HOSPADM

## 2020-06-19 RX ORDER — ONDANSETRON 4 MG/1
4 TABLET, ORALLY DISINTEGRATING ORAL EVERY 4 HOURS PRN
Status: DISCONTINUED | OUTPATIENT
Start: 2020-06-19 | End: 2020-06-21 | Stop reason: HOSPADM

## 2020-06-19 RX ORDER — ATORVASTATIN CALCIUM 40 MG/1
80 TABLET, FILM COATED ORAL NIGHTLY
Status: CANCELLED | OUTPATIENT
Start: 2020-06-19

## 2020-06-19 RX ORDER — LEVOTHYROXINE SODIUM 112 UG/1
112 TABLET ORAL
Status: CANCELLED | OUTPATIENT
Start: 2020-06-20

## 2020-06-19 RX ORDER — AMINOPHYLLINE 25 MG/ML
100 INJECTION, SOLUTION INTRAVENOUS
Status: DISCONTINUED | OUTPATIENT
Start: 2020-06-19 | End: 2020-06-21 | Stop reason: HOSPADM

## 2020-06-19 RX ORDER — ACETAMINOPHEN 325 MG/1
650 TABLET ORAL EVERY 6 HOURS PRN
Status: CANCELLED | OUTPATIENT
Start: 2020-06-19

## 2020-06-19 RX ORDER — HEPARIN SODIUM 1000 [USP'U]/ML
2600 INJECTION, SOLUTION INTRAVENOUS; SUBCUTANEOUS PRN
Status: DISCONTINUED | OUTPATIENT
Start: 2020-06-19 | End: 2020-06-20

## 2020-06-19 RX ORDER — BISACODYL 10 MG
10 SUPPOSITORY, RECTAL RECTAL
Status: CANCELLED | OUTPATIENT
Start: 2020-06-19

## 2020-06-19 RX ORDER — BISACODYL 10 MG
10 SUPPOSITORY, RECTAL RECTAL
Status: DISCONTINUED | OUTPATIENT
Start: 2020-06-19 | End: 2020-06-21 | Stop reason: HOSPADM

## 2020-06-19 RX ORDER — ACETAMINOPHEN 325 MG/1
650 TABLET ORAL EVERY 6 HOURS PRN
Status: DISCONTINUED | OUTPATIENT
Start: 2020-06-19 | End: 2020-06-21 | Stop reason: HOSPADM

## 2020-06-19 RX ORDER — INSULIN GLARGINE 100 [IU]/ML
12 INJECTION, SOLUTION SUBCUTANEOUS EVERY EVENING
Status: DISCONTINUED | OUTPATIENT
Start: 2020-06-19 | End: 2020-06-21 | Stop reason: HOSPADM

## 2020-06-19 RX ORDER — DEXTROSE MONOHYDRATE 25 G/50ML
50 INJECTION, SOLUTION INTRAVENOUS
Status: DISCONTINUED | OUTPATIENT
Start: 2020-06-19 | End: 2020-06-21 | Stop reason: HOSPADM

## 2020-06-19 RX ORDER — LISINOPRIL 5 MG/1
2.5 TABLET ORAL DAILY
Status: DISCONTINUED | OUTPATIENT
Start: 2020-06-20 | End: 2020-06-21 | Stop reason: HOSPADM

## 2020-06-19 RX ORDER — LIOTHYRONINE SODIUM 25 UG/1
12.5 TABLET ORAL 2 TIMES DAILY
Status: DISCONTINUED | OUTPATIENT
Start: 2020-06-19 | End: 2020-06-19 | Stop reason: HOSPADM

## 2020-06-19 RX ADMIN — ATORVASTATIN CALCIUM 80 MG: 80 TABLET, FILM COATED ORAL at 22:43

## 2020-06-19 RX ADMIN — NICOTINE 14 MG: 14 PATCH TRANSDERMAL at 10:21

## 2020-06-19 RX ADMIN — LIOTHYRONINE SODIUM 12.5 MCG: 25 TABLET ORAL at 17:44

## 2020-06-19 RX ADMIN — HEPARIN SODIUM 1050 UNITS/HR: 5000 INJECTION, SOLUTION INTRAVENOUS at 18:15

## 2020-06-19 RX ADMIN — INSULIN HUMAN 1 UNITS: 100 INJECTION, SOLUTION PARENTERAL at 22:46

## 2020-06-19 RX ADMIN — LIOTHYRONINE SODIUM 12.5 MCG: 5 TABLET ORAL at 06:20

## 2020-06-19 RX ADMIN — INSULIN GLARGINE 12 UNITS: 100 INJECTION, SOLUTION SUBCUTANEOUS at 22:47

## 2020-06-19 RX ADMIN — INSULIN HUMAN 1 UNITS: 100 INJECTION, SOLUTION PARENTERAL at 17:40

## 2020-06-19 RX ADMIN — LEVOTHYROXINE SODIUM 112 MCG: 112 TABLET ORAL at 06:18

## 2020-06-19 RX ADMIN — LISINOPRIL 2.5 MG: 5 TABLET ORAL at 06:18

## 2020-06-19 RX ADMIN — METOPROLOL TARTRATE 25 MG: 25 TABLET, FILM COATED ORAL at 17:38

## 2020-06-19 RX ADMIN — ASPIRIN 81 MG: 81 TABLET, COATED ORAL at 06:19

## 2020-06-19 RX ADMIN — ENOXAPARIN SODIUM 80 MG: 100 INJECTION SUBCUTANEOUS at 06:20

## 2020-06-19 ASSESSMENT — ENCOUNTER SYMPTOMS
CHILLS: 0
BLURRED VISION: 0
SORE THROAT: 0
ABDOMINAL PAIN: 0
CONSTIPATION: 0
PHOTOPHOBIA: 0
DIARRHEA: 0
DIZZINESS: 0
HEADACHES: 0
MYALGIAS: 0
COUGH: 0
SENSORY CHANGE: 0
PHOTOPHOBIA: 0
VOMITING: 0
CLAUDICATION: 0
BLURRED VISION: 0
CONSTIPATION: 0
HEARTBURN: 0
FEVER: 0
SPEECH CHANGE: 0
SPEECH CHANGE: 0
DIZZINESS: 0
NERVOUS/ANXIOUS: 0
MYALGIAS: 0
HEARTBURN: 0
VOMITING: 0
WEAKNESS: 0
INSOMNIA: 0
SORE THROAT: 0
NAUSEA: 0
INSOMNIA: 0
DEPRESSION: 0
COUGH: 0
DIARRHEA: 0
NERVOUS/ANXIOUS: 1
DEPRESSION: 0
SHORTNESS OF BREATH: 0
CLAUDICATION: 0
HEADACHES: 0
WHEEZING: 0
SENSORY CHANGE: 0
WEAKNESS: 0
ABDOMINAL PAIN: 0
CHILLS: 0
FEVER: 0
SHORTNESS OF BREATH: 0

## 2020-06-19 ASSESSMENT — COGNITIVE AND FUNCTIONAL STATUS - GENERAL
DAILY ACTIVITIY SCORE: 24
MOVING FROM LYING ON BACK TO SITTING ON SIDE OF FLAT BED: A LITTLE
CLIMB 3 TO 5 STEPS WITH RAILING: A LITTLE
SUGGESTED CMS G CODE MODIFIER MOBILITY: CJ
MOBILITY SCORE: 22
SUGGESTED CMS G CODE MODIFIER DAILY ACTIVITY: CH

## 2020-06-19 ASSESSMENT — PATIENT HEALTH QUESTIONNAIRE - PHQ9
1. LITTLE INTEREST OR PLEASURE IN DOING THINGS: NOT AT ALL
2. FEELING DOWN, DEPRESSED, IRRITABLE, OR HOPELESS: NOT AT ALL
SUM OF ALL RESPONSES TO PHQ9 QUESTIONS 1 AND 2: 0

## 2020-06-19 ASSESSMENT — LIFESTYLE VARIABLES
AVERAGE NUMBER OF DAYS PER WEEK YOU HAVE A DRINK CONTAINING ALCOHOL: 0
EVER HAD A DRINK FIRST THING IN THE MORNING TO STEADY YOUR NERVES TO GET RID OF A HANGOVER: NO
HOW MANY TIMES IN THE PAST YEAR HAVE YOU HAD 5 OR MORE DRINKS IN A DAY: 0
TOTAL SCORE: 0
EVER_SMOKED: YES
CONSUMPTION TOTAL: NEGATIVE
ON A TYPICAL DAY WHEN YOU DRINK ALCOHOL HOW MANY DRINKS DO YOU HAVE: 0
TOTAL SCORE: 0
ALCOHOL_USE: NO
HAVE YOU EVER FELT YOU SHOULD CUT DOWN ON YOUR DRINKING: NO
TOTAL SCORE: 0
EVER FELT BAD OR GUILTY ABOUT YOUR DRINKING: NO
DOES PATIENT WANT TO STOP DRINKING: NO
HAVE PEOPLE ANNOYED YOU BY CRITICIZING YOUR DRINKING: NO

## 2020-06-19 ASSESSMENT — FIBROSIS 4 INDEX
FIB4 SCORE: 1.58
FIB4 SCORE: 1.58

## 2020-06-19 ASSESSMENT — COPD QUESTIONNAIRES
IN THE PAST 12 MONTHS DO YOU DO LESS THAN YOU USED TO BECAUSE OF YOUR BREATHING PROBLEMS: DISAGREE/UNSURE
COPD SCREENING SCORE: 6
DURING THE PAST 4 WEEKS HOW MUCH DID YOU FEEL SHORT OF BREATH: SOME OF THE TIME
DO YOU EVER COUGH UP ANY MUCUS OR PHLEGM?: YES, A FEW DAYS A WEEK OR MONTH
HAVE YOU SMOKED AT LEAST 100 CIGARETTES IN YOUR ENTIRE LIFE: YES

## 2020-06-19 NOTE — PROGRESS NOTES
[facility] Medicine Daily Progress Note    Date of Service  6/19/2020    Chief Complaint  70 y.o. male admitted 6/18/2020 with chest pain and shortness of breath.    Hospital Course    Patient with history of MI in 2016 with stent placement and had been following annually with [facility] and has appointment with Dr Goldstein in 2 weeks.  He was initially admitted for stress test as troponin was minimal but has trended up significantly to 229.  He was started on lovenox full dose and cardiology will consult for recommendations.  Stress test cancelled pending cardio consult.        Interval Problem Update  Patient anxious about continued hospitalization, he denies any chest pain or shortness of breath and states it is mostly with eating and swallowing.  Troponin continued to rise after lovenox started and cardiology consult pending.    Consultants/Specialty  Cards - sharee    Code Status  full    Disposition  Tbd, if cath recommended by cards, will need transfer to [facility] for intervention.    Review of Systems  Review of Systems   Constitutional: Negative for chills and fever.   HENT: Negative for congestion and sore throat.    Eyes: Negative for blurred vision and photophobia.   Respiratory: Negative for cough, shortness of breath and wheezing.    Cardiovascular: Positive for chest pain (none since admission). Negative for claudication and leg swelling.   Gastrointestinal: Negative for abdominal pain, constipation, diarrhea, heartburn, nausea and vomiting.   Genitourinary: Negative for dysuria and hematuria.   Musculoskeletal: Negative for joint pain and myalgias.   Skin: Negative for itching and rash.   Neurological: Negative for dizziness, sensory change, speech change, weakness and headaches.   Psychiatric/Behavioral: Negative for depression. The patient is not nervous/anxious and does not have insomnia.         Physical Exam  Temp:  [36.2 °C (97.2 °F)-36.7 °C (98.1 °F)] 36.7 °C (98 °F)  Pulse:  [70-87] 82  Resp:   [16-19] 16  BP: ()/(49-72) 133/68  SpO2:  [94 %-98 %] 96 %    Physical Exam  Vitals signs and nursing note reviewed.   Constitutional:       General: He is not in acute distress.     Appearance: Normal appearance.   HENT:      Head: Normocephalic and atraumatic.   Eyes:      General: No scleral icterus.     Extraocular Movements: Extraocular movements intact.   Neck:      Musculoskeletal: Normal range of motion and neck supple.   Cardiovascular:      Rate and Rhythm: Normal rate and regular rhythm.      Pulses: Normal pulses.      Heart sounds: Normal heart sounds. No murmur.   Pulmonary:      Effort: Pulmonary effort is normal. No respiratory distress.      Breath sounds: Normal breath sounds. No wheezing, rhonchi or rales.   Abdominal:      General: Abdomen is flat. Bowel sounds are normal. There is no distension.      Palpations: Abdomen is soft.      Tenderness: There is no rebound.   Musculoskeletal:         General: No swelling or tenderness.   Lymphadenopathy:      Cervical: No cervical adenopathy.   Skin:     Coloration: Skin is not jaundiced.      Findings: No erythema.   Neurological:      General: No focal deficit present.      Mental Status: He is alert and oriented to person, place, and time. Mental status is at baseline.      Cranial Nerves: No cranial nerve deficit.   Psychiatric:         Mood and Affect: Mood normal.         Behavior: Behavior normal.         Fluids    Intake/Output Summary (Last 24 hours) at 6/19/2020 1018  Last data filed at 6/19/2020 0800  Gross per 24 hour   Intake 120 ml   Output --   Net 120 ml       Laboratory  Recent Labs     06/18/20  1109 06/19/20  0126   WBC 6.5 7.3   RBC 5.62 4.91   HEMOGLOBIN 16.5 14.4   HEMATOCRIT 50.1 43.0   MCV 89.1 87.6   MCH 29.4 29.3   MCHC 32.9* 33.5*   RDW 43.7 42.8   PLATELETCT 246 205   MPV 9.9 9.7     Recent Labs     06/18/20  1109 06/19/20  0126   SODIUM 139 137   POTASSIUM 4.1 4.0   CHLORIDE 108 108   CO2 18* 20   GLUCOSE 114* 116*    BUN 13 17   CREATININE 0.93 0.87   CALCIUM 9.3 8.7                   Imaging  DX-CHEST-PORTABLE (1 VIEW)   Final Result         1. No acute cardiopulmonary abnormalities are identified.      NM-CARDIAC STRESS TEST    (Results Pending)        Assessment/Plan  * Chest pain  Assessment & Plan  He often gets chest and epigastric pain from gastroparesis, but this time he had associated palpitations.  Trop was mildly elevated at 27 but has trended up to 229  Cardiology - Dr Wilcox to consult.  EKG no ischemia  GI cocktail  He does NOT want reglan  Trial of PPI or pepcid  ASA, statin  Recent A1C and FLP reviewed and well controlled    NSTEMI (non-ST elevated myocardial infarction) (HCC)  Assessment & Plan  lovenox therapeutic dose given  Cardiology consultation  Cancel stress test      History of MI (myocardial infarction)- (present on admission)  Assessment & Plan  He had a stent placed in 2016.  Lately he has been having decreased exercise tolerance and increased shortness of breath with minimal exertion.  Cardiology consult      Tobacco abuse- (present on admission)  Assessment & Plan  -on admission he was counseled for 4 minutes on tobacco cessation 90317  Nicotine patch ordered as no stress test planned.    Dyslipidemia- (present on admission)  Assessment & Plan  Recent lipid panel well controlled  Continue atorvastatin 80    Diabetes mellitus type 2, controlled (HCC)- (present on admission)  Assessment & Plan  Continue Lantus 12 units daily  Hold metformin while in the hospital  Sliding scale  Recent A1c 6.5    Hypothyroid- (present on admission)  Assessment & Plan  Continue Synthroid  He had a recent TSH 1 week ago    History of BPH- (present on admission)  Assessment & Plan  .    Gastroparesis- (present on admission)  Assessment & Plan  Due to longstanding diabetes.  A1c recently, 1 week ago was 6.5.  Continue Lantus 12 units daily  Add sliding scale  Add PPI due to intermittent reflux symptoms         VTE  prophylaxis: lovenox

## 2020-06-19 NOTE — CONSULTS
Cardiology Initial Consult Note    Date of note:    6/19/2020      Consulting Physician: Gabrielle Michaels D.O.    Patient ID:    Name:   Orlando Arreguin     YOB: 1950  Age:   70 y.o.  male   MRN:   [id number]      Reason for Consultation: NSTEMI    HPI:  Orlando Arreguin is a 70 y.o.-year-old male with a history of CAD s/p PCI to LAD in 2015, diabetes, dyslipidemia, smoking and hypothyroidism who presented on 6/18/2020 with NSTEMI.     He reports on Monday he had an acute episode of indigestion which then resolved. Since then he has acknowledged some worsening BANG (normally he is dyspneic after around a mile). Yesterday he had acute onset of substernal pressure-like chest pain, 4/10, and he went to see his PCP who personally took him to the ER. Admitted for CP r/o and trop peaked >200 overnight. No chest pain currently.     He is on aspirin/statin as an outpatient but unfortunately continues to smoke 1 PPD.     Last PCI was 2015, He was noted to have 70% RCA stenosis at that time.     Also acknowledges worsening palpitations and pre-syncope recently. No shannan syncope.       ROS  Constitution: Negative for chills, fever and night sweats.   HENT: Negative for nosebleeds.    Eyes: Negative for vision loss in left eye and vision loss in right eye.   Respiratory: Negative for hemoptysis.    Gastrointestinal: Negative for hematemesis, hematochezia and melena.   Genitourinary: Negative for hematuria.   Neurological: Negative for focal weakness, numbness and paresthesias.      All others reviewed and negative.      Past Medical History:   Diagnosis Date   • Coronary artery disease due to calcified coronary lesion 8/11/2015   • Diabetes mellitus type II    • Gastroparesis    • Hyperlipidemia    • Hypothyroid    • Interstitial cystitis    • Sciatic neuritis        Past Surgical History:   Procedure Laterality Date   • Z CARDIAC CATH  8/6/15    YESIKA to LAD.  Has RCA 70% occulsion.   • APPENDECTOMY     • OTHER      L  shoulder surgery (arthroscopic, [place], 2011)   • PB TRANSURETHRAL ELEC-SURG PBOSTATECTOM           Medications Prior to Admission   Medication Sig Dispense Refill Last Dose   • atorvastatin (LIPITOR) 80 MG tablet Take 80 mg by mouth every evening.   6/17/2020 at PM   • levothyroxine (SYNTHROID) 112 MCG Tab Take 112 mcg by mouth Every morning on an empty stomach.   6/17/2020 at PM   • liothyronine (CYTOMEL) 25 MCG Tab Take 12.5 mcg by mouth 2 Times a Day.   6/18/2020 at AM   • lisinopril (PRINIVIL) 2.5 MG Tab Take 2.5 mg by mouth every day.   6/17/2020 at 1200   • metformin (GLUCOPHAGE) 1000 MG tablet Take 1,000 mg by mouth 2 times a day, with meals.   6/17/2020 at PM   • erythromycin base (UMM-TAB) 250 MG Tab Take 250 mg by mouth every day. Maintenance Medication.   6/17/2020 at 1200   • Cholecalciferol (VITAMIN D3 PO) Take 1 Dose by mouth every day. Unknown OTC Strength.   6/17/2020 at 1200   • insulin glargine (LANTUS SOLOSTAR) 100 UNIT/ML Solution Pen-injector injection Inject 12 Units as instructed every evening. 15 mL 6 6/17/2020 at PM   • Riboflavin (VITAMIN B-2 PO) Take 1 Dose by mouth every day. Unknown OTC Strength.   6/17/2020 at 1200           Allergies   Allergen Reactions   • Brilinta [Ticagrelor] Shortness of Breath     Total Sleep Apnea per patient; SOB   • Pcn [Penicillins] Anaphylaxis   • Other Environmental      Hayfever   • Tetanus Toxoid          Family History   Problem Relation Age of Onset   • Heart Disease Mother    • Heart Attack Mother 55        heart attack         Social History     Socioeconomic History   • Marital status: Single     Spouse name: Not on file   • Number of children: Not on file   • Years of education: Not on file   • Highest education level: Not on file   Occupational History   • Not on file   Social Needs   • Financial resource strain: Not on file   • Food insecurity     Worry: Not on file     Inability: Not on file   • Transportation needs     Medical: Not on file      "Non-medical: Not on file   Tobacco Use   • Smoking status: Current Every Day Smoker     Packs/day: 1.00     Years: 30.00     Pack years: 30.00     Types: Cigarettes   • Smokeless tobacco: Never Used   Substance and Sexual Activity   • Alcohol use: No     Alcohol/week: 0.0 oz   • Drug use: No   • Sexual activity: Not on file   Lifestyle   • Physical activity     Days per week: Not on file     Minutes per session: Not on file   • Stress: Not on file   Relationships   • Social connections     Talks on phone: Not on file     Gets together: Not on file     Attends [employer] service: Not on file     Active member of club or organization: Not on file     Attends meetings of clubs or organizations: Not on file     Relationship status: Not on file   • Intimate partner violence     Fear of current or ex partner: Not on file     Emotionally abused: Not on file     Physically abused: Not on file     Forced sexual activity: Not on file   Other Topics Concern   • Not on file   Social History Narrative   • Not on file         Physical Exam  Body mass index is 24.9 kg/m².  /68   Pulse 82   Temp 36.7 °C (98 °F) (Oral)   Resp 16   Ht 1.702 m (5' 7\")   Wt 72.1 kg (158 lb 15.2 oz)   SpO2 96%   Vitals:    06/18/20 2300 06/19/20 0301 06/19/20 0618 06/19/20 0746   BP: 107/51 (!) 99/52 102/49 133/68   Pulse: 72 70  82   Resp: 18 18  16   Temp: 36.7 °C (98.1 °F) 36.7 °C (98 °F)  36.7 °C (98 °F)   TempSrc: Oral Oral  Oral   SpO2: 94% 94%  96%   Weight:       Height:         Oxygen Therapy:  Pulse Oximetry: 96 %, O2 (LPM): 0, O2 Delivery Device: None - Room Air    General: No apparent distress  Eyes: nl conjunctiva  ENT: OP clear  Neck: JVP 4-5 cm H2O, no carotid bruits  Lungs: normal respiratory effort, decreased breath sounds bilateral lung fields.  Heart: RRR, no murmurs, no rubs or gallops, no edema bilateral lower extremities. No LV/RV heave on cardiac palpatation. 2+ bilateral radial pulses.  2+ bilateral dp pulses. "   Abdomen: soft, non tender, non distended, no masses, normal bowel sounds.  No HSM.  Extremities/MSK: no clubbing, no cyanosis  Neurological: No focal sensory deficits  Psychiatric: Appropriate affect, A/O x 3  Skin: Warm extremities        Labs (personally reviewed and notable for):   Trop peaked at 229      Cardiac Imaging and Procedures Review:    EKG dated 6/18/2020: My personal interpretation is NSR, low voltage, non-specific st changes.    Echo dated 2015:  CONCLUSIONS  Normal left ventricular size and function.  Left ventricular ejection fraction is 65% to 70%.  Grade I diastolic dysfunction - mitral inflow E/A is <1.0.  Moderate concentric left ventricular hypertrophy.  Mild mitral regurgitation.  Mild aortic insufficiency.  Right ventricular systolic pressure is estimated to be 31 mmHg.    Nuclear Perfusion Imaging (2016):   Myocardial Perfusion   Report   IMPRESSIONS   1. Abnormal nuclear study      2. Mildy dilated LV end diastolic volume      3. Prior Inferior and anterior infarcts as described    [facility] (8/2015): Dr. Palacios  POSTOPERATIVE DIAGNOSES:  1.  Triple vessel coronary artery disease, but predominantly involved distal   segment of the major epicardial vessels and branches vessels with 95% ostial   left anterior descending artery stenosis and 70% mid right coronary   stenosis.  2.  Hypokinetic anterolateral wall with moderately reduced left ventricular   systolic function.  Ejection fraction in the range of 35%-40%.  3.  Status post stenting of the ostial left anterior descending artery with a   3.5 x 15 mm Xience Alpine drug-eluting stent.  4.  Diabetes mellitus.     PROCEDURES PERFORMED:  1.  Left heart catheterization with left ventriculography.  2.  Selective coronary angiography.  3.  Percutaneous intervention of the left anterior descending artery, which is   likely the culprit.    Radiology test Review:  CXR:      No pulmonary infiltrates or consolidations are noted.  No pleural  effusion. No pneumothorax.     Stable cardiopericardial silhouette.      Impression and Medical Decision Making:  # NSTEMI  # Dyslipidemia  # Smoking, active and 50 pack year history. Not interested in quitting currently due to life stressors.   # Diabetes  # Likely underlying COPD.       Recommendations:  # Transfer to [facility] for cath tomorrow. Recieved lovenox 80mg SC at 6:20AM this morning, would transition to heparin gtt tonight with bolus if pharmacy agrees at 6PM. Discussed this with Dr. Weinberg the interventional cardiologist on tomorrow and he would prefer to cath on heparin tomorrow.  # NPO at midnight for cath tomorrow, 6/20/2020.   # continue aspirin  # continue statin  # continue ACE  # start metoprolol 25mg PO bid  # check echo  # PPI for his GERD and gastroparesis once DAPT started    Discussed with Dr. Michaels.     Thank you for allowing me to participate in the care of this patient, Cardiology will continue to follow.  Please contact me with any questions.      Travis Wilcox MD  Cardiologist, [facility] [place]   [phone]

## 2020-06-19 NOTE — CARE PLAN
Problem: Safety  Goal: Will remain free from injury  Outcome: PROGRESSING AS EXPECTED  Note: Safety precautions in place. Will continue to monitor patient.   Goal: Will remain free from falls  Outcome: PROGRESSING AS EXPECTED  Note: Safety precautions in place. Will continue to monitor patient.      Problem: Infection  Goal: Will remain free from infection  Outcome: PROGRESSING AS EXPECTED  Note: Discussed the importance of infection prevention and hand hygiene.

## 2020-06-19 NOTE — ASSESSMENT & PLAN NOTE
Transition from lovenox to heparin drip  Cardiology consulting   Cardiac catheterization and possible stenting this afternoon.  Assistance of cardiology appreciated.

## 2020-06-19 NOTE — CARE PLAN
Problem: Knowledge Deficit  Goal: Knowledge of disease process/condition, treatment plan, diagnostic tests, and medications will improve  Outcome: PROGRESSING AS EXPECTED     Problem: Psychosocial Needs:  Goal: Level of anxiety will decrease  Outcome: PROGRESSING AS EXPECTED    Pt will learn about disease process and all questions addressed and answered. Assess patient's anxiety triggers; encourage pt to verbalize feelings regarding care, hospital stay, and illness. Maintain calm environment; keep lights dim, noise level low, door/curtain closed.

## 2020-06-19 NOTE — DISCHARGE SUMMARY
Discharge Summary    CHIEF COMPLAINT ON ADMISSION  Chief Complaint   Patient presents with   • Chest Pain   • Shortness of Breath       Reason for Admission  Chest Pain     Admission Date  6/18/2020    CODE STATUS  Full Code    HPI & HOSPITAL COURSE  This is a 70 y.o. male here with chest pain.  He has a history of MI in 2016 with stent placement and had been following annually with [facility] cardiology and has appointment with Dr Goldstein in 2 weeks.  He was initially admitted for stress test as troponin was minimal but has trended up significantly to 229.  He was started on lovenox full dose and cardiology consulted here, Dr Wilcox, and is recommending transfer to [facility] for cardiac cath tomorrow am.  Lovenox will be discontinued and heparin drip will start this afternoon.    Therefore, he is discharged in good and stable condition to a short-term general hosptial for inpatient care.      Discharge Date  6/19/2020    FOLLOW UP ITEMS POST DISCHARGE  Cardiology    DISCHARGE DIAGNOSES  Principal Problem:    Chest pain POA: Unknown  Active Problems:    Gastroparesis (Chronic) POA: Yes      Overview: 4/04 gastric emptying study severe gatroparesis done in [place], no hx of DM or       MS, started on erythromycin base      5/04 CT thorax in [place] negative      5/04 MRI brain in [place] no evid of MS      4/16/13 declined gastric stimulator      12/5/17 remains on erythromycin    History of BPH POA: Yes      Overview: 10/04 cystoscopy and green light photovaporization of prostate in [place], [place]    Hypothyroid (Chronic) POA: Yes      Overview: 10/08 tsh 0.126,free t4 1.4,free t3 2.7      8/10 tsh 0.199 taking levothyroxine 0.125 6 days a week, and 2 tabs on       john      9/10/10 increase to levothyroxine 0.137 mg daily, repeat tsh in 6 weeks      10/10 tsh 0.9      12/10 tsh 1.2, free t4 1.4, free t3 2.9      4/11 tsh 0.8, thyroxine 9.6, free thyroxine uptake 3.4, free t3 uptake 35%      9/11 tsh 0.5, free t4 0.9, free t3 2.4 on  levothyroxine 137 mcg      12/11 tsh 0.3,free t4 1.5,free t3 2.7 on levothyroxine 137 mcg      4/16/13 tsh 0.28,free t4 1.1, free t3 2.3 (2.4-4.2); on levothyroxine 137       mcg; change to armour thyroid 60 mg qday      5/6/13 tsh 0.3, free t4 1.0, free t3 2.5 on levothyroxine 137 mcg ? Change       to armour 60 mg      5/9/13  note decrease levothyroxine to 125 mcg and add cytomel       5 mg bid      6/13 tsh 0.45, free t4 1.2, free t3 2.9, on synthroid 125 mcg and cytomel       5 mcg bid per       8/14/13 tsh 0.235,free t4 1.1,free t3 2.8 on synthroid 125 mcg and cytomel       5 mcg bid per ;change to synthroid 150 mcg and stop cytomel per       pt request      10/16/13 tsh 0.4, free t4 1.34, free t3 2.2 on synthroid 150 mcg      10/23/13  endo note; change to synthroid 125 mcg and cytomel 5       mcg daily      4/16/14 tsh 0.67,free t4 1.0,free t3 2.4      4/23/14  endocrine note, increase levothyroxine to 137 mcg and       cont cytomel 5 mcg      10/28/14  endocrine note, tsh 0.6,free t4 1.0, free t3 2.7 on       thyroxine 137 mcg and cytomel 5 mcg; gastroparesis symptoms improve with       cytomel, will reduce thryoxine to 125 mcg and use cytomel 25 mcg to cut       and take more than once per day as needed, return in 4 months      7/3/15 tsh 0.04, free t4 0.6 and free t3 3.0 on levothyroxine 125 mcg and       cytomel 12.5 mcg daily      7/21/15  endocrine note; on levothyroxine 125 mcg and cytomel       12.5 mcg daily, tsh 0.04, free t4 0.6 and free t3 3.0, patient does not       want to change dose, no changes continue same dosing regimen; follow up 6       months      8/8/15  endocrine phone note, decrease levothyroxine to 112 mcg       daily, continue cytomel 12.5 mg daily      1/13/16 tsh 0.016,free t4 0.9,free t3 2.5 on levothyroxine 112 mcg and       cytomel 12.5 mg daily      1/22/16  endocrinology note  on levothyroxine 112 mcg and       cytomel 12.5 mg daily, patient declines to change dosing regimen      4/5/16 tsh 0.012,free t4 0.97,free t3 3.6 on levothyroxine 112 mcg and       cytomel 12.5 mg daily      4/21/16  endocrinology note, no changes      10/13/16 tsh 0.14,free t4 0.87,free t3 3.1 on levothyroxine 112 mcg and       cytomel 25 mg      4/17/17 tsh 0.016,free t4 0.9,free t3 4.0 on levothyroxine 112 mcg and       cytomel 25 mg      4/19/17  endocrinology note no changes follow up 6 months      10/18/17  endocrine note no changes      4/12/18 tsh 0.012, free t4 0.99, free t3 4.7 on levothyroxine 112 mcg and       cytomel 25 mg      4/18/18  endocrinology note, on levothyroxine 112 mcg daily and       cytomel 12.5 mcg bid tsh 0.01, free 4 0.9, free t3 4.7, clinically feeling       fine, no changes      10/15/18 endocrinology note repeat labs follow-up 6 months      4/15/19 endocrinology note stable follow-up 6 months      6/10/19 tsh 0.02 on levothyroxine 112 mcg daily and cytomel 12.5 mcg bid      10/8/19 tsh 0.010, free t4 0.80, free t3 2.9 on levothyroxine 112 mcg       daily and cytomel 12.5 mcg bid per endocrinology      10/14/19 endocrinology note, continue same thyroid medication dosing no       changes follow-up 6 months      4/20/20 endocrinology note patient not willing to adjust his thyroid as it       has been successful in controlling gastroparesis, follow-up 6 months      6/12/20 tsh 0.006 on levothyroxine 112 mcg daily and cytomel 12.5 mcg bid       per endocrinology          Diabetes mellitus type 2, controlled (HCC) (Chronic) POA: Yes      Overview: 11/08 A1c 6.3%      8/10 A1c 7.9%      10/10 A1c 7.7%      12/10 A1c 8.0%      1/11 add metformin 500 mg bid      2/11 A1c 7.9%      4/11 A1c 8.2% increase metformin to 1000 mg bid      10/11 A1c 6.5 %      12/11 A1c 6.1% on metformin 1000 mg bid      4/16/13 A1c 6.2% on metformin 1000 mg bid; declines  to check blood sugars       at home; bs 194 non fasting; declines ACE      8/14/13 A1c 6.5% on metformin 1000 mg bid      12/9/13  eye exam grade 1 diabetic background retinopathy      4/16/14 A1c 7.0% per  on metformin 1000 mg bid      7/3/15 A1c 8.3% on metformin 1000 mg bid per endocrine       7/31/15 start lantus 5 units and continue metformin 1000 mg bid, declines       ACE,statin,asa      8/8/15 hospital discharge on coreg 12.5 mg bid,lisinopril 5 mg, brilinta       90 mg bid, asa 81 mg,lipitor 80 mg; on lantus 8 units and metformin 1000       mg bid      8/28/15 declines nutrition consultation and diabetic education regarding       diet      8/28/15 recommend increasing lantus to 10 units and metformin 1000 mg bid      11/24/15 A1c 6.3% on lantus 10 units and metformin 1000 mg bid      11/30/15 on lantus 12 units and metformin 1000 mg bid      5/20/16 A1c 6.3% on lantus 12 units and metformin 1000 mg bid      11/23/16 A1c 6.1% on lantus 12 units and metformin 1000 mg bid      1/9/17  eye exam      5/24/17 A1c 6.3% on lantus 12 units and metformin 1000 mg bid       5/24/17 urine mac 2.5 on lisinopril 2.5 mg      11/29/17 A1c 6.1% and urine mac<4 on lisinopril 2.5 mg on lantus 12 units       and metformin 1000 mg bid       6/7/18 A1c 5.9% on lantus 12 units and metformin 1000 mg bid       12/3/18 A1c 6.2% and urine mac< 3 on lantus 12 units and metformin 1000 mg       bid       6/4/19  eye exam, mild nonproliferative retinopathy      6/10/19 A1c 6.4% on lantus 12 units and metformin 1000 mg bid       12/5/19 A1c 6.2% on lantus 12 units and metformin 1000 mg bid       6/11/20 A1c 6.5% on lantus 12 units and metformin 1000 mg bid                 Dyslipidemia (Chronic) POA: Yes      Overview: 5/13 chol 158,trig 204,hdl 36,ldl 81      8/8/15 chol 97,trig 117,hdl 26,ldl 48 started on lipitor 80 mg on hospital       discharge      8/21/15 chol 76,trig 85,hdl 24,ldl  35 on lipitor 80 mg      10/7/15 chol 67,trig 77,hdl 22,ldl 30 on lipitor 80 mg per cardiology      4/5/16 chol 64,trig 43,hdl 24,ldl 31 on lipitor 80 mg per cardiology      11/23/16 chol 83,trig 73,hdl 27,ldl 41 on lipitor 80 mg per cardiology      5/24/17 chol 85,trig 81,hdl 28,ldl 41 on lipitor 80 mg per cardiology      10/10/17 pharmacy known interaction with lipitor and erythromycin base,       consider changing to crestor, offer made to patient to change to crestor       but he would like to discuss with his cardiologist first      11/29/17 chol 72,trig 47,hdl 24,ldl 39 on lipitor 80 mg and erythromycin       base, previously recommended changing to crestor because of potential       interaction, patient would like to discuss with cardiology first      12/5/17 declines to change from lipitor understanding potential       interaction with erythromycin      6/7/18 chol 78,trig 68,hdl 25,ldl 39,cpk 69 on lipitor 80 mg declines to       change from lipitor understanding potential interaction with erythromycin      7/23/18 cardiology note asymptomatic, increased stress however, continues       to smoke, follow-up 1 year continue atorvastatin plus erythromycin      12/3/18 chol 81,trig 85,hdl 26,ldl 38 on lipitor 80 mg      6/10/19 chol 64,trig 70,hdl 20,ldl 30 on lipitor 80 mg      6/10/20 chol 81,trig 74,hdl 25,ldl 41 on lipitor 80 mg                Tobacco abuse (Chronic) POA: Yes      Overview: 7/31/15 tobacco 1 ppd not interested in stopping smoking classes or       education      8/28/15 tobacco 1 ppd not interested in stopping smoking classes or       education      8/28/15 declines PFT       11/30/15 still smoking 20 cigs per day declines stop smoking classes or       medications      5/31/16 declines stop smoking classes and medications, not interested in       stop smoking classes, smoking ppd x 40 plus years, declines CT lung cancer       screening, pulmonary function testing      11/29/16 still smoking 1  ppd, started smoking age 20, declines stop       smoking classes and medication, declines lung cancer screening program and       PFT      5/30/17 still smoking 1 ppd declines stop smoking classes and medications,       and lung cancer screening program and PFT       12/5/17 still smokes 1 ppd declines stop smoking classes and medications,       and lung cancer screening program and PFT       6/12/18  still smokes 1 ppd declines stop smoking classes and medications,       and lung cancer screening program and PFT       12/18/18 still smokes 1 ppd declines stop smoking classes and medications,       and lung cancer screening program and PFT       6/18/19 still smokes 1 ppd declines stop smoking classes and medications,       and lung cancer screening program and PFT       12/10/19 still smoking 1 pack/day, declines stop smoking classes,       medications, lung cancer screening, PFT                History of MI (myocardial infarction) (Chronic) POA: Yes      Overview: 8/6/15 hospital admission non-STEMI inverted T waves in aVL, ST depression       in lead 1, initial troponin 3.4      8/6/15       8/6/15 cardiac catheterization left main free of disease, triple vessel       disease prominently involving distal segment nature epicardial vessels and       branches, 95% ostial LAD descending artery stenosis and 70% mid RCA       stenosis, ejection fraction 35-40%, stenting ostial LAD with xience       drug-eluting stent      8/7/15 echo normal LV function EF 65-70%, grade 1 diastolic dysfunction,       moderate concentric LVH, mild MR and AR      8/8/15 hospital discharge on coreg 12.5 mg bid,lisinopril 5 mg, brilinta       90 mg bid, asa 81 mg,lipitor 80 mg      8/11/15 cardiology note; continue brilenta and asa for one year, some       dyspnea, if no improvement could consider another antiplatelet therapy,       will recheck BNP in 2 weeks with followup visit, ultimately will need       myocardial perfusion scan  but will defer for a few months      8/26/15 cardiology note, decrease coreg to 6.25 mg bid due to lower blood       pressure,continue lisinopril 5 mg, asa, lipitor,start effient 10 mg for       one year due to brilinta causing shortness of breath, followup 2 months      10/14/15 cardiology note no chnges, repeat myocardial perfusion scan 3       months      11/30/15 cardiac rehab program      1/13/15 cardiology note nitroglycerin when necessary follow-up 3 months      1/12/16 persantine thallium small basal anterior inferior infarct with       small ame-infarct ischemia, moderately sized moderate severity inferior,       inferior lateral infarct with reversible ischemia      4/12/16 cardiology note decrease coreg to 3.125 mg bid consider       discontinuation of coreg next evaluation and continue lisinopril 2.5 mg      7/19/16 cardiology note clinically stable, episodes of chest tightness       related to stress, blood pressure running low, discontinue carvedilol,       follow-up in a few months      11/3/16 cardiology note, isosorbide mononitrate with heavy exertion,       follow-up in a few months to determine if further evaluation is necessary,       could consider repeat perfusion study or dobutamine stress echo      1/5/17 cardiology note stable CAD, follow-up 6 months      7/12/17 cardiology note, likely stable discussed smoking cessation,       patient declines to quit smoking, follow-up one year      12/22/17 ultrasound carotid less than 50% stenosis bilateral      7/23/18 cardiology note asymptomatic, increased stress however, continues       to smoke, follow-up 1 year continue atorvastatin plus erythromycin      7/15/19 cardiology note stable continues to smoke advised to quit       follow-up 1 year          NSTEMI (non-ST elevated myocardial infarction) ([facility]) POA: Unknown  Resolved Problems:    * No resolved hospital problems. *      FOLLOW UP  Future Appointments   Date Time Provider Department Center    6/24/2020 10:15 AM LAB DONNY SHAH None   7/1/2020 11:00 AM Jean Goldstein M.D. [facility] None   10/20/2020 11:00 AM KALEB Guillen     No follow-up provider specified.    MEDICATIONS ON DISCHARGE     Medication List      ASK your doctor about these medications      Instructions   atorvastatin 80 MG tablet  Commonly known as:  LIPITOR  Ask about: Which instructions should I use?   Take 80 mg by mouth every evening.  Dose:  80 mg     erythromycin base 250 MG Tabs  Commonly known as:  UMM-TAB  Ask about: Which instructions should I use?   Take 250 mg by mouth every day. Maintenance Medication.  Dose:  250 mg     insulin glargine 100 UNIT/ML Sopn injection  Commonly known as:  Lantus SoloStar   Inject 12 Units as instructed every evening.  Dose:  12 Units     levothyroxine 112 MCG Tabs  Commonly known as:  SYNTHROID  Ask about: Which instructions should I use?   Take 112 mcg by mouth Every morning on an empty stomach.  Dose:  112 mcg     liothyronine 25 MCG Tabs  Commonly known as:  CYTOMEL  Ask about: Which instructions should I use?   Take 12.5 mcg by mouth 2 Times a Day.  Dose:  12.5 mcg     lisinopril 2.5 MG Tabs  Commonly known as:  PRINIVIL  Ask about: Which instructions should I use?   Take 2.5 mg by mouth every day.  Dose:  2.5 mg     metformin 1000 MG tablet  Commonly known as:  GLUCOPHAGE  Ask about: Which instructions should I use?   Take 1,000 mg by mouth 2 times a day, with meals.  Dose:  1,000 mg     VITAMIN B-2 PO   Take 1 Dose by mouth every day. Unknown OTC Strength.  Dose:  1 Dose     VITAMIN D3 PO   Take 1 Dose by mouth every day. Unknown OTC Strength.  Dose:  1 Dose            Allergies  Allergies   Allergen Reactions   • Brilinta [Ticagrelor] Shortness of Breath     Total Sleep Apnea per patient; SOB   • Pcn [Penicillins] Anaphylaxis   • Other Environmental      Hayfever   • Tetanus Toxoid        DIET  Orders Placed This Encounter   Procedures   • Diet Order Cardiac      Standing Status:   Standing     Number of Occurrences:   1     Order Specific Question:   Diet:     Answer:   Cardiac [6]     Order Specific Question:   Miscellaneous modifications:     Answer:   No Decaf, No Caffeine(for test) [11]       ACTIVITY  As tolerated.  Weight bearing as tolerated    CONSULTATIONS  Mane Wilcox - cardiology    PROCEDURES  none    LABORATORY  Lab Results   Component Value Date    SODIUM 137 06/19/2020    POTASSIUM 4.0 06/19/2020    CHLORIDE 108 06/19/2020    CO2 20 06/19/2020    GLUCOSE 116 (H) 06/19/2020    BUN 17 06/19/2020    CREATININE 0.87 06/19/2020        Lab Results   Component Value Date    WBC 7.3 06/19/2020    HEMOGLOBIN 14.4 06/19/2020    HEMATOCRIT 43.0 06/19/2020    PLATELETCT 205 06/19/2020        Total time of the discharge process exceeds 35 minutes.

## 2020-06-19 NOTE — PROGRESS NOTES
Telemetry Shift Summary     Rhythm SR  HR Range 69 - 80  Ectopy R PVC  Measurements 0.16 / 0.08 / 0.40           Normal Values  Rhythm SR  HR Range    Measurements 0.12-0.20 / 0.06-0.10  / 0.30-0.52

## 2020-06-19 NOTE — PROGRESS NOTES
Patient rounded. POC discussed. Medications administered as ordered. Comfortable in bed. Appropriate safety precautions in place. Will continue to monitor patient.

## 2020-06-19 NOTE — PROGRESS NOTES
Informed night Hospitalist regarding patient's steady increase in troponin levels. From initial result of 27 to 112, 171, and now 229. Discussed medications ordered and administered. Patient denies any chest pain. No telemetry changes noted. Continue to trend troponin. Continue to monitor patient.

## 2020-06-19 NOTE — PROGRESS NOTES
Received report from [facility] RN. Patient transported via [facility]. Assumed care at 1400. This pt is AOx4, ambulatory with up independently, voiding adequately, reports no pain. Patient and RN discussed plan of care: questions answered. Labs noted, assessment complete, patient tolerating cardiac diet, NPO at 0000. Tele box in place. Pt is on RA of O2. Call light in place, fall precautions in place, patient educated on importance of calling for assistance. No additional needs at this time. VSS

## 2020-06-19 NOTE — PROGRESS NOTES
Patient seen and examined, full note to follow in morning.    All patient's questions answered, he agrees with plan for cardiac catheterization in the morning, reports to me that he already has an existing stent and has a pretty fair idea of what is involved in the aftercare.    Heparin drip to start at 1800 this evening.    Dr. Rivas, Cardiology, notified via [place] text that patient has arrived.

## 2020-06-19 NOTE — DISCHARGE PLANNING
Received Transport Form @ 0227  Spoke to Kiera @ [facility]    Transport is scheduled for 6/19 @0770 [place] [facility].    RICHARD Mirza notified.

## 2020-06-19 NOTE — PROGRESS NOTES
Cardiology Follow Up Progress Note    Date of Service  6/19/2020    Attending Physician  Evelio Kirby M.D.    Chief Complaint   Chest pain    HPI  Orlando Arreguin is a 70 y.o. male admitted 6/19/2020 with NSTEMI.        Past medical history significant for PCI to LAD 2015 (noted 70% RCA at that time), type 2 diabetes, dyslipidemia, tobacco abuse (continues to smoke 1 pack/day), hypothyroid        Blood pressure 101/60  Rhythm sinus    Labs reviewed  Sodium 134  Potassium 3.9   creatinine 0.76  CBC stable    Interim Events  6/20/2020 no overnight cardiac events, sinus rhythm, ambulated in the room, asymptomatic, anxious to go home, right radial cath site without evidence of hematoma, we discussed dual antiplatelet therapy in the form of aspirin and Effient, discussed close follow-up with cardiology, we discussed tobacco cessation, we discussed cardiac rehab, patient verbalized understanding.    Review of Systems  Review of Systems   Respiratory: Negative for apnea, cough, choking, chest tightness, shortness of breath and stridor.    Cardiovascular: Negative for chest pain and leg swelling.       Vital signs in last 24 hours  Temp:  [36.4 °C (97.5 °F)] 36.4 °C (97.5 °F)  Pulse:  [80] 80  Resp:  [18] 18  BP: (124)/(76) 124/76  SpO2:  [97 %] 97 %    Physical Exam  Physical Exam  Cardiovascular:      Rate and Rhythm: Normal rate and regular rhythm.      Pulses: Normal pulses.   Pulmonary:      Effort: Pulmonary effort is normal.   Abdominal:      General: Abdomen is flat.   Skin:     General: Skin is warm.      Comments: Right radial cath site with mild bruising, mild swelling, excellent circulation   Neurological:      General: No focal deficit present.      Mental Status: He is alert.   Psychiatric:         Mood and Affect: Mood normal.         Lab Review  Lab Results   Component Value Date/Time    WBC 7.3 06/19/2020 01:26 AM    RBC 4.91 06/19/2020 01:26 AM    HEMOGLOBIN 14.4 06/19/2020 01:26 AM    HEMATOCRIT 43.0  06/19/2020 01:26 AM    MCV 87.6 06/19/2020 01:26 AM    MCH 29.3 06/19/2020 01:26 AM    MCHC 33.5 (L) 06/19/2020 01:26 AM    MPV 9.7 06/19/2020 01:26 AM      Lab Results   Component Value Date/Time    SODIUM 137 06/19/2020 01:26 AM    POTASSIUM 4.0 06/19/2020 01:26 AM    CHLORIDE 108 06/19/2020 01:26 AM    CO2 20 06/19/2020 01:26 AM    GLUCOSE 116 (H) 06/19/2020 01:26 AM    BUN 17 06/19/2020 01:26 AM    CREATININE 0.87 06/19/2020 01:26 AM    BUNCREATRAT 21 12/03/2018 11:25 AM      Lab Results   Component Value Date/Time    ASTSGOT 24 06/18/2020 11:09 AM    ALTSGPT 27 06/18/2020 11:09 AM     Lab Results   Component Value Date/Time    CHOLSTRLTOT 81 (L) 06/10/2020 10:29 AM    LDL 41 06/10/2020 10:29 AM    HDL 25 (A) 06/10/2020 10:29 AM    TRIGLYCERIDE 74 06/10/2020 10:29 AM    TROPONINT 172 (H) 06/19/2020 07:37 AM       No results for input(s): NTPROBNP in the last 72 hours.    Cardiac Imaging and Procedures Review  EKG: Sinus rhythm, nonspecific ST changes    Echocardiogram: 6/20/2020   Compared to the report of the study done 08/08/2015 there has been.   Mild concentric left ventricular hypertrophy.  Normal left ventricular systolic function.  Left ventricular ejection fraction is visually estimated to be 65%.  Subtle inferior hypokinesis   Normal diastolic function.      Cardiac Catheterization:    6/20/2020 severe stenosis in the mid RCA status post successful PCI of the mid RCA, widely patent left main-LAD stent, ambiguous stenosis at the circumflex origin, probably 50% stenosis, severe elevation LVEDP, ongoing tobacco abuse                [facility] (8/2015): Dr. Palacios  POSTOPERATIVE DIAGNOSES:  1.  Triple vessel coronary artery disease, but predominantly involved distal   segment of the major epicardial vessels and branches vessels with 95% ostial   left anterior descending artery stenosis and 70% mid right coronary   stenosis.  2.  Hypokinetic anterolateral wall with moderately reduced left ventricular    systolic function.  Ejection fraction in the range of 35%-40%.  3.  Status post stenting of the ostial left anterior descending artery with a   3.5 x 15 mm Xience Alpine drug-eluting stent.  4.  Diabetes mellitus.     PROCEDURES PERFORMED:  1.  Left heart catheterization with left ventriculography.  2.  Selective coronary angiography.  3.  Percutaneous intervention of the left anterior descending artery, which is   likely the culprit.    Imaging  Chest X-Ray:   No pulmonary infiltrates or consolidations are noted.  No pleural effusion. No pneumothorax.    Stress Test: Not applicable    Assessment    NSTEMI, troponin peaked at 229 ng/L  -s/p successful PCI to mid RCA  -DAPT ( ASA 81 mg & Effient 10 mg x 12 month ) along with high intensity statin and beta-blockers.  -Discussed the importance of compliance with dual antiplatelet therapy, patient verbalized understanding.    Dyslipidemia, continue atorvastatin    Smoking (1 pack/day), active, not interested in quitting    Type 2 diabetes, A1c 6.5 (6/10/2020), on metformin and insulin glargine at home, discussed close outpatient follow-up with PCP    Undiagnosed COPD  -PFTs as an outpatient    History of PCI/YESIKA ostial LAD (2015)      Very anxious to to go home.  Stable for discharge from cardiology standpoint  Follow-up appointment with our cardiology group on 7/1/2020  Cardiac rehab, will arrange      Please contact me with any questions.    KUNAL Kamara.   Cardiologist, [facility]  [phone]

## 2020-06-19 NOTE — PROGRESS NOTES
2 RN SKIN CHECK    2 RN skin check completed with second RN.   Devices in place - IV catheter.  Skin assessed under devices - intact.  Confirmed pressure ulcers found on NONE.  New potential pressure ulcers noted on NONE. Wound consult placed NONE.    Skin is intact.    The following interventions in place - reminded patient to turn at least every 2 hours.

## 2020-06-19 NOTE — PROGRESS NOTES
Lab called with critical result of troponin at 1740. Critical lab result read back.   Dr. Huang notified of critical lab result at 1755.  Critical lab result read back by Dr. Huang.    Pt VSS, resting in bed, denies cp.    Call MD if next troponin remains elevated. EKG not indicated. Perform EKG as per standing order when pt develops chest pain. Will follow through.

## 2020-06-19 NOTE — PROGRESS NOTES
Received bedside patient report from ROHIT GUSTAFSON. Patient resting comfortably in bed, no complaints at this time. Safety precautions in place. Will continue to monitor.

## 2020-06-19 NOTE — PROGRESS NOTES
"Received patient from NOC RN. Assessment complete. A&Ox4. Pt ambulating the halls. POC discussed. Call light within reach. Bed in low, locked position. All needs attended to at this time.     [facility] called- cannot do stress test until pt has cardiologist consult. Pt states \"I can't stay another day.\" Pt verbalized concern about his cat, Kristi. \"She is probably wondering why I wasn't home last night.\"  "

## 2020-06-19 NOTE — PROGRESS NOTES
Direct admit from [facility]. Referring/accepting is Dr. Michaels for chest pain.  Discharge and readmit orders signed and held--please release and implement upon pt arrival. Page the direct admit on call hospitalist and cardiology to notify that patient has arrived & for any acute changes. Call referring/accepting MD from [facility] for any concerns related to the admission orders

## 2020-06-19 NOTE — PROGRESS NOTES
Patient rounded. Patient sleeping. Comfortable in bed. Appropriate safety precautions in place. Bed alarm Refused. Will continue to monitor patient.

## 2020-06-19 NOTE — ASSESSMENT & PLAN NOTE
Chest pain has resolved but troponin peaked at 229 and are starting to trend down, history of CAD with stent placed 2015

## 2020-06-19 NOTE — PROGRESS NOTES
Update: Called by RN for rising trop  Full dose lovenox added, no chest pain at this time  No changes on tele  Continue to trend trop, likely will need routine cardiology consultation in AM  Repeat EKG if he has recurrent CP

## 2020-06-19 NOTE — PROGRESS NOTES
Informed Dr. Huang regarding increase in Tropinin. Continue to trend 2 more time every 6 hours. Patient denies chest pain. Patient reluctant to take LOVENOX due to previous experiences with anticoagulants. Patient given education material regarding LOVENOX. Will continue to monitor patient.

## 2020-06-19 NOTE — H&P
Hospital Medicine History & Physical Note    Date of Service  6/19/2020    Primary Care Physician  Charbel Jaffe M.D.    Code Status  Full Code    Chief Complaint  Chest pain    History of Presenting Illness  70 y.o. male who presented 6/18/2020 with complaints of chest pain, he has a history of diabetes, gastroparesis, cad with stent placed in 2015.  He had onset of chest pain with swallowing the day of admission and was initially to have a stress test in the morning but his troponin continued to trend up.  He was started on therapeutic lovenox dosing and cardiology consulted this am.  He will be transferred to [facility] for heart cath tomorrow and will transition to heparin drip from lovenox at the 12 hour timing.      Review of Systems  Review of Systems   Constitutional: Negative for chills and fever.   HENT: Negative for congestion and sore throat.    Eyes: Negative for blurred vision and photophobia.   Respiratory: Negative for cough and shortness of breath.    Cardiovascular: Positive for chest pain (with eating/swallowing). Negative for claudication and leg swelling.   Gastrointestinal: Negative for abdominal pain, constipation, diarrhea, heartburn and vomiting.   Genitourinary: Negative for dysuria and hematuria.   Musculoskeletal: Negative for joint pain and myalgias.   Skin: Negative for itching and rash.   Neurological: Negative for dizziness, sensory change, speech change, weakness and headaches.   Psychiatric/Behavioral: Negative for depression. The patient is nervous/anxious. The patient does not have insomnia.        Past Medical History   has a past medical history of Coronary artery disease due to calcified coronary lesion (8/11/2015), Diabetes mellitus type II, Gastroparesis, Hyperlipidemia, Hypothyroid, Interstitial cystitis, and Sciatic neuritis.    Surgical History   has a past surgical history that includes appendectomy; other; pr transurethral elec-surg prostatectom; and zzz cardiac cath  (8/6/15).     Family History  family history includes Heart Attack (age of onset: 55) in his mother; Heart Disease in his mother.     Social History   reports that he has been smoking cigarettes. He has a 30.00 pack-year smoking history. He has never used smokeless tobacco. He reports that he does not drink alcohol or use drugs.    Allergies  Allergies   Allergen Reactions   • Brilinta [Ticagrelor] Shortness of Breath     Total Sleep Apnea per patient; SOB   • Pcn [Penicillins] Anaphylaxis   • Other Environmental      Hayfever   • Tetanus Toxoid        Medications  Cannot display prior to admission medications because the patient has not been admitted in this contact.       Physical Exam  BP: ()/()     Physical Exam  Vitals signs and nursing note reviewed.   Constitutional:       General: He is not in acute distress.     Appearance: Normal appearance.   HENT:      Head: Normocephalic and atraumatic.   Eyes:      General: No scleral icterus.     Extraocular Movements: Extraocular movements intact.   Neck:      Musculoskeletal: Normal range of motion and neck supple.   Cardiovascular:      Rate and Rhythm: Normal rate and regular rhythm.      Pulses: Normal pulses.      Heart sounds: Normal heart sounds. No murmur.   Pulmonary:      Effort: Pulmonary effort is normal. No respiratory distress.      Breath sounds: Normal breath sounds. No wheezing, rhonchi or rales.   Abdominal:      General: Abdomen is flat. Bowel sounds are normal. There is no distension.      Palpations: Abdomen is soft.      Tenderness: There is no rebound.   Musculoskeletal:         General: No swelling or tenderness.   Lymphadenopathy:      Cervical: No cervical adenopathy.   Skin:     Coloration: Skin is not jaundiced.      Findings: No erythema.   Neurological:      General: No focal deficit present.      Mental Status: He is alert and oriented to person, place, and time. Mental status is at baseline.      Cranial Nerves: No cranial nerve  deficit.   Psychiatric:         Mood and Affect: Mood normal.         Behavior: Behavior normal.         Laboratory:  Recent Labs     06/18/20  1109 06/19/20  0126   WBC 6.5 7.3   RBC 5.62 4.91   HEMOGLOBIN 16.5 14.4   HEMATOCRIT 50.1 43.0   MCV 89.1 87.6   MCH 29.4 29.3   MCHC 32.9* 33.5*   RDW 43.7 42.8   PLATELETCT 246 205   MPV 9.9 9.7     Recent Labs     06/18/20  1109 06/19/20  0126   SODIUM 139 137   POTASSIUM 4.1 4.0   CHLORIDE 108 108   CO2 18* 20   GLUCOSE 114* 116*   BUN 13 17   CREATININE 0.93 0.87   CALCIUM 9.3 8.7     Recent Labs     06/18/20  1109 06/19/20  0126   ALTSGPT 27  --    ASTSGOT 24  --    ALKPHOSPHAT 105*  --    TBILIRUBIN 0.6  --    GLUCOSE 114* 116*         No results for input(s): NTPROBNP in the last 72 hours.      Recent Labs     06/18/20  2103 06/19/20  0126 06/19/20  0737   TROPONINT 171* 229* 172*       Imaging:  No orders to display         Assessment/Plan:  NSTEMI (non-ST elevated myocardial infarction) (HCC)- (present on admission)  Assessment & Plan  Transition from lovenox to heparin drip  Cardiology consulting   Cardiac cath tomorrow am, NPO at [facility]      Chest pain- (present on admission)  Assessment & Plan  Chest pain has resolved but troponin peaked at 229 and are starting to trend down, history of CAD with stent placed 2015    History of MI (myocardial infarction)- (present on admission)  Assessment & Plan  [facility], supposed to see Dr Goldstein in 2 weeks      Dyslipidemia- (present on admission)  Assessment & Plan  Continue statin      Diabetes mellitus type 2, controlled (HCC)- (present on admission)  Assessment & Plan  Continue with lantus and SSI  Diabetic diet  Metformin on hold

## 2020-06-19 NOTE — PROGRESS NOTES
Monitor Summary     Rhythm:SR 82  Measurements: 0.18/0.10/0.38  ECTOPIES: n/a        Normal Values  Rhythm SR  HR Range    Measurements 0.12-0.20 / 0.06-0.10  / 0.30-0.52

## 2020-06-20 ENCOUNTER — APPOINTMENT (OUTPATIENT)
Dept: CARDIOLOGY | Facility: MEDICAL CENTER | Age: 70
DRG: 247 | End: 2020-06-20
Attending: INTERNAL MEDICINE
Payer: MEDICARE

## 2020-06-20 LAB
ACT BLD: 241 SEC (ref 74–137)
ACT BLD: 279 SEC (ref 74–137)
ACT BLD: 301 SEC (ref 74–137)
ACT BLD: 472 SEC (ref 74–137)
APTT PPP: 72.4 SEC (ref 24.7–36)
APTT PPP: 72.9 SEC (ref 24.7–36)
APTT PPP: 89.5 SEC (ref 24.7–36)
EKG IMPRESSION: NORMAL
GLUCOSE BLD-MCNC: 115 MG/DL (ref 65–99)
GLUCOSE BLD-MCNC: 121 MG/DL (ref 65–99)
GLUCOSE BLD-MCNC: 126 MG/DL (ref 65–99)
GLUCOSE BLD-MCNC: 133 MG/DL (ref 65–99)
LV EJECT FRACT  99904: 65
LV EJECT FRACT MOD 2C 99903: 62.97
LV EJECT FRACT MOD 4C 99902: 64.41
LV EJECT FRACT MOD BP 99901: 62.54

## 2020-06-20 PROCEDURE — A9270 NON-COVERED ITEM OR SERVICE: HCPCS | Performed by: HOSPITALIST

## 2020-06-20 PROCEDURE — A9270 NON-COVERED ITEM OR SERVICE: HCPCS

## 2020-06-20 PROCEDURE — 99152 MOD SED SAME PHYS/QHP 5/>YRS: CPT | Performed by: INTERNAL MEDICINE

## 2020-06-20 PROCEDURE — 700111 HCHG RX REV CODE 636 W/ 250 OVERRIDE (IP)

## 2020-06-20 PROCEDURE — 82962 GLUCOSE BLOOD TEST: CPT

## 2020-06-20 PROCEDURE — 93005 ELECTROCARDIOGRAM TRACING: CPT | Performed by: INTERNAL MEDICINE

## 2020-06-20 PROCEDURE — 700102 HCHG RX REV CODE 250 W/ 637 OVERRIDE(OP): Performed by: INTERNAL MEDICINE

## 2020-06-20 PROCEDURE — 027034Z DILATION OF CORONARY ARTERY, ONE ARTERY WITH DRUG-ELUTING INTRALUMINAL DEVICE, PERCUTANEOUS APPROACH: ICD-10-PCS | Performed by: INTERNAL MEDICINE

## 2020-06-20 PROCEDURE — 93571 IV DOP VEL&/PRESS C FLO 1ST: CPT | Mod: 26,52,RC | Performed by: INTERNAL MEDICINE

## 2020-06-20 PROCEDURE — 770020 HCHG ROOM/CARE - TELE (206)

## 2020-06-20 PROCEDURE — 4A033BC MEASUREMENT OF ARTERIAL PRESSURE, CORONARY, PERCUTANEOUS APPROACH: ICD-10-PCS | Performed by: INTERNAL MEDICINE

## 2020-06-20 PROCEDURE — B2111ZZ FLUOROSCOPY OF MULTIPLE CORONARY ARTERIES USING LOW OSMOLAR CONTRAST: ICD-10-PCS | Performed by: INTERNAL MEDICINE

## 2020-06-20 PROCEDURE — 700102 HCHG RX REV CODE 250 W/ 637 OVERRIDE(OP)

## 2020-06-20 PROCEDURE — A9270 NON-COVERED ITEM OR SERVICE: HCPCS | Performed by: INTERNAL MEDICINE

## 2020-06-20 PROCEDURE — 700102 HCHG RX REV CODE 250 W/ 637 OVERRIDE(OP): Performed by: HOSPITALIST

## 2020-06-20 PROCEDURE — 93306 TTE W/DOPPLER COMPLETE: CPT

## 2020-06-20 PROCEDURE — 99233 SBSQ HOSP IP/OBS HIGH 50: CPT | Performed by: HOSPITALIST

## 2020-06-20 PROCEDURE — 36415 COLL VENOUS BLD VENIPUNCTURE: CPT

## 2020-06-20 PROCEDURE — 700102 HCHG RX REV CODE 250 W/ 637 OVERRIDE(OP): Performed by: NURSE PRACTITIONER

## 2020-06-20 PROCEDURE — 85347 COAGULATION TIME ACTIVATED: CPT | Mod: 91

## 2020-06-20 PROCEDURE — A9270 NON-COVERED ITEM OR SERVICE: HCPCS | Performed by: NURSE PRACTITIONER

## 2020-06-20 PROCEDURE — 93010 ELECTROCARDIOGRAM REPORT: CPT | Performed by: INTERNAL MEDICINE

## 2020-06-20 PROCEDURE — 700101 HCHG RX REV CODE 250

## 2020-06-20 PROCEDURE — 4A023N7 MEASUREMENT OF CARDIAC SAMPLING AND PRESSURE, LEFT HEART, PERCUTANEOUS APPROACH: ICD-10-PCS | Performed by: INTERNAL MEDICINE

## 2020-06-20 PROCEDURE — 93458 L HRT ARTERY/VENTRICLE ANGIO: CPT | Mod: 26,59 | Performed by: INTERNAL MEDICINE

## 2020-06-20 PROCEDURE — 700117 HCHG RX CONTRAST REV CODE 255: Performed by: INTERNAL MEDICINE

## 2020-06-20 PROCEDURE — 92928 PRQ TCAT PLMT NTRAC ST 1 LES: CPT | Mod: RC | Performed by: INTERNAL MEDICINE

## 2020-06-20 PROCEDURE — 85730 THROMBOPLASTIN TIME PARTIAL: CPT | Mod: 91

## 2020-06-20 PROCEDURE — 93306 TTE W/DOPPLER COMPLETE: CPT | Mod: 26 | Performed by: INTERNAL MEDICINE

## 2020-06-20 PROCEDURE — C1887 CATHETER, GUIDING: HCPCS

## 2020-06-20 RX ORDER — HEPARIN SODIUM 200 [USP'U]/100ML
INJECTION, SOLUTION INTRAVENOUS
Status: COMPLETED
Start: 2020-06-20 | End: 2020-06-20

## 2020-06-20 RX ORDER — MIDAZOLAM HYDROCHLORIDE 1 MG/ML
INJECTION INTRAMUSCULAR; INTRAVENOUS
Status: COMPLETED
Start: 2020-06-20 | End: 2020-06-20

## 2020-06-20 RX ORDER — PRASUGREL 10 MG/1
10 TABLET, FILM COATED ORAL DAILY
Status: DISCONTINUED | OUTPATIENT
Start: 2020-06-21 | End: 2020-06-21 | Stop reason: HOSPADM

## 2020-06-20 RX ORDER — ADENOSINE 3 MG/ML
INJECTION, SOLUTION INTRAVENOUS
Status: COMPLETED
Start: 2020-06-20 | End: 2020-06-20

## 2020-06-20 RX ORDER — VERAPAMIL HYDROCHLORIDE 2.5 MG/ML
INJECTION, SOLUTION INTRAVENOUS
Status: COMPLETED
Start: 2020-06-20 | End: 2020-06-20

## 2020-06-20 RX ORDER — ERYTHROMYCIN 250 MG/1
250 TABLET, DELAYED RELEASE ORAL DAILY
Status: DISCONTINUED | OUTPATIENT
Start: 2020-06-20 | End: 2020-06-20

## 2020-06-20 RX ORDER — HEPARIN SODIUM,PORCINE 1000/ML
VIAL (ML) INJECTION
Status: COMPLETED
Start: 2020-06-20 | End: 2020-06-20

## 2020-06-20 RX ORDER — PRASUGREL 10 MG/1
10 TABLET, FILM COATED ORAL DAILY
Qty: 30 TAB | Refills: 11 | Status: SHIPPED | OUTPATIENT
Start: 2020-06-21 | End: 2020-10-01

## 2020-06-20 RX ORDER — ERYTHROMYCIN 250 MG/1
250 TABLET, DELAYED RELEASE ORAL
Status: DISCONTINUED | OUTPATIENT
Start: 2020-06-20 | End: 2020-06-21 | Stop reason: HOSPADM

## 2020-06-20 RX ORDER — PRASUGREL 10 MG/1
TABLET, FILM COATED ORAL
Status: COMPLETED
Start: 2020-06-20 | End: 2020-06-20

## 2020-06-20 RX ORDER — PRASUGREL 10 MG/1
60 TABLET, FILM COATED ORAL ONCE
Status: DISCONTINUED | OUTPATIENT
Start: 2020-06-20 | End: 2020-06-20

## 2020-06-20 RX ORDER — LIDOCAINE HYDROCHLORIDE 20 MG/ML
INJECTION, SOLUTION INFILTRATION; PERINEURAL
Status: COMPLETED
Start: 2020-06-20 | End: 2020-06-20

## 2020-06-20 RX ADMIN — NICOTINE 14 MG: 14 PATCH TRANSDERMAL at 06:31

## 2020-06-20 RX ADMIN — ATORVASTATIN CALCIUM 80 MG: 80 TABLET, FILM COATED ORAL at 20:24

## 2020-06-20 RX ADMIN — ERYTHROMYCIN 250 MG: 250 TABLET, DELAYED RELEASE ORAL at 08:50

## 2020-06-20 RX ADMIN — LIDOCAINE HYDROCHLORIDE: 20 INJECTION, SOLUTION INFILTRATION; PERINEURAL at 12:16

## 2020-06-20 RX ADMIN — INSULIN GLARGINE 12 UNITS: 100 INJECTION, SOLUTION SUBCUTANEOUS at 17:48

## 2020-06-20 RX ADMIN — ERYTHROMYCIN 250 MG: 250 TABLET, DELAYED RELEASE ORAL at 17:48

## 2020-06-20 RX ADMIN — HEPARIN SODIUM 2000 UNITS: 200 INJECTION, SOLUTION INTRAVENOUS at 12:16

## 2020-06-20 RX ADMIN — LIOTHYRONINE SODIUM 12.5 MCG: 25 TABLET ORAL at 06:29

## 2020-06-20 RX ADMIN — LIOTHYRONINE SODIUM 12.5 MCG: 25 TABLET ORAL at 17:47

## 2020-06-20 RX ADMIN — METOPROLOL TARTRATE 25 MG: 25 TABLET, FILM COATED ORAL at 06:30

## 2020-06-20 RX ADMIN — FENTANYL CITRATE 100 MCG: 50 INJECTION INTRAMUSCULAR; INTRAVENOUS at 12:20

## 2020-06-20 RX ADMIN — LISINOPRIL 2.5 MG: 5 TABLET ORAL at 06:31

## 2020-06-20 RX ADMIN — LEVOTHYROXINE SODIUM 112 MCG: 112 TABLET ORAL at 06:30

## 2020-06-20 RX ADMIN — MIDAZOLAM HYDROCHLORIDE 2 MG: 1 INJECTION, SOLUTION INTRAMUSCULAR; INTRAVENOUS at 12:42

## 2020-06-20 RX ADMIN — IOHEXOL 67 ML: 350 INJECTION, SOLUTION INTRAVENOUS at 13:18

## 2020-06-20 RX ADMIN — VERAPAMIL HYDROCHLORIDE 2.5 MG: 5 INJECTION INTRAVENOUS at 12:16

## 2020-06-20 RX ADMIN — ACETAMINOPHEN 650 MG: 325 TABLET, FILM COATED ORAL at 20:25

## 2020-06-20 RX ADMIN — ASPIRIN 81 MG: 81 TABLET, COATED ORAL at 06:29

## 2020-06-20 RX ADMIN — HEPARIN SODIUM: 1000 INJECTION, SOLUTION INTRAVENOUS; SUBCUTANEOUS at 12:42

## 2020-06-20 RX ADMIN — NITROGLYCERIN 10 ML: 20 INJECTION INTRAVENOUS at 12:25

## 2020-06-20 RX ADMIN — HEPARIN SODIUM: 1000 INJECTION, SOLUTION INTRAVENOUS; SUBCUTANEOUS at 12:16

## 2020-06-20 RX ADMIN — PRASUGREL 60 MG: 10 TABLET, FILM COATED ORAL at 13:19

## 2020-06-20 RX ADMIN — MIDAZOLAM HYDROCHLORIDE 2 MG: 1 INJECTION, SOLUTION INTRAMUSCULAR; INTRAVENOUS at 12:20

## 2020-06-20 RX ADMIN — ADENOSINE 90 MG: 3 INJECTION, SOLUTION INTRAVENOUS at 13:00

## 2020-06-20 NOTE — PROGRESS NOTES
[facility] Medicine Daily Progress Note    Date of Service  6/20/2020    Chief Complaint  70 y.o. male admitted 6/19/2020 with chest discomfort concerning for N STEMI    Hospital Course    Patient is a pleasant 70-year-old gentleman transferred from [facility] for consideration of cardiac catheterization.  He was placed on a nitroglycerin and heparin drip with resolution of his chest discomfort.      Interval Problem Update  Patient remains chest pain-free.    Consultants/Specialty  Interventional cardiology, assistance appreciated.    Code Status  Full    Disposition  Likely to home in the morning if no intraoperative complications this afternoon.    Review of Systems  Review of Systems   Cardiovascular: Negative for chest pain.   All other systems reviewed and are negative.       Physical Exam  Temp:  [36.1 °C (96.9 °F)-36.7 °C (98 °F)] 36.2 °C (97.1 °F)  Pulse:  [50-83] 52  Resp:  [16-18] 16  BP: ()/(52-77) 91/53  SpO2:  [95 %-99 %] 98 %    General: No acute distress  HEENT atraumatic, normocephalic, pupils equal round reactive to light  Chest: Respirations are unlabored  Cardiac: Physiologic S1 and S2  Abdomen: Soft, nontender, nondistended  Extremities: Without clubbing, cyanosis or edema  Neuro: Cranial nerves II through XII are grossly intact.      Current Facility-Administered Medications:   •  erythromycin base (UMM-TAB) tablet 250 mg, 250 mg, Oral, PM MEAL, Evelio Kirby M.D.  •  [START ON 6/21/2020] prasugrel (EFFIENT) tablet 10 mg, 10 mg, Oral, DAILY, Jose M Weinberg M.D.  •  insulin regular (HUMULIN R) injection 1-6 Units, 1-6 Units, Subcutaneous, 4X/DAY ACHS, Stopped at 06/20/20 0700 **AND** POC Blood Glucose, , , Q AC AND BEDTIME(S) **AND** NOTIFY MD and PharmD, , , Once **AND** glucose 4 g chewable tablet 16 g, 16 g, Oral, Q15 MIN PRN **AND** dextrose 50% (D50W) injection 50 mL, 50 mL, Intravenous, Q15 MIN PRN, Gabrielle Michaels D.O.  •  ondansetron (ZOFRAN ODT) dispertab 4 mg, 4 mg,  Oral, Q4HRS PRN, MAX Marcelo.O.  •  ondansetron (ZOFRAN) syringe/vial injection 4 mg, 4 mg, Intravenous, Q4HRS PRN, MAX Marcelo.O.  •  senna-docusate (PERICOLACE or SENOKOT S) 8.6-50 MG per tablet 2 Tab, 2 Tab, Oral, BID **AND** polyethylene glycol/lytes (MIRALAX) PACKET 1 Packet, 1 Packet, Oral, QDAY PRN **AND** magnesium hydroxide (MILK OF MAGNESIA) suspension 30 mL, 30 mL, Oral, QDAY PRN **AND** bisacodyl (DULCOLAX) suppository 10 mg, 10 mg, Rectal, QDAY PRN, MAX Marcelo.O.  •  acetaminophen (TYLENOL) tablet 650 mg, 650 mg, Oral, Q6HRS PRN, MAX Marcelo.O.  •  aspirin EC (ECOTRIN) tablet 81 mg, 81 mg, Oral, DAILY, Gabrielle Michaels D.O., 81 mg at 06/20/20 0629  •  hyoscyamine-maalox plus-lidocaine viscous (GI COCKTAIL) oral susp 30 mL, 30 mL, Oral, Q6HRS PRN, MAX Marcelo.O.  •  nicotine (NICODERM) 14 MG/24HR 14 mg, 14 mg, Transdermal, Daily-0600, 14 mg at 06/20/20 0631 **AND** Nicotine Replacement Patient Education Materials, , , Once **AND** nicotine polacrilex (NICORETTE) 2 MG piece 2 mg, 2 mg, Oral, Q HOUR PRN, MAX Marcelo.O.  •  aminophylline injection 100 mg, 100 mg, Intravenous, Q5 MIN PRN, MAX Marcelo.O.  •  atorvastatin (LIPITOR) tablet 80 mg, 80 mg, Oral, Nightly, Gabrielle Michaels D.O., 80 mg at 06/19/20 2243  •  insulin glargine (LANTUS) injection 12 Units, 12 Units, Subcutaneous, Q EVENING, MAX Marcelo.O., 12 Units at 06/19/20 2247  •  levothyroxine (SYNTHROID) tablet 112 mcg, 112 mcg, Oral, AM ES, FLORIN MarceloO., 112 mcg at 06/20/20 0630  •  liothyronine (CYTOMEL) tablet 12.5 mcg, 12.5 mcg, Oral, BID, FLORIN MarceloO., 12.5 mcg at 06/20/20 0629  •  lisinopril (PRINIVIL) tablet 2.5 mg, 2.5 mg, Oral, DAILY, FLORIN MarceloO., 2.5 mg at 06/20/20 0631  •  metoprolol (LOPRESSOR) tablet 25 mg, 25 mg, Oral, TWICE DAILY, AYSHA KamaraPDebbyNDebby, 25 mg at 06/20/20 0630      Fluids    Intake/Output Summary (Last 24 hours) at 6/20/2020 1646  Last data filed at  6/19/2020 1800  Gross per 24 hour   Intake 120 ml   Output --   Net 120 ml       Laboratory  Recent Labs     06/18/20  1109 06/19/20  0126   WBC 6.5 7.3   RBC 5.62 4.91   HEMOGLOBIN 16.5 14.4   HEMATOCRIT 50.1 43.0   MCV 89.1 87.6   MCH 29.4 29.3   MCHC 32.9* 33.5*   RDW 43.7 42.8   PLATELETCT 246 205   MPV 9.9 9.7     Recent Labs     06/18/20  1109 06/19/20  0126   SODIUM 139 137   POTASSIUM 4.1 4.0   CHLORIDE 108 108   CO2 18* 20   GLUCOSE 114* 116*   BUN 13 17   CREATININE 0.93 0.87   CALCIUM 9.3 8.7     Recent Labs     06/20/20  0019 06/20/20  0350 06/20/20  0946   APTT 72.4* 72.9* 89.5*               Imaging  EC-ECHOCARDIOGRAM COMPLETE W/O CONT   Final Result      CL-LEFT HEART CATHETERIZATION WITH POSSIBLE INTERVENTION    (Results Pending)        Assessment/Plan  NSTEMI (non-ST elevated myocardial infarction) (HCC)- (present on admission)  Assessment & Plan  Transition from lovenox to heparin drip  Cardiology consulting   Cardiac catheterization and possible stenting this afternoon.  Assistance of cardiology appreciated.      Chest pain- (present on admission)  Assessment & Plan  Chest pain has resolved but troponin peaked at 229 and are starting to trend down, history of CAD with stent placed 2015    History of MI (myocardial infarction)- (present on admission)  Assessment & Plan  [facility], supposed to see Dr Goldstein in 2 weeks      Dyslipidemia- (present on admission)  Assessment & Plan  Continue statin      Diabetes mellitus type 2, controlled (HCC)- (present on admission)  Assessment & Plan  Continue with lantus and SSI  Diabetic diet  Metformin on hold           VTE prophylaxis: Heparin drip as ordered.

## 2020-06-20 NOTE — PROGRESS NOTES
Bedside report received from NOC RN. Assumed care of pt at 0700. Pt is on RA. Pt has tele box in place. No s/s of pain or discomfort. Educated to call light. Bed is in low and locked position. Will continue to monitor.

## 2020-06-20 NOTE — PROGRESS NOTES
Patient back from cath lab. Monitor room notified. A&O, radial site is soft, intact. Post op vitals initiated. Will continue to monitor     14:00 attempted to remove 3mL of air from hemoband. Site began to bleed. Reinserted 3mL of air and bleeding stopped. Will continue to monitor

## 2020-06-20 NOTE — CARE PLAN
Problem: Infection  Goal: Will remain free from infection  Outcome: PROGRESSING AS EXPECTED   Patient WBC within normal limits. No open wounds noted on patient. Patient does not complain of SOB. Patient vital signs are stable.      Problem: Venous Thromboembolism (VTW)/Deep Vein Thrombosis (DVT) Prevention:  Goal: Patient will participate in Venous Thrombosis (VTE)/Deep Vein Thrombosis (DVT)Prevention Measures  Outcome: PROGRESSING AS EXPECTED   Patient educated on DVT risks. Patient VS are stable. No signs of DVT. Will continue to monitor for change status. Currently on a heparin gtt

## 2020-06-20 NOTE — PROCEDURES
"CARDIAC CATHETERIZATION REPORT    Referring Provider: Travis Wilcox M.D.    PROCEDURE PHYSICIAN: Jose M Weinberg MD, [facility]  ASSISTANT: None      IMPRESSIONS:  1. NSTEMI due to severe stenosis in the mid RCA by FFR  2. Successful PCI of the mid RCA using one YESIKA  3. Widely patent left main-LAD stent  4. Ambiguous stenosis at the circumflex origin- probably 50% stenosis  5. Severe elevation in LVEDP  6. Ongoing tobacco abuse    Recommendations:  DAPT with aspirin and effient    Usual post MI care    Smoking cessation    Pre-procedure diagnosis: NSTEMI  Post-procedure diagnosis: Same    Procedure performed  Selective coronary angiography  Left heart catheterization  Fractional Flow Reserve of the RCA    Conscious sedation was supervised by myself and administered by trained personnel using fentanyl and versed between 1205 and 1317. The patient tolerated sedation without complication.     Procedure Description  1. Access: 6 [place] right radial artery Micropuncture technique was utilized following local anesthesia with lidocaine. A radial cocktail was administered in the sheath.      2. Diagnostic description: The catheter was passed to the central circulation with the aide of J tipped 0.35\" wire. 5F TIG 4.0 and 6F JR4 were used to inject the coronary circulation and enter the left ventricle during invasive hemodynamic monitoring.      3. Description of Intervention: After confirming therapeutic anticoagulation intervention proceeded. A 6F JR4 guiding catheter was used after an Ikari 1.5 would not engage the RCA. The lesion was crossed with a 0.014\" Verrata pressure iFR was 0.92 but recovered from 0.75 after flushing with intracoronary nitroglycerin. Given the appearance of a ruptured plaque and echocardiogram showing inferior hypokinesis I decided to proceed with FFR which measured a stable 0.76 at one minute of hyperememia. I then predilated the lesion with a 2.5x12 compliant balloon which facilitated delivery " of a guide extension catheter to the mid vessel. Subsequently I dilated with a 3.5x12 NC balloon before deploying a 3.5x15 [place] YESIKA. The stent appeared slightly large and so post inflation was not performed. Post procedure angiograms showed excellent result and the procedure was terminated. The equipment was removed from the body      4. Hemostasis: Radial band     Findings   Hemodynamics: Aorta: 101/61 mmHg  LV: 101/ mmHg    Coronary Anatomy   Left Main: Mild distal tapering of 10% stenosis. There is a stent in the very distal part which is widely patent   LAD: Widely patent ostial stent, extends back into left main. There is diffuse moderate stenosis in the distal LAD and small diagonal branches   LCx: Equivocal stenosis at the highly tortuous circumflex origin, estimated 50%; within the limitations of angiography. The OM1 is occluded and fills by L-L collaterals- stable since 2015. The OM2 is diffusely diseased with mild stenosis on the lateral wall. The distal circumflex/OM3 is small has severe stenosis in the terminal segment   RCA: Dominant, Diffuse atherosclerosis. 40% stenosis proximally, 70% in the mid vessel, 40% distally. PDA had multiple 50-70% stenosis in the mid and distal segments. The PLB is small with non-obstructive diffuse atherosclerosis.       Results of intervention:  Pre: 70% stenosis and VINCENT III flow  Post: 0% residual stenosis and VINCENT III flow. No dissection or distal embolization.    Technical Factors  1. Complications: None  2. Estimated Blood Loss: <50 cc  3. Specimens: None  4. Contrast Volume: 67 ml  5. Medications: Radial cocktail (Verapamil 2.5 mg, Nitroglycerin 100 mcg) Heparin to maintain ACT >300 Prasugrel 60 mg  6. Radiation (Air Kerma): 488 mGy

## 2020-06-20 NOTE — CARE PLAN
Problem: Communication  Goal: The ability to communicate needs accurately and effectively will improve  Note: Patient encouraged to ask questions and voice concerns. Time provided for patient to express needs. Questions and concerns addressed appropriately.      Problem: Safety  Goal: Will remain free from injury  Note: Patient encouraged to use call light when assistance needed. Gait assessed. Room free of clutter. Belongings close to bedside. Treaded slipper socks in place.

## 2020-06-20 NOTE — PROGRESS NOTES
Received report from day shift RN. Assumed care of patient at 1900. Patient A&Ox 4. On room air, no signs of respiratory distress. Patient states no pain. POC discussed and agreed upon with patient. Call light and belongings within reach. Bed in lowest locked position. Upper side rails raised. Fall risk precautions in place. All questions answered at this time. Hourly rounding in place. Patient on tele box. Will continue to monitor chest pain.

## 2020-06-21 VITALS
BODY MASS INDEX: 24.64 KG/M2 | WEIGHT: 156.97 LBS | HEIGHT: 67 IN | OXYGEN SATURATION: 96 % | TEMPERATURE: 98.3 F | DIASTOLIC BLOOD PRESSURE: 60 MMHG | SYSTOLIC BLOOD PRESSURE: 101 MMHG | RESPIRATION RATE: 18 BRPM | HEART RATE: 60 BPM

## 2020-06-21 PROBLEM — R07.9 CHEST PAIN: Status: RESOLVED | Noted: 2020-06-18 | Resolved: 2020-06-21

## 2020-06-21 PROBLEM — I21.4 NSTEMI (NON-ST ELEVATED MYOCARDIAL INFARCTION) (HCC): Status: RESOLVED | Noted: 2020-06-19 | Resolved: 2020-06-21

## 2020-06-21 LAB
ANION GAP SERPL CALC-SCNC: 9 MMOL/L (ref 7–16)
BUN SERPL-MCNC: 16 MG/DL (ref 8–22)
CALCIUM SERPL-MCNC: 8.5 MG/DL (ref 8.5–10.5)
CHLORIDE SERPL-SCNC: 107 MMOL/L (ref 96–112)
CO2 SERPL-SCNC: 18 MMOL/L (ref 20–33)
CREAT SERPL-MCNC: 0.76 MG/DL (ref 0.5–1.4)
ERYTHROCYTE [DISTWIDTH] IN BLOOD BY AUTOMATED COUNT: 43.6 FL (ref 35.9–50)
GLUCOSE SERPL-MCNC: 104 MG/DL (ref 65–99)
HCT VFR BLD AUTO: 43.6 % (ref 42–52)
HGB BLD-MCNC: 14.5 G/DL (ref 14–18)
MCH RBC QN AUTO: 30 PG (ref 27–33)
MCHC RBC AUTO-ENTMCNC: 33.3 G/DL (ref 33.7–35.3)
MCV RBC AUTO: 90.3 FL (ref 81.4–97.8)
PLATELET # BLD AUTO: 175 K/UL (ref 164–446)
PMV BLD AUTO: 10.3 FL (ref 9–12.9)
POTASSIUM SERPL-SCNC: 3.9 MMOL/L (ref 3.6–5.5)
RBC # BLD AUTO: 4.83 M/UL (ref 4.7–6.1)
SODIUM SERPL-SCNC: 134 MMOL/L (ref 135–145)
WBC # BLD AUTO: 8.1 K/UL (ref 4.8–10.8)

## 2020-06-21 PROCEDURE — A9270 NON-COVERED ITEM OR SERVICE: HCPCS | Performed by: INTERNAL MEDICINE

## 2020-06-21 PROCEDURE — 80048 BASIC METABOLIC PNL TOTAL CA: CPT

## 2020-06-21 PROCEDURE — 99239 HOSP IP/OBS DSCHRG MGMT >30: CPT | Performed by: HOSPITALIST

## 2020-06-21 PROCEDURE — 36415 COLL VENOUS BLD VENIPUNCTURE: CPT

## 2020-06-21 PROCEDURE — 700102 HCHG RX REV CODE 250 W/ 637 OVERRIDE(OP): Performed by: INTERNAL MEDICINE

## 2020-06-21 PROCEDURE — 99232 SBSQ HOSP IP/OBS MODERATE 35: CPT | Performed by: INTERNAL MEDICINE

## 2020-06-21 PROCEDURE — 85027 COMPLETE CBC AUTOMATED: CPT

## 2020-06-21 RX ORDER — METOPROLOL SUCCINATE 50 MG/1
50 TABLET, EXTENDED RELEASE ORAL DAILY
Qty: 30 TAB | Refills: 0 | Status: SHIPPED | OUTPATIENT
Start: 2020-06-21 | End: 2020-07-14 | Stop reason: SDUPTHER

## 2020-06-21 RX ORDER — ASPIRIN 81 MG/1
81 TABLET ORAL DAILY
Qty: 30 TAB | Refills: 0 | Status: SHIPPED | OUTPATIENT
Start: 2020-06-22 | End: 2023-02-02

## 2020-06-21 RX ADMIN — PRASUGREL 10 MG: 10 TABLET, FILM COATED ORAL at 05:57

## 2020-06-21 RX ADMIN — LEVOTHYROXINE SODIUM 112 MCG: 112 TABLET ORAL at 05:53

## 2020-06-21 RX ADMIN — ASPIRIN 81 MG: 81 TABLET, COATED ORAL at 05:53

## 2020-06-21 RX ADMIN — NICOTINE 14 MG: 14 PATCH TRANSDERMAL at 05:54

## 2020-06-21 RX ADMIN — LIOTHYRONINE SODIUM 12.5 MCG: 25 TABLET ORAL at 05:53

## 2020-06-21 RX ADMIN — LISINOPRIL 2.5 MG: 5 TABLET ORAL at 05:54

## 2020-06-21 ASSESSMENT — ENCOUNTER SYMPTOMS
CHEST TIGHTNESS: 0
COUGH: 0
APNEA: 0
CHOKING: 0
SHORTNESS OF BREATH: 0
STRIDOR: 0

## 2020-06-21 NOTE — PROGRESS NOTES
Discharge instructions reviewed with patient at bedside. Educated on diet, exercise, medications, follow up appointments, angiogram and chest pain. Verbalized understanding and all questions answered. IV and tele monitor removed. Monitor room notified. Patient is AOx4 and on RA. Cab voucher given to patient for ride. Will discharge patient off floor once he is ready.

## 2020-06-21 NOTE — CARE PLAN
Problem: Safety  Goal: Will remain free from falls  Note: Patient encouraged to use call light when assistance needed. Patient's gait assessed. Room free of clutter. Bed in lowest locked position. Patient room close to nursing station. Hourly rounding in place.     Problem: Knowledge Deficit  Goal: Knowledge of disease process/condition, treatment plan, diagnostic tests, and medications will improve  Note: Patient's understanding of disease process and interventions assessed and gaps in knowledge addressed. Patient encouraged to ask questions and voice concerns. Questions and concerns addressed appropriately. Patient updated on any changes in plan of care.

## 2020-06-21 NOTE — PROGRESS NOTES
Morning report received at bedside. Care assumed. Pt alert and awake sitting up in bed. No complaints of pain or discomfort. AOx4 and on RA. Tele monitor on. Bed in lowest position and locked. Call light and all belongings within reach. No further needs at this time.

## 2020-06-21 NOTE — PROGRESS NOTES
Received report from day shift RN. Assumed care of patient at 1900. Patient A&Ox 4. On room air, no signs of respiratory distress. Patient states 1/10 pain. POC discussed and agreed upon with patient. Call light and belongings within reach. Bed in lowest locked position. Upper side rails raised. Bed alarm on. Fall risk precautions in place. All questions answered at this time. Hourly rounding in place. Patient on tele box. Will continue to monitor cardiac symptoms and chest pain.

## 2020-06-21 NOTE — DISCHARGE SUMMARY
Discharge Summary    CHIEF COMPLAINT ON ADMISSION  No chief complaint on file.      Reason for Admission  chest pain     Admission Date  6/19/2020    CODE STATUS  Prior    HPI & HOSPITAL COURSE   Patient is a pleasant 70-year-old gentleman transferred from [facility] for consideration of cardiac catheterization.  He was placed on a nitroglycerin and heparin drip with resolution of his chest discomfort.   Patient underwent left heart cardiac cath on 6/20, results were significant for severe stenosis in the mid RCA, this was successfully stented using 1 drug-eluting stent.  Patient was seen by cardiology, placed on the usual postoperative treatments to include dual antiplatelet agent therapy, beta-blockade, statin therapy.  No significant interval change on postop day 1 at which point patient was successfully discharged back to home with close outpatient follow-up.    Therefore, he is discharged in good and stable condition to home with close outpatient follow-up.    The patient met 2-midnight criteria for an inpatient stay at the time of discharge.    Discharge Date  6/21/2020    FOLLOW UP ITEMS POST DISCHARGE  With Cards.     DISCHARGE DIAGNOSES  Active Problems:    Dyslipidemia (Chronic) POA: Yes      Overview: 5/13 chol 158,trig 204,hdl 36,ldl 81      8/8/15 chol 97,trig 117,hdl 26,ldl 48 started on lipitor 80 mg on hospital       discharge      8/21/15 chol 76,trig 85,hdl 24,ldl 35 on lipitor 80 mg      10/7/15 chol 67,trig 77,hdl 22,ldl 30 on lipitor 80 mg per cardiology      4/5/16 chol 64,trig 43,hdl 24,ldl 31 on lipitor 80 mg per cardiology      11/23/16 chol 83,trig 73,hdl 27,ldl 41 on lipitor 80 mg per cardiology      5/24/17 chol 85,trig 81,hdl 28,ldl 41 on lipitor 80 mg per cardiology      10/10/17 pharmacy known interaction with lipitor and erythromycin base,       consider changing to crestor, offer made to patient to change to crestor       but he would like to discuss with his cardiologist  first      11/29/17 chol 72,trig 47,hdl 24,ldl 39 on lipitor 80 mg and erythromycin       base, previously recommended changing to crestor because of potential       interaction, patient would like to discuss with cardiology first      12/5/17 declines to change from lipitor understanding potential       interaction with erythromycin      6/7/18 chol 78,trig 68,hdl 25,ldl 39,cpk 69 on lipitor 80 mg declines to       change from lipitor understanding potential interaction with erythromycin      7/23/18 cardiology note asymptomatic, increased stress however, continues       to smoke, follow-up 1 year continue atorvastatin plus erythromycin      12/3/18 chol 81,trig 85,hdl 26,ldl 38 on lipitor 80 mg      6/10/19 chol 64,trig 70,hdl 20,ldl 30 on lipitor 80 mg      6/10/20 chol 81,trig 74,hdl 25,ldl 41 on lipitor 80 mg                History of MI (myocardial infarction) (Chronic) POA: Yes      Overview: 8/6/15 hospital admission non-STEMI inverted T waves in aVL, ST depression       in lead 1, initial troponin 3.4      8/6/15       8/6/15 cardiac catheterization left main free of disease, triple vessel       disease prominently involving distal segment nature epicardial vessels and       branches, 95% ostial LAD descending artery stenosis and 70% mid RCA       stenosis, ejection fraction 35-40%, stenting ostial LAD with xience       drug-eluting stent      8/7/15 echo normal LV function EF 65-70%, grade 1 diastolic dysfunction,       moderate concentric LVH, mild MR and AR      8/8/15 hospital discharge on coreg 12.5 mg bid,lisinopril 5 mg, brilinta       90 mg bid, asa 81 mg,lipitor 80 mg      8/11/15 cardiology note; continue brilenta and asa for one year, some       dyspnea, if no improvement could consider another antiplatelet therapy,       will recheck BNP in 2 weeks with followup visit, ultimately will need       myocardial perfusion scan but will defer for a few months      8/26/15 cardiology note,  decrease coreg to 6.25 mg bid due to lower blood       pressure,continue lisinopril 5 mg, asa, lipitor,start effient 10 mg for       one year due to brilinta causing shortness of breath, followup 2 months      10/14/15 cardiology note no chnges, repeat myocardial perfusion scan 3       months      11/30/15 cardiac rehab program      1/13/15 cardiology note nitroglycerin when necessary follow-up 3 months      1/12/16 persantine thallium small basal anterior inferior infarct with       small ame-infarct ischemia, moderately sized moderate severity inferior,       inferior lateral infarct with reversible ischemia      4/12/16 cardiology note decrease coreg to 3.125 mg bid consider       discontinuation of coreg next evaluation and continue lisinopril 2.5 mg      7/19/16 cardiology note clinically stable, episodes of chest tightness       related to stress, blood pressure running low, discontinue carvedilol,       follow-up in a few months      11/3/16 cardiology note, isosorbide mononitrate with heavy exertion,       follow-up in a few months to determine if further evaluation is necessary,       could consider repeat perfusion study or dobutamine stress echo      1/5/17 cardiology note stable CAD, follow-up 6 months      7/12/17 cardiology note, likely stable discussed smoking cessation,       patient declines to quit smoking, follow-up one year      12/22/17 ultrasound carotid less than 50% stenosis bilateral      7/23/18 cardiology note asymptomatic, increased stress however, continues       to smoke, follow-up 1 year continue atorvastatin plus erythromycin      7/15/19 cardiology note stable continues to smoke advised to quit       follow-up 1 year        Resolved Problems:    Diabetes mellitus type 2, controlled (HCC) (Chronic) POA: Yes      Overview: 11/08 A1c 6.3%      8/10 A1c 7.9%      10/10 A1c 7.7%      12/10 A1c 8.0%      1/11 add metformin 500 mg bid      2/11 A1c 7.9%      4/11 A1c 8.2% increase  metformin to 1000 mg bid      10/11 A1c 6.5 %      12/11 A1c 6.1% on metformin 1000 mg bid      4/16/13 A1c 6.2% on metformin 1000 mg bid; declines to check blood sugars       at home; bs 194 non fasting; declines ACE      8/14/13 A1c 6.5% on metformin 1000 mg bid      12/9/13  eye exam grade 1 diabetic background retinopathy      4/16/14 A1c 7.0% per  on metformin 1000 mg bid      7/3/15 A1c 8.3% on metformin 1000 mg bid per endocrine       7/31/15 start lantus 5 units and continue metformin 1000 mg bid, declines       ACE,statin,asa      8/8/15 hospital discharge on coreg 12.5 mg bid,lisinopril 5 mg, brilinta       90 mg bid, asa 81 mg,lipitor 80 mg; on lantus 8 units and metformin 1000       mg bid      8/28/15 declines nutrition consultation and diabetic education regarding       diet      8/28/15 recommend increasing lantus to 10 units and metformin 1000 mg bid      11/24/15 A1c 6.3% on lantus 10 units and metformin 1000 mg bid      11/30/15 on lantus 12 units and metformin 1000 mg bid      5/20/16 A1c 6.3% on lantus 12 units and metformin 1000 mg bid      11/23/16 A1c 6.1% on lantus 12 units and metformin 1000 mg bid      1/9/17  eye exam      5/24/17 A1c 6.3% on lantus 12 units and metformin 1000 mg bid       5/24/17 urine mac 2.5 on lisinopril 2.5 mg      11/29/17 A1c 6.1% and urine mac<4 on lisinopril 2.5 mg on lantus 12 units       and metformin 1000 mg bid       6/7/18 A1c 5.9% on lantus 12 units and metformin 1000 mg bid       12/3/18 A1c 6.2% and urine mac< 3 on lantus 12 units and metformin 1000 mg       bid       6/4/19  eye exam, mild nonproliferative retinopathy      6/10/19 A1c 6.4% on lantus 12 units and metformin 1000 mg bid       12/5/19 A1c 6.2% on lantus 12 units and metformin 1000 mg bid       6/11/20 A1c 6.5% on lantus 12 units and metformin 1000 mg bid                 Chest pain POA: Yes    NSTEMI (non-ST elevated myocardial infarction)  ([facility]) POA: Yes      FOLLOW UP  Future Appointments   Date Time Provider Department Center   7/1/2020 11:00 AM Jean Goldstein M.D. [facility] None   10/20/2020 11:00 AM Alex Stein M.D. [facility]     No follow-up provider specified.    MEDICATIONS ON DISCHARGE     Medication List      START taking these medications      Instructions   aspirin 81 MG EC tablet  Start taking on:  June 22, 2020   Take 1 Tab by mouth every day.  Dose:  81 mg     metoprolol SR 50 MG Tb24  Commonly known as:  TOPROL XL   Take 1 Tab by mouth every day.  Dose:  50 mg     prasugrel 10 MG Tabs  Commonly known as:  EFFIENT   Take 1 Tab by mouth every day.  Dose:  10 mg        CONTINUE taking these medications      Instructions   atorvastatin 80 MG tablet  Commonly known as:  LIPITOR   Take 80 mg by mouth every evening.  Dose:  80 mg     erythromycin base 250 MG Tabs  Commonly known as:  UMM-TAB   Take 250 mg by mouth every day. Maintenance Medication.  Dose:  250 mg     insulin glargine 100 UNIT/ML Sopn injection  Commonly known as:  Lantus SoloStar   Inject 12 Units as instructed every evening.  Dose:  12 Units     levothyroxine 112 MCG Tabs  Commonly known as:  SYNTHROID   Take 112 mcg by mouth Every morning on an empty stomach.  Dose:  112 mcg     liothyronine 25 MCG Tabs  Commonly known as:  CYTOMEL   Take 12.5 mcg by mouth 2 Times a Day.  Dose:  12.5 mcg     lisinopril 2.5 MG Tabs  Commonly known as:  PRINIVIL   Take 2.5 mg by mouth every day.  Dose:  2.5 mg     metformin 1000 MG tablet  Commonly known as:  GLUCOPHAGE   Take 1,000 mg by mouth 2 times a day, with meals.  Dose:  1,000 mg     VITAMIN B-2 PO   Take 1 Dose by mouth every day. Unknown OTC Strength.  Dose:  1 Dose     VITAMIN D3 PO   Take 1 Dose by mouth every day. Unknown OTC Strength.  Dose:  1 Dose            Allergies  Allergies   Allergen Reactions   • Brilinta [Ticagrelor] Shortness of Breath     Total Sleep Apnea per patient; SOB   • Pcn [Penicillins] Anaphylaxis    • Other Environmental      Hayfever   • Tetanus Toxoid        DIET  No orders of the defined types were placed in this encounter.      ACTIVITY  As tolerated.  Weight bearing as tolerated    CONSULTATIONS  Interventional cardiology, Dr. Weinberg.     PROCEDURES  Patient underwent coronary angiography on 6/20/2020,    1. NSTEMI due to severe stenosis in the mid RCA by FFR  2. Successful PCI of the mid RCA using one YESIKA  3. Widely patent left main-LAD stent  4. Ambiguous stenosis at the circumflex origin- probably 50% stenosis  5. Severe elevation in LVEDP  6. Ongoing tobacco abuse    LABORATORY  Lab Results   Component Value Date    SODIUM 134 (L) 06/21/2020    POTASSIUM 3.9 06/21/2020    CHLORIDE 107 06/21/2020    CO2 18 (L) 06/21/2020    GLUCOSE 104 (H) 06/21/2020    BUN 16 06/21/2020    CREATININE 0.76 06/21/2020        Lab Results   Component Value Date    WBC 8.1 06/21/2020    HEMOGLOBIN 14.5 06/21/2020    HEMATOCRIT 43.6 06/21/2020    PLATELETCT 175 06/21/2020        Total time of the discharge process exceeds 32 minutes.

## 2020-06-21 NOTE — DISCHARGE INSTRUCTIONS
Discharge Instructions    Discharged to home by car with relative. Discharged via wheelchair, hospital escort: Yes.  Special equipment needed: Not Applicable    Be sure to schedule a follow-up appointment with your primary care doctor or any specialists as instructed.     Discharge Plan:   Diet Plan: Discussed  Activity Level: Discussed  Smoking Cessation Offered: Patient Refused  Confirmed Follow up Appointment: Appointment Scheduled  Confirmed Symptoms Management: Discussed  Medication Reconciliation Updated: Yes    I understand that a diet low in cholesterol, fat, and sodium is recommended for good health. Unless I have been given specific instructions below for another diet, I accept this instruction as my diet prescription.   Other diet: Cardiac diet    Special Instructions: Diagnosis:  Acute Coronary Syndrome (ACS) is a diagnosis that encompasses cardiac-related chest pain and heart attack. ACS occurs when the blood flow to the heart muscle is severely reduced or cut off completely due to a slow process called atherosclerosis.  Atherosclerosis is a disease in which the coronary arteries become narrow from a buildup of fat, cholesterol, and other substances that combine to form plaque. If the plaque breaks, a blood clot will form and block the blood flow to the heart muscle. This lack of blood flow can cause damage or death to the heart muscle which is called a heart attack or Myocardial Infarction (MI). There are two different types of MIs:  ST Elevation Myocardial Infarction or STEMI (the most severe type of heart attack) and Non-ST Elevation Myocardial Infarction or NSTEMI.    Treatment Plan:  · Cardiac Diet  - Low fat, low salt, low cholesterol   · Cardiac Rehab  - Your doctor has ordered you a referral to [facility].  Call [phone] to schedule an appointment.  · Attend my follow-up appointment with my Cardiologist.  · Take my medications as prescribed by my doctor  · Exercise daily  · Quit Smoking, lower  my blood pressure and Reduce stress    Medications:  Certain medications are used to treat ACS.  Remember to always take medications as prescribed and never stop talking medications unless told by your doctor.    You have been prescribed the following medicatons:    Anti-platelet/blood thinner - Your Anti-platelet/Blood thinning medication is called Effient, and is used in combination with aspirin to prevent clots from forming in your heart and/or around your stent.  Your doctor will determine how long you need to be on this medicine.    · Is patient discharged on Warfarin / Coumadin?   No     Depression / Suicide Risk    As you are discharged from this [facility] facility, it is important to learn how to keep safe from harming yourself.    Recognize the warning signs:  · Abrupt changes in personality, positive or negative- including increase in energy   · Giving away possessions  · Change in eating patterns- significant weight changes-  positive or negative  · Change in sleeping patterns- unable to sleep or sleeping all the time   · Unwillingness or inability to communicate  · Depression  · Unusual sadness, discouragement and loneliness  · Talk of wanting to die  · Neglect of personal appearance   · Rebelliousness- reckless behavior  · Withdrawal from people/activities they love  · Confusion- inability to concentrate     If you or a loved one observes any of these behaviors or has concerns about self-harm, here's what you can do:  · Talk about it- your feelings and reasons for harming yourself  · Remove any means that you might use to hurt yourself (examples: pills, rope, extension cords, firearm)  · Get professional help from the community (Mental Health, Substance Abuse, psychological counseling)  · Do not be alone:Call your Safe Contact- someone whom you trust who will be there for you.  · Call your local CRISIS HOTLINE 501-4458 or 882-619-4909  · Call your local Children's Mobile Crisis Response Team  [facility] (982) 532-6629 or www.[employer]  · Call the toll free National Suicide Prevention Hotlines   · National Suicide Prevention Lifeline 903-052-RPYM (2598)  · [place] [facility] 800-SUICIDE (122-4371)      Angiogram  An angiogram is an X-ray test. It is used to look at your blood vessels. For this test, a dye is put into the blood vessel being checked. The dye shows up on X-rays. It helps your doctor see if there is a blockage or other problem in the blood vessel.  What happens before the procedure?  · Follow your doctor's instructions about limiting what you eat or drink.  · Ask your doctor if you may drink enough water to take any needed medicines the morning of the test.  · Plan to have someone take you home after the test.  · If you go home the same day as the test, plan to have someone stay with you for 24 hours.  What happens during the procedure?  · An IV tube will be put into one of your veins.  · You will be given a medicine that makes you relax (sedative).  · Your skin will be washed where the thin tube (catheter) will be put in. Hair may be removed from this area. The tube may be put into:  ¨ Your upper leg area (groin).  ¨ The fold of your arm, near your elbow.  ¨ Your wrist.  · You will be given a medicine that numbs the area where the tube will be inserted (local anesthetic).  · The tube will be inserted into a blood vessel.  · Using a type of X-ray (fluoroscopy) to see, your doctor will move the tube into the blood vessel to check it.  · Dye will be put in through the tube. X-rays of your blood vessels will then be taken.  Different doctors and hospitals may do this procedure differently.  What happens after the procedure?  · If the test is done through the leg, you will be kept in bed lying flat for several hours. You will be told to not bend or cross your legs.  · The area where the tube was inserted will be checked often.  · The pulse in your feet or wrist will be checked  often.  · More tests or X-rays may be done.  This information is not intended to replace advice given to you by your health care provider. Make sure you discuss any questions you have with your health care provider.  Document Released: 03/16/2010 Document Revised: 05/25/2017 Document Reviewed: 05/21/2014  [employer] Interactive Patient Education © 2017 [employer]      Angiogram, Care After  These instructions give you information about caring for yourself after your procedure. Your doctor may also give you more specific instructions. Call your doctor if you have any problems or questions after your procedure.   HOME CARE  · Take medicines only as told by your doctor.  · Follow your doctor's instructions about:  ¨ Care of the area where the tube was inserted.  ¨ Bandage (dressing) changes and removal.  · You may shower 24-48 hours after the procedure or as told by your doctor.  · Do not take baths, swim, or use a hot tub until your doctor approves.  · Every day, check the area where the tube was inserted. Watch for:  ¨ Redness, swelling, or pain.  ¨ Fluid, blood, or pus.  · Do not apply powder or lotion to the site.  · Do not lift anything that is heavier than 10 lb (4.5 kg) for 5 days or as told by your doctor.  · Ask your doctor when you can:  ¨ Return to work or school.  ¨ Do physical activities or play sports.  ¨ Have sex.  · Do not drive or operate heavy machinery for 24 hours or as told by your doctor.  · Have someone with you for the first 24 hours after the procedure.  · Keep all follow-up visits as told by your doctor. This is important.  GET HELP IF:  · You have a fever.    · You have chills.    · You have more bleeding from the area where the tube was inserted. Hold pressure on the area.  · You have redness, swelling, or pain in the area where the tube was inserted.  · You have fluid or pus coming from the area.  GET HELP RIGHT AWAY IF:   · You have a lot of pain in the area where the tube was  inserted.  · The area where the tube was inserted is bleeding, and the bleeding does not stop after 30 minutes of holding steady pressure on the area.  · The area near or just beyond the insertion site becomes pale, cool, tingly, or numb.     This information is not intended to replace advice given to you by your health care provider. Make sure you discuss any questions you have with your health care provider.     Document Released: 03/16/2010 Document Revised: 01/08/2016 Document Reviewed: 07/06/2015  [employer] Interactive Patient Education ©2016 [employer]

## 2020-07-01 ENCOUNTER — OFFICE VISIT (OUTPATIENT)
Dept: CARDIOLOGY | Facility: MEDICAL CENTER | Age: 70
End: 2020-07-01
Payer: MEDICARE

## 2020-07-01 VITALS
WEIGHT: 157 LBS | DIASTOLIC BLOOD PRESSURE: 66 MMHG | SYSTOLIC BLOOD PRESSURE: 104 MMHG | HEART RATE: 78 BPM | BODY MASS INDEX: 24.64 KG/M2 | OXYGEN SATURATION: 99 % | HEIGHT: 67 IN

## 2020-07-01 DIAGNOSIS — Z72.0 TOBACCO ABUSE: Chronic | ICD-10-CM

## 2020-07-01 DIAGNOSIS — I25.84 CORONARY ARTERY DISEASE DUE TO CALCIFIED CORONARY LESION: ICD-10-CM

## 2020-07-01 DIAGNOSIS — I25.10 CORONARY ARTERY DISEASE DUE TO CALCIFIED CORONARY LESION: ICD-10-CM

## 2020-07-01 DIAGNOSIS — E78.5 DYSLIPIDEMIA: Chronic | ICD-10-CM

## 2020-07-01 PROCEDURE — 99214 OFFICE O/P EST MOD 30 MIN: CPT | Performed by: INTERNAL MEDICINE

## 2020-07-01 RX ORDER — NICOTINE 21 MG/24HR
1 PATCH, TRANSDERMAL 24 HOURS TRANSDERMAL EVERY 24 HOURS
COMMUNITY
End: 2020-12-15

## 2020-07-01 RX ORDER — PRASUGREL 10 MG/1
TABLET, FILM COATED ORAL
COMMUNITY
Start: 2020-06-20 | End: 2020-07-04

## 2020-07-01 RX ORDER — METOPROLOL SUCCINATE 50 MG/1
TABLET, EXTENDED RELEASE ORAL
COMMUNITY
Start: 2020-06-21 | End: 2020-11-30

## 2020-07-01 ASSESSMENT — FIBROSIS 4 INDEX: FIB4 SCORE: 1.847520861406802447

## 2020-07-01 ASSESSMENT — ENCOUNTER SYMPTOMS
SHORTNESS OF BREATH: 0
MUSCULOSKELETAL NEGATIVE: 1
ABDOMINAL PAIN: 1
RESPIRATORY NEGATIVE: 1
HEARTBURN: 1
NAUSEA: 1
CARDIOVASCULAR NEGATIVE: 1
NEUROLOGICAL NEGATIVE: 1
CONSTITUTIONAL NEGATIVE: 1
DEPRESSION: 0

## 2020-07-01 NOTE — PROGRESS NOTES
"Chief Complaint   Patient presents with   • Coronary Artery Disease       Subjective:   Orlando Arreguin is a 70 y.o. male who presents today for follow-up of recent non-STEMI.  He has a history of a prior MI in 2015 with placement of a 3.5 x 15 mm Xience stent into his ostial LAD..  The patient presented to [facility] on June 19 and underwent angiography at the [facility] the following day.  The LAD stent was found to be widely patent.  He had diffuse disease in the right coronary artery  and this was stented using a 3.5 x 15 mm [place] drug-eluting stent.  Unfortunately, he is still smoking cigarettes but is \"trying to quit\".  He has been faithful on his medications.  No angina.  He has had some pain in his left leg with activity.  The patient has known peripheral vascular disease.    Past Medical History:   Diagnosis Date   • Coronary artery disease due to calcified coronary lesion 8/11/2015   • Diabetes mellitus type II    • Gastroparesis    • Hyperlipidemia    • Hypothyroid    • Interstitial cystitis    • Sciatic neuritis      Past Surgical History:   Procedure Laterality Date   • [facility] CARDIAC CATH  8/6/15    YESIKA to LAD.  Has RCA 70% occulsion.   • APPENDECTOMY     • OTHER      L shoulder surgery (arthroscopic, [place], 2011)   • PB TRANSURETHRAL ELEC-SURG PBOSTATECTOM       Family History   Problem Relation Age of Onset   • Heart Disease Mother    • Heart Attack Mother 55        heart attack     Social History     Socioeconomic History   • Marital status: Single     Spouse name: Not on file   • Number of children: Not on file   • Years of education: Not on file   • Highest education level: Not on file   Occupational History   • Not on file   Social Needs   • Financial resource strain: Not on file   • Food insecurity     Worry: Not on file     Inability: Not on file   • Transportation needs     Medical: Not on file     Non-medical: Not on file   Tobacco Use   • Smoking status: Current Every Day Smoker     " Packs/day: 1.00     Years: 30.00     Pack years: 30.00     Types: Cigarettes   • Smokeless tobacco: Never Used   Substance and Sexual Activity   • Alcohol use: No     Alcohol/week: 0.0 oz   • Drug use: No   • Sexual activity: Not on file   Lifestyle   • Physical activity     Days per week: Not on file     Minutes per session: Not on file   • Stress: Not on file   Relationships   • Social connections     Talks on phone: Not on file     Gets together: Not on file     Attends Restorationist service: Not on file     Active member of club or organization: Not on file     Attends meetings of clubs or organizations: Not on file     Relationship status: Not on file   • Intimate partner violence     Fear of current or ex partner: Not on file     Emotionally abused: Not on file     Physically abused: Not on file     Forced sexual activity: Not on file   Other Topics Concern   • Not on file   Social History Narrative   • Not on file     Allergies   Allergen Reactions   • Brilinta [Ticagrelor] Shortness of Breath     Total Sleep Apnea per patient; SOB   • Pcn [Penicillins] Anaphylaxis   • Other Environmental      Hayfever   • Tetanus Toxoid      Outpatient Encounter Medications as of 7/1/2020   Medication Sig Dispense Refill   • nicotine (NICODERM) 14 MG/24HR PATCH 24 HR Apply 1 Patch to skin as directed every 24 hours.     • aspirin EC 81 MG EC tablet Take 1 Tab by mouth every day. 30 Tab 0   • metoprolol SR (TOPROL XL) 50 MG TABLET SR 24 HR Take 1 Tab by mouth every day. 30 Tab 0   • prasugrel (EFFIENT) 10 MG Tab Take 1 Tab by mouth every day. 30 Tab 11   • Cholecalciferol (VITAMIN D3 PO) Take 1 Dose by mouth every day. Unknown OTC Strength.     • atorvastatin (LIPITOR) 80 MG tablet Take 80 mg by mouth every evening.     • levothyroxine (SYNTHROID) 112 MCG Tab Take 112 mcg by mouth Every morning on an empty stomach.     • liothyronine (CYTOMEL) 25 MCG Tab Take 12.5 mcg by mouth 2 Times a Day.     • lisinopril (PRINIVIL) 2.5 MG Tab  "Take 2.5 mg by mouth every day.     • metformin (GLUCOPHAGE) 1000 MG tablet Take 1,000 mg by mouth 2 times a day, with meals.     • erythromycin base (UMM-TAB) 250 MG Tab Take 250 mg by mouth every day. Maintenance Medication.     • insulin glargine (LANTUS SOLOSTAR) 100 UNIT/ML Solution Pen-injector injection Inject 12 Units as instructed every evening. 15 mL 6   • Riboflavin (VITAMIN B-2 PO) Take 1 Dose by mouth every day. Unknown OTC Strength.     • metoprolol SR (TOPROL XL) 50 MG TABLET SR 24 HR      • prasugrel (EFFIENT) 10 MG Tab        No facility-administered encounter medications on file as of 7/1/2020.      Review of Systems   Constitutional: Negative.    HENT: Negative.    Respiratory: Negative.  Negative for shortness of breath.    Cardiovascular: Negative.    Gastrointestinal: Positive for abdominal pain, heartburn and nausea.        History of gastroparesis   Musculoskeletal: Negative.    Skin: Negative.    Neurological: Negative.    Endo/Heme/Allergies: Negative.    Psychiatric/Behavioral: Negative for depression.        Objective:   /66 (BP Location: Left arm, Patient Position: Sitting, BP Cuff Size: Adult)   Pulse 78   Ht 1.702 m (5' 7\")   Wt 71.2 kg (157 lb)   SpO2 99%   BMI 24.59 kg/m²     Physical Exam   Constitutional: He is oriented to person, place, and time. He appears well-nourished. No distress.   HENT:   Head: Normocephalic and atraumatic.   Eyes: Pupils are equal, round, and reactive to light. No scleral icterus.   Neck: No JVD present. No tracheal deviation present.   Cardiovascular: Normal rate and regular rhythm.   No murmur heard.  Pulses:       Femoral pulses are 2+ on the right side and 1+ on the left side with bruit.       Dorsalis pedis pulses are 1+ on the right side and 2+ on the left side.        Posterior tibial pulses are 1+ on the right side and 0 on the left side.   Pulmonary/Chest: Breath sounds normal. No respiratory distress. He has no wheezes.   Abdominal: " Soft. Bowel sounds are normal. He exhibits no distension. There is no abdominal tenderness.   Musculoskeletal:         General: No edema.   Neurological: He is alert and oriented to person, place, and time.   Skin: Skin is warm and dry.   Psychiatric: He has a normal mood and affect.       Assessment:     1. Tobacco abuse     2. Dyslipidemia     3. Coronary artery disease due to calcified coronary lesion: LAD stent in August 2015         Medical Decision Making:  Today's Assessment / Status / Plan:   Status post non-STEMI: He underwent stenting of his RCA approximately 3 weeks ago and is doing well.  The previously placed stent in the LAD was widely patent at the time of the recent angiogram.  He has had no significant bruising problems.  The importance of dual antiplatelet therapy was discussed with the patient.  The role of each of his medications was discussed.  Abstinence from smoking was strongly encouraged.    History of hyperlipidemia treated with statin    Cigarette abuse: Strongly urged to quit smoking    Peripheral vascular disease: Patient has some claudication problems his left femoral pulse is definitely diminished compared to his right and the left femoral bruit is present.  I cannot find a left posterior tibial pulse today.    Continue current medications.  Return in 3 months.

## 2020-07-04 RX ORDER — PEN NEEDLE, DIABETIC 32GX 5/32"
1 NEEDLE, DISPOSABLE MISCELLANEOUS DAILY
Qty: 100 EACH | Refills: 11 | Status: SHIPPED | OUTPATIENT
Start: 2020-07-04 | End: 2021-08-15

## 2020-07-14 DIAGNOSIS — I25.10 CORONARY ARTERY DISEASE DUE TO CALCIFIED CORONARY LESION: Primary | ICD-10-CM

## 2020-07-14 DIAGNOSIS — I25.84 CORONARY ARTERY DISEASE DUE TO CALCIFIED CORONARY LESION: Primary | ICD-10-CM

## 2020-07-14 RX ORDER — METOPROLOL SUCCINATE 50 MG/1
50 TABLET, EXTENDED RELEASE ORAL DAILY
Qty: 90 TAB | Refills: 2 | Status: SHIPPED | OUTPATIENT
Start: 2020-07-14 | End: 2020-10-01

## 2020-08-06 NOTE — PROGRESS NOTES
Called and spoke with Danny Elizabeth and advised him to go to [facility] so that he could be admitted to the hospital for further work up. He verbalized understanding. I called and spoke with Dr. Michelle Rubio to let him know I was sending the patient to the ER so he could be admitted to [facility] or [facility]  for further work up. Chief Complaint   Patient presents with   • Hypothyroidism     Unclear etiology        HPI:      1. Hypothyroidism.    I don’t have a basis for this gentleman’s hypothyroidism.  He is taking levothyroxine 112 mcg per day and liothyronine 12.5 mcg per day.  His free T4 and free T3 generally run in the mid to low normal range but his TSH usually is suppressed which it is now at .01.  Despite that, he has absolutely no symptoms of thyrotoxicosis.  He insists that he needs this particular dose to help with his gastroparesis.  He has had very little difficulty with it as long as he maintains his thyroid levels where they are now.  He feels he would have a very difficult time if he lowered his thyroid dose.      He does have some element of coronary artery disease but he has no symptoms such as angina, tachycardias, arrhythmias or that sort of thing.  He sleeps well at night and his weight is stable within two pounds.      Currently on examination, he has no tremor, his pulse is regular at 70.  He seems calm and relaxed.  No fractures although I have not done a bone density.      I will see him again in six months.        ROS:    All other systems reported as negative or unchanged since last exam    Allergies:   Allergies   Allergen Reactions   • Pcn [Penicillins] Anaphylaxis   • Other Environmental      Hayfever   • Tetanus Toxoid        Current medicines including changes today:  Current Outpatient Medications   Medication Sig Dispense Refill   • lisinopril (PRINIVIL) 2.5 MG Tab Take 1 Tab by mouth every day. 90 Tab 3   • insulin glargine (LANTUS SOLOSTAR) 100 UNIT/ML Solution Pen-injector injection Inject 12 Units as instructed every evening. 15 mL 6   • glucose blood (RELION PRIME TEST) strip 1 Strip by Other route every day. Check blood sugar once a day before meals. E11.9 100 Strip 3   • atorvastatin (LIPITOR) 80 MG tablet TAKE 1 TABLET BY MOUTH ONCE DAILY AT BEDTIME 90 Tab 3   • metformin (GLUCOPHAGE) 1000 MG  "tablet Take 1 Tab by mouth 2 times a day, with meals. 180 Tab 1   • liothyronine (CYTOMEL) 25 MCG Tab TAKE 1/2 (ONE-HALF) TABLET BY MOUTH TWICE DAILY 90 Tab 3   • Insulin Pen Needle (RELION PEN NEEDLES) 31G X 6 MM Misc 1 Device by Does not apply route every day. Use with lantus E11.9 100 Each 6   • levothyroxine (SYNTHROID) 112 MCG Tab Take 1 Tab by mouth Every morning on an empty stomach. 90 Tab 3   • TRUEPLUS LANCETS 28G Misc 1 Device by Does not apply route every day. Check blood sugar once a day, trueplus lancet, E11.9 100 Each 3   • erythromycin base (UMM-TAB) 250 MG Tab Take 1 Tab by mouth 2 Times a Day. 180 Tab 3   • Riboflavin (VITAMIN B-2 PO) Take  by mouth.     • aspirin (ASA) 81 MG Chew Tab chewable tablet Take 1 Tab by mouth every day. 100 Tab 11     No current facility-administered medications for this visit.         Past Medical History:   Diagnosis Date   • Coronary artery disease due to calcified coronary lesion 8/11/2015   • Diabetes mellitus type II    • Gastroparesis    • Hyperlipidemia    • Hypothyroid    • Interstitial cystitis    • Sciatic neuritis        PHYSICAL EXAM:    Ht 1.702 m (5' 7.01\")   Wt 74 kg (163 lb 3.2 oz)   BMI 25.55 kg/m²   Heart rate taken during my examination is 70 and regular.  Blood pressure 122/74    Gen.   appears healthy, does not appear thyrotoxic    Skin   appropriate for sex and age    HEENT  unremarkable    Neck   no palpable thyroid.  It might be substernal    Heart  regular    Extremities  no edema    Neuro  gait and station normal, no tremor    Psych  appropriate, calm, pleasant    ASSESSMENT AND RECOMMENDATIONS    1. Acquired hypothyroidism         See HPI  - FREE THYROXINE; Future  - T3 FREE; Future  - TSH; Future    2.  Gastroparesis            Benefits by current thyroid dosing    DISPOSITION: No dose change indicated                            Return in 6 months      Alex Stein M.D.    Copies to: Charbel Jaffe M.D. [phone]  "

## 2020-08-07 DIAGNOSIS — E78.5 DYSLIPIDEMIA: Chronic | ICD-10-CM

## 2020-09-04 RX ORDER — ATORVASTATIN CALCIUM 80 MG/1
80 TABLET, FILM COATED ORAL DAILY
Qty: 90 TAB | Refills: 3 | Status: SHIPPED | OUTPATIENT
Start: 2020-09-04 | End: 2020-10-01 | Stop reason: SDUPTHER

## 2020-09-15 RX ORDER — LISINOPRIL 2.5 MG/1
2.5 TABLET ORAL DAILY
Qty: 90 TAB | Refills: 0 | Status: SHIPPED | OUTPATIENT
Start: 2020-09-15 | End: 2020-10-01

## 2020-09-23 ENCOUNTER — HOSPITAL ENCOUNTER (OUTPATIENT)
Dept: LAB | Facility: MEDICAL CENTER | Age: 70
End: 2020-09-23
Attending: INTERNAL MEDICINE
Payer: MEDICARE

## 2020-09-23 DIAGNOSIS — E78.5 DYSLIPIDEMIA: Chronic | ICD-10-CM

## 2020-09-23 LAB
ALBUMIN SERPL BCP-MCNC: 4.2 G/DL (ref 3.2–4.9)
ALBUMIN/GLOB SERPL: 1.8 G/DL
ALP SERPL-CCNC: 104 U/L (ref 30–99)
ALT SERPL-CCNC: 23 U/L (ref 2–50)
ANION GAP SERPL CALC-SCNC: 11 MMOL/L (ref 7–16)
AST SERPL-CCNC: 21 U/L (ref 12–45)
BILIRUB SERPL-MCNC: 0.4 MG/DL (ref 0.1–1.5)
BUN SERPL-MCNC: 24 MG/DL (ref 8–22)
CALCIUM SERPL-MCNC: 9 MG/DL (ref 8.5–10.5)
CHLORIDE SERPL-SCNC: 107 MMOL/L (ref 96–112)
CHOLEST SERPL-MCNC: 76 MG/DL (ref 100–199)
CO2 SERPL-SCNC: 20 MMOL/L (ref 20–33)
CREAT SERPL-MCNC: 1.04 MG/DL (ref 0.5–1.4)
FASTING STATUS PATIENT QL REPORTED: NORMAL
GLOBULIN SER CALC-MCNC: 2.4 G/DL (ref 1.9–3.5)
GLUCOSE SERPL-MCNC: 91 MG/DL (ref 65–99)
HDLC SERPL-MCNC: 24 MG/DL
LDLC SERPL CALC-MCNC: 34 MG/DL
POTASSIUM SERPL-SCNC: 4.7 MMOL/L (ref 3.6–5.5)
PROT SERPL-MCNC: 6.6 G/DL (ref 6–8.2)
SODIUM SERPL-SCNC: 138 MMOL/L (ref 135–145)
TRIGL SERPL-MCNC: 90 MG/DL (ref 0–149)

## 2020-09-23 PROCEDURE — 80053 COMPREHEN METABOLIC PANEL: CPT

## 2020-09-23 PROCEDURE — 80061 LIPID PANEL: CPT

## 2020-09-23 PROCEDURE — 36415 COLL VENOUS BLD VENIPUNCTURE: CPT

## 2020-10-01 ENCOUNTER — OFFICE VISIT (OUTPATIENT)
Dept: CARDIOLOGY | Facility: MEDICAL CENTER | Age: 70
End: 2020-10-01
Payer: MEDICARE

## 2020-10-01 VITALS
SYSTOLIC BLOOD PRESSURE: 110 MMHG | HEART RATE: 70 BPM | DIASTOLIC BLOOD PRESSURE: 66 MMHG | WEIGHT: 166 LBS | OXYGEN SATURATION: 98 % | BODY MASS INDEX: 26.06 KG/M2 | HEIGHT: 67 IN

## 2020-10-01 DIAGNOSIS — I25.10 CORONARY ARTERY DISEASE DUE TO CALCIFIED CORONARY LESION: ICD-10-CM

## 2020-10-01 DIAGNOSIS — I25.2 HISTORY OF MI (MYOCARDIAL INFARCTION): Chronic | ICD-10-CM

## 2020-10-01 DIAGNOSIS — Z79.899 ON ANGIOTENSIN-CONVERTING ENZYME (ACE) INHIBITORS: ICD-10-CM

## 2020-10-01 DIAGNOSIS — E78.6 LOW HDL (UNDER 40): ICD-10-CM

## 2020-10-01 DIAGNOSIS — Z95.5 STENTED CORONARY ARTERY: ICD-10-CM

## 2020-10-01 DIAGNOSIS — Z72.0 TOBACCO ABUSE: Chronic | ICD-10-CM

## 2020-10-01 DIAGNOSIS — I25.84 CORONARY ARTERY DISEASE DUE TO CALCIFIED CORONARY LESION: ICD-10-CM

## 2020-10-01 PROCEDURE — 99214 OFFICE O/P EST MOD 30 MIN: CPT | Performed by: INTERNAL MEDICINE

## 2020-10-01 RX ORDER — METOPROLOL SUCCINATE 25 MG/1
25 TABLET, EXTENDED RELEASE ORAL DAILY
Qty: 90 TAB | Refills: 3 | Status: SHIPPED | OUTPATIENT
Start: 2020-10-01 | End: 2020-12-01

## 2020-10-01 RX ORDER — LISINOPRIL 2.5 MG/1
2.5 TABLET ORAL DAILY
Qty: 90 TAB | Refills: 0 | Status: SHIPPED | OUTPATIENT
Start: 2020-10-01 | End: 2021-03-08

## 2020-10-01 RX ORDER — ATORVASTATIN CALCIUM 80 MG/1
80 TABLET, FILM COATED ORAL DAILY
Qty: 90 TAB | Refills: 3 | Status: SHIPPED | OUTPATIENT
Start: 2020-10-01 | End: 2021-12-03 | Stop reason: SDUPTHER

## 2020-10-01 RX ORDER — PRASUGREL 10 MG/1
10 TABLET, FILM COATED ORAL DAILY
Qty: 90 TAB | Refills: 3 | Status: SHIPPED | OUTPATIENT
Start: 2020-10-01 | End: 2021-10-05 | Stop reason: SDUPTHER

## 2020-10-01 ASSESSMENT — ENCOUNTER SYMPTOMS
MUSCULOSKELETAL NEGATIVE: 1
CARDIOVASCULAR NEGATIVE: 1
SHORTNESS OF BREATH: 0
GASTROINTESTINAL NEGATIVE: 1
NEUROLOGICAL NEGATIVE: 1
RESPIRATORY NEGATIVE: 1

## 2020-10-01 ASSESSMENT — FIBROSIS 4 INDEX: FIB4 SCORE: 1.75

## 2020-10-01 NOTE — PROGRESS NOTES
Chief Complaint   Patient presents with   • Follow-Up     3 Month Follow Up, Coronary Artery Disease, CHF       Subjective:   Orlando Arreguin is a 70 y.o. male who presents today for follow-up of coronary disease with stenting of his RCA with a 3.5 x 15 mm [place] stent in June, 2020.  At that time he presented with a non-STEMI.  Prior to that he had a 3.5 x 15 mm Xience stent placed in his ostial LAD in 2015.  He has OM1 is noted to be closed by most recent angios.  He has had no chest pain or exertional dyspnea.  He notes some fatigue.  The patient is unfortunately still smoking 1/2 pack of cigarettes daily although he is using Nicorette teen patch.  Lab from September 23 reveals LDL of 34, HDL 24 and non-HDL cholesterol of 52    Past Medical History:   Diagnosis Date   • Coronary artery disease due to calcified coronary lesion 8/11/2015   • Diabetes mellitus type II    • Gastroparesis    • Hyperlipidemia    • Hypothyroid    • Interstitial cystitis    • Sciatic neuritis      Past Surgical History:   Procedure Laterality Date   • [facility] CARDIAC CATH  8/6/15    YESIKA to LAD.  Has RCA 70% occulsion.   • APPENDECTOMY     • OTHER      L shoulder surgery (arthroscopic, [place], 2011)   • PB TRANSURETHRAL ELEC-SURG PBOSTATECTOM       Family History   Problem Relation Age of Onset   • Heart Disease Mother    • Heart Attack Mother 55        heart attack     Social History     Socioeconomic History   • Marital status: Single     Spouse name: Not on file   • Number of children: Not on file   • Years of education: Not on file   • Highest education level: Not on file   Occupational History   • Not on file   Social Needs   • Financial resource strain: Not on file   • Food insecurity     Worry: Not on file     Inability: Not on file   • Transportation needs     Medical: Not on file     Non-medical: Not on file   Tobacco Use   • Smoking status: Current Every Day Smoker     Packs/day: 1.00     Years: 30.00     Pack years: 30.00     Types:  Cigarettes   • Smokeless tobacco: Never Used   Substance and Sexual Activity   • Alcohol use: No     Alcohol/week: 0.0 oz   • Drug use: No   • Sexual activity: Not on file   Lifestyle   • Physical activity     Days per week: Not on file     Minutes per session: Not on file   • Stress: Not on file   Relationships   • Social connections     Talks on phone: Not on file     Gets together: Not on file     Attends Orthodoxy service: Not on file     Active member of club or organization: Not on file     Attends meetings of clubs or organizations: Not on file     Relationship status: Not on file   • Intimate partner violence     Fear of current or ex partner: Not on file     Emotionally abused: Not on file     Physically abused: Not on file     Forced sexual activity: Not on file   Other Topics Concern   • Not on file   Social History Narrative   • Not on file     Allergies   Allergen Reactions   • Brilinta [Ticagrelor] Shortness of Breath     Total Sleep Apnea per patient; SOB   • Pcn [Penicillins] Anaphylaxis   • Other Environmental      Hayfever   • Tetanus Toxoid      Outpatient Encounter Medications as of 10/1/2020   Medication Sig Dispense Refill   • atorvastatin (LIPITOR) 80 MG tablet Take 1 Tab by mouth every day. 90 Tab 3   • lisinopril (PRINIVIL) 2.5 MG Tab Take 1 Tab by mouth every day. 90 Tab 0   • metoprolol SR (TOPROL XL) 25 MG TABLET SR 24 HR Take 1 Tab by mouth every day. 90 Tab 3   • prasugrel (EFFIENT) 10 MG Tab Take 1 Tab by mouth every day. 90 Tab 3   • metformin (GLUCOPHAGE) 1000 MG tablet Take 1 Tab by mouth 2 times a day, with meals. 180 Tab 3   • erythromycin base (UMM-TAB) 250 MG Tab Take 1 Tab by mouth 2 Times a Day. 180 Tab 3   • Lancets Lancets order: Lancets for relion prime strips and meter, check blood sugar once daily in am,E11.9 100 Each 11   • glucose blood (RELION PRIME TEST) strip 1 Strip by Other route every day. Check blood sugar before meal, E11.9 100 Each 11   • Insulin Pen Needle  "(RELION PEN NEEDLES) 31G X 6 MM Misc 1 Device by Does not apply route every day. Use with Lantus pen subcutaneously qday, E11.9 100 Each 11   • metoprolol SR (TOPROL XL) 50 MG TABLET SR 24 HR      • nicotine (NICODERM) 14 MG/24HR PATCH 24 HR Apply 1 Patch to skin as directed every 24 hours.     • aspirin EC 81 MG EC tablet Take 1 Tab by mouth every day. 30 Tab 0   • Cholecalciferol (VITAMIN D3 PO) Take 1 Dose by mouth every day. Unknown OTC Strength.     • levothyroxine (SYNTHROID) 112 MCG Tab Take 112 mcg by mouth Every morning on an empty stomach.     • liothyronine (CYTOMEL) 25 MCG Tab Take 12.5 mcg by mouth 2 Times a Day.     • erythromycin base (UMM-TAB) 250 MG Tab Take 250 mg by mouth every day. Maintenance Medication.     • insulin glargine (LANTUS SOLOSTAR) 100 UNIT/ML Solution Pen-injector injection Inject 12 Units as instructed every evening. 15 mL 6   • Riboflavin (VITAMIN B-2 PO) Take 1 Dose by mouth every day. Unknown OTC Strength.     • [DISCONTINUED] lisinopril (PRINIVIL) 2.5 MG Tab Take 1 Tab by mouth every day. 90 Tab 0   • [DISCONTINUED] atorvastatin (LIPITOR) 80 MG tablet Take 1 Tab by mouth every day. 90 Tab 3   • [DISCONTINUED] metoprolol SR (TOPROL XL) 50 MG TABLET SR 24 HR Take 1 Tab by mouth every day. 90 Tab 2   • [DISCONTINUED] prasugrel (EFFIENT) 10 MG Tab Take 1 Tab by mouth every day. 30 Tab 11     No facility-administered encounter medications on file as of 10/1/2020.      Review of Systems   Constitutional: Positive for malaise/fatigue.   HENT: Negative.    Respiratory: Negative.  Negative for shortness of breath.    Cardiovascular: Negative.    Gastrointestinal: Negative.    Musculoskeletal: Negative.    Skin: Negative.    Neurological: Negative.    Endo/Heme/Allergies: Negative.    Psychiatric/Behavioral:        Patient continues to smoke cigarettes        Objective:   /66 (BP Location: Left arm, Patient Position: Sitting, BP Cuff Size: Adult)   Pulse 70   Ht 1.702 m (5' 7\")  "  Wt 75.3 kg (166 lb)   SpO2 98%   BMI 26.00 kg/m²     Physical Exam   Constitutional: He is oriented to person, place, and time. He appears well-nourished. No distress.   HENT:   Head: Normocephalic and atraumatic.   Eyes: Pupils are equal, round, and reactive to light. No scleral icterus.   Neck: No JVD present. No tracheal deviation present.   Cardiovascular: Normal rate and regular rhythm.   No murmur heard.  Pulmonary/Chest: Breath sounds normal. No respiratory distress. He has no wheezes.   Abdominal: Soft. Bowel sounds are normal. He exhibits no distension. There is no abdominal tenderness.   Musculoskeletal:         General: No edema.   Neurological: He is alert and oriented to person, place, and time.   Skin: Skin is warm and dry.   Psychiatric: He has a normal mood and affect.       Assessment:     1. Coronary artery disease due to calcified coronary lesion: LAD stent in August 2015  metoprolol SR (TOPROL XL) 25 MG TABLET SR 24 HR   2. Tobacco abuse     3. On angiotensin-converting enzyme (ACE) inhibitors (for diabetes, not HTN)     4. History of MI (myocardial infarction)     5. Stented coronary artery     6. Low HDL (under 40)         Medical Decision Making:  Today's Assessment / Status / Plan:   Coronary disease: Patient has multivessel coronary disease with stenting of his ostial LAD in 2015 and recent stenting of his mid RCA last June.  He has had no angina at all.    Cigarette abuse: Smoking cessation again discussed with patient.  He does not have a stop date in mind.    Mixed hyperlipidemia: Patient has low HDL this appears to be hereditary.  LDL is at target.    Fatigue: Etiology is uncertain.  He is on beta-blockade and will reduce his metoprolol to 25 mg a day.  Return in 6 months.

## 2020-10-13 ENCOUNTER — HOSPITAL ENCOUNTER (OUTPATIENT)
Dept: LAB | Facility: MEDICAL CENTER | Age: 70
End: 2020-10-13
Attending: INTERNAL MEDICINE
Payer: MEDICARE

## 2020-10-13 DIAGNOSIS — E03.9 ACQUIRED HYPOTHYROIDISM: Chronic | ICD-10-CM

## 2020-10-13 LAB
T3FREE SERPL-MCNC: 3.25 PG/ML (ref 2–4.4)
T4 FREE SERPL-MCNC: 1.01 NG/DL (ref 0.93–1.7)
TSH SERPL DL<=0.005 MIU/L-ACNC: 0.01 UIU/ML (ref 0.38–5.33)

## 2020-10-13 PROCEDURE — 84443 ASSAY THYROID STIM HORMONE: CPT

## 2020-10-13 PROCEDURE — 84481 FREE ASSAY (FT-3): CPT

## 2020-10-13 PROCEDURE — 36415 COLL VENOUS BLD VENIPUNCTURE: CPT

## 2020-10-13 PROCEDURE — 84439 ASSAY OF FREE THYROXINE: CPT

## 2020-10-20 ENCOUNTER — OFFICE VISIT (OUTPATIENT)
Dept: ENDOCRINOLOGY | Facility: MEDICAL CENTER | Age: 70
End: 2020-10-20
Attending: INTERNAL MEDICINE
Payer: MEDICARE

## 2020-10-20 VITALS
OXYGEN SATURATION: 97 % | HEART RATE: 71 BPM | WEIGHT: 166 LBS | HEIGHT: 68 IN | DIASTOLIC BLOOD PRESSURE: 68 MMHG | SYSTOLIC BLOOD PRESSURE: 112 MMHG | BODY MASS INDEX: 25.16 KG/M2

## 2020-10-20 DIAGNOSIS — E03.9 ACQUIRED HYPOTHYROIDISM: Chronic | ICD-10-CM

## 2020-10-20 PROCEDURE — 99213 OFFICE O/P EST LOW 20 MIN: CPT | Performed by: INTERNAL MEDICINE

## 2020-10-20 PROCEDURE — 99211 OFF/OP EST MAY X REQ PHY/QHP: CPT | Performed by: INTERNAL MEDICINE

## 2020-10-20 ASSESSMENT — FIBROSIS 4 INDEX: FIB4 SCORE: 1.75

## 2020-10-20 NOTE — PROGRESS NOTES
Chief Complaint   Patient presents with   • Hypothyroidism        HPI:    Hypothyroidism           In the interval the patient had a significant development.  He had a myocardial infarction  in June.  He had a significant stenosis which was stented.  He now feels he is doing very well.  No congestive heart failure.  No residual chest pain.  More importantly no cardiac arrhythmia or tachy arrhythmias.          During hospitalization TSH was determined to be low at 0.006.  No indication by the cardiologist that they felt he should reduce his thyroid dose or at least no discussion.  In any event he would not do that anyway  because he believes this is essential to treat his gastroparesis..                Current TSH is 0.01 and free T4 is low normal at 0.1 (0.9-1.7) and free T3 is mid range at 3.2 (2.0-4.4).  He is comfortable with this dose.  Currently he is not tachycardic and no chest pain.   He believes he cannot lower his Cytomel dose.    ROS:  Currently very concerned about the COVID-19 epidemic and its lethal nature.  He is taking protective precautions. He has a feeling this is going to be a long-term serious epidemic.      Allergies:   Allergies   Allergen Reactions   • Brilinta [Ticagrelor] Shortness of Breath     Total Sleep Apnea per patient; SOB   • Pcn [Penicillins] Anaphylaxis   • Other Environmental      Hayfever   • Tetanus Toxoid        Current medicines including changes today:  Current Outpatient Medications   Medication Sig Dispense Refill   • atorvastatin (LIPITOR) 80 MG tablet Take 1 Tab by mouth every day. 90 Tab 3   • lisinopril (PRINIVIL) 2.5 MG Tab Take 1 Tab by mouth every day. 90 Tab 0   • metoprolol SR (TOPROL XL) 25 MG TABLET SR 24 HR Take 1 Tab by mouth every day. 90 Tab 3   • prasugrel (EFFIENT) 10 MG Tab Take 1 Tab by mouth every day. 90 Tab 3   • metformin (GLUCOPHAGE) 1000 MG tablet Take 1 Tab by mouth 2 times a day, with meals. 180 Tab 3   • erythromycin base (UMM-TAB) 250 MG Tab  "Take 1 Tab by mouth 2 Times a Day. 180 Tab 3   • Lancets Lancets order: Lancets for relion prime strips and meter, check blood sugar once daily in am,E11.9 100 Each 11   • glucose blood (RELION PRIME TEST) strip 1 Strip by Other route every day. Check blood sugar before meal, E11.9 100 Each 11   • Insulin Pen Needle (RELION PEN NEEDLES) 31G X 6 MM Misc 1 Device by Does not apply route every day. Use with Lantus pen subcutaneously qday, E11.9 100 Each 11   • metoprolol SR (TOPROL XL) 50 MG TABLET SR 24 HR      • nicotine (NICODERM) 14 MG/24HR PATCH 24 HR Apply 1 Patch to skin as directed every 24 hours.     • aspirin EC 81 MG EC tablet Take 1 Tab by mouth every day. 30 Tab 0   • Cholecalciferol (VITAMIN D3 PO) Take 1 Dose by mouth every day. Unknown OTC Strength.     • levothyroxine (SYNTHROID) 112 MCG Tab Take 112 mcg by mouth Every morning on an empty stomach.     • liothyronine (CYTOMEL) 25 MCG Tab Take 12.5 mcg by mouth 2 Times a Day.     • erythromycin base (UMM-TAB) 250 MG Tab Take 250 mg by mouth every day. Maintenance Medication.     • insulin glargine (LANTUS SOLOSTAR) 100 UNIT/ML Solution Pen-injector injection Inject 12 Units as instructed every evening. 15 mL 6   • Riboflavin (VITAMIN B-2 PO) Take 1 Dose by mouth every day. Unknown OTC Strength.       No current facility-administered medications for this visit.         Past Medical History:   Diagnosis Date   • Coronary artery disease due to calcified coronary lesion 8/11/2015   • Diabetes mellitus type II    • Gastroparesis    • Hyperlipidemia    • Hypothyroid    • Interstitial cystitis    • Sciatic neuritis        PHYSICAL EXAM:    /68 (BP Location: Left arm, Patient Position: Sitting, BP Cuff Size: Adult)   Pulse 71   Ht 1.727 m (5' 8\")   Wt 75.3 kg (166 lb)   SpO2 97%   BMI 25.24 kg/m²   Examination is limited due to Covid 19 restrictions and his desire to stay     Gen.   appears healthy and comfortable    Skin   appropriate for " sex and age    HEENT  unremarkable    Heart  regular    Extremities  no edema    Neuro  gait and station normal    Psych  appropriate, calm, pleasant    ASSESSMENT AND RECOMMENDATIONS    1. Acquired hypothyroidism             Treatment is intimately related to treating his gastroparesis.  See HPI     2.  Coronary artery disease                Recent second myocardial infarction and stenting.  Doing well currently.                No inclination to decrease the Cytomel dosing.    DISPOSITION:   If stable return in 6 months      Alex Stein M.D.    Copies to: Charbel Jaffe M.D. [phone]                   Dr. Jean Martinez

## 2020-11-02 DIAGNOSIS — E03.9 ACQUIRED HYPOTHYROIDISM: ICD-10-CM

## 2020-11-02 RX ORDER — LIOTHYRONINE SODIUM 25 UG/1
TABLET ORAL
Qty: 90 TAB | Refills: 0 | Status: SHIPPED | OUTPATIENT
Start: 2020-11-02 | End: 2020-11-16

## 2020-11-15 DIAGNOSIS — E03.9 ACQUIRED HYPOTHYROIDISM: ICD-10-CM

## 2020-11-16 RX ORDER — LIOTHYRONINE SODIUM 25 UG/1
TABLET ORAL
Qty: 90 TAB | Refills: 0 | Status: SHIPPED | OUTPATIENT
Start: 2020-11-16 | End: 2021-04-26 | Stop reason: SDUPTHER

## 2020-11-22 PROBLEM — E78.6 LOW HDL (UNDER 40): Status: RESOLVED | Noted: 2020-10-01 | Resolved: 2020-11-22

## 2020-11-30 ENCOUNTER — TELEPHONE (OUTPATIENT)
Dept: CARDIOLOGY | Facility: MEDICAL CENTER | Age: 70
End: 2020-11-30

## 2020-11-30 NOTE — TELEPHONE ENCOUNTER
Pt. Notified via My Chart.Message  Received: Today  Message Contents   Jean Goldstein M.D.  Norma Mitchell L.P.N.   Caller: Unspecified (Today, 10:25 AM)             I reduce metoprolol by half last visit.  His finger color changes may be due to his cigarette smoking which I strongly urge he quit.  Would like to continue him on his reduced dose of metoprolol if at all possible

## 2020-11-30 NOTE — TELEPHONE ENCOUNTER
See my response to message.  To Dr. Goldstein.    Orlando Arreguin Richard P, M.D. 13 hours ago (9:17 PM)        Dear Dr. Goldstein,     I am having problems that I did not know were known side effects of Metoprolol until I found them on the [facility] Web site at:  [email or web address]     1.  The tips of my fingers are darker than the rest of their respective fingers.     2.  My hands get cold very easily. When they are not cold I have a burning feeling. And my palms tend to itch.     3.  The skin on my upper arms and thighs is very dry and it itches. A moisturizer doesn’t help. I thought it was from using nicotine patches. It isn’t.     4.  I am sleeping even worse than before.     There isn't room here to list the other problems.     I think I need to stop taking Metoprolol.     Orlando Arreguin

## 2020-12-01 ENCOUNTER — TELEPHONE (OUTPATIENT)
Dept: CARDIOLOGY | Facility: MEDICAL CENTER | Age: 70
End: 2020-12-01

## 2020-12-01 ENCOUNTER — TELEMEDICINE (OUTPATIENT)
Dept: CARDIOLOGY | Facility: MEDICAL CENTER | Age: 70
End: 2020-12-01
Payer: MEDICARE

## 2020-12-01 DIAGNOSIS — Z95.5 STENTED CORONARY ARTERY: ICD-10-CM

## 2020-12-01 DIAGNOSIS — I25.2 HISTORY OF MI (MYOCARDIAL INFARCTION): Chronic | ICD-10-CM

## 2020-12-01 DIAGNOSIS — I25.10 CORONARY ARTERY DISEASE DUE TO CALCIFIED CORONARY LESION: ICD-10-CM

## 2020-12-01 DIAGNOSIS — Z72.0 TOBACCO ABUSE: Chronic | ICD-10-CM

## 2020-12-01 DIAGNOSIS — I25.84 CORONARY ARTERY DISEASE DUE TO CALCIFIED CORONARY LESION: ICD-10-CM

## 2020-12-01 DIAGNOSIS — E78.5 DYSLIPIDEMIA: Chronic | ICD-10-CM

## 2020-12-01 PROCEDURE — 99214 OFFICE O/P EST MOD 30 MIN: CPT | Mod: 95,CR | Performed by: NURSE PRACTITIONER

## 2020-12-01 ASSESSMENT — ENCOUNTER SYMPTOMS
DIARRHEA: 1
PND: 0
COUGH: 0
ABDOMINAL PAIN: 0
LOSS OF CONSCIOUSNESS: 0
DIZZINESS: 0
CHILLS: 0
FEVER: 0
ORTHOPNEA: 0
MYALGIAS: 0
HEADACHES: 0
SHORTNESS OF BREATH: 0
PALPITATIONS: 0
BRUISES/BLEEDS EASILY: 0
NAUSEA: 0
SENSORY CHANGE: 1
INSOMNIA: 0

## 2020-12-01 NOTE — PROGRESS NOTES
Telemedicine: Established Patient   This evaluation was conducted via Zoom using secure and encrypted videoconferencing technology. The patient was in a private location in the state [facility].    The patient's identity was confirmed and verbal consent was obtained for this virtual visit.    Subjective:   CC:   Chief Complaint   Patient presents with   • Follow-Up   • Medication Problem   • Coronary Artery Disease   • Hyperlipidemia       Orlando Arreguin is a 70 y.o. male presenting for evaluation and management of side effects of metoprolol.    Orlando is a 70 year old male with history of CAD, status post PCI/YESIKA to the LAD in 2015 and more recent PCI/YESIKA to the mid RCA in June 2020; he also has hyperlipidemia, diabetes,  hyperthyroidism, and chronic tobacco use, and is followed by Dr. Goldstein, and last seen in October 2020.    Since being on Metoprolol since June 2020, he has had progressive dry skin and discoloration of his fingers, as well as his hands get cold very easily. He has tried moisturizers and cortisone cream, with little relief.  He also notes some diarrhea and weight loss. He has already tried cutting the dose from 25mg to 12.5mg once daily, with little relief. He denies any chest pain, pressure or discomfort; no palpitations; no shortness of breath; no dizziness or syncope; no LE edema. He is still smoking 1+ ppd; he tried the Nicotine patch, but this caused itching and a rash. He states he is interested in quitting smoking.    Review of Systems   Constitutional: Negative for chills and fever.   HENT: Negative for congestion.    Respiratory: Negative for cough and shortness of breath.    Cardiovascular: Negative for chest pain, palpitations, orthopnea, leg swelling and PND.   Gastrointestinal: Positive for diarrhea. Negative for abdominal pain and nausea.   Musculoskeletal: Negative for myalgias.   Skin: Positive for itching and rash.   Neurological: Positive for sensory change. Negative for dizziness, loss  of consciousness and headaches.   Endo/Heme/Allergies: Does not bruise/bleed easily.   Psychiatric/Behavioral: The patient does not have insomnia.          Allergies   Allergen Reactions   • Brilinta [Ticagrelor] Shortness of Breath     Total Sleep Apnea per patient; SOB   • Pcn [Penicillins] Anaphylaxis   • Other Environmental      Hayfever   • Tetanus Toxoid        Current medicines (including changes today)  Current Outpatient Medications   Medication Sig Dispense Refill   • ALPRAZolam (XANAX) 0.25 MG Tab TAKE 1 TABLET BY MOUTH AT BEDTIME AS NEEDED FOR SLEEP FOR  UP  TO  30  DAYS 30 Tab 0   • liothyronine (CYTOMEL) 25 MCG Tab Take 1/2 (one-half) tablet by mouth twice daily 90 Tab 0   • atorvastatin (LIPITOR) 80 MG tablet Take 1 Tab by mouth every day. 90 Tab 3   • lisinopril (PRINIVIL) 2.5 MG Tab Take 1 Tab by mouth every day. 90 Tab 0   • prasugrel (EFFIENT) 10 MG Tab Take 1 Tab by mouth every day. 90 Tab 3   • metformin (GLUCOPHAGE) 1000 MG tablet Take 1 Tab by mouth 2 times a day, with meals. 180 Tab 3   • erythromycin base (UMM-TAB) 250 MG Tab Take 1 Tab by mouth 2 Times a Day. 180 Tab 3   • Lancets Lancets order: Lancets for relion prime strips and meter, check blood sugar once daily in am,E11.9 100 Each 11   • glucose blood (RELION PRIME TEST) strip 1 Strip by Other route every day. Check blood sugar before meal, E11.9 100 Each 11   • Insulin Pen Needle (RELION PEN NEEDLES) 31G X 6 MM Misc 1 Device by Does not apply route every day. Use with Lantus pen subcutaneously qday, E11.9 100 Each 11   • nicotine (NICODERM) 14 MG/24HR PATCH 24 HR Apply 1 Patch to skin as directed every 24 hours.     • aspirin EC 81 MG EC tablet Take 1 Tab by mouth every day. 30 Tab 0   • Cholecalciferol (VITAMIN D3 PO) Take 1 Dose by mouth every day. Unknown OTC Strength.     • levothyroxine (SYNTHROID) 112 MCG Tab Take 112 mcg by mouth Every morning on an empty stomach.     • erythromycin base (UMM-TAB) 250 MG Tab Take 250 mg by  mouth every day. Maintenance Medication.     • insulin glargine (LANTUS SOLOSTAR) 100 UNIT/ML Solution Pen-injector injection Inject 12 Units as instructed every evening. 15 mL 6   • Riboflavin (VITAMIN B-2 PO) Take 1 Dose by mouth every day. Unknown OTC Strength.       No current facility-administered medications for this visit.        Patient Active Problem List    Diagnosis Date Noted   • Stented coronary artery 10/01/2020     Priority: High   • PVD (peripheral vascular disease) (HCC) 12/28/2017     Priority: High   • Coronary artery disease due to calcified coronary lesion 07/12/2017     Priority: High   • History of MI (myocardial infarction) 08/06/2015     Priority: High   • Dyslipidemia 05/07/2013     Priority: High   • Mild non proliferative diabetic retinopathy (HCC) 06/04/2019     Priority: Medium   • Tobacco abuse 07/29/2015     Priority: Medium   • Insomnia 04/18/2012   • Preventative health care 12/09/2010   • history shoulder pain 12/09/2010   • History of allergic rhinitis 08/21/2010   • Gastroparesis 08/21/2010   • History of BPH 08/21/2010   • Interstitial cystitis 08/21/2010   • Postpolio syndrome 08/21/2010   • Hypothyroid 08/21/2010   • On angiotensin-converting enzyme (ACE) inhibitors (for diabetes, not HTN) 08/21/2010       Family History   Problem Relation Age of Onset   • Heart Disease Mother    • Heart Attack Mother 55        heart attack       He  has a past medical history of Coronary artery disease due to calcified coronary lesion (8/11/2015), Diabetes mellitus type II, Gastroparesis, Hyperlipidemia, Hypothyroid, Interstitial cystitis, and Sciatic neuritis.  He  has a past surgical history that includes appendectomy; other; pr transurethral elec-surg prostatectom; and zzz cardiac cath (8/6/15).       Objective:   There were no vitals taken for this visit.    Physical Exam   Constitutional: He is oriented to person, place, and time and well-developed, well-nourished, and in no distress.    HENT:   Head: Normocephalic and atraumatic.   Neck: Normal range of motion.   Pulmonary/Chest: Effort normal.   Musculoskeletal: Normal range of motion.   Neurological: He is alert and oriented to person, place, and time.   Psychiatric: Mood, memory, affect and judgment normal.     IMPRESSIONS OF CORONARY ANGIOGRAM OF 6/20/2020:  1. NSTEMI due to severe stenosis in the mid RCA by FFR  2. Successful PCI of the mid RCA using one YESIKA  3. Widely patent left main-LAD stent  4. Ambiguous stenosis at the circumflex origin- probably 50% stenosis  5. Severe elevation in LVEDP  6. Ongoing tobacco abuse    IMPRESSIONS OF CORONARY ANGIOGRAM OF 8/6/2015:  1.  Triple vessel coronary artery disease, but predominantly involved distal   segment of the major epicardial vessels and branches vessels with 95% ostial   left anterior descending artery stenosis and 70% mid right coronary   stenosis.  2.  Hypokinetic anterolateral wall with moderately reduced left ventricular   systolic function.  Ejection fraction in the range of 35%-40%.  3.  Status post stenting of the ostial left anterior descending artery with a   3.5 x 15 mm Xience Alpine drug-eluting stent.  4.  Diabetes mellitus.    CONCLUSIONS OF ECHOCARDIOGRAM OF 6/19/2020:  Compared to the report of the study done 08/08/2015 there has been.   Mild concentric left ventricular hypertrophy.  Normal left ventricular systolic function.  Left ventricular ejection fraction is visually estimated to be 65%.  Subtle inferior hypokinesis   Normal diastolic function.  Unable to estimate pulmonary artery pressure due to an inadequate   tricuspid regurgitant jet.    Lab Results   Component Value Date/Time    CHOLSTRLTOT 76 (L) 09/23/2020 10:09 AM    LDL 34 09/23/2020 10:09 AM    HDL 24 (A) 09/23/2020 10:09 AM    TRIGLYCERIDE 90 09/23/2020 10:09 AM       Lab Results   Component Value Date/Time    SODIUM 138 09/23/2020 10:09 AM    POTASSIUM 4.7 09/23/2020 10:09 AM    CHLORIDE 107 09/23/2020  10:09 AM    CO2 20 09/23/2020 10:09 AM    GLUCOSE 91 09/23/2020 10:09 AM    BUN 24 (H) 09/23/2020 10:09 AM    CREATININE 1.04 09/23/2020 10:09 AM    BUNCREATRAT 21 12/03/2018 11:25 AM     Lab Results   Component Value Date/Time    ALKPHOSPHAT 104 (H) 09/23/2020 10:09 AM    ASTSGOT 21 09/23/2020 10:09 AM    ALTSGPT 23 09/23/2020 10:09 AM    TBILIRUBIN 0.4 09/23/2020 10:09 AM           Assessment and Plan:   The following treatment plan was discussed:     1. Side effects from Metoprolol  Given ongoing side effects, to try discontinuing Metoprolol completely. To notify us if any improvement (or worsening) of symptoms.    2.Coronary artery disease due to calcified coronary lesions, with stented coronary arteries  He remains on ASA, Effient and ACEI, along with statin.    3. History of MI (myocardial infarction)    4. Dyslipidemia  Treated with Lipitor. Recent LDL was 34.    5. Tobacco abuse  He is encouraged to keep working at quitting smoking. Explained that this may be contributing to his sensory changes in his fingers and discoloration.    He will be getting lab work done next week, and has FU with his PCP on 12/15.  To keep March 2021 FU with Dr. Goldstein; FU sooner if clinical condition changes or worsens.      Follow-up: Return for Keep March 2020 FU with Dr. Goldstein..

## 2020-12-07 ENCOUNTER — HOSPITAL ENCOUNTER (OUTPATIENT)
Dept: LAB | Facility: MEDICAL CENTER | Age: 70
End: 2020-12-07
Attending: INTERNAL MEDICINE
Payer: MEDICARE

## 2020-12-07 DIAGNOSIS — E78.5 DYSLIPIDEMIA: Chronic | ICD-10-CM

## 2020-12-07 DIAGNOSIS — E05.90 HYPERTHYROIDISM: ICD-10-CM

## 2020-12-07 DIAGNOSIS — E03.9 HYPOTHYROIDISM, UNSPECIFIED TYPE: ICD-10-CM

## 2020-12-07 DIAGNOSIS — E11.9 DIABETES MELLITUS WITHOUT COMPLICATION (HCC): ICD-10-CM

## 2020-12-07 DIAGNOSIS — Z11.59 NEED FOR HEPATITIS C SCREENING TEST: ICD-10-CM

## 2020-12-07 LAB
ALBUMIN SERPL BCP-MCNC: 4.3 G/DL (ref 3.2–4.9)
ALBUMIN/GLOB SERPL: 1.7 G/DL
ALP SERPL-CCNC: 115 U/L (ref 30–99)
ALT SERPL-CCNC: 44 U/L (ref 2–50)
ANION GAP SERPL CALC-SCNC: 10 MMOL/L (ref 7–16)
APPEARANCE UR: CLEAR
AST SERPL-CCNC: 37 U/L (ref 12–45)
BASOPHILS # BLD AUTO: 0.7 % (ref 0–1.8)
BASOPHILS # BLD: 0.04 K/UL (ref 0–0.12)
BILIRUB SERPL-MCNC: 0.4 MG/DL (ref 0.1–1.5)
BILIRUB UR QL STRIP.AUTO: NEGATIVE
BUN SERPL-MCNC: 18 MG/DL (ref 8–22)
CALCIUM SERPL-MCNC: 9.6 MG/DL (ref 8.5–10.5)
CHLORIDE SERPL-SCNC: 109 MMOL/L (ref 96–112)
CHOLEST SERPL-MCNC: 72 MG/DL (ref 100–199)
CO2 SERPL-SCNC: 23 MMOL/L (ref 20–33)
COLOR UR: YELLOW
CREAT SERPL-MCNC: 0.94 MG/DL (ref 0.5–1.4)
CREAT UR-MCNC: 79.88 MG/DL
EOSINOPHIL # BLD AUTO: 0.1 K/UL (ref 0–0.51)
EOSINOPHIL NFR BLD: 1.7 % (ref 0–6.9)
ERYTHROCYTE [DISTWIDTH] IN BLOOD BY AUTOMATED COUNT: 47.7 FL (ref 35.9–50)
EST. AVERAGE GLUCOSE BLD GHB EST-MCNC: 123 MG/DL
GLOBULIN SER CALC-MCNC: 2.6 G/DL (ref 1.9–3.5)
GLUCOSE SERPL-MCNC: 113 MG/DL (ref 65–99)
GLUCOSE UR STRIP.AUTO-MCNC: NEGATIVE MG/DL
HBA1C MFR BLD: 5.9 % (ref 0–5.6)
HCT VFR BLD AUTO: 44.7 % (ref 42–52)
HCV AB SER QL: NORMAL
HDLC SERPL-MCNC: 23 MG/DL
HGB BLD-MCNC: 14.3 G/DL (ref 14–18)
IMM GRANULOCYTES # BLD AUTO: 0.02 K/UL (ref 0–0.11)
IMM GRANULOCYTES NFR BLD AUTO: 0.3 % (ref 0–0.9)
KETONES UR STRIP.AUTO-MCNC: NEGATIVE MG/DL
LDLC SERPL CALC-MCNC: 36 MG/DL
LEUKOCYTE ESTERASE UR QL STRIP.AUTO: NEGATIVE
LYMPHOCYTES # BLD AUTO: 1.68 K/UL (ref 1–4.8)
LYMPHOCYTES NFR BLD: 28 % (ref 22–41)
MCH RBC QN AUTO: 28.9 PG (ref 27–33)
MCHC RBC AUTO-ENTMCNC: 32 G/DL (ref 33.7–35.3)
MCV RBC AUTO: 90.5 FL (ref 81.4–97.8)
MICRO URNS: NORMAL
MICROALBUMIN UR-MCNC: <1.2 MG/DL
MICROALBUMIN/CREAT UR: NORMAL MG/G (ref 0–30)
MONOCYTES # BLD AUTO: 0.51 K/UL (ref 0–0.85)
MONOCYTES NFR BLD AUTO: 8.5 % (ref 0–13.4)
NEUTROPHILS # BLD AUTO: 3.64 K/UL (ref 1.82–7.42)
NEUTROPHILS NFR BLD: 60.8 % (ref 44–72)
NITRITE UR QL STRIP.AUTO: NEGATIVE
NRBC # BLD AUTO: 0 K/UL
NRBC BLD-RTO: 0 /100 WBC
PH UR STRIP.AUTO: 5.5 [PH] (ref 5–8)
PLATELET # BLD AUTO: 215 K/UL (ref 164–446)
PMV BLD AUTO: 11.2 FL (ref 9–12.9)
POTASSIUM SERPL-SCNC: 4.8 MMOL/L (ref 3.6–5.5)
PROT SERPL-MCNC: 6.9 G/DL (ref 6–8.2)
PROT UR QL STRIP: NEGATIVE MG/DL
RBC # BLD AUTO: 4.94 M/UL (ref 4.7–6.1)
RBC UR QL AUTO: NEGATIVE
SODIUM SERPL-SCNC: 142 MMOL/L (ref 135–145)
SP GR UR STRIP.AUTO: 1.02
T3 SERPL-MCNC: 187 NG/DL (ref 60–181)
T4 FREE SERPL-MCNC: 1.23 NG/DL (ref 0.93–1.7)
TRIGL SERPL-MCNC: 65 MG/DL (ref 0–149)
TSH SERPL DL<=0.005 MIU/L-ACNC: 0.01 UIU/ML (ref 0.38–5.33)
UROBILINOGEN UR STRIP.AUTO-MCNC: 0.2 MG/DL
WBC # BLD AUTO: 6 K/UL (ref 4.8–10.8)

## 2020-12-07 PROCEDURE — 80061 LIPID PANEL: CPT

## 2020-12-07 PROCEDURE — 84480 ASSAY TRIIODOTHYRONINE (T3): CPT

## 2020-12-07 PROCEDURE — 86803 HEPATITIS C AB TEST: CPT

## 2020-12-07 PROCEDURE — 85025 COMPLETE CBC W/AUTO DIFF WBC: CPT

## 2020-12-07 PROCEDURE — 36415 COLL VENOUS BLD VENIPUNCTURE: CPT

## 2020-12-07 PROCEDURE — 82043 UR ALBUMIN QUANTITATIVE: CPT

## 2020-12-07 PROCEDURE — 84443 ASSAY THYROID STIM HORMONE: CPT

## 2020-12-07 PROCEDURE — 81003 URINALYSIS AUTO W/O SCOPE: CPT

## 2020-12-07 PROCEDURE — 84439 ASSAY OF FREE THYROXINE: CPT

## 2020-12-07 PROCEDURE — 83036 HEMOGLOBIN GLYCOSYLATED A1C: CPT | Mod: GA

## 2020-12-07 PROCEDURE — 82570 ASSAY OF URINE CREATININE: CPT

## 2020-12-07 PROCEDURE — 80053 COMPREHEN METABOLIC PANEL: CPT

## 2020-12-15 ENCOUNTER — OFFICE VISIT (OUTPATIENT)
Dept: MEDICAL GROUP | Facility: MEDICAL CENTER | Age: 70
End: 2020-12-15
Payer: MEDICARE

## 2020-12-15 VITALS
OXYGEN SATURATION: 97 % | HEART RATE: 85 BPM | SYSTOLIC BLOOD PRESSURE: 112 MMHG | TEMPERATURE: 97.2 F | DIASTOLIC BLOOD PRESSURE: 64 MMHG | HEIGHT: 67 IN | WEIGHT: 162 LBS | BODY MASS INDEX: 25.43 KG/M2

## 2020-12-15 DIAGNOSIS — Z72.0 TOBACCO ABUSE: Chronic | ICD-10-CM

## 2020-12-15 DIAGNOSIS — Z00.00 PREVENTATIVE HEALTH CARE: Chronic | ICD-10-CM

## 2020-12-15 DIAGNOSIS — E11.65 CONTROLLED TYPE 2 DIABETES MELLITUS WITH HYPERGLYCEMIA, WITHOUT LONG-TERM CURRENT USE OF INSULIN (HCC): Chronic | ICD-10-CM

## 2020-12-15 PROCEDURE — 99213 OFFICE O/P EST LOW 20 MIN: CPT | Performed by: INTERNAL MEDICINE

## 2020-12-15 ASSESSMENT — FIBROSIS 4 INDEX: FIB4 SCORE: 1.82

## 2020-12-15 NOTE — PROGRESS NOTES
Subjective:      Orlando Arreguin is a 70 y.o. male who presents with diabetes Follow-Up            HPI       Follow-up diabetes, on Lantus 12 units, Metformin 1000 mg twice daily, A1c 5.9% December 7, blodo sugar running 90 to 110 on the regimen, no hypoglycemia.  No history of retinopathy, neuropathy, nephropathy related to diabetes.  Followed by cardiology s/p catheterization in june, off metoprolol, still on asa and effient, lipitor, lisinopril, blood pressures stable running 100 to 110 over 60s, heart rate 70s and 80s has been checking blood sugars and blood pressures daily.  Remains on Lipitor 80 mg a day for dyslipidemia, no muscle pain or aches.  Chronic gastroparesis on erythromycin.  Remains on low-dose ACE inhibitor lisinopril 2.5 mg daily.  Still smoking 1/2 ppd working on [place] back, has decreased from one 4 pack/day.  Has tried nicotine patch but developed dry skin irritation related to the patch.  Peripheral vascular disease, as seen vascular surgery previously, no current calf pain.  No open sores or lesions lower extremities.  Has had cold fingers as well as occasional redness of his hands.  No open sores or lesions of his hands or fingers.  Chronic insomnia on Xanax as needed, does not use this regularly      Current Outpatient Medications   Medication Sig Dispense Refill   • LANTUS SOLOSTAR 100 UNIT/ML Solution Pen-injector injection INJECT 12 UNITS SUBCUTANEOUSLY ONCE DAILY IN THE EVENING AS  INSTRUCTED 15 mL 3   • ALPRAZolam (XANAX) 0.25 MG Tab TAKE 1 TABLET BY MOUTH AT BEDTIME AS NEEDED FOR SLEEP FOR  UP  TO  30  DAYS 30 Tab 0   • liothyronine (CYTOMEL) 25 MCG Tab Take 1/2 (one-half) tablet by mouth twice daily 90 Tab 0   • atorvastatin (LIPITOR) 80 MG tablet Take 1 Tab by mouth every day. 90 Tab 3   • lisinopril (PRINIVIL) 2.5 MG Tab Take 1 Tab by mouth every day. 90 Tab 0   • prasugrel (EFFIENT) 10 MG Tab Take 1 Tab by mouth every day. 90 Tab 3   • metformin (GLUCOPHAGE) 1000 MG tablet Take 1  Tab by mouth 2 times a day, with meals. 180 Tab 3   • erythromycin base (UMM-TAB) 250 MG Tab Take 1 Tab by mouth 2 Times a Day. 180 Tab 3   • Lancets Lancets order: Lancets for relion prime strips and meter, check blood sugar once daily in am,E11.9 100 Each 11   • glucose blood (RELION PRIME TEST) strip 1 Strip by Other route every day. Check blood sugar before meal, E11.9 100 Each 11   • Insulin Pen Needle (RELION PEN NEEDLES) 31G X 6 MM Misc 1 Device by Does not apply route every day. Use with Lantus pen subcutaneously qday, E11.9 100 Each 11   • nicotine (NICODERM) 14 MG/24HR PATCH 24 HR Apply 1 Patch to skin as directed every 24 hours.     • aspirin EC 81 MG EC tablet Take 1 Tab by mouth every day. 30 Tab 0   • Cholecalciferol (VITAMIN D3 PO) Take 1 Dose by mouth every day. Unknown OTC Strength.     • levothyroxine (SYNTHROID) 112 MCG Tab Take 112 mcg by mouth Every morning on an empty stomach.     • erythromycin base (UMM-TAB) 250 MG Tab Take 250 mg by mouth every day. Maintenance Medication.     • Riboflavin (VITAMIN B-2 PO) Take 1 Dose by mouth every day. Unknown OTC Strength.       No current facility-administered medications for this visit.      Tobacco  7/31/15 tobacco 1 ppd not interested in stopping smoking classes or education  8/28/15 tobacco 1 ppd not interested in stopping smoking classes or education  8/28/15 declines PFT    11/30/15 still smoking 20 cigs per day declines stop smoking classes or medications  5/31/16 declines stop smoking classes and medications, not interested in stop smoking classes, smoking ppd x 40 plus years, declines CT lung cancer screening, pulmonary function testing  11/29/16 still smoking 1 ppd, started smoking age 20, declines stop smoking classes and medication, declines lung cancer screening program and PFT  5/30/17 still smoking 1 ppd declines stop smoking classes and medications, and lung cancer screening program and PFT  12/5/17 still smokes 1 ppd declines stop  smoking classes and medications, and lung cancer screening program and PFT   6/12/18  still smokes 1 ppd declines stop smoking classes and medications, and lung cancer screening program and PFT   6/18/19 still smokes 1 ppd declines stop smoking classes and medications, and lung cancer screening program and PFT   12/10/19 still smoking 1 pack/day, declines stop smoking classes, medications, lung cancer screening, PFT  7/1/20 cardiology note stable, urged to quit smoking, follow-up 3 months     Preventative health  8/23/15 pneumovax  1/12/16 zoster vaccine at [facility]  11/23/16 vit d 51  5/30/17 prevnar   6//18/19 declines colon, vaccines, cologuard  12/10/19 declines colonoscopy, cologuard, vaccines  6/11/20 psa 1.8  12/7/20 hep c ab negative     Postpolio syndrome  6/04 saw neurology in [place] , notes indicate chronic non specific complaints with normal brain MRI, no evidence to support post polio syndrome.     Peripheral vascular disease  12/22/17 ultrasound carotid less than 50% stenosis bilateral  12/28/17 JOSSUE lower extremities, normal at rest, post exercise right JOSSUE 0.8, left 0.8 mild-to-moderate arterial disease bilateral  12/28/17 ultrasound vascular lower extremities, no arterial occlusive disease from common femoral to popliteal bilateral, 50-75% stenosis proximal right external iliac, 50-75% stenosis distal left external iliac, patent right tibial arteries, left posterior tibial appears occluded distally, normal flow left anterior tibial, peroneal arteries retrograde suggesting proximal occlusion  7/11/19 ultrasound arterial lower extremities, duplex right stenosis distal external iliac greater than 50%, elevated velocities proximal portion posterior tibial and peroneal, left greater than 50% stenosis mid external iliac artery, occluded proximal portion posterior tibial artery with distal reconstitution ankle, short segment occlusion mid peroneal artery right JOSSUE 1.09, left JOSSUE 1.07  7/14/19  referral vascular surgery placed  12/10/19 declines vascular surgery follow-up asymptomatic  7/1/20 cardiology note stable, urged to quit smoking, follow-up 3 months     On ACE inhibitor  4/04 p.thallium no ischemia EF 62%  12/10 p.thallium no ischemia, EF 59%  5/9/11 rec ACE instead of atenolol patient declines  10/17/11 discontinued atenolol, rec start lotensin 10 mg; he declines  5/7/13 echo normal LV function, EF 60%, grade 1 diastolic dysfunction  5/7/13 p.thallium fixed defect mid inferior, inferoseptal, mid inferior walls suggest subdiaphragmatic uptake or prior infarction, no ischemia noted, EF 57%  8/8/15 hospital discharge on coreg 12.5 mg bid,lisinopril 5 mg, brilinta 90 mg bid, asa 81 mg,lipitor 80 mg  8/26/15 cardiology note, decrease coreg to 6.25 mg bid due to lower blood pressure continue lisinopril 5 mg    8/28/15 decrease lisinopril to 2.5 mg daily and cont coreg 6.25 mg bid  11/24/15 urine mac<2.5 on lisinopril 2.5 mg and coreg 6.25 mg bid  4/12/16 cardiology note decrease coreg to 3.125 mg bid consider discontinuation of coreg next evaluation and continue lisinopril 2.5 mg  5/20/16 urine mac <3 on lisinopril 2.5 mg  7/19/16 cardiology note clinically stable, episodes of chest tightness related to stress, blood pressure running low, discontinue carvedilol, follow-up in a few months  11/23/16 urine mac 3.2 on lisinopril 2.5 mg  11/29/17 urine mac<4 on lisinopril 2.5 mg  6/7/18 urine mac 3.8 on lisinopril 2.5 mg  6/10/19 urine mac<0.7 on lisinopril 2.5 mg  12/5/19 urine mac<0.7 on lisinopril 2.5 mg  6/11/20 urine mac<1.2 on lisinopril 2.5 mg  12/7/20 urine mac<1.2 on lisinopril 2.5 mg     mild nonproliferative diabetic retinopathy  6/4/19  eye exam, mild nonproliferative retinopathy     Interstitial cystitis  5/02 urology note [facility] nonbacterial prostatitis vs interstitial cystitis given trial of flomax     Insomnia on Xanax as needed     Hypothyroid  10/08 tsh 0.126,free t4  1.4,free t3 2.7  8/10 tsh 0.199 taking levothyroxine 0.125 6 days a week, and 2 tabs on john  9/10/10 increase to levothyroxine 0.137 mg daily, repeat tsh in 6 weeks  10/10 tsh 0.9  12/10 tsh 1.2, free t4 1.4, free t3 2.9  4/11 tsh 0.8, thyroxine 9.6, free thyroxine uptake 3.4, free t3 uptake 35%  9/11 tsh 0.5, free t4 0.9, free t3 2.4 on levothyroxine 137 mcg  12/11 tsh 0.3,free t4 1.5,free t3 2.7 on levothyroxine 137 mcg  4/16/13 tsh 0.28,free t4 1.1, free t3 2.3 (2.4-4.2); on levothyroxine 137 mcg; change to armour thyroid 60 mg qday  5/6/13 tsh 0.3, free t4 1.0, free t3 2.5 on levothyroxine 137 mcg ? Change to armour 60 mg  5/9/13  note decrease levothyroxine to 125 mcg and add cytomel 5 mg bid  6/13 tsh 0.45, free t4 1.2, free t3 2.9, on synthroid 125 mcg and cytomel 5 mcg bid per   8/14/13 tsh 0.235,free t4 1.1,free t3 2.8 on synthroid 125 mcg and cytomel 5 mcg bid per ;change to synthroid 150 mcg and stop cytomel per pt request  10/16/13 tsh 0.4, free t4 1.34, free t3 2.2 on synthroid 150 mcg  10/23/13  endo note; change to synthroid 125 mcg and cytomel 5 mcg daily  4/16/14 tsh 0.67,free t4 1.0,free t3 2.4  4/23/14  endocrine note, increase levothyroxine to 137 mcg and cont cytomel 5 mcg  10/28/14  endocrine note, tsh 0.6,free t4 1.0, free t3 2.7 on thyroxine 137 mcg and cytomel 5 mcg; gastroparesis symptoms improve with cytomel, will reduce thryoxine to 125 mcg and use cytomel 25 mcg to cut and take more than once per day as needed, return in 4 months  7/3/15 tsh 0.04, free t4 0.6 and free t3 3.0 on levothyroxine 125 mcg and cytomel 12.5 mcg daily  7/21/15  endocrine note; on levothyroxine 125 mcg and cytomel 12.5 mcg daily, tsh 0.04, free t4 0.6 and free t3 3.0, patient does not want to change dose, no changes continue same dosing regimen; follow up 6 months  8/8/15  endocrine phone note, decrease levothyroxine to 112  mcg daily, continue cytomel 12.5 mg daily  1/13/16 tsh 0.016,free t4 0.9,free t3 2.5 on levothyroxine 112 mcg and cytomel 12.5 mg daily  1/22/16  endocrinology note on levothyroxine 112 mcg and cytomel 12.5 mg daily, patient declines to change dosing regimen  4/5/16 tsh 0.012,free t4 0.97,free t3 3.6 on levothyroxine 112 mcg and cytomel 12.5 mg daily  4/21/16  endocrinology note, no changes  10/13/16 tsh 0.14,free t4 0.87,free t3 3.1 on levothyroxine 112 mcg and cytomel 25 mg  4/17/17 tsh 0.016,free t4 0.9,free t3 4.0 on levothyroxine 112 mcg and cytomel 25 mg  4/19/17  endocrinology note no changes follow up 6 months  10/18/17  endocrine note no changes  4/12/18 tsh 0.012, free t4 0.99, free t3 4.7 on levothyroxine 112 mcg and cytomel 25 mg  4/18/18  endocrinology note, on levothyroxine 112 mcg daily and cytomel 12.5 mcg bid tsh 0.01, free 4 0.9, free t3 4.7, clinically feeling fine, no changes  10/15/18 endocrinology note repeat labs follow-up 6 months  10/14/19 endocrinology note, continue same thyroid medication dosing no changes follow-up 6 months  4/20/20 endocrinology note patient not willing to adjust his thyroid as it has been successful in controlling gastroparesis, follow-up 6 months  6/12/20 tsh 0.006 on levothyroxine 112 mcg daily and cytomel 12.5 mcg bid per endocrinology  12/7/20 tsh 0.009, free t4 1.23, t3 187 () on levothyroxine 112 mcg daily and cytomel 12.5 mcg bid per endocrinology     History shoulder pain  12/9/10 left shoulder pain,  note MRI pending  2/11 MRI left shoulder RDC mild to moderate rotator cuff tendinopathy, adhesive capsulitis, small anterior and posterior labral tears  7/11 [facility] note  left shoulder adhesive capsulitis rec decompression  12/8/15 xray right shoulder at [facility] mild hydroxyapatite deposition adjacent to the greater tuberosity, no evidence of significant arthritis  5/11/16 MRI right shoulder  thickening and increased signal with synovitis, suggestive of adhesive capsulitis, tendinopathy supraspinatus and infraspinatus, fatty atrophy paraspinous muscle  1/4/17 [facility] discharge note     History MI  8/6/15 hospital admission non-STEMI inverted T waves in aVL, ST depression in lead 1, initial troponin 3.4  8/6/15   8/6/15 cardiac catheterization left main free of disease, triple vessel disease prominently involving distal segment nature epicardial vessels and branches, 95% ostial LAD descending artery stenosis and 70% mid RCA stenosis, ejection fraction 35-40%, stenting ostial LAD with xience drug-eluting stent  8/7/15 echo normal LV function EF 65-70%, grade 1 diastolic dysfunction, moderate concentric LVH, mild MR and AR  8/8/15 hospital discharge on coreg 12.5 mg bid,lisinopril 5 mg, brilinta 90 mg bid, asa 81 mg,lipitor 80 mg  8/11/15 cardiology note; continue brilenta and asa for one year, some dyspnea, if no improvement could consider another antiplatelet therapy, will recheck BNP in 2 weeks with followup visit, ultimately will need myocardial perfusion scan but will defer for a few months  8/26/15 cardiology note, decrease coreg to 6.25 mg bid due to lower blood pressure,continue lisinopril 5 mg, asa, lipitor,start effient 10 mg for one year due to brilinta causing shortness of breath, followup 2 months  10/14/15 cardiology note no chnges, repeat myocardial perfusion scan 3 months  11/30/15 cardiac rehab program  1/13/15 cardiology note nitroglycerin when necessary follow-up 3 months  1/12/16 persantine thallium small basal anterior inferior infarct with small ame-infarct ischemia, moderately sized moderate severity inferior, inferior lateral infarct with reversible ischemia  4/12/16 cardiology note decrease coreg to 3.125 mg bid consider discontinuation of coreg next evaluation and continue lisinopril 2.5 mg  7/19/16 cardiology note clinically stable, episodes of chest tightness related to  stress, blood pressure running low, discontinue carvedilol, follow-up in a few months  11/3/16 cardiology note, isosorbide mononitrate with heavy exertion, follow-up in a few months to determine if further evaluation is necessary, could consider repeat perfusion study or dobutamine stress echo  1/5/17 cardiology note stable CAD, follow-up 6 months  7/12/17 cardiology note, likely stable discussed smoking cessation, patient declines to quit smoking, follow-up one year  12/22/17 ultrasound carotid less than 50% stenosis bilateral  7/23/18 cardiology note asymptomatic, increased stress however, continues to smoke, follow-up 1 year continue atorvastatin plus erythromycin  7/15/19 cardiology note stable continues to smoke advised to quit follow-up 1 year  6/19/20 hospital admission, 6/21/20 hospital discharge, admission for chest pain, cardiac catheterization performed, drug-eluting stent placement of RCA  6/19/20 echo mild concentric LVH, EF 65%, subtle inferior hypokinesis, normal diastolic function  6/19/20 cardiac catheterization left main mild distal tapering 10% stenosis, stent distal part widely patent, LAD widely patent ostial stent, diffuse moderate stenosis distal LAD and small diagonal branches, circumflex tortuous origin 50%, obtuse marginal 1 occluded fills by collaterals stable compared to 2015, obtuse marginal 2 diffusely diseased with mild stenosis lateral wall, distal circumflex/obtuse marginal 3 is small with severe stenosis terminal segment, dominant RCA 40% proximal stenosis, 70% mid vessel stenosis, 40% distal stenosis, PDA multiple 50 to 70% stenosis mid and distal segments, successful mid RCA YESIKA placement, preintervention 70% stenosis, post intervention 0% residual stenosis  7/1/20 cardiology note stable, urged to quit smoking, follow-up 3 months  12/1/20 cardiology note side effects from metoprolol dry skin and cold fingers having already decreased from 25 mg to 12.5 mg daily, will discontinue  metoprolol, continue asa, effient, lipitor, lisinopril     History of BPH  10/04 cystoscopy and green light photovaporization of prostate in [place], [place]     Gastroparesis  4/04 gastric emptying study severe gatroparesis done in [place], no hx of DM or MS  5/04 CT thorax in [place] negative  5/04 MRI brain in [place] no evid of MS  On Emycin  4/16/13 declined gastric stimulator  12/5/17 remains on erythromycin     Dyslipidemia  5/13 chol 158,trig 204,hdl 36,ldl 81  8/8/15 chol 97,trig 117,hdl 26,ldl 48 started on lipitor 80 mg on hospital discharge  8/21/15 chol 76,trig 85,hdl 24,ldl 35 on lipitor 80 mg  10/7/15 chol 67,trig 77,hdl 22,ldl 30 on lipitor 80 mg per cardiology  4/5/16 chol 64,trig 43,hdl 24,ldl 31 on lipitor 80 mg per cardiology  11/23/16 chol 83,trig 73,hdl 27,ldl 41 on lipitor 80 mg per cardiology  5/24/17 chol 85,trig 81,hdl 28,ldl 41 on lipitor 80 mg per cardiology  10/10/17 pharmacy known interaction with lipitor and erythromycin base, consider changing to crestor, offer made to patient to change to crestor but he would like to discuss with his cardiologist first  11/29/17 chol 72,trig 47,hdl 24,ldl 39 on lipitor 80 mg and erythromycin base, previously recommended changing to crestor because of potential interaction, patient would like to discuss with cardiology first  12/5/17 declines to change from lipitor understanding potential interaction with erythromycin  6/7/18 chol 78,trig 68,hdl 25,ldl 39,cpk 69 on lipitor 80 mg  7/23/18 cardiology note asymptomatic, increased stress however, continues to smoke, follow-up 1 year continue atorvastatin plus erythromycin  12/3/18 chol 81,trig 85,hdl 26,ldl 38 on lipitor 80 mg  6/10/19 chol 64,trig 70,hdl 20,ldl 30 on lipitor 80 mg  6/10/20 chol 81,trig 74,hdl 25,ldl 41 on lipitor 80 mg  12/7/20 chol 72,trig 65,hdl 23,ldl 36 on lipitor 80 mg     Diabetes  11/08 A1c 6.3%  8/10 A1c 7.9%  10/10 A1c 7.7%  12/10 A1c 8.0%  1/11 add metformin 500 mg bid  2/11 A1c 7.9%  4/11 A1c  8.2% increase metformin to 1000 mg bid  10/11 A1c 6.5 %  12/11 A1c 6.1% on metformin 1000 mg bid  4/16/13 A1c 6.2% on metformin 1000 mg bid; declines to check blood sugars at home; bs 194 non fasting; declines ACE  8/14/13 A1c 6.5% on metformin 1000 mg bid  12/9/13  eye exam grade 1 diabetic background retinopathy  4/16/14 A1c 7.0% per  on metformin 1000 mg bid  7/3/15 A1c 8.3% on metformin 1000 mg bid per endocrine   7/31/15 start lantus 5 units and continue metformin 1000 mg bid, declines ACE,statin,asa  8/8/15 hospital discharge on coreg 12.5 mg bid,lisinopril 5 mg, brilinta 90 mg bid, asa 81 mg,lipitor 80 mg; on lantus 8 units and metformin 1000 mg bid  8/28/15 declines nutrition consultation and diabetic education regarding diet  8/28/15 recommend increasing lantus to 10 units and metformin 1000 mg bid  11/24/15 A1c 6.3% on lantus 10 units and metformin 1000 mg bid  11/30/15 on lantus 12 units and metformin 1000 mg bid  5/20/16 A1c 6.3% on lantus 12 units and metformin 1000 mg bid  11/23/16 A1c 6.1% on lantus 12 units and metformin 1000 mg bid  1/9/17  eye exam  5/24/17 A1c 6.3% on lantus 12 units and metformin 1000 mg bid   5/24/17 urine mac 2.5 on lisinopril 2.5 mg  11/29/17 A1c 6.1% on lantus 12 units and metformin 1000 mg bid   6/7/18 A1c 5.9% on lantus 12 units and metformin 1000 mg bid   12/3/18 A1c 6.2% and urine mac< 3on lantus 12 units and metformin 1000 mg bid   6/4/19  eye exam, mild nonproliferative retinopathy  6/10/19 A1c 6.4% on lantus 12 units and metformin 1000 mg bid   12/5/19 A1c 6.2% on lantus 12 units and metformin 1000 mg bid   6/11/20 A1c 6.5% on lantus 12 units and metformin 1000 mg bid   12/7/20 A1c 5.9% on lantus 12 units and metformin 1000 mg bid                          Patient Active Problem List   Diagnosis   • History of allergic rhinitis   • Gastroparesis   • History of BPH   • Interstitial cystitis   • Postpolio syndrome   •  Hypothyroid   • On angiotensin-converting enzyme (ACE) inhibitors (for diabetes, not HTN)   • Diabetes mellitus type 2, controlled (HCC)   • Preventative health care   • history of shoulder pain   • Insomnia   • Dyslipidemia   • Tobacco abuse   • History of MI (myocardial infarction)   • Coronary artery disease due to calcified coronary lesion   • PVD (peripheral vascular disease) (HCC)   • Mild non proliferative diabetic retinopathy (HCC)   • Stented coronary artery          Health Maintenance Summary                IMM DTaP/Tdap/Td Vaccine Overdue 2[birth year]20/1969     IMM ZOSTER VACCINES Overdue 3/8/2016      Done 1/12/2016 Imm Admin: Zoster Vaccine Live (ZVL) (Zostavax) - HISTORICAL DATA    Annual Wellness Visit Overdue 5/31/2018      Done 5/30/2017 Visit Dx: Medicare annual wellness visit, subsequent    RETINAL SCREENING Overdue 12/10/2020      Done 12/10/2019 6/4/19      Patient has more history with this topic...    DIABETES MONOFILAMENT / LE EXAM Overdue 12/10/2020      Done 12/10/2019 AMB DIABETIC MONOFILAMENT LOWER EXTREMITY EXAM     Patient has more history with this topic...    A1C SCREENING Next Due 6/7/2021      Done 12/7/2020 HEMOGLOBIN A1C     Patient has more history with this topic...    FASTING LIPID PROFILE Next Due 12/7/2021      Done 12/7/2020 LIPID PROFILE     Patient has more history with this topic...    URINE ACR / MICROALBUMIN Next Due 12/7/2021      Done 12/7/2020 MICROALBUMIN CREAT RATIO URINE     Patient has more history with this topic...    SERUM CREATININE Next Due 12/7/2021      Done 12/7/2020 COMP METABOLIC PANEL     Patient has more history with this topic...    COLONOSCOPY Next Due 5/31/2026      Patient Declined 5/31/2016      Patient has more history with this topic...          Patient Care Team:  Charbel Jaffe M.D. as PCP - General  Alex Stein M.D. as Consulting Physician (Endocrinology)      ROS       Objective:     /64 (BP Location: Right arm, Patient  "Position: Sitting, BP Cuff Size: Adult)   Pulse 85   Temp 36.2 °C (97.2 °F)   Ht 1.702 m (5' 7\")   Wt 73.5 kg (162 lb)   SpO2 97%   BMI 25.37 kg/m²      Physical Exam  Vitals signs and nursing note reviewed.   Constitutional:       Appearance: Normal appearance.   HENT:      Head: Normocephalic and atraumatic.      Right Ear: External ear normal.      Left Ear: External ear normal.   Eyes:      Conjunctiva/sclera: Conjunctivae normal.   Neck:      Musculoskeletal: Neck supple.   Cardiovascular:      Rate and Rhythm: Normal rate and regular rhythm.      Heart sounds: Normal heart sounds.   Pulmonary:      Effort: No respiratory distress.      Breath sounds: Normal breath sounds.   Abdominal:      General: There is no distension.   Musculoskeletal:         General: No swelling.   Skin:     General: Skin is warm.   Neurological:      General: No focal deficit present.      Mental Status: He is alert.   Psychiatric:         Mood and Affect: Mood normal.         Behavior: Behavior normal.           Monofilament testing with a 10 gram force: sensation intact:    Visual Inspection: Feet  maceration, ulcers, fissures.  Pedal pulses:        Assessment/Plan:          Assessment  #1 diabetes 12/7/20 A1c 5.9% on lantus 12 units and metformin 1000 mg bid stable sugars, no hypoglycemia    #2  #2 dyslipidemia most recent labs 12/7/20 chol 72,trig 65,hdl 23,ldl 36 on lipitor 80 mg    #3 CAD history of MI followed by cardiology status post recent stenting no recurrent symptoms, remains on aspirin, Effient, Lipitor, lisinopril, off metoprolol as that was causing some symptoms of his fingers and fatigue    #4 tobacco abuse 1/2 ppd has cut from 1 pack/day    #5 peripheral vascular disease has seen vascular surgery, smoking likely contributes to worsening peripheral vascular disease, currently no calf claudication    #6 hypothyroid per endocrinology    #7 chronic insomnia on Xanax as needed    #8 alkaline phosphatase " 115      Plan  #! Continue check blood sugar daily     #2 continue lantus 12 units and metformin hypoglycemic precautions    #3  Continue avoid sweets and candies    #4 stop smoking as tobacco increases risk for COPD, worsening cardiovascular disease, peripheral vascular disease, cancer, declines PFT and CT thorax for lung cancer screening, continue to work on smoking cessation    #5 needs eye exam with      #6 declines vascular follow up for peripheral vascular disease    #7 declines tdap, shingrix     #8 declines colon cancer screening    #9 follow-up cardiology    #10 we reviewed most recent lab results from December 7    #11 follow-up 6 months    #12 continue excellent job social distancing, mask wearing in public with Covid pandemic ongoing    #13 monitor alkaline phosphatase levels

## 2020-12-18 DIAGNOSIS — E03.9 ACQUIRED HYPOTHYROIDISM: ICD-10-CM

## 2020-12-18 NOTE — TELEPHONE ENCOUNTER
Received request via: Patient    Was the patient seen in the last year in this department? Yes    Does the patient have an active prescription (recently filled or refills available) for medication(s) requested? No     Levothyroxine Sodium 112 MCG Oral Tablet        Will file in chart as: levothyroxine (SYNTHROID) 112 MCG Tab   Sig: TAKE 1 TABLET BY MOUTH IN THE MORNING ON AN EMPTY STOMACH   Disp:  90 Tab    Refills:  0   Start: 12/18/2020   Class: Normal   Non-formulary   Last ordered: 6 months ago by Physician Outpatient Last refill: 9/30/2020   Rx #: [id number]

## 2020-12-19 RX ORDER — LEVOTHYROXINE SODIUM 112 UG/1
TABLET ORAL
Qty: 90 TAB | Refills: 0 | Status: SHIPPED | OUTPATIENT
Start: 2020-12-19 | End: 2021-03-08

## 2021-01-15 DIAGNOSIS — Z23 NEED FOR VACCINATION: ICD-10-CM

## 2021-03-06 DIAGNOSIS — E03.9 ACQUIRED HYPOTHYROIDISM: ICD-10-CM

## 2021-03-08 RX ORDER — LEVOTHYROXINE SODIUM 112 UG/1
TABLET ORAL
Qty: 90 TABLET | Refills: 0 | Status: SHIPPED | OUTPATIENT
Start: 2021-03-08 | End: 2021-06-01

## 2021-03-08 RX ORDER — LISINOPRIL 2.5 MG/1
TABLET ORAL
Qty: 90 TABLET | Refills: 3 | Status: SHIPPED | OUTPATIENT
Start: 2021-03-08 | End: 2022-04-01

## 2021-03-09 ENCOUNTER — OFFICE VISIT (OUTPATIENT)
Dept: CARDIOLOGY | Facility: MEDICAL CENTER | Age: 71
End: 2021-03-09
Payer: MEDICARE

## 2021-03-09 VITALS
OXYGEN SATURATION: 97 % | HEART RATE: 102 BPM | HEIGHT: 67 IN | BODY MASS INDEX: 24.64 KG/M2 | SYSTOLIC BLOOD PRESSURE: 128 MMHG | WEIGHT: 157 LBS | DIASTOLIC BLOOD PRESSURE: 68 MMHG

## 2021-03-09 DIAGNOSIS — I25.84 CORONARY ARTERY DISEASE DUE TO CALCIFIED CORONARY LESION: ICD-10-CM

## 2021-03-09 DIAGNOSIS — Z72.0 TOBACCO ABUSE: Chronic | ICD-10-CM

## 2021-03-09 DIAGNOSIS — I25.10 CORONARY ARTERY DISEASE DUE TO CALCIFIED CORONARY LESION: ICD-10-CM

## 2021-03-09 DIAGNOSIS — Z95.5 STENTED CORONARY ARTERY: ICD-10-CM

## 2021-03-09 DIAGNOSIS — E78.5 DYSLIPIDEMIA: Chronic | ICD-10-CM

## 2021-03-09 PROCEDURE — 99213 OFFICE O/P EST LOW 20 MIN: CPT | Performed by: INTERNAL MEDICINE

## 2021-03-09 ASSESSMENT — ENCOUNTER SYMPTOMS
CARDIOVASCULAR NEGATIVE: 1
MUSCULOSKELETAL NEGATIVE: 1
GASTROINTESTINAL NEGATIVE: 1
BRUISES/BLEEDS EASILY: 1
SHORTNESS OF BREATH: 0
NEUROLOGICAL NEGATIVE: 1
RESPIRATORY NEGATIVE: 1

## 2021-03-09 ASSESSMENT — FIBROSIS 4 INDEX: FIB4 SCORE: 1.84

## 2021-03-09 NOTE — PROGRESS NOTES
Chief Complaint   Patient presents with   • Coronary Artery Disease     & Coronary artery disease due to calcified coronary lesion: LAD stent in August 2015   • Dyslipidemia       Subjective:   Orlando Arreguin is a 71 y.o. male who presents today for follow-up of coronary disease.  He suffered a non-STEMI and underwent stenting of his RCA with a 3.5 mm x 15 mm [place] stent in June, 2020.  He also had stenting of his LAD in 2015.  The patient has had no angina or edema.  He continues to smoke cigarettes.  He has been compliant with his dual antiplatelet therapy and has noted some easy bruising.  Laboratory from December 7 was reviewed with patient.  LDL is at target at 36.    Past Medical History:   Diagnosis Date   • Coronary artery disease due to calcified coronary lesion 8/11/2015   • Diabetes mellitus type II    • Gastroparesis    • Hyperlipidemia    • Hypothyroid    • Interstitial cystitis    • Sciatic neuritis      Past Surgical History:   Procedure Laterality Date   • Z CARDIAC CATH  8/6/15    YESIKA to LAD.  Has RCA 70% occulsion.   • APPENDECTOMY     • OTHER      L shoulder surgery (arthroscopic, [place], 2011)   • PB TRANSURETHRAL ELEC-SURG PBOSTATECTOM       Family History   Problem Relation Age of Onset   • Heart Disease Mother    • Heart Attack Mother 55        heart attack     Social History     Socioeconomic History   • Marital status: Single     Spouse name: Not on file   • Number of children: Not on file   • Years of education: Not on file   • Highest education level: Not on file   Occupational History   • Not on file   Tobacco Use   • Smoking status: Current Every Day Smoker     Packs/day: 1.00     Years: 30.00     Pack years: 30.00     Types: Cigarettes   • Smokeless tobacco: Never Used   Substance and Sexual Activity   • Alcohol use: No     Alcohol/week: 0.0 oz   • Drug use: No   • Sexual activity: Not on file   Other Topics Concern   • Not on file   Social History Narrative   • Not on file     Social  Determinants of Health     Financial Resource Strain:    • Difficulty of Paying Living Expenses:    Food Insecurity:    • Worried About Running Out of Food in the Last Year:    • Ran Out of Food in the Last Year:    Transportation Needs:    • Lack of Transportation (Medical):    • Lack of Transportation (Non-Medical):    Physical Activity:    • Days of Exercise per Week:    • Minutes of Exercise per Session:    Stress:    • Feeling of Stress :    Social Connections:    • Frequency of Communication with Friends and Family:    • Frequency of Social Gatherings with Friends and Family:    • Attends [employer] Services:    • Active Member of Clubs or Organizations:    • Attends Club or Organization Meetings:    • Marital Status:    Intimate Partner Violence:    • Fear of Current or Ex-Partner:    • Emotionally Abused:    • Physically Abused:    • Sexually Abused:      Allergies   Allergen Reactions   • Brilinta [Ticagrelor] Shortness of Breath     Total Sleep Apnea per patient; SOB   • Pcn [Penicillins] Anaphylaxis   • Other Environmental      Hayfever   • Tdap [Dipth, Acell Pertus, Tetanus]      tdap   • Tetanus Toxoid      Outpatient Encounter Medications as of 3/9/2021   Medication Sig Dispense Refill   • levothyroxine (SYNTHROID) 112 MCG Tab TAKE 1 TABLET BY MOUTH IN THE MORNING ON AN EMPTY STOMACH 90 tablet 0   • lisinopril (PRINIVIL) 2.5 MG Tab Take 1 tablet by mouth once daily 90 tablet 3   • LANTUS SOLOSTAR 100 UNIT/ML Solution Pen-injector injection INJECT 12 UNITS SUBCUTANEOUSLY ONCE DAILY IN THE EVENING AS  INSTRUCTED 15 mL 3   • liothyronine (CYTOMEL) 25 MCG Tab Take 1/2 (one-half) tablet by mouth twice daily 90 Tab 0   • atorvastatin (LIPITOR) 80 MG tablet Take 1 Tab by mouth every day. 90 Tab 3   • prasugrel (EFFIENT) 10 MG Tab Take 1 Tab by mouth every day. 90 Tab 3   • metformin (GLUCOPHAGE) 1000 MG tablet Take 1 Tab by mouth 2 times a day, with meals. 180 Tab 3   • erythromycin base (UMM-TAB) 250 MG Tab  "Take 1 Tab by mouth 2 Times a Day. 180 Tab 3   • Lancets Lancets order: Lancets for relion prime strips and meter, check blood sugar once daily in am,E11.9 100 Each 11   • glucose blood (RELION PRIME TEST) strip 1 Strip by Other route every day. Check blood sugar before meal, E11.9 100 Each 11   • Insulin Pen Needle (RELION PEN NEEDLES) 31G X 6 MM Misc 1 Device by Does not apply route every day. Use with Lantus pen subcutaneously qday, E11.9 100 Each 11   • aspirin EC 81 MG EC tablet Take 1 Tab by mouth every day. 30 Tab 0   • Cholecalciferol (VITAMIN D3 PO) Take 1 Dose by mouth every day. Unknown OTC Strength.     • Riboflavin (VITAMIN B-2 PO) Take 1 Dose by mouth every day. Unknown OTC Strength.     • erythromycin base (UMM-TAB) 250 MG Tab Take 250 mg by mouth every day. Maintenance Medication.       No facility-administered encounter medications on file as of 3/9/2021.     Review of Systems   Constitutional: Negative for malaise/fatigue.   HENT: Negative.    Respiratory: Negative.  Negative for shortness of breath.    Cardiovascular: Negative.  Negative for chest pain and leg swelling.        Forceful heart beating   Gastrointestinal: Negative.    Musculoskeletal: Negative.    Skin: Negative.    Neurological: Negative.    Endo/Heme/Allergies: Bruises/bleeds easily.   Psychiatric/Behavioral:        Patient continues to smoke cigarettes        Objective:   /68 (BP Location: Left arm, Patient Position: Sitting, BP Cuff Size: Adult)   Pulse (!) 102   Ht 1.702 m (5' 7\")   Wt 71.2 kg (157 lb)   SpO2 97%   BMI 24.59 kg/m²     Physical Exam   Constitutional: He is oriented to person, place, and time. He appears well-nourished. No distress.   HENT:   Head: Normocephalic and atraumatic.   Eyes: Pupils are equal, round, and reactive to light. No scleral icterus.   Neck: No JVD present. No tracheal deviation present.   Cardiovascular: Normal rate and regular rhythm.   No murmur heard.  Pulmonary/Chest: Breath " sounds normal. No respiratory distress. He has no wheezes.   Abdominal: Soft. Bowel sounds are normal. He exhibits no distension. There is no abdominal tenderness.   Musculoskeletal:         General: No edema.   Neurological: He is alert and oriented to person, place, and time.   Skin: Skin is warm and dry.   Psychiatric: He has a normal mood and affect.       Assessment:     1. Coronary artery disease due to calcified coronary lesion  Lipid Profile    Comp Metabolic Panel   2. Stented coronary artery  Lipid Profile    Comp Metabolic Panel   3. Tobacco abuse  Lipid Profile    Comp Metabolic Panel   4. Dyslipidemia         Medical Decision Making:  Today's Assessment / Status / Plan:   Coronary disease: Status post stenting of RCA in June 2020, status post native LAD 2015 and known occlusion of the OM.  No angina.  Because of his ongoing cigarette abuse, would recommend keeping on dual antiplatelet therapy for another 6 months.    Hyperlipidemia: LDL treated to target.  We will continue aggressive management of his cholesterol because of his diabetes and ongoing cigarette smoking.    Consider increasing lisinopril next visit for enhanced kidney preservation.    Return in 6 months and plan to discontinue his Effient at that time.

## 2021-03-30 ENCOUNTER — IMMUNIZATION (OUTPATIENT)
Dept: FAMILY PLANNING/WOMEN'S HEALTH CLINIC | Facility: IMMUNIZATION CENTER | Age: 71
End: 2021-03-30
Attending: INTERNAL MEDICINE
Payer: MEDICARE

## 2021-03-30 DIAGNOSIS — Z23 NEED FOR VACCINATION: ICD-10-CM

## 2021-03-30 DIAGNOSIS — Z23 ENCOUNTER FOR VACCINATION: Primary | ICD-10-CM

## 2021-03-30 PROCEDURE — 91300 PFIZER SARS-COV-2 VACCINE: CPT

## 2021-03-30 PROCEDURE — 0001A PFIZER SARS-COV-2 VACCINE: CPT

## 2021-04-16 ENCOUNTER — HOSPITAL ENCOUNTER (OUTPATIENT)
Dept: LAB | Facility: MEDICAL CENTER | Age: 71
End: 2021-04-16
Attending: INTERNAL MEDICINE
Payer: MEDICARE

## 2021-04-16 DIAGNOSIS — E03.9 ACQUIRED HYPOTHYROIDISM: Chronic | ICD-10-CM

## 2021-04-16 LAB
T3FREE SERPL-MCNC: 2.9 PG/ML (ref 2–4.4)
T4 FREE SERPL-MCNC: 1.31 NG/DL (ref 0.93–1.7)
TSH SERPL DL<=0.005 MIU/L-ACNC: <0.005 UIU/ML (ref 0.38–5.33)

## 2021-04-16 PROCEDURE — 84481 FREE ASSAY (FT-3): CPT

## 2021-04-16 PROCEDURE — 36415 COLL VENOUS BLD VENIPUNCTURE: CPT

## 2021-04-16 PROCEDURE — 84439 ASSAY OF FREE THYROXINE: CPT

## 2021-04-16 PROCEDURE — 84443 ASSAY THYROID STIM HORMONE: CPT

## 2021-04-23 ENCOUNTER — IMMUNIZATION (OUTPATIENT)
Dept: FAMILY PLANNING/WOMEN'S HEALTH CLINIC | Facility: IMMUNIZATION CENTER | Age: 71
End: 2021-04-23
Attending: INTERNAL MEDICINE
Payer: MEDICARE

## 2021-04-23 DIAGNOSIS — Z23 ENCOUNTER FOR VACCINATION: Primary | ICD-10-CM

## 2021-04-23 PROCEDURE — 91300 PFIZER SARS-COV-2 VACCINE: CPT | Performed by: NURSE PRACTITIONER

## 2021-04-23 PROCEDURE — 0002A PFIZER SARS-COV-2 VACCINE: CPT | Performed by: NURSE PRACTITIONER

## 2021-04-26 DIAGNOSIS — E03.9 ACQUIRED HYPOTHYROIDISM: ICD-10-CM

## 2021-04-27 RX ORDER — LIOTHYRONINE SODIUM 25 UG/1
12.5 TABLET ORAL
Qty: 90 TABLET | Refills: 3 | Status: SHIPPED | OUTPATIENT
Start: 2021-04-27 | End: 2021-07-26

## 2021-05-04 ENCOUNTER — OFFICE VISIT (OUTPATIENT)
Dept: ENDOCRINOLOGY | Facility: MEDICAL CENTER | Age: 71
End: 2021-05-04
Attending: INTERNAL MEDICINE
Payer: MEDICARE

## 2021-05-04 VITALS
RESPIRATION RATE: 14 BRPM | HEART RATE: 80 BPM | OXYGEN SATURATION: 98 % | WEIGHT: 157 LBS | SYSTOLIC BLOOD PRESSURE: 110 MMHG | DIASTOLIC BLOOD PRESSURE: 68 MMHG | HEIGHT: 67 IN | BODY MASS INDEX: 24.64 KG/M2

## 2021-05-04 DIAGNOSIS — K31.84 GASTROPARESIS: Chronic | ICD-10-CM

## 2021-05-04 DIAGNOSIS — E03.9 ACQUIRED HYPOTHYROIDISM: ICD-10-CM

## 2021-05-04 DIAGNOSIS — Z00.00 PREVENTATIVE HEALTH CARE: ICD-10-CM

## 2021-05-04 PROCEDURE — 99211 OFF/OP EST MAY X REQ PHY/QHP: CPT | Performed by: INTERNAL MEDICINE

## 2021-05-04 PROCEDURE — 99214 OFFICE O/P EST MOD 30 MIN: CPT | Performed by: INTERNAL MEDICINE

## 2021-05-04 ASSESSMENT — FIBROSIS 4 INDEX: FIB4 SCORE: 1.84

## 2021-05-04 NOTE — PROGRESS NOTES
Chief Complaint   Patient presents with   • Hypothyroidism        HPI:        Patient continues to feel fine.  He is managing his gastroparesis in a way he is able to stay well-nourished with stable weight.  He is very well read and has definite ideas and opinions about a lot of things including his gastroparesis.  He feels his T3 hormone is very important in managing this problem which I assume is a complication of his diabetes.  His current his levothyroxine 112 mcg daily and liothyronine 12.5 mcg twice daily.            Blood tests usually indicate a normal free T4 and normal free T3 with TSH suppressed. He does not have symptoms of excessive thyroid effect.  In particular he is not experiencing cardiac arrhythmias or angina.  He has had a myocardial infarction in the past.  No symptoms of CHF.        We discussed excessive thyroid might accelerate bone loss.  This does not concern him.  I think we should get a bone density to  get an idea of where things stand right now and a baseline for future comparison.    ROS:  All other systems reported as negative or unchanged since last exam      Allergies:   Allergies   Allergen Reactions   • Brilinta [Ticagrelor] Shortness of Breath     Total Sleep Apnea per patient; SOB   • Pcn [Penicillins] Anaphylaxis   • Other Environmental      Hayfever   • Tdap [Dipth, Acell Pertus, Tetanus]      tdap   • Tetanus Toxoid        Current medicines including changes today:  Current Outpatient Medications   Medication Sig Dispense Refill   • liothyronine (CYTOMEL) 25 MCG Tab Take 0.5 Tablets by mouth 2 (two) times a day for 90 days. 90 tablet 3   • levothyroxine (SYNTHROID) 112 MCG Tab TAKE 1 TABLET BY MOUTH IN THE MORNING ON AN EMPTY STOMACH 90 tablet 0   • lisinopril (PRINIVIL) 2.5 MG Tab Take 1 tablet by mouth once daily 90 tablet 3   • LANTUS SOLOSTAR 100 UNIT/ML Solution Pen-injector injection INJECT 12 UNITS SUBCUTANEOUSLY ONCE DAILY IN THE EVENING AS  INSTRUCTED 15 mL 3   •  "atorvastatin (LIPITOR) 80 MG tablet Take 1 Tab by mouth every day. 90 Tab 3   • prasugrel (EFFIENT) 10 MG Tab Take 1 Tab by mouth every day. 90 Tab 3   • metformin (GLUCOPHAGE) 1000 MG tablet Take 1 Tab by mouth 2 times a day, with meals. 180 Tab 3   • Lancets Lancets order: Lancets for relion prime strips and meter, check blood sugar once daily in am,E11.9 100 Each 11   • glucose blood (RELION PRIME TEST) strip 1 Strip by Other route every day. Check blood sugar before meal, E11.9 100 Each 11   • Insulin Pen Needle (RELION PEN NEEDLES) 31G X 6 MM Misc 1 Device by Does not apply route every day. Use with Lantus pen subcutaneously qday, E11.9 100 Each 11   • aspirin EC 81 MG EC tablet Take 1 Tab by mouth every day. 30 Tab 0   • Cholecalciferol (VITAMIN D3 PO) Take 1 Dose by mouth every day. Unknown OTC Strength.     • erythromycin base (UMM-TAB) 250 MG Tab Take 250 mg by mouth every day. Maintenance Medication.     • Riboflavin (VITAMIN B-2 PO) Take 1 Dose by mouth every day. Unknown OTC Strength.     • Insulin Pen Needle 31G X 6 MM Misc      • erythromycin base (UMM-TAB) 250 MG Tab Take 1 Tab by mouth 2 Times a Day. (Patient not taking: Reported on 5/4/2021) 180 Tab 3     No current facility-administered medications for this visit.        Past Medical History:   Diagnosis Date   • Coronary artery disease due to calcified coronary lesion 8/11/2015   • Diabetes mellitus type II    • Gastroparesis    • Hyperlipidemia    • Hypothyroid    • Interstitial cystitis    • Sciatic neuritis        PHYSICAL EXAM:    /68 (BP Location: Left arm, Patient Position: Sitting, BP Cuff Size: Adult)   Pulse 80   Resp 14   Ht 1.702 m (5' 7\")   Wt 71.2 kg (157 lb)   SpO2 98%   BMI 24.59 kg/m²   No physical examination done today.  Primarily observation.  Pulse taken by me is 80 and regular.    Gen.   appears healthy     Skin   appropriate for sex and age    HEENT  unremarkable    Neck   no visible thyroid    Heart  " regular    Extremities  no edema    Neuro  gait and station normal, no tremor    Psych  appropriate, calm, pleasant      ASSESSMENT AND RECOMMENDATIONS    1. Acquired hypothyroidism             Thyroid replacement primarily adjusted with additional T3 to treat gastroparesis.            Bone density for baseline and discussion    2. Gastroparesis           Adequately managed per the above comments       DISPOSITION: Return in review in 6 months      Alex Stein M.D.    Copies to: Charbel Jaffe M.D. [phone]

## 2021-05-06 DIAGNOSIS — M54.9 BACK PAIN WITH RISK FOR OSTEOPOROSIS: ICD-10-CM

## 2021-05-06 DIAGNOSIS — Z91.89 AT RISK FOR ADVERSE DRUG EVENT: ICD-10-CM

## 2021-05-06 DIAGNOSIS — Z91.89 AT RISK FOR BONE DENSITY LOSS: ICD-10-CM

## 2021-05-06 DIAGNOSIS — Z91.89 BACK PAIN WITH RISK FOR OSTEOPOROSIS: ICD-10-CM

## 2021-05-07 DIAGNOSIS — E05.80 IATROGENIC HYPERTHYROIDISM: ICD-10-CM

## 2021-05-07 DIAGNOSIS — E05.90 PRIMARY HYPERTHYROIDISM: ICD-10-CM

## 2021-05-07 DIAGNOSIS — Z91.89 AT RISK FOR BONE DENSITY LOSS: ICD-10-CM

## 2021-05-07 DIAGNOSIS — E05.80 OTHER THYROTOXICOSIS WITHOUT THYROTOXIC CRISIS OR STORM: ICD-10-CM

## 2021-05-31 DIAGNOSIS — E03.9 ACQUIRED HYPOTHYROIDISM: ICD-10-CM

## 2021-06-01 RX ORDER — LEVOTHYROXINE SODIUM 112 UG/1
TABLET ORAL
Qty: 90 TABLET | Refills: 0 | Status: SHIPPED | OUTPATIENT
Start: 2021-06-01 | End: 2021-08-26

## 2021-06-01 NOTE — TELEPHONE ENCOUNTER
Called and informed patient that refill request was approved and he can  medication at pharmacy.

## 2021-06-08 ENCOUNTER — APPOINTMENT (OUTPATIENT)
Dept: LAB | Facility: MEDICAL CENTER | Age: 71
End: 2021-06-08
Payer: MEDICARE

## 2021-06-09 LAB
25(OH)D3+25(OH)D2 SERPL-MCNC: 39.7 NG/ML (ref 30–100)
ALBUMIN SERPL-MCNC: 4.2 G/DL (ref 3.7–4.7)
ALBUMIN/CREAT UR: <3 MG/G CREAT (ref 0–29)
ALBUMIN/GLOB SERPL: 1.9 {RATIO} (ref 1.2–2.2)
ALP SERPL-CCNC: 105 IU/L (ref 48–121)
ALT SERPL-CCNC: 21 IU/L (ref 0–44)
APPEARANCE UR: CLEAR
AST SERPL-CCNC: 23 IU/L (ref 0–40)
BACTERIA #/AREA URNS HPF: NORMAL /[HPF]
BASOPHILS # BLD AUTO: 0 X10E3/UL (ref 0–0.2)
BASOPHILS NFR BLD AUTO: 1 %
BILIRUB SERPL-MCNC: 0.4 MG/DL (ref 0–1.2)
BILIRUB UR QL STRIP: NEGATIVE
BUN SERPL-MCNC: 17 MG/DL (ref 8–27)
BUN/CREAT SERPL: 19 (ref 10–24)
CALCIUM SERPL-MCNC: 8.8 MG/DL (ref 8.6–10.2)
CASTS URNS MICRO: NORMAL
CASTS URNS QL MICRO: NORMAL /LPF
CHLORIDE SERPL-SCNC: 107 MMOL/L (ref 96–106)
CHOLEST SERPL-MCNC: 81 MG/DL (ref 100–199)
CO2 SERPL-SCNC: 21 MMOL/L (ref 20–29)
COLOR UR: YELLOW
CREAT SERPL-MCNC: 0.91 MG/DL (ref 0.76–1.27)
CREAT UR-MCNC: 86.2 MG/DL
CRYSTALS URNS MICRO: NORMAL
EOSINOPHIL # BLD AUTO: 0.1 X10E3/UL (ref 0–0.4)
EOSINOPHIL NFR BLD AUTO: 1 %
EPI CELLS #/AREA URNS HPF: NORMAL /HPF (ref 0–10)
ERYTHROCYTE [DISTWIDTH] IN BLOOD BY AUTOMATED COUNT: 13 % (ref 11.6–15.4)
GLOBULIN SER CALC-MCNC: 2.2 G/DL (ref 1.5–4.5)
GLUCOSE SERPL-MCNC: 128 MG/DL (ref 65–99)
GLUCOSE UR QL: NEGATIVE
HBA1C MFR BLD: 6 % (ref 4.8–5.6)
HCT VFR BLD AUTO: 46 % (ref 37.5–51)
HDLC SERPL-MCNC: 27 MG/DL
HGB BLD-MCNC: 15.5 G/DL (ref 13–17.7)
HGB UR QL STRIP: NEGATIVE
IMM GRANULOCYTES # BLD AUTO: 0 X10E3/UL (ref 0–0.1)
IMM GRANULOCYTES NFR BLD AUTO: 0 %
IMMATURE CELLS  115398: NORMAL
KETONES UR QL STRIP: NEGATIVE
LABORATORY COMMENT REPORT: ABNORMAL
LDLC SERPL CALC-MCNC: 41 MG/DL (ref 0–99)
LEUKOCYTE ESTERASE UR QL STRIP: NEGATIVE
LYMPHOCYTES # BLD AUTO: 1.6 X10E3/UL (ref 0.7–3.1)
LYMPHOCYTES NFR BLD AUTO: 29 %
MCH RBC QN AUTO: 30.5 PG (ref 26.6–33)
MCHC RBC AUTO-ENTMCNC: 33.7 G/DL (ref 31.5–35.7)
MCV RBC AUTO: 90 FL (ref 79–97)
MICRO URNS: NORMAL
MICRO URNS: NORMAL
MICROALBUMIN UR-MCNC: <3 UG/ML
MONOCYTES # BLD AUTO: 0.4 X10E3/UL (ref 0.1–0.9)
MONOCYTES NFR BLD AUTO: 7 %
MORPHOLOGY BLD-IMP: NORMAL
MUCOUS THREADS URNS QL MICRO: NORMAL
NEUTROPHILS # BLD AUTO: 3.4 X10E3/UL (ref 1.4–7)
NEUTROPHILS NFR BLD AUTO: 62 %
NITRITE UR QL STRIP: NEGATIVE
NRBC BLD AUTO-RTO: NORMAL %
PH UR STRIP: 5 [PH] (ref 5–7.5)
PLATELET # BLD AUTO: 206 X10E3/UL (ref 150–450)
POTASSIUM SERPL-SCNC: 4.9 MMOL/L (ref 3.5–5.2)
PROT SERPL-MCNC: 6.4 G/DL (ref 6–8.5)
PROT UR QL STRIP: NEGATIVE
RBC # BLD AUTO: 5.09 X10E6/UL (ref 4.14–5.8)
RBC #/AREA URNS HPF: NORMAL /HPF (ref 0–2)
RENAL EPI CELLS #/AREA URNS HPF: NORMAL /[HPF]
SODIUM SERPL-SCNC: 138 MMOL/L (ref 134–144)
SP GR UR: 1.02 (ref 1–1.03)
T VAGINALIS URNS QL MICRO: NORMAL
TRIGL SERPL-MCNC: 54 MG/DL (ref 0–149)
UNIDENT CRYS URNS QL MICRO: NORMAL
URINALYSIS REFLEX  377202: NORMAL
URNS CMNT MICRO: NORMAL
UROBILINOGEN UR STRIP-MCNC: 0.2 MG/DL (ref 0.2–1)
VLDLC SERPL CALC-MCNC: 13 MG/DL (ref 5–40)
WBC # BLD AUTO: 5.6 X10E3/UL (ref 3.4–10.8)
WBC #/AREA URNS HPF: NORMAL /HPF (ref 0–5)
YEAST #/AREA URNS HPF: NORMAL /[HPF]

## 2021-06-14 ENCOUNTER — OFFICE VISIT (OUTPATIENT)
Dept: MEDICAL GROUP | Facility: MEDICAL CENTER | Age: 71
End: 2021-06-14
Payer: MEDICARE

## 2021-06-14 VITALS
WEIGHT: 158 LBS | SYSTOLIC BLOOD PRESSURE: 116 MMHG | OXYGEN SATURATION: 98 % | HEART RATE: 91 BPM | BODY MASS INDEX: 24.8 KG/M2 | TEMPERATURE: 97.9 F | DIASTOLIC BLOOD PRESSURE: 64 MMHG | HEIGHT: 67 IN

## 2021-06-14 DIAGNOSIS — Z95.5 STENTED CORONARY ARTERY: ICD-10-CM

## 2021-06-14 DIAGNOSIS — E11.65 CONTROLLED TYPE 2 DIABETES MELLITUS WITH HYPERGLYCEMIA, WITHOUT LONG-TERM CURRENT USE OF INSULIN (HCC): ICD-10-CM

## 2021-06-14 DIAGNOSIS — Z72.0 TOBACCO ABUSE: Chronic | ICD-10-CM

## 2021-06-14 DIAGNOSIS — I73.9 PVD (PERIPHERAL VASCULAR DISEASE) (HCC): ICD-10-CM

## 2021-06-14 PROCEDURE — 99213 OFFICE O/P EST LOW 20 MIN: CPT | Performed by: INTERNAL MEDICINE

## 2021-06-14 ASSESSMENT — FIBROSIS 4 INDEX: FIB4 SCORE: 1.73

## 2021-06-14 ASSESSMENT — PATIENT HEALTH QUESTIONNAIRE - PHQ9: CLINICAL INTERPRETATION OF PHQ2 SCORE: 0

## 2021-08-25 DIAGNOSIS — E03.9 ACQUIRED HYPOTHYROIDISM: ICD-10-CM

## 2021-08-25 NOTE — TELEPHONE ENCOUNTER
Received request via: Pharmacy    Was the patient seen in the last year in this department? Yes    Does the patient have an active prescription (recently filled or refills available) for medication(s) requested? No       REQUESTED MEDICATION:   Requested Prescriptions     Pending Prescriptions Disp Refills   • EUTHYROX 112 MCG Tab [Pharmacy Med Name: Euthyrox 112 MCG Oral Tablet] 90 Tablet 0     Sig: TAKE 1 TABLET BY MOUTH IN THE MORNING ON AN EMPTY STOMACH

## 2021-08-26 RX ORDER — LEVOTHYROXINE SODIUM 112 UG/1
TABLET ORAL
Qty: 90 TABLET | Refills: 0 | Status: SHIPPED | OUTPATIENT
Start: 2021-08-26 | End: 2021-12-06

## 2021-09-03 LAB
ALBUMIN SERPL-MCNC: 4.3 G/DL (ref 3.7–4.7)
ALBUMIN/GLOB SERPL: 1.9 {RATIO} (ref 1.2–2.2)
ALP SERPL-CCNC: 115 IU/L (ref 48–121)
ALT SERPL-CCNC: 27 IU/L (ref 0–44)
AST SERPL-CCNC: 24 IU/L (ref 0–40)
BILIRUB SERPL-MCNC: 0.4 MG/DL (ref 0–1.2)
BUN SERPL-MCNC: 15 MG/DL (ref 8–27)
BUN/CREAT SERPL: 16 (ref 10–24)
CALCIUM SERPL-MCNC: 8.8 MG/DL (ref 8.6–10.2)
CHLORIDE SERPL-SCNC: 106 MMOL/L (ref 96–106)
CHOLEST SERPL-MCNC: 75 MG/DL (ref 100–199)
CO2 SERPL-SCNC: 21 MMOL/L (ref 20–29)
CREAT SERPL-MCNC: 0.92 MG/DL (ref 0.76–1.27)
GLOBULIN SER CALC-MCNC: 2.3 G/DL (ref 1.5–4.5)
GLUCOSE SERPL-MCNC: 140 MG/DL (ref 65–99)
HDLC SERPL-MCNC: 27 MG/DL
LABORATORY COMMENT REPORT: ABNORMAL
LDLC SERPL CALC-MCNC: 36 MG/DL (ref 0–99)
POTASSIUM SERPL-SCNC: 4.6 MMOL/L (ref 3.5–5.2)
PROT SERPL-MCNC: 6.6 G/DL (ref 6–8.5)
SODIUM SERPL-SCNC: 140 MMOL/L (ref 134–144)
TRIGL SERPL-MCNC: 43 MG/DL (ref 0–149)
VLDLC SERPL CALC-MCNC: 12 MG/DL (ref 5–40)

## 2021-09-09 ENCOUNTER — OFFICE VISIT (OUTPATIENT)
Dept: CARDIOLOGY | Facility: MEDICAL CENTER | Age: 71
End: 2021-09-09
Payer: MEDICARE

## 2021-09-09 VITALS
BODY MASS INDEX: 24.33 KG/M2 | HEIGHT: 67 IN | OXYGEN SATURATION: 98 % | DIASTOLIC BLOOD PRESSURE: 54 MMHG | HEART RATE: 84 BPM | SYSTOLIC BLOOD PRESSURE: 122 MMHG | WEIGHT: 155 LBS

## 2021-09-09 DIAGNOSIS — Z95.5 STENTED CORONARY ARTERY: ICD-10-CM

## 2021-09-09 DIAGNOSIS — I25.10 CORONARY ARTERY DISEASE DUE TO CALCIFIED CORONARY LESION: ICD-10-CM

## 2021-09-09 DIAGNOSIS — R06.09 DOE (DYSPNEA ON EXERTION): ICD-10-CM

## 2021-09-09 DIAGNOSIS — I25.84 CORONARY ARTERY DISEASE DUE TO CALCIFIED CORONARY LESION: ICD-10-CM

## 2021-09-09 DIAGNOSIS — I10 ESSENTIAL HYPERTENSION: ICD-10-CM

## 2021-09-09 DIAGNOSIS — E78.5 DYSLIPIDEMIA: ICD-10-CM

## 2021-09-09 DIAGNOSIS — Z72.0 TOBACCO ABUSE: ICD-10-CM

## 2021-09-09 DIAGNOSIS — R07.89 ATYPICAL CHEST PAIN: ICD-10-CM

## 2021-09-09 PROCEDURE — 99214 OFFICE O/P EST MOD 30 MIN: CPT | Performed by: INTERNAL MEDICINE

## 2021-09-09 ASSESSMENT — FIBROSIS 4 INDEX: FIB4 SCORE: 1.59

## 2021-09-15 ENCOUNTER — TELEPHONE (OUTPATIENT)
Dept: MEDICAL GROUP | Facility: MEDICAL CENTER | Age: 71
End: 2021-09-15

## 2021-09-15 LAB — HBA1C MFR BLD: 6.1 % (ref 4.8–5.6)

## 2021-09-15 NOTE — TELEPHONE ENCOUNTER
Please notify the patient his A1c is 6.1%, there is no significant change from 3 months ago when his A1c was 6.0%.      Have him continue his medications with no changes, we can repeat A1c in 6 months.

## 2021-09-16 ENCOUNTER — TELEPHONE (OUTPATIENT)
Dept: MEDICAL GROUP | Facility: MEDICAL CENTER | Age: 71
End: 2021-09-16

## 2021-09-16 NOTE — TELEPHONE ENCOUNTER
----- Message from Charbel Jaffe M.D. sent at 9/15/2021  4:43 PM PDT -----  Please notify the patient his A1c is 6.1%, there is no significant change from 3 months ago when his A1c was 6.0%.      Have him continue his medications with no changes, we can repeat A1c in 6 months.

## 2021-10-02 ENCOUNTER — HOSPITAL ENCOUNTER (EMERGENCY)
Facility: MEDICAL CENTER | Age: 71
End: 2021-10-02
Attending: EMERGENCY MEDICINE
Payer: MEDICARE

## 2021-10-02 ENCOUNTER — APPOINTMENT (OUTPATIENT)
Dept: RADIOLOGY | Facility: MEDICAL CENTER | Age: 71
End: 2021-10-02
Attending: EMERGENCY MEDICINE
Payer: MEDICARE

## 2021-10-02 VITALS
BODY MASS INDEX: 23.49 KG/M2 | DIASTOLIC BLOOD PRESSURE: 61 MMHG | RESPIRATION RATE: 19 BRPM | SYSTOLIC BLOOD PRESSURE: 132 MMHG | HEART RATE: 66 BPM | HEIGHT: 68 IN | WEIGHT: 155 LBS | OXYGEN SATURATION: 96 % | TEMPERATURE: 98 F

## 2021-10-02 DIAGNOSIS — R07.9 CHEST PAIN, UNSPECIFIED TYPE: ICD-10-CM

## 2021-10-02 LAB
ALBUMIN SERPL BCP-MCNC: 4.1 G/DL (ref 3.2–4.9)
ALBUMIN/GLOB SERPL: 1.7 G/DL
ALP SERPL-CCNC: 106 U/L (ref 30–99)
ALT SERPL-CCNC: 28 U/L (ref 2–50)
ANION GAP SERPL CALC-SCNC: 11 MMOL/L (ref 7–16)
AST SERPL-CCNC: 24 U/L (ref 12–45)
BASOPHILS # BLD AUTO: 0.9 % (ref 0–1.8)
BASOPHILS # BLD: 0.05 K/UL (ref 0–0.12)
BILIRUB SERPL-MCNC: 0.4 MG/DL (ref 0.1–1.5)
BUN SERPL-MCNC: 18 MG/DL (ref 8–22)
CALCIUM SERPL-MCNC: 8.9 MG/DL (ref 8.5–10.5)
CHLORIDE SERPL-SCNC: 107 MMOL/L (ref 96–112)
CO2 SERPL-SCNC: 20 MMOL/L (ref 20–33)
CREAT SERPL-MCNC: 0.79 MG/DL (ref 0.5–1.4)
EKG IMPRESSION: NORMAL
EOSINOPHIL # BLD AUTO: 0.07 K/UL (ref 0–0.51)
EOSINOPHIL NFR BLD: 1.3 % (ref 0–6.9)
ERYTHROCYTE [DISTWIDTH] IN BLOOD BY AUTOMATED COUNT: 44.2 FL (ref 35.9–50)
GLOBULIN SER CALC-MCNC: 2.4 G/DL (ref 1.9–3.5)
GLUCOSE SERPL-MCNC: 131 MG/DL (ref 65–99)
HCT VFR BLD AUTO: 42.9 % (ref 42–52)
HGB BLD-MCNC: 14.7 G/DL (ref 14–18)
IMM GRANULOCYTES # BLD AUTO: 0.02 K/UL (ref 0–0.11)
IMM GRANULOCYTES NFR BLD AUTO: 0.4 % (ref 0–0.9)
LYMPHOCYTES # BLD AUTO: 1.12 K/UL (ref 1–4.8)
LYMPHOCYTES NFR BLD: 20.4 % (ref 22–41)
MCH RBC QN AUTO: 30.8 PG (ref 27–33)
MCHC RBC AUTO-ENTMCNC: 34.3 G/DL (ref 33.7–35.3)
MCV RBC AUTO: 89.9 FL (ref 81.4–97.8)
MONOCYTES # BLD AUTO: 0.38 K/UL (ref 0–0.85)
MONOCYTES NFR BLD AUTO: 6.9 % (ref 0–13.4)
NEUTROPHILS # BLD AUTO: 3.86 K/UL (ref 1.82–7.42)
NEUTROPHILS NFR BLD: 70.1 % (ref 44–72)
NRBC # BLD AUTO: 0 K/UL
NRBC BLD-RTO: 0 /100 WBC
PLATELET # BLD AUTO: 186 K/UL (ref 164–446)
PMV BLD AUTO: 10.2 FL (ref 9–12.9)
POTASSIUM SERPL-SCNC: 4.4 MMOL/L (ref 3.6–5.5)
PROT SERPL-MCNC: 6.5 G/DL (ref 6–8.2)
RBC # BLD AUTO: 4.77 M/UL (ref 4.7–6.1)
SODIUM SERPL-SCNC: 138 MMOL/L (ref 135–145)
TROPONIN T SERPL-MCNC: 11 NG/L (ref 6–19)
TROPONIN T SERPL-MCNC: 14 NG/L (ref 6–19)
WBC # BLD AUTO: 5.5 K/UL (ref 4.8–10.8)

## 2021-10-02 PROCEDURE — 84484 ASSAY OF TROPONIN QUANT: CPT

## 2021-10-02 PROCEDURE — 93005 ELECTROCARDIOGRAM TRACING: CPT | Performed by: EMERGENCY MEDICINE

## 2021-10-02 PROCEDURE — 700105 HCHG RX REV CODE 258: Performed by: EMERGENCY MEDICINE

## 2021-10-02 PROCEDURE — 93005 ELECTROCARDIOGRAM TRACING: CPT

## 2021-10-02 PROCEDURE — 80053 COMPREHEN METABOLIC PANEL: CPT

## 2021-10-02 PROCEDURE — 85025 COMPLETE CBC W/AUTO DIFF WBC: CPT

## 2021-10-02 PROCEDURE — 99284 EMERGENCY DEPT VISIT MOD MDM: CPT

## 2021-10-02 PROCEDURE — 71045 X-RAY EXAM CHEST 1 VIEW: CPT

## 2021-10-02 RX ORDER — SODIUM CHLORIDE 9 MG/ML
1000 INJECTION, SOLUTION INTRAVENOUS ONCE
Status: COMPLETED | OUTPATIENT
Start: 2021-10-02 | End: 2021-10-02

## 2021-10-02 RX ORDER — LISINOPRIL 2.5 MG/1
2.5 TABLET ORAL ONCE
Status: DISCONTINUED | OUTPATIENT
Start: 2021-10-02 | End: 2021-10-02 | Stop reason: HOSPADM

## 2021-10-02 RX ADMIN — SODIUM CHLORIDE 1000 ML: 9 INJECTION, SOLUTION INTRAVENOUS at 12:42

## 2021-10-02 ASSESSMENT — HEART SCORE
AGE: 65+
ECG: NORMAL
HEART SCORE: 4
TROPONIN: LESS THAN OR EQUAL TO NORMAL LIMIT
RISK FACTORS: >2 RISK FACTORS OR HX OF ATHEROSCLEROTIC DISEASE
HISTORY: SLIGHTLY SUSPICIOUS

## 2021-10-02 ASSESSMENT — FIBROSIS 4 INDEX: FIB4 SCORE: 1.59

## 2021-10-02 NOTE — ED PROVIDER NOTES
ED Provider Note    Scribed for Rowan Condon M.D. by Josue Cortez-Reyes. 10/2/2021, 10:31 AM.    Primary care provider: Charbel Jaffe M.D.  Means of arrival: EMS  History obtained from: Patient  History limited by: None    CHIEF COMPLAINT  Chief Complaint   Patient presents with   • Chest Pain     patient arrives via EMS with c/o CP onset 0730 while sitting at desk this morning. patient with hx MI x 2 with stent placement x 2, pt receive 324 mg baby aspirin en route, no additional meds en route, PIV est. pt has hx DM.        HPI  Orlando Arreguin is a 71 y.o. male who presents to the Emergency Department for evaluation of chest pain onset this morning. The patient states that his chest pain began at around 7:30 AM while he was sitting at his desk endorsing additional acute onset fatigue. He describes his chest pain as a pressure in the center of his chest noting that it feels similar to the chest pain he gets secondary to his gastroparesis. He adds that after the onset of his symptoms he noticed that his blood pressure was in the 160's/80's. The patient has a history of myocardial infarction and was treated with baby aspirin on the way to the ED. The patient states that he is scheduled to have a stress test performed this upcoming Monday.    REVIEW OF SYSTEMS  Pertinent positives include chest pain and fatigue. Pertinent negatives include no other pain or illness.  All other systems reviewed and negative.    PAST MEDICAL HISTORY   has a past medical history of Coronary artery disease due to calcified coronary lesion (8/11/2015), Diabetes mellitus type II, Gastroparesis, Hyperlipidemia, Hypothyroid, Interstitial cystitis, and Sciatic neuritis.    SURGICAL HISTORY   has a past surgical history that includes appendectomy; other; transurethral elec-surg prostatectom; and zzz cardiac cath (8/6/15).    SOCIAL HISTORY  Social History     Tobacco Use   • Smoking status: Current Every Day Smoker     Packs/day: 1.00      Years: 30.00     Pack years: 30.00     Types: Cigarettes   • Smokeless tobacco: Never Used   Vaping Use   • Vaping Use: Never used   Substance Use Topics   • Alcohol use: No     Alcohol/week: 0.0 oz   • Drug use: No      Social History     Substance and Sexual Activity   Drug Use No       FAMILY HISTORY  Family History   Problem Relation Age of Onset   • Heart Disease Mother    • Heart Attack Mother 55        heart attack       CURRENT MEDICATIONS  Current Outpatient Medications   Medication Instructions   • aspirin 81 mg, Oral, DAILY   • atorvastatin (LIPITOR) 80 mg, Oral, DAILY   • Cholecalciferol (VITAMIN D3 PO) 1 Dose, Oral, DAILY, Unknown OTC Strength.   • erythromycin base (UMM-TAB) 250 mg, Oral, DAILY, Maintenance Medication.   • EUTHYROX 112 MCG Tab TAKE 1 TABLET BY MOUTH IN THE MORNING ON AN EMPTY STOMACH   • Insulin Pen Needle 31G X 6 MM Misc No dose, route, or frequency recorded.   • LANTUS SOLOSTAR 100 UNIT/ML Solution Pen-injector injection INJECT 12 UNITS SUBCUTANEOUSLY ONCE DAILY IN THE EVENING AS  INSTRUCTED   • LIOTHYRONINE SODIUM PO 12.5 mcg, Oral, 2 TIMES DAILY   • lisinopril (PRINIVIL) 2.5 MG Tab Take 1 tablet by mouth once daily   • metformin (GLUCOPHAGE) 1,000 mg, Oral, 2 TIMES DAILY WITH MEALS   • prasugrel (EFFIENT) 10 mg, Oral, DAILY   • RELION PEN NEEDLES 31G X 6 MM Misc USE PEN ONCE DAILY WITH LANTUS PEN   • RELION PRIME TEST strip USE 1 STRIP TO CHECK GLUCOSE ONCE DAILY BEFORE A MEAL   • ReliOn Ultra Thin Lancets 30G Misc USE LANCET TO CHECK BLOOD SUGAR ONCE DAILY   • Riboflavin (VITAMIN B-2 PO) 1 Dose, Oral, DAILY, Unknown OTC Strength.       ALLERGIES  Allergies   Allergen Reactions   • Brilinta [Ticagrelor] Shortness of Breath     Total Sleep Apnea per patient; SOB   • Pcn [Penicillins] Anaphylaxis   • Other Environmental      Hayfever   • Tdap [Dipth, Acell Pertus, Tetanus]      tdap   • Tetanus Toxoid        PHYSICAL EXAM  VITAL SIGNS: /57   Pulse 72   Temp 36.7 °C (98.1 °F)  "(Oral)   Resp 19   Ht 1.715 m (5' 7.5\")   Wt 70.3 kg (155 lb)   SpO2 97%   BMI 23.92 kg/m²   Constitutional: Alert in no apparent distress.  HENT: No signs of trauma, Bilateral external ears normal, Nose normal.   Eyes: Pupils are equal and reactive, Conjunctiva normal, Non-icteric.   Neck: Normal range of motion, No tenderness, Supple, No stridor.   Cardiovascular: Regular rate and rhythm, no murmurs.   Thorax & Lungs: Normal breath sounds, No respiratory distress, No wheezing, No chest tenderness.   Abdomen: Bowel sounds normal, Soft, No tenderness, No masses, No peritoneal signs.  Skin: Warm, Dry, No erythema, No rash.   Musculoskeletal:  No major deformities noted.   Neurologic: Alert, moving all extremities without difficulty, no focal deficits.    LABS  Labs Reviewed   CBC WITH DIFFERENTIAL - Abnormal; Notable for the following components:       Result Value    Lymphocytes 20.40 (*)     All other components within normal limits   COMP METABOLIC PANEL - Abnormal; Notable for the following components:    Glucose 131 (*)     Alkaline Phosphatase 106 (*)     All other components within normal limits   TROPONIN   ESTIMATED GFR   TROPONIN     All labs reviewed by me.    EKG  12 Lead EKG interpreted by me to show:  Results for orders placed or performed during the hospital encounter of 10/02/21   EKG   Result Value Ref Range    Report       [facility] Emergency Dept.    Test Date:  2021-10-02  Pt Name:    ZOHAIB BHATT        Department: ER  MRN:        [id number]                      Room:       [facility]  Gender:     Male                         Technician: [id number]  :        1950                   Requested By:ER TRIAGE PROTOCOL  Order #:    [id number]                    Reading MD: LILIA FELIZ    Measurements  Intervals                                Axis  Rate:       70                           P:          56  AL:         160                          QRS:        0  QRSD:       " 86                           T:          68  QT:         384  QTc:        415    Interpretive Statements  SINUS RHYTHM  LOW VOLTAGE IN FRONTAL LEADS  Compared to ECG 06/20/2020 15:24:21  Sinus bradycardia no longer present  IMpression: NO acute ischemia  Electronically Signed On 10-2-2021 11:02:10 PDT by LILIA FELIZ         RADIOLOGY  DX-CHEST-PORTABLE (1 VIEW)   Final Result      No evidence of acute cardiopulmonary process.        The radiologist's interpretation of all radiological studies have been reviewed by me.    COURSE & MEDICAL DECISION MAKING  Pertinent Labs & Imaging studies reviewed. (See chart for details)    I reviewed the patient's past medical records which showed that the patient was seen by Dr. Kwong at the beginning of last month and was supposed to get a stress test and ECHO performed.     Differential diagnoses include but are not limited to: ACS and atypical chest pain.    10:31 AM - Patient seen and examined at bedside. Ordered DX-Chest, CBC with differential, CMP, Troponin, and EKG to evaluate his symptoms.     12:38 PM - The patient will be treated with NS infusion for his symptoms.    1:16 PM - Paged Cardiology.    1:21 PM - I discussed the patient's case and the above findings with Dr. Fraire (Cardiology) who advised that the patient follow up with his stress test on Monday.    1:24 PM - I reevaluated the patient at bedside. I updated him on his labs and imaging results noted above. I informed him that I discussed his case with Dr. Fraire who advised that he follow up with his stress test on Monday. I discussed plan for discharge and follow up as outlined below. The patient verbalizes they feel comfortable going home. The patient is stable for discharge at this time and will return for any new or worsening symptoms. Patient verbalizes understanding and support with my plan for discharge.   Review of vital signs at this visit show: /57   Pulse 72   Temp 36.7 °C (98.1 °F)  "(Oral)   Resp 19   Ht 1.715 m (5' 7.5\")   Wt 70.3 kg (155 lb)   SpO2 97%   BMI 23.92 kg/m²     HYDRATION: Based on the patient's presentation of Dehydration the patient was given IV fluids. IV Hydration was used because oral hydration was not adequate alone. Upon recheck following hydration, the patient was well improved.    Decision Making:  This is a 71 y.o. year old male who presents with chest pain.  Patient's initial EKG and troponin are normal.  He was reporting slightly elevated blood pressure at home which has improved significantly here in the emergency department.  Repeat troponin was performed 2 hours later and is still unremarkable.  I did discuss the case with Dr. Fraire.  Patient has a heart score of 4 however he is scheduled for a nuclear stress test in 2 days.  Given his normal EKG and tropes x2 that are normal I do think it is reasonable to have him follow-up on Monday for this stress test.  Patient is agreeable to this plan.    The patient will return for new or worsening symptoms and is stable at the time of discharge. Patient was given return precautions. Patient and/or family member verbalizes understanding and will comply.    DISPOSITION:  Patient will be discharged home in stable condition.    FOLLOW UP:  Abran Kwong M.D.  [address]  [address]  [facility] [id number] [phone]          [facility], Emergency Dept  [address] [facility] [address] [phone]    Return to the emergency department for worsening pain, shortness of breath or other concerns.      FINAL IMPRESSION  1. Chest pain, unspecified type           This dictation has been created using voice recognition software and/or scribes. The accuracy of the dictation is limited by the abilities of the software and the expertise of the scribes. I expect there may be some errors of grammar and possibly content. I made every attempt to manually correct the errors within my dictation. However, " errors related to voice recognition software and/or scribes may still exist and should be interpreted within the appropriate context.     I, Josue Cortez-Reyes (Scribe), am scribing for, and in the presence of, Rowan Condon M.D..    Electronically signed by: Josue Cortez-Reyes (Scribe), 10/2/2021    IRowan M.D. personally performed the services described in this documentation, as scribed by Josue Cortez-Reyes in my presence, and it is both accurate and complete. C.    The note accurately reflects work and decisions made by me.  Rowan Condon M.D.  10/2/2021  3:17 PM

## 2021-10-02 NOTE — ED NOTES
"Ambulates to the bathroom, pt requesting fluids, \"I'm dehydrated, drinking water isn't going to help, also I would like to go home.\" Will notify ERP.  "

## 2021-10-02 NOTE — ED NOTES
Discharge orders received, IV and monitor discontinued, instructions and education given, follow-up discussed, pt verbalized understanding. Patient refuses meds, states he will take them all at home. Patient requesting assistance with transportation home.

## 2021-10-02 NOTE — ED TRIAGE NOTES
Chief Complaint   Patient presents with   • Chest Pain     patient arrives via EMS with c/o CP onset 0730 while sitting at desk this morning. patient with hx MI x 2 with stent placement x 2, pt receive 324 mg baby aspirin en route, no additional meds en route, PIV est. pt has hx DM.

## 2021-10-04 ENCOUNTER — HOSPITAL ENCOUNTER (OUTPATIENT)
Dept: RADIOLOGY | Facility: MEDICAL CENTER | Age: 71
End: 2021-10-04
Attending: INTERNAL MEDICINE
Payer: MEDICARE

## 2021-10-04 DIAGNOSIS — I25.10 CORONARY ARTERY DISEASE DUE TO CALCIFIED CORONARY LESION: ICD-10-CM

## 2021-10-04 DIAGNOSIS — R06.09 DOE (DYSPNEA ON EXERTION): ICD-10-CM

## 2021-10-04 DIAGNOSIS — I25.84 CORONARY ARTERY DISEASE DUE TO CALCIFIED CORONARY LESION: ICD-10-CM

## 2021-10-04 PROCEDURE — 700111 HCHG RX REV CODE 636 W/ 250 OVERRIDE (IP)

## 2021-10-04 PROCEDURE — A9502 TC99M TETROFOSMIN: HCPCS

## 2021-10-04 RX ORDER — REGADENOSON 0.08 MG/ML
INJECTION, SOLUTION INTRAVENOUS
Status: COMPLETED
Start: 2021-10-04 | End: 2021-10-04

## 2021-10-04 RX ORDER — REGADENOSON 0.08 MG/ML
0.4 INJECTION, SOLUTION INTRAVENOUS ONCE
Status: COMPLETED | OUTPATIENT
Start: 2021-10-04 | End: 2021-10-04

## 2021-10-04 RX ADMIN — REGADENOSON 0.4 MG: 0.08 INJECTION, SOLUTION INTRAVENOUS at 10:35

## 2021-10-05 DIAGNOSIS — I25.10 CORONARY ARTERY DISEASE DUE TO CALCIFIED CORONARY LESION: ICD-10-CM

## 2021-10-05 DIAGNOSIS — Z95.5 STENTED CORONARY ARTERY: ICD-10-CM

## 2021-10-05 DIAGNOSIS — I25.84 CORONARY ARTERY DISEASE DUE TO CALCIFIED CORONARY LESION: ICD-10-CM

## 2021-10-05 DIAGNOSIS — I25.2 HISTORY OF MI (MYOCARDIAL INFARCTION): ICD-10-CM

## 2021-10-05 PROCEDURE — 78452 HT MUSCLE IMAGE SPECT MULT: CPT | Mod: 26 | Performed by: INTERNAL MEDICINE

## 2021-10-05 PROCEDURE — 93018 CV STRESS TEST I&R ONLY: CPT | Performed by: INTERNAL MEDICINE

## 2021-10-05 RX ORDER — PRASUGREL 10 MG/1
10 TABLET, FILM COATED ORAL DAILY
Qty: 90 TABLET | Refills: 3 | Status: SHIPPED | OUTPATIENT
Start: 2021-10-05 | End: 2022-07-14

## 2021-10-08 ENCOUNTER — PATIENT OUTREACH (OUTPATIENT)
Dept: MEDICAL GROUP | Facility: MEDICAL CENTER | Age: 71
End: 2021-10-08

## 2021-11-04 LAB
T3FREE SERPL-MCNC: 3.4 PG/ML (ref 2–4.4)
T4 FREE SERPL-MCNC: 1.27 NG/DL (ref 0.82–1.77)
TSH SERPL DL<=0.005 MIU/L-ACNC: <0.005 UIU/ML (ref 0.45–4.5)

## 2021-11-10 ENCOUNTER — OFFICE VISIT (OUTPATIENT)
Dept: ENDOCRINOLOGY | Facility: MEDICAL CENTER | Age: 71
End: 2021-11-10
Attending: INTERNAL MEDICINE
Payer: MEDICARE

## 2021-11-10 VITALS
BODY MASS INDEX: 23.19 KG/M2 | DIASTOLIC BLOOD PRESSURE: 70 MMHG | HEIGHT: 68 IN | SYSTOLIC BLOOD PRESSURE: 118 MMHG | WEIGHT: 153 LBS | HEART RATE: 60 BPM | OXYGEN SATURATION: 95 %

## 2021-11-10 DIAGNOSIS — G14 POSTPOLIO SYNDROME: ICD-10-CM

## 2021-11-10 DIAGNOSIS — Z00.00 PREVENTATIVE HEALTH CARE: Chronic | ICD-10-CM

## 2021-11-10 DIAGNOSIS — Z91.89 BACK PAIN WITH RISK FOR OSTEOPOROSIS: ICD-10-CM

## 2021-11-10 DIAGNOSIS — Z13.820 OSTEOPOROSIS SCREENING: ICD-10-CM

## 2021-11-10 DIAGNOSIS — Z91.89 AT RISK FOR BONE DENSITY LOSS: ICD-10-CM

## 2021-11-10 DIAGNOSIS — K31.84 GASTROPARESIS: Chronic | ICD-10-CM

## 2021-11-10 DIAGNOSIS — M54.9 BACK PAIN WITH RISK FOR OSTEOPOROSIS: ICD-10-CM

## 2021-11-10 DIAGNOSIS — E03.9 ACQUIRED HYPOTHYROIDISM: ICD-10-CM

## 2021-11-10 DIAGNOSIS — Z91.89 AT RISK FOR LOSS OF BONE DENSITY: ICD-10-CM

## 2021-11-10 PROCEDURE — 99214 OFFICE O/P EST MOD 30 MIN: CPT | Performed by: INTERNAL MEDICINE

## 2021-11-10 PROCEDURE — 99211 OFF/OP EST MAY X REQ PHY/QHP: CPT | Performed by: INTERNAL MEDICINE

## 2021-11-10 ASSESSMENT — FIBROSIS 4 INDEX: FIB4 SCORE: 1.73

## 2021-11-10 NOTE — PROGRESS NOTES
Chief Complaint   Patient presents with   • Hypothyroidism   • Follow-Up     Gastroparesis        HPI:    Orlando's medical circumstance presents a medical and possibly ethical dilemma.  He has found empirically that relatively high dose liothyronine significantly relieves gastroparesis.  As a matter of fact he feels it has saved his life from starvation.  The trade off his that he is thyroid levels by TSH criteria suggests he is overtreated even though he has no symptoms of thyrotoxicosis.  He takes levothyroxine 112 mcg/day and liothyronine 12.5 mcg twice a day.  TSH is completely suppressed.  Free T4 is mid range at 1.2 (0.8-1.7) and free T3 also mid range at 3.4 (2.0-4.4).  He understands the usual interpretation of low TSH signifying excessive thyroid effect.            The dilemma is that he has known coronary artery disease.  He has had an MI in the past and 2 stents.  Recently he has gone to the emergency room with chest pain which fortunately was not an MI.  He has recently passed a chemical stress test.  Cardiac echo is pending.            He still insists that this dose of thyroid is beneficial to his health and is very resistant to the notion of reducing the dose.          I also indicated it can accelerate bone loss.  I have tried to order a bone density but I cannot find a code that will satisfy Medicare coverage.  I will try again.          ROS:  All other systems reported as negative or unchanged since last exam      Allergies:   Allergies   Allergen Reactions   • Brilinta [Ticagrelor] Shortness of Breath     Total Sleep Apnea per patient; SOB   • Pcn [Penicillins] Anaphylaxis   • Other Environmental      Hayfever   • Tdap [Dipth, Acell Pertus, Tetanus]      tdap   • Tetanus Toxoid        Current medicines including changes today:  Current Outpatient Medications   Medication Sig Dispense Refill   • prasugrel (EFFIENT) 10 MG Tab Take 1 Tablet by mouth every day. 90 Tablet 3   • LIOTHYRONINE SODIUM PO Take  "12.5 mcg by mouth 2 times a day.     • EUTHYROX 112 MCG Tab TAKE 1 TABLET BY MOUTH IN THE MORNING ON AN EMPTY STOMACH 90 Tablet 0   • RELION PRIME TEST strip USE 1 STRIP TO CHECK GLUCOSE ONCE DAILY BEFORE A MEAL 100 Strip 11   • ReliOn Ultra Thin Lancets 30G Misc USE LANCET TO CHECK BLOOD SUGAR ONCE DAILY 100 Each 11   • RELION PEN NEEDLES 31G X 6 MM Misc USE PEN ONCE DAILY WITH LANTUS  Each 11   • metformin (GLUCOPHAGE) 1000 MG tablet Take 1 tablet by mouth 2 times a day with meals. 180 tablet 3   • Insulin Pen Needle 31G X 6 MM Misc      • lisinopril (PRINIVIL) 2.5 MG Tab Take 1 tablet by mouth once daily 90 tablet 3   • LANTUS SOLOSTAR 100 UNIT/ML Solution Pen-injector injection INJECT 12 UNITS SUBCUTANEOUSLY ONCE DAILY IN THE EVENING AS  INSTRUCTED 15 mL 3   • atorvastatin (LIPITOR) 80 MG tablet Take 1 Tab by mouth every day. 90 Tab 3   • aspirin EC 81 MG EC tablet Take 1 Tab by mouth every day. 30 Tab 0   • Cholecalciferol (VITAMIN D3 PO) Take 1 Dose by mouth every day. Unknown OTC Strength.     • erythromycin base (UMM-TAB) 250 MG Tab Take 250 mg by mouth every day. Maintenance Medication.     • Riboflavin (VITAMIN B-2 PO) Take 1 Dose by mouth every day. Unknown OTC Strength.       No current facility-administered medications for this visit.        Past Medical History:   Diagnosis Date   • Coronary artery disease due to calcified coronary lesion 8/11/2015   • Diabetes mellitus type II    • Gastroparesis    • Hyperlipidemia    • Hypothyroid    • Interstitial cystitis    • Sciatic neuritis        PHYSICAL EXAM:    /70 (BP Location: Left arm, Patient Position: Sitting, BP Cuff Size: Adult)   Pulse 60   Ht 1.715 m (5' 7.5\")   Wt 69.4 kg (153 lb)   SpO2 95%   BMI 23.61 kg/m²   Pulse taken by me is 70 and regular    Gen.   appears healthy     Skin   appropriate for sex and age    HEENT  unremarkable    Neck   no palpable thyroid    Heart  regular    Extremities  no edema    Neuro  gait and station " normal, no tremor    Psych  appropriate, calm, pleasant    ASSESSMENT AND RECOMMENDATIONS    1. Acquired hypothyroidism                See HPI    2. Gastroparesis              See HPI       DISPOSITION: I discussed my plans for custodial the end of this year.  I have introduced him to our nurse practitioner working with me and she will follow-up after my custodial.  He is in agreement.      Alex Stein M.D.    Copies to: Charbel Jaffe M.D. [phone]

## 2021-11-19 ENCOUNTER — HOSPITAL ENCOUNTER (OUTPATIENT)
Dept: CARDIOLOGY | Facility: MEDICAL CENTER | Age: 71
End: 2021-11-19
Attending: INTERNAL MEDICINE
Payer: MEDICARE

## 2021-11-19 DIAGNOSIS — I25.10 CORONARY ARTERY DISEASE DUE TO CALCIFIED CORONARY LESION: ICD-10-CM

## 2021-11-19 DIAGNOSIS — R06.09 DOE (DYSPNEA ON EXERTION): ICD-10-CM

## 2021-11-19 DIAGNOSIS — I25.84 CORONARY ARTERY DISEASE DUE TO CALCIFIED CORONARY LESION: ICD-10-CM

## 2021-11-19 PROBLEM — I77.810 ASCENDING AORTA DILATATION (HCC): Status: ACTIVE | Noted: 2021-11-19

## 2021-11-19 LAB
LV EJECT FRACT  99904: 60
LV EJECT FRACT MOD 2C 99903: 54.57
LV EJECT FRACT MOD 4C 99902: 61.58
LV EJECT FRACT MOD BP 99901: 59.46

## 2021-11-19 PROCEDURE — 93306 TTE W/DOPPLER COMPLETE: CPT | Mod: 26 | Performed by: INTERNAL MEDICINE

## 2021-11-19 PROCEDURE — 93306 TTE W/DOPPLER COMPLETE: CPT

## 2021-12-03 ENCOUNTER — OFFICE VISIT (OUTPATIENT)
Dept: CARDIOLOGY | Facility: MEDICAL CENTER | Age: 71
End: 2021-12-03
Payer: MEDICARE

## 2021-12-03 VITALS
HEART RATE: 80 BPM | RESPIRATION RATE: 20 BRPM | OXYGEN SATURATION: 98 % | SYSTOLIC BLOOD PRESSURE: 118 MMHG | DIASTOLIC BLOOD PRESSURE: 60 MMHG | HEIGHT: 68 IN | WEIGHT: 152 LBS | BODY MASS INDEX: 23.04 KG/M2

## 2021-12-03 DIAGNOSIS — Z95.5 STENTED CORONARY ARTERY: ICD-10-CM

## 2021-12-03 DIAGNOSIS — I25.84 CORONARY ARTERY DISEASE DUE TO CALCIFIED CORONARY LESION: ICD-10-CM

## 2021-12-03 DIAGNOSIS — E78.5 DYSLIPIDEMIA: ICD-10-CM

## 2021-12-03 DIAGNOSIS — I25.10 CORONARY ARTERY DISEASE DUE TO CALCIFIED CORONARY LESION: ICD-10-CM

## 2021-12-03 DIAGNOSIS — I35.1 NONRHEUMATIC AORTIC VALVE INSUFFICIENCY: ICD-10-CM

## 2021-12-03 DIAGNOSIS — I10 ESSENTIAL HYPERTENSION: ICD-10-CM

## 2021-12-03 PROCEDURE — 99214 OFFICE O/P EST MOD 30 MIN: CPT | Performed by: INTERNAL MEDICINE

## 2021-12-03 RX ORDER — ATORVASTATIN CALCIUM 80 MG/1
80 TABLET, FILM COATED ORAL DAILY
Qty: 90 TABLET | Refills: 3 | Status: SHIPPED | OUTPATIENT
Start: 2021-12-03 | End: 2022-12-13

## 2021-12-03 RX ORDER — LIOTHYRONINE SODIUM 25 UG/1
TABLET ORAL
COMMUNITY
Start: 2021-10-12 | End: 2021-12-03

## 2021-12-03 ASSESSMENT — FIBROSIS 4 INDEX: FIB4 SCORE: 1.73

## 2021-12-03 NOTE — PROGRESS NOTES
Chief Complaint   Patient presents with   • Coronary Artery Disease     F/V Dx: Coronary artery disease due to calcified coronary lesion   • Dyslipidemia       Subjective     Orlando Arreguin is a 71 y.o. male who presents today for follow-up of coronary artery disease characterized by left main to LAD PCI Dr. ORR in 2015 and RCA PCI for ACS 2020 Dr. Weinberg.  Since that time is developed dyspnea on exertion that is new and chest discomfort after eating.  Has history of polio as a child and cannot ambulate long distances or participating in the treadmill test.  Taking his medications as directed.    He returns following investigations for his dyspnea with a stress test with tiny infarct from prior MI and no ischemia and a normal echocardiogram.  He continues to note dyspnea when bending over and standing up.  He does not exercise due to his history of polio.    Past Medical History:   Diagnosis Date   • Coronary artery disease due to calcified coronary lesion 8/11/2015   • Diabetes mellitus type II    • Gastroparesis    • Hyperlipidemia    • Hypothyroid    • Interstitial cystitis    • Sciatic neuritis      Past Surgical History:   Procedure Laterality Date   • ZZZ CARDIAC CATH  8/6/15    YESIKA to LAD.  Has RCA 70% occulsion.   • APPENDECTOMY     • OTHER      L shoulder surgery (arthroscopic, [place], 2011)   • PB TRANSURETHRAL ELEC-SURG PBOSTATECTOM       Family History   Problem Relation Age of Onset   • Heart Disease Mother    • Heart Attack Mother 55        heart attack     Social History     Socioeconomic History   • Marital status: Single     Spouse name: Not on file   • Number of children: Not on file   • Years of education: Not on file   • Highest education level: Not on file   Occupational History   • Not on file   Tobacco Use   • Smoking status: Current Every Day Smoker     Packs/day: 1.00     Years: 30.00     Pack years: 30.00     Types: Cigarettes   • Smokeless tobacco: Never Used   Vaping Use   • Vaping Use: Never  used   Substance and Sexual Activity   • Alcohol use: No     Alcohol/week: 0.0 oz   • Drug use: No   • Sexual activity: Not on file   Other Topics Concern   • Not on file   Social History Narrative   • Not on file     Social Determinants of Health     Financial Resource Strain:    • Difficulty of Paying Living Expenses: Not on file   Food Insecurity:    • Worried About Running Out of Food in the Last Year: Not on file   • Ran Out of Food in the Last Year: Not on file   Transportation Needs:    • Lack of Transportation (Medical): Not on file   • Lack of Transportation (Non-Medical): Not on file   Physical Activity:    • Days of Exercise per Week: Not on file   • Minutes of Exercise per Session: Not on file   Stress:    • Feeling of Stress : Not on file   Social Connections:    • Frequency of Communication with Friends and Family: Not on file   • Frequency of Social Gatherings with Friends and Family: Not on file   • Attends Tenriism Services: Not on file   • Active Member of Clubs or Organizations: Not on file   • Attends Club or Organization Meetings: Not on file   • Marital Status: Not on file   Intimate Partner Violence:    • Fear of Current or Ex-Partner: Not on file   • Emotionally Abused: Not on file   • Physically Abused: Not on file   • Sexually Abused: Not on file   Housing Stability:    • Unable to Pay for Housing in the Last Year: Not on file   • Number of Places Lived in the Last Year: Not on file   • Unstable Housing in the Last Year: Not on file     Allergies   Allergen Reactions   • Brilinta [Ticagrelor] Shortness of Breath     Total Sleep Apnea per patient; SOB   • Pcn [Penicillins] Anaphylaxis   • Other Environmental      Hayfever   • Tdap [Dipth, Acell Pertus, Tetanus]      tdap   • Tetanus Toxoid      Outpatient Encounter Medications as of 12/3/2021   Medication Sig Dispense Refill   • atorvastatin (LIPITOR) 80 MG tablet Take 1 Tablet by mouth every day. 90 Tablet 3   • prasugrel (EFFIENT) 10 MG  "Tab Take 1 Tablet by mouth every day. 90 Tablet 3   • LIOTHYRONINE SODIUM PO Take 12.5 mcg by mouth 2 times a day.     • EUTHYROX 112 MCG Tab TAKE 1 TABLET BY MOUTH IN THE MORNING ON AN EMPTY STOMACH 90 Tablet 0   • RELION PRIME TEST strip USE 1 STRIP TO CHECK GLUCOSE ONCE DAILY BEFORE A MEAL 100 Strip 11   • ReliOn Ultra Thin Lancets 30G Misc USE LANCET TO CHECK BLOOD SUGAR ONCE DAILY 100 Each 11   • RELION PEN NEEDLES 31G X 6 MM Misc USE PEN ONCE DAILY WITH LANTUS  Each 11   • metformin (GLUCOPHAGE) 1000 MG tablet Take 1 tablet by mouth 2 times a day with meals. 180 tablet 3   • Insulin Pen Needle 31G X 6 MM Misc      • lisinopril (PRINIVIL) 2.5 MG Tab Take 1 tablet by mouth once daily 90 tablet 3   • LANTUS SOLOSTAR 100 UNIT/ML Solution Pen-injector injection INJECT 12 UNITS SUBCUTANEOUSLY ONCE DAILY IN THE EVENING AS  INSTRUCTED 15 mL 3   • aspirin EC 81 MG EC tablet Take 1 Tab by mouth every day. 30 Tab 0   • Cholecalciferol (VITAMIN D3 PO) Take 1 Dose by mouth every day. Unknown OTC Strength.     • erythromycin base (UMM-TAB) 250 MG Tab Take 250 mg by mouth every day. Maintenance Medication.     • Riboflavin (VITAMIN B-2 PO) Take 1 Dose by mouth every day. Unknown OTC Strength.     • [DISCONTINUED] liothyronine (CYTOMEL) 25 MCG Tab  (Patient not taking: Reported on 12/3/2021)     • [DISCONTINUED] atorvastatin (LIPITOR) 80 MG tablet Take 1 Tab by mouth every day. 90 Tab 3     No facility-administered encounter medications on file as of 12/3/2021.     Review of Systems   All other systems reviewed and are negative.             Objective     /60 (BP Location: Left arm, Patient Position: Sitting, BP Cuff Size: Adult)   Pulse 80   Resp 20   Ht 1.715 m (5' 7.5\")   Wt 68.9 kg (152 lb)   SpO2 98%   BMI 23.46 kg/m²     Physical Exam  Vitals reviewed.   Constitutional:       General: He is not in acute distress.     Appearance: He is well-developed. He is not diaphoretic.   HENT:      Head: " Normocephalic and atraumatic.      Right Ear: External ear normal.      Left Ear: External ear normal.   Eyes:      General: No scleral icterus.        Right eye: No discharge.         Left eye: No discharge.      Conjunctiva/sclera: Conjunctivae normal.      Pupils: Pupils are equal, round, and reactive to light.   Neck:      Thyroid: No thyromegaly.      Vascular: No JVD.      Trachea: No tracheal deviation.   Cardiovascular:      Rate and Rhythm: Normal rate and regular rhythm.      Chest Wall: PMI is not displaced.      Pulses:           Carotid pulses are 2+ on the right side and 2+ on the left side.       Radial pulses are 2+ on the left side.        Popliteal pulses are 2+ on the right side and 2+ on the left side.        Dorsalis pedis pulses are 2+ on the right side and 2+ on the left side.        Posterior tibial pulses are 2+ on the right side and 2+ on the left side.      Heart sounds: No murmur heard.  No friction rub. No gallop.    Pulmonary:      Effort: Pulmonary effort is normal. No respiratory distress.      Breath sounds: Normal breath sounds. No wheezing or rales.   Chest:      Chest wall: No tenderness.   Abdominal:      General: Bowel sounds are normal. There is no distension.      Palpations: Abdomen is soft.      Tenderness: There is no abdominal tenderness.   Musculoskeletal:         General: No tenderness or deformity. Normal range of motion.      Cervical back: Normal range of motion and neck supple.   Skin:     General: Skin is warm and dry.      Coloration: Skin is not pale.      Findings: No erythema or rash.   Neurological:      Mental Status: He is alert and oriented to person, place, and time.      Cranial Nerves: No cranial nerve deficit (cranial nerves II through XII grossly intact).      Coordination: Coordination normal.   Psychiatric:         Behavior: Behavior normal.         Thought Content: Thought content normal.       LABS:  Lab Results   Component Value Date/Time     CHOLSTRLTOT 75 (L) 09/02/2021 07:06 AM    CHOLSTRLTOT 72 (L) 12/07/2020 10:51 AM    LDL 36 12/07/2020 10:51 AM    HDL 27 (L) 09/02/2021 07:06 AM    HDL 23 (A) 12/07/2020 10:51 AM    TRIGLYCERIDE 43 09/02/2021 07:06 AM    TRIGLYCERIDE 65 12/07/2020 10:51 AM       Lab Results   Component Value Date/Time    WBC 5.5 10/02/2021 10:30 AM    RBC 4.77 10/02/2021 10:30 AM    HEMOGLOBIN 14.7 10/02/2021 10:30 AM    HEMATOCRIT 42.9 10/02/2021 10:30 AM    MCV 89.9 10/02/2021 10:30 AM    NEUTSPOLYS 70.10 10/02/2021 10:30 AM    LYMPHOCYTES 20.40 (L) 10/02/2021 10:30 AM    MONOCYTES 6.90 10/02/2021 10:30 AM    EOSINOPHILS 1.30 10/02/2021 10:30 AM    BASOPHILS 0.90 10/02/2021 10:30 AM     Lab Results   Component Value Date/Time    SODIUM 138 10/02/2021 10:30 AM    POTASSIUM 4.4 10/02/2021 10:30 AM    CHLORIDE 107 10/02/2021 10:30 AM    CO2 20 10/02/2021 10:30 AM    GLUCOSE 131 (H) 10/02/2021 10:30 AM    BUN 18 10/02/2021 10:30 AM    CREATININE 0.79 10/02/2021 10:30 AM    BUNCREATRAT 16 09/02/2021 07:06 AM     Lab Results   Component Value Date    HBA1C 6.1 (H) 09/14/2021    HBA1C 5.9 (H) 12/07/2020      Lab Results   Component Value Date/Time    ALKPHOSPHAT 106 (H) 10/02/2021 10:30 AM    ASTSGOT 24 10/02/2021 10:30 AM    ALTSGPT 28 10/02/2021 10:30 AM    TBILIRUBIN 0.4 10/02/2021 10:30 AM      Lab Results   Component Value Date/Time    BNPBTYPENAT 178.8 (H) 08/19/2015 10:28 AM    BNPBTYPENAT 299 (H) 08/06/2015 04:59 AM      Lab Results   Component Value Date/Time    TSH <0.005 (L) 11/03/2021 07:19 AM     Lab Results   Component Value Date/Time    PROTHROMBTM 14.1 08/06/2015 04:59 AM    INR 1.09 08/06/2015 04:59 AM                   Assessment & Plan     1. Coronary artery disease due to calcified coronary lesion     2. Stented coronary artery     3. Essential hypertension     4. Nonrheumatic aortic valve insufficiency         Medical Decision Making: Today's Assessment/Status/Plan:     CAD with good lipid profile and blood pressure.   Good medical therapy.  Cardiac evaluation is unremarkable.  Recommend routine stress testing for his left main PCI.  He would like to stop his Effient.  He will continue aspirin.  He stopped his Effient after 1 year of DAPT following his left main stent in 2015 while not under my care and had no adverse outcomes.  It has been 1 year since his stent was placed in 2020 as well.  Okay to discontinue Effient although we did discuss long-term DAPT is my standard treatment he would like to be off it.  This is reasonable as he has proven that it is not an issue for him in the past.  Continue aspirin 81 mg indefinitely.  Consider noncardiac causes for dyspnea.  Follow-up with cardiology yearly.

## 2021-12-04 DIAGNOSIS — Z91.89 PERSONAL HISTORY PRESENTING HAZARDS TO HEALTH: ICD-10-CM

## 2021-12-04 DIAGNOSIS — Z29.89 OSTEOPOROSIS PROPHYLAXIS: ICD-10-CM

## 2021-12-04 DIAGNOSIS — Z13.820 OSTEOPOROSIS SCREENING: ICD-10-CM

## 2021-12-04 DIAGNOSIS — M85.80 BONE LOSS: ICD-10-CM

## 2021-12-04 DIAGNOSIS — Z82.62 FAMILY HISTORY OF OSTEOPOROSIS IN MOTHER: ICD-10-CM

## 2021-12-04 DIAGNOSIS — Z53.20 OSTEOPOROSIS MONITORING DECLINED: ICD-10-CM

## 2021-12-06 DIAGNOSIS — E03.9 ACQUIRED HYPOTHYROIDISM: ICD-10-CM

## 2021-12-06 RX ORDER — LEVOTHYROXINE SODIUM 112 UG/1
TABLET ORAL
Qty: 90 TABLET | Refills: 0 | Status: SHIPPED | OUTPATIENT
Start: 2021-12-06 | End: 2022-03-04

## 2021-12-14 ENCOUNTER — TELEPHONE (OUTPATIENT)
Dept: MEDICAL GROUP | Facility: MEDICAL CENTER | Age: 71
End: 2021-12-14

## 2021-12-14 ENCOUNTER — PATIENT MESSAGE (OUTPATIENT)
Dept: MEDICAL GROUP | Facility: MEDICAL CENTER | Age: 71
End: 2021-12-14

## 2021-12-14 DIAGNOSIS — E78.5 DYSLIPIDEMIA: ICD-10-CM

## 2021-12-14 DIAGNOSIS — E11.65 CONTROLLED TYPE 2 DIABETES MELLITUS WITH HYPERGLYCEMIA, WITHOUT LONG-TERM CURRENT USE OF INSULIN (HCC): ICD-10-CM

## 2021-12-14 DIAGNOSIS — Z12.5 PROSTATE CANCER SCREENING: ICD-10-CM

## 2021-12-14 PROBLEM — I35.1 NONRHEUMATIC AORTIC VALVE INSUFFICIENCY: Status: RESOLVED | Noted: 2021-12-03 | Resolved: 2021-12-14

## 2021-12-15 NOTE — TELEPHONE ENCOUNTER
----- Message from Bridget Armenta, Med Ass't sent at 12/14/2021  8:28 AM PST -----  Regarding: FW: Lab Order    ----- Message -----  From: Orlando Arreguin  Sent: 12/14/2021   8:21 AM PST  To: Snehal Zafar  Subject: Lab Order                                        Dear Dr. Jaffe,    Please order the lab tests you want me to have for our January 10 appointment and have them mailed to me. You can include an A1C test. (My last A1C test was September 14 so Medicare will pay for it.)    Thank you    Orlando Arreguin

## 2021-12-22 DIAGNOSIS — M25.519 ARTHRALGIA OF SHOULDER, UNSPECIFIED LATERALITY: ICD-10-CM

## 2021-12-22 DIAGNOSIS — Z13.820 SCREENING FOR OSTEOPOROSIS: ICD-10-CM

## 2021-12-30 ENCOUNTER — PATIENT MESSAGE (OUTPATIENT)
Dept: ENDOCRINOLOGY | Facility: MEDICAL CENTER | Age: 71
End: 2021-12-30

## 2021-12-30 DIAGNOSIS — M85.88 OTHER SPECIFIED DISORDERS OF BONE DENSITY AND STRUCTURE, OTHER SITE: ICD-10-CM

## 2021-12-31 NOTE — PROGRESS NOTES
Telephone conversation with patient and with a  in the scheduling department.    I cannot come up with a code that Medicare will except for this gentleman to do a bone density even though he is at risk for bone loss with his thyroid dosing.  I have tried all the variations I now and none of them seem to satisfy Medicare.    Alex Stein M.D.

## 2022-01-05 LAB
ALBUMIN SERPL-MCNC: 4.3 G/DL (ref 3.7–4.7)
ALBUMIN/GLOB SERPL: 1.9 {RATIO} (ref 1.2–2.2)
ALP SERPL-CCNC: 96 IU/L (ref 44–121)
ALT SERPL-CCNC: 22 IU/L (ref 0–44)
AST SERPL-CCNC: 22 IU/L (ref 0–40)
BASOPHILS # BLD AUTO: 0 X10E3/UL (ref 0–0.2)
BASOPHILS NFR BLD AUTO: 1 %
BILIRUB SERPL-MCNC: 0.4 MG/DL (ref 0–1.2)
BUN SERPL-MCNC: 17 MG/DL (ref 8–27)
BUN/CREAT SERPL: 20 (ref 10–24)
CALCIUM SERPL-MCNC: 9.1 MG/DL (ref 8.6–10.2)
CHLORIDE SERPL-SCNC: 107 MMOL/L (ref 96–106)
CHOLEST SERPL-MCNC: 83 MG/DL (ref 100–199)
CO2 SERPL-SCNC: 20 MMOL/L (ref 20–29)
CREAT SERPL-MCNC: 0.87 MG/DL (ref 0.76–1.27)
EOSINOPHIL # BLD AUTO: 0.1 X10E3/UL (ref 0–0.4)
EOSINOPHIL NFR BLD AUTO: 2 %
ERYTHROCYTE [DISTWIDTH] IN BLOOD BY AUTOMATED COUNT: 12.9 % (ref 11.6–15.4)
GLOBULIN SER CALC-MCNC: 2.3 G/DL (ref 1.5–4.5)
GLUCOSE SERPL-MCNC: 108 MG/DL (ref 65–99)
HBA1C MFR BLD: 5.8 % (ref 4.8–5.6)
HCT VFR BLD AUTO: 43.8 % (ref 37.5–51)
HDLC SERPL-MCNC: 31 MG/DL
HGB BLD-MCNC: 14.8 G/DL (ref 13–17.7)
IMM GRANULOCYTES # BLD AUTO: 0 X10E3/UL (ref 0–0.1)
IMM GRANULOCYTES NFR BLD AUTO: 0 %
IMMATURE CELLS  115398: NORMAL
LABORATORY COMMENT REPORT: ABNORMAL
LDLC SERPL CALC-MCNC: 39 MG/DL (ref 0–99)
LYMPHOCYTES # BLD AUTO: 1.6 X10E3/UL (ref 0.7–3.1)
LYMPHOCYTES NFR BLD AUTO: 25 %
MCH RBC QN AUTO: 30.2 PG (ref 26.6–33)
MCHC RBC AUTO-ENTMCNC: 33.8 G/DL (ref 31.5–35.7)
MCV RBC AUTO: 89 FL (ref 79–97)
MONOCYTES # BLD AUTO: 0.6 X10E3/UL (ref 0.1–0.9)
MONOCYTES NFR BLD AUTO: 9 %
MORPHOLOGY BLD-IMP: NORMAL
NEUTROPHILS # BLD AUTO: 4.1 X10E3/UL (ref 1.4–7)
NEUTROPHILS NFR BLD AUTO: 63 %
NRBC BLD AUTO-RTO: NORMAL %
PLATELET # BLD AUTO: 217 X10E3/UL (ref 150–450)
POTASSIUM SERPL-SCNC: 5.1 MMOL/L (ref 3.5–5.2)
PROT SERPL-MCNC: 6.6 G/DL (ref 6–8.5)
PSA SERPL-MCNC: 1.4 NG/ML (ref 0–4)
RBC # BLD AUTO: 4.9 X10E6/UL (ref 4.14–5.8)
SODIUM SERPL-SCNC: 139 MMOL/L (ref 134–144)
TRIGL SERPL-MCNC: 52 MG/DL (ref 0–149)
VLDLC SERPL CALC-MCNC: 13 MG/DL (ref 5–40)
WBC # BLD AUTO: 6.5 X10E3/UL (ref 3.4–10.8)

## 2022-01-10 ENCOUNTER — OFFICE VISIT (OUTPATIENT)
Dept: MEDICAL GROUP | Facility: MEDICAL CENTER | Age: 72
End: 2022-01-10
Payer: MEDICARE

## 2022-01-10 VITALS
HEIGHT: 67 IN | HEART RATE: 78 BPM | WEIGHT: 155 LBS | TEMPERATURE: 97.7 F | SYSTOLIC BLOOD PRESSURE: 120 MMHG | BODY MASS INDEX: 24.33 KG/M2 | OXYGEN SATURATION: 98 % | RESPIRATION RATE: 16 BRPM | DIASTOLIC BLOOD PRESSURE: 64 MMHG

## 2022-01-10 DIAGNOSIS — I73.9 PVD (PERIPHERAL VASCULAR DISEASE) (HCC): ICD-10-CM

## 2022-01-10 DIAGNOSIS — E11.3299 MILD NONPROLIFERATIVE DIABETIC RETINOPATHY ASSOCIATED WITH TYPE 2 DIABETES MELLITUS, MACULAR EDEMA PRESENCE UNSPECIFIED, UNSPECIFIED LATERALITY (HCC): ICD-10-CM

## 2022-01-10 DIAGNOSIS — E03.9 ACQUIRED HYPOTHYROIDISM: ICD-10-CM

## 2022-01-10 DIAGNOSIS — E05.90 HYPERTHYROIDISM: ICD-10-CM

## 2022-01-10 DIAGNOSIS — Z79.899 ON ANGIOTENSIN-CONVERTING ENZYME (ACE) INHIBITORS: ICD-10-CM

## 2022-01-10 DIAGNOSIS — E24.8 OTHER CUSHING'S SYNDROME (HCC): ICD-10-CM

## 2022-01-10 DIAGNOSIS — M89.8X9 METABOLIC BONE DISEASE: ICD-10-CM

## 2022-01-10 DIAGNOSIS — Z72.0 TOBACCO ABUSE: Chronic | ICD-10-CM

## 2022-01-10 DIAGNOSIS — E11.65 CONTROLLED TYPE 2 DIABETES MELLITUS WITH HYPERGLYCEMIA, WITHOUT LONG-TERM CURRENT USE OF INSULIN (HCC): ICD-10-CM

## 2022-01-10 PROCEDURE — 99214 OFFICE O/P EST MOD 30 MIN: CPT | Performed by: INTERNAL MEDICINE

## 2022-01-10 RX ORDER — LIOTHYRONINE SODIUM 25 UG/1
TABLET ORAL
COMMUNITY
Start: 2022-01-03 | End: 2022-03-31

## 2022-01-10 RX ORDER — FLURBIPROFEN SODIUM 0.3 MG/ML
SOLUTION/ DROPS OPHTHALMIC
COMMUNITY
Start: 2021-12-04

## 2022-01-10 ASSESSMENT — FIBROSIS 4 INDEX: FIB4 SCORE: 1.53

## 2022-01-10 NOTE — PROGRESS NOTES
Subjective     Orlando Arreguin is a 71 y.o. male who presents with diabetes         HPI       Here follow up diabetes and on lantus 12 units and metformin 1000 mg bid and checking blood sugars daily will run on average 95 to 115, low blood sugar 71 with no hypoglycemia.  Tries to limit sweets and candies in his meals.  Has an upcoming appointment with his ophthalmologist Dr. Scott in February, we do not have any recent records.  Does have a history of mild nonproliferative retinopathy.  Blood pressures at home typically sbp less than 120.  History of MI, stent placement, followed by cardiology.  Patient on aspirin indefinitely, off Effient per patient preference.  Tries to keep active at home walking outdoors, no pain in calf or legs with ambulation.  Peripheral vascular disease, continues to smoke a pack a day but no shortness of breath with activity, perhaps decreased exercise tolerance, no recurrent upper respiratory tract infections.  Dyslipidemia on lipitor with no muscle pain or aches.   Hypothyroid on replacement followed by endocrinology, on levothyroxine and Cytomel, last TSH undetectable Free T4 1.27, free T3 3.4, patient feels better controlling his thyroid dose understanding that elevated thyroid can increase bone loss and increased risk of cardiac arrhythmias.  Patient has had no fragility fractures.        Current Outpatient Medications   Medication Sig Dispense Refill   • Insulin Pen Needle 31G X 6 MM Misc      • B-D UF III MINI PEN NEEDLES 31G X 5 MM Misc USE PEN ONCE DAILY WITH LANTUS PEN     • liothyronine (CYTOMEL) 25 MCG Tab TAKE 1/2 (ONE-HALF) TABLET BY MOUTH TWICE DAILY FOR 90 DAYS     • levothyroxine (SYNTHROID) 112 MCG Tab TAKE 1 TABLET BY MOUTH IN THE MORNING ON AN EMPTY STOMACH 90 Tablet 0   • atorvastatin (LIPITOR) 80 MG tablet Take 1 Tablet by mouth every day. 90 Tablet 3   • prasugrel (EFFIENT) 10 MG Tab Take 1 Tablet by mouth every day. 90 Tablet 3   • LIOTHYRONINE SODIUM PO Take 12.5  mcg by mouth 2 times a day.     • RELION PRIME TEST strip USE 1 STRIP TO CHECK GLUCOSE ONCE DAILY BEFORE A MEAL 100 Strip 11   • ReliOn Ultra Thin Lancets 30G Misc USE LANCET TO CHECK BLOOD SUGAR ONCE DAILY 100 Each 11   • RELION PEN NEEDLES 31G X 6 MM Misc USE PEN ONCE DAILY WITH LANTUS  Each 11   • metformin (GLUCOPHAGE) 1000 MG tablet Take 1 tablet by mouth 2 times a day with meals. 180 tablet 3   • Insulin Pen Needle 31G X 6 MM Misc      • lisinopril (PRINIVIL) 2.5 MG Tab Take 1 tablet by mouth once daily 90 tablet 3   • LANTUS SOLOSTAR 100 UNIT/ML Solution Pen-injector injection INJECT 12 UNITS SUBCUTANEOUSLY ONCE DAILY IN THE EVENING AS  INSTRUCTED 15 mL 3   • aspirin EC 81 MG EC tablet Take 1 Tab by mouth every day. 30 Tab 0   • Cholecalciferol (VITAMIN D3 PO) Take 1 Dose by mouth every day. Unknown OTC Strength.     • erythromycin base (UMM-TAB) 250 MG Tab Take 250 mg by mouth every day. Maintenance Medication.     • Riboflavin (VITAMIN B-2 PO) Take 1 Dose by mouth every day. Unknown OTC Strength.       No current facility-administered medications for this visit.              Tobacco  7/31/15 tobacco 1 ppd not interested in stopping smoking classes or education  8/28/15 tobacco 1 ppd not interested in stopping smoking classes or education  8/28/15 declines PFT    11/30/15 still smoking 20 cigs per day declines stop smoking classes or medications  5/31/16 declines stop smoking classes and medications, not interested in stop smoking classes, smoking ppd x 40 plus years, declines CT lung cancer screening, pulmonary function testing  11/29/16 still smoking 1 ppd, started smoking age 20, declines stop smoking classes and medication, declines lung cancer screening program and PFT  5/30/17 still smoking 1 ppd declines stop smoking classes and medications, and lung cancer screening program and PFT  12/5/17 still smokes 1 ppd declines stop smoking classes and medications, and lung cancer screening program and  PFT   6/12/18  still smokes 1 ppd declines stop smoking classes and medications, and lung cancer screening program and PFT   6/18/19 still smokes 1 ppd declines stop smoking classes and medications, and lung cancer screening program and PFT   12/10/19 still smoking 1 pack/day, declines stop smoking classes, medications, lung cancer screening, PFT  7/1/20 cardiology note stable, urged to quit smoking, follow-up 3 months  12/15/20 tobacco 1/2 ppd not interested to stop smoking classes, medications, CT lung cancer screening, or PFTs  6/14/21 1 ppd tobacco, declines stop smoking classes, CT lung cancer screening, PFT    s/p greenlight photo vaporization  10/04 cystoscopy and green light photovaporization of prostate in [place], [place]  1/5/22 psa 1.4    Preventative health  8/23/15 pneumovax  1/12/16 zoster vaccine at [facility]  5/30/17 prevnar   6/11/20 psa 1.8  12/7/20 hep c ab negative  12/15/20 declines tdap  12/15/20 declines shingrix  12/15/20 declines colonoscopy  6/8/21 vit d 39.7  4/23/21 covid pfizer second  12/8/21 covid pfizer booster     Postpolio syndrome  6/04 saw neurology in [place] , notes indicate chronic non specific complaints with normal brain MRI, no evidence to support post polio syndrome.     Peripheral vascular disease  12/22/17 ultrasound carotid less than 50% stenosis bilateral  12/28/17 JOSSUE lower extremities, normal at rest, post exercise right JOSSUE 0.8, left 0.8 mild-to-moderate arterial disease bilateral  12/28/17 ultrasound vascular lower extremities, no arterial occlusive disease from common femoral to popliteal bilateral, 50-75% stenosis proximal right external iliac, 50-75% stenosis distal left external iliac, patent right tibial arteries, left posterior tibial appears occluded distally, normal flow left anterior tibial, peroneal arteries retrograde suggesting proximal occlusion  7/11/19 ultrasound arterial lower extremities, duplex right stenosis distal external iliac greater  than 50%, elevated velocities proximal portion posterior tibial and peroneal, left greater than 50% stenosis mid external iliac artery, occluded proximal portion posterior tibial artery with distal reconstitution ankle, short segment occlusion mid peroneal artery right JOSSUE 1.09, left JOSSUE 1.07  7/14/19 referral vascular surgery placed  12/10/19 declines vascular surgery follow-up asymptomatic  7/1/20 cardiology note stable, urged to quit smoking, follow-up 3 months  6/14/21 asymptomatic declines follow-up testing, declines vascular surgery evaluation     On ACE inhibitor  4/04 p.thallium no ischemia EF 62%  12/10 p.thallium no ischemia, EF 59%  5/9/11 rec ACE instead of atenolol patient declines  10/17/11 discontinued atenolol, rec start lotensin 10 mg; he declines  5/7/13 echo normal LV function, EF 60%, grade 1 diastolic dysfunction  5/7/13 p.thallium fixed defect mid inferior, inferoseptal, mid inferior walls suggest subdiaphragmatic uptake or prior infarction, no ischemia noted, EF 57%  8/8/15 hospital discharge on coreg 12.5 mg bid,lisinopril 5 mg, brilinta 90 mg bid, asa 81 mg,lipitor 80 mg  8/26/15 cardiology note, decrease coreg to 6.25 mg bid due to lower blood pressure continue lisinopril 5 mg    8/28/15 decrease lisinopril to 2.5 mg daily and cont coreg 6.25 mg bid  11/24/15 urine mac<2.5 on lisinopril 2.5 mg and coreg 6.25 mg bid  4/12/16 cardiology note decrease coreg to 3.125 mg bid consider discontinuation of coreg next evaluation and continue lisinopril 2.5 mg  5/20/16 urine mac <3 on lisinopril 2.5 mg  7/19/16 cardiology note clinically stable, episodes of chest tightness related to stress, blood pressure running low, discontinue carvedilol, follow-up in a few months  11/23/16 urine mac 3.2 on lisinopril 2.5 mg  11/29/17 urine mac<4 on lisinopril 2.5 mg  6/7/18 urine mac 3.8 on lisinopril 2.5 mg  6/10/19 urine mac<0.7 on lisinopril 2.5 mg  12/5/19 urine mac<0.7 on lisinopril 2.5 mg  6/11/20 urine  mac<1.2 on lisinopril 2.5 mg  12/7/20 urine mac<1.2 on lisinopril 2.5 mg  6/8/21 urine mac<3 on lisinopril 2.5 mg     mild nonproliferative diabetic retinopathy  6/4/19  eye exam, mild nonproliferative retinopathy     Interstitial cystitis  5/02 urology note [facility] nonbacterial prostatitis vs interstitial cystitis given trial of flomax     Insomnia on Xanax as needed  On xanax 0.25 mg at night prn  3/8/18 refill xanax  5/30/19 refill xanax  11/22/20 refill xanax  11/22/20      Hypothyroid  10/08 tsh 0.126,free t4 1.4,free t3 2.7  8/10 tsh 0.199 taking levothyroxine 0.125 6 days a week, and 2 tabs on john  9/10/10 increase to levothyroxine 0.137 mg daily, repeat tsh in 6 weeks  10/10 tsh 0.9  12/10 tsh 1.2, free t4 1.4, free t3 2.9  4/11 tsh 0.8, thyroxine 9.6, free thyroxine uptake 3.4, free t3 uptake 35%  9/11 tsh 0.5, free t4 0.9, free t3 2.4 on levothyroxine 137 mcg  12/11 tsh 0.3,free t4 1.5,free t3 2.7 on levothyroxine 137 mcg  4/16/13 tsh 0.28,free t4 1.1, free t3 2.3 (2.4-4.2); on levothyroxine 137 mcg; change to armour thyroid 60 mg qday  5/6/13 tsh 0.3, free t4 1.0, free t3 2.5 on levothyroxine 137 mcg ? Change to armour 60 mg  5/9/13  note decrease levothyroxine to 125 mcg and add cytomel 5 mg bid  6/13 tsh 0.45, free t4 1.2, free t3 2.9, on synthroid 125 mcg and cytomel 5 mcg bid per   8/14/13 tsh 0.235,free t4 1.1,free t3 2.8 on synthroid 125 mcg and cytomel 5 mcg bid per ;change to synthroid 150 mcg and stop cytomel per pt request  10/16/13 tsh 0.4, free t4 1.34, free t3 2.2 on synthroid 150 mcg  10/23/13  endo note; change to synthroid 125 mcg and cytomel 5 mcg daily  4/16/14 tsh 0.67,free t4 1.0,free t3 2.4  4/23/14  endocrine note, increase levothyroxine to 137 mcg and cont cytomel 5 mcg  10/28/14  endocrine note, tsh 0.6,free t4 1.0, free t3 2.7 on thyroxine 137 mcg and cytomel 5 mcg; gastroparesis symptoms  improve with cytomel, will reduce thryoxine to 125 mcg and use cytomel 25 mcg to cut and take more than once per day as needed, return in 4 months  7/3/15 tsh 0.04, free t4 0.6 and free t3 3.0 on levothyroxine 125 mcg and cytomel 12.5 mcg daily  7/21/15  endocrine note; on levothyroxine 125 mcg and cytomel 12.5 mcg daily, tsh 0.04, free t4 0.6 and free t3 3.0, patient does not want to change dose, no changes continue same dosing regimen; follow up 6 months  8/8/15  endocrine phone note, decrease levothyroxine to 112 mcg daily, continue cytomel 12.5 mg daily  1/13/16 tsh 0.016,free t4 0.9,free t3 2.5 on levothyroxine 112 mcg and cytomel 12.5 mg daily  1/22/16  endocrinology note on levothyroxine 112 mcg and cytomel 12.5 mg daily, patient declines to change dosing regimen  4/5/16 tsh 0.012,free t4 0.97,free t3 3.6 on levothyroxine 112 mcg and cytomel 12.5 mg daily  4/21/16  endocrinology note, no changes  10/13/16 tsh 0.14,free t4 0.87,free t3 3.1 on levothyroxine 112 mcg and cytomel 25 mg  4/17/17 tsh 0.016,free t4 0.9,free t3 4.0 on levothyroxine 112 mcg and cytomel 25 mg  4/19/17  endocrinology note no changes follow up 6 months  10/18/17  endocrine note no changes  4/12/18 tsh 0.012, free t4 0.99, free t3 4.7 on levothyroxine 112 mcg and cytomel 25 mg  4/18/18  endocrinology note, on levothyroxine 112 mcg daily and cytomel 12.5 mcg bid tsh 0.01, free 4 0.9, free t3 4.7, clinically feeling fine, no changes  10/15/18 endocrinology note repeat labs follow-up 6 months  10/8/19 tsh 0.010, free t4 0.80, free t3 2.9 on levothyroxine 112 mcg daily and cytomel 12.5 mcg bid per endocrinology  10/14/19 endocrinology note, continue same thyroid medication dosing no changes follow-up 6 months  4/20/20 endocrinology note patient not willing to adjust his thyroid as it has been successful in controlling gastroparesis, follow-up 6 months  6/12/20 tsh  0.006 on levothyroxine 112 mcg daily and cytomel 12.5 mcg bid per endocrinology  12/7/20 tsh 0.009, free t4 1.23, t3 187 () on levothyroxine 112 mcg daily and cytomel 12.5 mcg bid per endocrinology  4/16/21 tsh<0.005,free t4 1.3,free t3 2.9  5/4/21 endocrinology note on levothyroxine 112 mcg and cytomel 12.5 mcg bid, continue and follow up 6 months  11/3/21 tsh<0.005,free t4 1.27,free t3 3.4  1/5/21 A1c 5.8% on lantus 12 units and metformin 1000 mg bid on levothyroxine 112 mcg and liothyronine 12.5 mcg bid, patient states that the thyroid dose is beneficial to her self and is reluctant to decrease the dose understanding that hyperthyroid can increase bone loss  11/10/21  endocrinology note he is retiring, follow-up with nurse practitioner     History shoulder pain  12/9/10 left shoulder pain,  note MRI pending  2/11 MRI left shoulder [facility] mild to moderate rotator cuff tendinopathy, adhesive capsulitis, small anterior and posterior labral tears  7/11 [facility]FF[facility]LE note  left shoulder adhesive capsulitis rec decompression  12/8/15 xray right shoulder at [facility] mild hydroxyapatite deposition adjacent to the greater tuberosity, no evidence of significant arthritis  5/11/16 MRI right shoulder thickening and increased signal with synovitis, suggestive of adhesive capsulitis, tendinopathy supraspinatus and infraspinatus, fatty atrophy paraspinous muscle  1/4/17 [facility] discharge note     History MI  8/6/15 hospital admission non-STEMI inverted T waves in aVL, ST depression in lead 1, initial troponin 3.4  8/6/15   8/6/15 cardiac catheterization left main free of disease, triple vessel disease prominently involving distal segment nature epicardial vessels and branches, 95% ostial LAD descending artery stenosis and 70% mid RCA stenosis, ejection fraction 35-40%, stenting ostial LAD with xience drug-eluting stent  8/7/15 echo normal LV function EF 65-70%, grade 1 diastolic dysfunction, moderate  concentric LVH, mild MR and AR  8/8/15 hospital discharge on coreg 12.5 mg bid,lisinopril 5 mg, brilinta 90 mg bid, asa 81 mg,lipitor 80 mg  8/11/15 cardiology note; continue brilenta and asa for one year, some dyspnea, if no improvement could consider another antiplatelet therapy, will recheck BNP in 2 weeks with followup visit, ultimately will need myocardial perfusion scan but will defer for a few months  8/26/15 cardiology note, decrease coreg to 6.25 mg bid due to lower blood pressure,continue lisinopril 5 mg, asa, lipitor,start effient 10 mg for one year due to brilinta causing shortness of breath, followup 2 months  10/14/15 cardiology note no chnges, repeat myocardial perfusion scan 3 months  11/30/15 cardiac rehab program  1/13/15 cardiology note nitroglycerin when necessary follow-up 3 months  1/12/16 persantine thallium small basal anterior inferior infarct with small ame-infarct ischemia, moderately sized moderate severity inferior, inferior lateral infarct with reversible ischemia  4/12/16 cardiology note decrease coreg to 3.125 mg bid consider discontinuation of coreg next evaluation and continue lisinopril 2.5 mg  7/19/16 cardiology note clinically stable, episodes of chest tightness related to stress, blood pressure running low, discontinue carvedilol, follow-up in a few months  11/3/16 cardiology note, isosorbide mononitrate with heavy exertion, follow-up in a few months to determine if further evaluation is necessary, could consider repeat perfusion study or dobutamine stress echo  1/5/17 cardiology note stable CAD, follow-up 6 months  7/12/17 cardiology note, likely stable discussed smoking cessation, patient declines to quit smoking, follow-up one year  12/22/17 ultrasound carotid less than 50% stenosis bilateral  7/23/18 cardiology note asymptomatic, increased stress however, continues to smoke, follow-up 1 year continue atorvastatin plus erythromycin  7/15/19 cardiology note stable continues  to smoke advised to quit follow-up 1 year  6/19/20 hospital admission, 6/21/20 hospital discharge, admission for chest pain, cardiac catheterization performed, drug-eluting stent placement of RCA  6/19/20 echo mild concentric LVH, EF 65%, subtle inferior hypokinesis, normal diastolic function  6/19/20 cardiac catheterization left main mild distal tapering 10% stenosis, stent distal part widely patent, LAD widely patent ostial stent, diffuse moderate stenosis distal LAD and small diagonal branches, circumflex tortuous origin 50%, obtuse marginal 1 occluded fills by collaterals stable compared to 2015, obtuse marginal 2 diffusely diseased with mild stenosis lateral wall, distal circumflex/obtuse marginal 3 is small with severe stenosis terminal segment, dominant RCA 40% proximal stenosis, 70% mid vessel stenosis, 40% distal stenosis, PDA multiple 50 to 70% stenosis mid and distal segments, successful mid RCA YESIKA placement, preintervention 70% stenosis, post intervention 0% residual stenosis  7/1/20 cardiology note stable, urged to quit smoking, follow-up 3 months  12/1/20 cardiology note side effects from metoprolol dry skin and cold fingers having already decreased from 25 mg to 12.5 mg daily, will discontinue metoprolol, continue asa, effient, lipitor, lisinopril  3/9/21 cardiology note consider increasing lisinopril, follow-up 6 months back to discontinue effient at that time  10/5/21 myocardial perfusion study small fixed defect no ischemia, EF 47%  11/19/21 echo mild LVH, EF 60%, moderate aortic insufficiency, ascending aortic dilation 4.0 cm  12/3/21  cardiology note recommend routine stress testing for left main PCI, patient would like to stop effient and has been 1 year since his stent was placed, patient understands that DAPT is standard treatment but patient would like to be off that medication, okay to discontinue effient and continue aspirin 81 mg indefinitely, follow-up 1 year      Gastroparesis  4/04 gastric emptying study severe gatroparesis done in [place], no hx of DM or MS  5/04 CT thorax in [place] negative  5/04 MRI brain in [place] no evid of MS  On Emycin  4/16/13 declined gastric stimulator  12/5/17 remains on erythromycin     Dyslipidemia  5/13 chol 158,trig 204,hdl 36,ldl 81  8/8/15 chol 97,trig 117,hdl 26,ldl 48 started on lipitor 80 mg on hospital discharge  8/21/15 chol 76,trig 85,hdl 24,ldl 35 on lipitor 80 mg  10/7/15 chol 67,trig 77,hdl 22,ldl 30 on lipitor 80 mg per cardiology  4/5/16 chol 64,trig 43,hdl 24,ldl 31 on lipitor 80 mg per cardiology  11/23/16 chol 83,trig 73,hdl 27,ldl 41 on lipitor 80 mg per cardiology  5/24/17 chol 85,trig 81,hdl 28,ldl 41 on lipitor 80 mg per cardiology  10/10/17 pharmacy known interaction with lipitor and erythromycin base, consider changing to crestor, offer made to patient to change to crestor but he would like to discuss with his cardiologist first  11/29/17 chol 72,trig 47,hdl 24,ldl 39 on lipitor 80 mg and erythromycin base, previously recommended changing to crestor because of potential interaction, patient would like to discuss with cardiology first  12/5/17 declines to change from lipitor understanding potential interaction with erythromycin  6/7/18 chol 78,trig 68,hdl 25,ldl 39,cpk 69 on lipitor 80 mg  7/23/18 cardiology note asymptomatic, increased stress however, continues to smoke, follow-up 1 year continue atorvastatin plus erythromycin  12/3/18 chol 81,trig 85,hdl 26,ldl 38 on lipitor 80 mg  6/10/19 chol 64,trig 70,hdl 20,ldl 30 on lipitor 80 mg  6/10/20 chol 81,trig 74,hdl 25,ldl 41 on lipitor 80 mg  12/7/20 chol 72,trig 65,hdl 23,ldl 36 on lipitor 80 mg  6/8/21 chol 81,trig 54,hdl 27,ldl 41 on lipitor 80 mg  1/5/22 chol 83,trig 52,hdl 31,ldl 39 on lipitor 80 mg     Diabetes  11/08 A1c 6.3%  8/10 A1c 7.9%  10/10 A1c 7.7%  12/10 A1c 8.0%  1/11 add metformin 500 mg bid  2/11 A1c 7.9%  4/11 A1c 8.2% increase metformin to 1000 mg bid  10/11  A1c 6.5 %  12/11 A1c 6.1% on metformin 1000 mg bid  4/16/13 A1c 6.2% on metformin 1000 mg bid; declines to check blood sugars at home; bs 194 non fasting; declines ACE  8/14/13 A1c 6.5% on metformin 1000 mg bid  12/9/13  eye exam grade 1 diabetic background retinopathy  4/16/14 A1c 7.0% per  on metformin 1000 mg bid  7/3/15 A1c 8.3% on metformin 1000 mg bid per endocrine   7/31/15 start lantus 5 units and continue metformin 1000 mg bid, declines ACE,statin,asa  8/8/15 hospital discharge on coreg 12.5 mg bid,lisinopril 5 mg, brilinta 90 mg bid, asa 81 mg,lipitor 80 mg; on lantus 8 units and metformin 1000 mg bid  8/28/15 declines nutrition consultation and diabetic education regarding diet  8/28/15 recommend increasing lantus to 10 units and metformin 1000 mg bid  11/24/15 A1c 6.3% on lantus 10 units and metformin 1000 mg bid  11/30/15 on lantus 12 units and metformin 1000 mg bid  5/20/16 A1c 6.3% on lantus 12 units and metformin 1000 mg bid  11/23/16 A1c 6.1% on lantus 12 units and metformin 1000 mg bid  1/9/17  eye exam  5/24/17 A1c 6.3% on lantus 12 units and metformin 1000 mg bid   5/24/17 urine mac 2.5 on lisinopril 2.5 mg  11/29/17 A1c 6.1% on lantus 12 units and metformin 1000 mg bid   6/7/18 A1c 5.9% on lantus 12 units and metformin 1000 mg bid   12/3/18 A1c 6.2% and urine mac< 3on lantus 12 units and metformin 1000 mg bid   6/4/19  eye exam, mild nonproliferative retinopathy  6/10/19 A1c 6.4% on lantus 12 units and metformin 1000 mg bid   12/5/19 A1c 6.2% on lantus 12 units and metformin 1000 mg bid   6/11/20 A1c 6.5% on lantus 12 units and metformin 1000 mg bid   12/7/20 A1c 5.9% on lantus 12 units and metformin 1000 mg bid   6/8/21 A1c 6.0% on lantus 12 units and metformin 1000 mg bid   1/5/22 A1c 5.8% on lantus 12 units and metformin 1000 mg bid     ascending aorta dilationa  6/19/20 echo mild concentric LVH, EF 65%, subtle inferior hypokinesis, normal  "diastolic function  10/5/21 myocardial perfusion study small fixed defect no ischemia, EF 47%  11/19/21 echo mild LVH, EF 60%, moderate aortic insufficiency, ascending aortic dilation 4.0 cm     aortic insufficiency  6/19/20 echo mild concentric LVH, EF 65%, subtle inferior hypokinesis, normal diastolic function  10/5/21 myocardial perfusion study small fixed defect no ischemia, EF 47%  11/19/21 echo mild LVH, EF 60%, moderate aortic insufficiency, ascending aortic dilation 4.0 cm         Patient Active Problem List   Diagnosis   • History of allergic rhinitis   • Gastroparesis   • S/p green light photovaporization prostate   • Interstitial cystitis   • Postpolio syndrome   • Hypothyroid   • On angiotensin-converting enzyme (ACE) inhibitors (for diabetes, not HTN)   • Diabetes mellitus type 2, controlled ([facility])   • Preventative health care   • History of shoulder pain   • Insomnia   • Dyslipidemia   • Tobacco abuse   • History of MI (myocardial infarction)   • Coronary artery disease due to calcified coronary lesion   • PVD (peripheral vascular disease) ([facility])   • Mild non proliferative diabetic retinopathy ([facility])   • Stented coronary artery   • Ascending aorta dilatation ([facility])   • Aortic insufficiency             ROS           Objective     /64   Pulse 78   Temp 36.5 °C (97.7 °F)   Resp 16   Ht 1.702 m (5' 7\")   Wt 70.3 kg (155 lb)   SpO2 98%   BMI 24.28 kg/m²      Physical Exam  Vitals and nursing note reviewed.   Constitutional:       Appearance: Normal appearance.   HENT:      Head: Normocephalic and atraumatic.      Right Ear: External ear normal.      Left Ear: External ear normal.   Neurological:      Mental Status: He is alert.          feet no open sores or lesions, dorsalis pedis 1/4 bilateral, no edema                    Assessment & Plan        Assessment  #! Diabetes mellitus A1c 5.8% on lantus and metformin stable and controlled    #2 peripheral vascular disease by ultrasound arterial lower " extremities in 2019, initial referral placed to vascular surgery but patient was referred to vein [place] at the vascular surgeons, currently asymptomatic, foot exam shows no open sores or lesions, dorsalis pedis pulses palpable    #3 tobacco use 1 ppd does breathe heavy at times walking up stairs but that has improved has declined smoking cessation, no recurrent upper respiratory tract symptoms, no formal diagnosis of COPD as he has declined PFT    #4 dyslipidemia 1/5/22 chol 83,trig 52,hdl 31,ldl 39 on lipitor 80 mg    #5 history stent and MI followed by cardiology off Effient per patient preference is on aspirin    #6 hypothyroid on replacement, TSH less than 0.005 on levothyroxine and Cytomel followed by endocrinology patient understands he is at high risk for bone loss because of the decreased TSH      Plan  #! Has eye exam upcoming in feb 2022      #2 recommend referral to vascular surgery but he declines, will check feet daily monitor for open sores or lesions    #3 declines stop smoking classes, declines PFT and CT lung cancer screening declines to quit smoking understanding increased risk for COPD, recurrent heart disease, peripheral vascular disease, osteoporosis    #4 dexa ordered because of over replaced thyroid, decreased TSH putting him at risk for metabolic bone disease related to hyperthyroidism and over replacement of therapy    #5 continue to check blood pressure and blood sugar regularly, no change with diabetes medications, continue Lipitor    #6 follow-up cardiology    #7 follow-up ophthalmology    #8 continue work on regular exercise and activity    #9 follow-up 6 months    #10 reviewed labs with him from January 5

## 2022-03-18 RX ORDER — INSULIN GLARGINE 100 [IU]/ML
INJECTION, SOLUTION SUBCUTANEOUS
Qty: 15 ML | Refills: 11 | Status: SHIPPED | OUTPATIENT
Start: 2022-03-18 | End: 2023-06-19 | Stop reason: SDUPTHER

## 2022-05-05 ENCOUNTER — HOSPITAL ENCOUNTER (OUTPATIENT)
Dept: LAB | Facility: MEDICAL CENTER | Age: 72
End: 2022-05-05
Attending: INTERNAL MEDICINE
Payer: MEDICARE

## 2022-05-05 DIAGNOSIS — E03.9 ACQUIRED HYPOTHYROIDISM: ICD-10-CM

## 2022-05-05 LAB
T3FREE SERPL-MCNC: 3.5 PG/ML (ref 2–4.4)
T4 FREE SERPL-MCNC: 1.44 NG/DL (ref 0.93–1.7)
TSH SERPL DL<=0.005 MIU/L-ACNC: 0.01 UIU/ML (ref 0.38–5.33)

## 2022-05-05 PROCEDURE — 84481 FREE ASSAY (FT-3): CPT

## 2022-05-05 PROCEDURE — 84443 ASSAY THYROID STIM HORMONE: CPT

## 2022-05-05 PROCEDURE — 84439 ASSAY OF FREE THYROXINE: CPT

## 2022-05-05 PROCEDURE — 36415 COLL VENOUS BLD VENIPUNCTURE: CPT

## 2022-05-13 ENCOUNTER — OFFICE VISIT (OUTPATIENT)
Dept: ENDOCRINOLOGY | Facility: MEDICAL CENTER | Age: 72
End: 2022-05-13
Payer: MEDICARE

## 2022-05-13 VITALS
HEIGHT: 68 IN | BODY MASS INDEX: 23.04 KG/M2 | DIASTOLIC BLOOD PRESSURE: 60 MMHG | HEART RATE: 79 BPM | SYSTOLIC BLOOD PRESSURE: 122 MMHG | WEIGHT: 152 LBS | OXYGEN SATURATION: 98 %

## 2022-05-13 DIAGNOSIS — E03.9 ACQUIRED HYPOTHYROIDISM: ICD-10-CM

## 2022-05-13 DIAGNOSIS — I25.84 CORONARY ARTERY DISEASE DUE TO CALCIFIED CORONARY LESION: ICD-10-CM

## 2022-05-13 DIAGNOSIS — I25.10 CORONARY ARTERY DISEASE DUE TO CALCIFIED CORONARY LESION: ICD-10-CM

## 2022-05-13 DIAGNOSIS — R79.89 LOW TSH LEVEL: ICD-10-CM

## 2022-05-13 DIAGNOSIS — E55.9 VITAMIN D DEFICIENCY: ICD-10-CM

## 2022-05-13 DIAGNOSIS — E11.65 TYPE 2 DIABETES MELLITUS WITH HYPERGLYCEMIA, UNSPECIFIED WHETHER LONG TERM INSULIN USE (HCC): ICD-10-CM

## 2022-05-13 DIAGNOSIS — K31.84 GASTROPARESIS: Chronic | ICD-10-CM

## 2022-05-13 PROCEDURE — 99214 OFFICE O/P EST MOD 30 MIN: CPT

## 2022-05-13 PROCEDURE — 99211 OFF/OP EST MAY X REQ PHY/QHP: CPT

## 2022-05-13 ASSESSMENT — FIBROSIS 4 INDEX: FIB4 SCORE: 1.56

## 2022-05-13 NOTE — PROGRESS NOTES
Chief Complaint: Consult requested by Charbel Jaffe M.D. for the following conditions  HPI:   1.acquired hypothyroidism:  Orlando Arreguin is a 72 y.o. male with history of Hypothyroidism diagnosed on for the past 18 years and is here for initial evaluation.    He was a patient of Dr. Stein    He is currently on Levothyroxine 112 mcg daily and Cytomel 25 mcg cut in half one half in the am and half in the pm daily which has been her thyroid hormone dose for the past few years  He denies any recent dose changes.   He reports Good compliance and takes his thyroid hormone daily before breakfast.  Although at times he forgets to take his morning Cytomel for which she develops fatigue around lunchtime    He denies taking any iron, calcium supplements or antacids.   Denies taking any Biotin     He reports the following symptoms:feeling cold and cold intolerance, tremulousness and weight loss which has been present for 1 weeks.     He denies fatigue, weight gain, constipation, swelling, feeling slow, losing hair, anxiousness, feeling excessive energy, palpitations and sweating.    Hx of gastroparesis,   He denies lumps or enlargement in the neck.    He reports a family history of Hypothyroidism with his mother.       History of coronary artery disease with an MI in the past and 2 with stents     Latest Reference Range & Units 05/05/22 10:04   TSH 0.380 - 5.330 uIU/mL 0.010 (L) [1]   Free T-4 0.93 - 1.70 ng/dL 1.44      Latest Reference Range & Units 05/05/22 10:04   T3,Free 2.00 - 4.40 pg/mL 3.50     2.  Coronary artery disease due to calcified coronary lesion:  Reports history of 2 heart attacks in the past  This is followed by cardiology  Family history of cardiovascular disease with his mom dying at 62 after her third heart attack    3.  Type 2 diabetes mellitus with hyperglycemia, unspecified whether long term insulin use:  Reports history of type 2 diabetes mellitus  This is followed by primary care    4.   Gastroparesis:  Reports history of gastroparesis for the past 18 to 20 years  This is followed by primary care  This condition was diagnosed when he was living in [facility] after a gastric emptying scintigraphy was done  He was prescribed Reglan originally but after reading the side effects of Tardive Dyskinesis he decided not to take it  He was then prescribed Erythromycin and provided relief, he took erythromycin for her gastroparesis management for 18 years and he stopped 3 months ago due to high cost  Currently treatment for gastroparesis is gingerroot tablets    He reports that when his free T4 and free T3 are mid normal levels, his gastroparesis is better controlled    5.  Vitamin D deficiency:  I will evaluate levels   Latest Reference Range & Units 06/08/21 08:35   25-Hydroxy   Vitamin D 25 30.0 - 100.0 ng/mL 39.7 [1]     6.  Low TSH level:  Low TSH levels on thyroid hormone blood work, please see lab results  With normal free T4 and free T3  Reports never been evaluated for a pituitary issue    Patient's medications, allergies, and social histories were reviewed and updated as appropriate.      ROS:     CONS:     No fever, no chills, no weight loss, no fatigue   EYES:      No diplopia, no blurry vision, no redness of eyes, no swelling of eyelids   ENT:    No hearing loss, No ear pain, No sore throat, no dysphagia, no neck swelling   CV:     No chest pain, no palpitations, no claudication, no orthopnea, no PND   PULM:    No SOB, no cough, no hemoptysis, no wheezing    GI:   No nausea, no vomiting, no diarrhea, no constipation, no bloody stools   :  Passing urine well, no dysuria, no hematuria   ENDO:   No polyuria, no polydipsia, no heat intolerance, no cold intolerance   NEURO: No headaches, no dizziness, no convulsions, no tremors   MUSC:  No joint swellings, no arthralgias, no myalgias, no weakness   SKIN:   No rash, no ulcers, no dry skin   PSYCH:   No depression, no anxiety, no difficulty  sleeping       Past Medical History:  Patient Active Problem List    Diagnosis Date Noted   • Aortic insufficiency 12/03/2021   • Ascending aorta dilatation (Formerly [facility]) 11/19/2021   • Stented coronary artery 10/01/2020   • Mild non proliferative diabetic retinopathy ([facility]ly [facility]) 06/04/2019   • PVD (peripheral vascular disease) (Formerly [facility]) 12/28/2017   • Coronary artery disease due to calcified coronary lesion 07/12/2017   • History of MI (myocardial infarction) 08/06/2015   • Tobacco abuse 07/29/2015   • Dyslipidemia 05/07/2013   • Insomnia 04/18/2012   • Preventative health care 12/09/2010   • History of shoulder pain 12/09/2010   • Diabetes mellitus type 2, controlled (Formerly [facility]) 09/10/2010   • History of allergic rhinitis 08/21/2010   • Gastroparesis 08/21/2010   • S/p green light photovaporization prostate 08/21/2010   • Interstitial cystitis 08/21/2010   • Postpolio syndrome 08/21/2010   • Hypothyroid 08/21/2010   • On angiotensin-converting enzyme (ACE) inhibitors (for diabetes, not HTN) 08/21/2010       Past Surgical History:  Past Surgical History:   Procedure Laterality Date   • [facility] CARDIAC CATH  8/6/15    YESIKA to LAD.  Has RCA 70% occulsion.   • APPENDECTOMY     • OTHER      L shoulder surgery (arthroscopic, [place], 2011)   • [place] TRANSURETHRAL ELEC-SURG PROSTATECTOM          Allergies:  Brilinta [ticagrelor]; Pcn [penicillins]; Other environmental; Tdap [dipth, acell pertus, tetanus]; and Tetanus toxoid     Current Medications:    Current Outpatient Medications:   •  lisinopril (PRINIVIL) 2.5 MG Tab, Take 1 tablet by mouth once daily, Disp: 90 Tablet, Rfl: 2  •  liothyronine (CYTOMEL) 25 MCG Tab, TAKE 1/2 (ONE-HALF) TABLET BY MOUTH TWICE DAILY FOR 90 DAYS, Disp: 90 Tablet, Rfl: 0  •  LANTUS SOLOSTAR 100 UNIT/ML Solution Pen-injector injection, INJECT 12 UNITS SUBCUTANEOUSLY ONCE DAILY IN THE EVENING AS  INSTRUCTED, Disp: 15 mL, Rfl: 11  •  levothyroxine (SYNTHROID) 112 MCG Tab, TAKE 1 TABLET BY MOUTH IN THE MORNING ON AN EMPTY  STOMACH, Disp: 90 Tablet, Rfl: 0  •  Insulin Pen Needle 31G X 6 MM Misc, , Disp: , Rfl:   •  B-D UF III MINI PEN NEEDLES 31G X 5 MM Misc, USE PEN ONCE DAILY WITH LANTUS PEN, Disp: , Rfl:   •  atorvastatin (LIPITOR) 80 MG tablet, Take 1 Tablet by mouth every day., Disp: 90 Tablet, Rfl: 3  •  prasugrel (EFFIENT) 10 MG Tab, Take 1 Tablet by mouth every day., Disp: 90 Tablet, Rfl: 3  •  LIOTHYRONINE SODIUM PO, Take 12.5 mcg by mouth 2 times a day., Disp: , Rfl:   •  RELION PRIME TEST strip, USE 1 STRIP TO CHECK GLUCOSE ONCE DAILY BEFORE A MEAL, Disp: 100 Strip, Rfl: 11  •  ReliOn Ultra Thin Lancets 30G Misc, USE LANCET TO CHECK BLOOD SUGAR ONCE DAILY, Disp: 100 Each, Rfl: 11  •  RELION PEN NEEDLES 31G X 6 MM Misc, USE PEN ONCE DAILY WITH LANTUS PEN, Disp: 100 Each, Rfl: 11  •  metformin (GLUCOPHAGE) 1000 MG tablet, Take 1 tablet by mouth 2 times a day with meals., Disp: 180 tablet, Rfl: 3  •  Insulin Pen Needle 31G X 6 MM Misc, , Disp: , Rfl:   •  aspirin EC 81 MG EC tablet, Take 1 Tab by mouth every day., Disp: 30 Tab, Rfl: 0  •  Cholecalciferol (VITAMIN D3 PO), Take 1 Dose by mouth every day. Unknown OTC Strength., Disp: , Rfl:   •  erythromycin base (UMM-TAB) 250 MG Tab, Take 250 mg by mouth every day. Maintenance Medication., Disp: , Rfl:   •  Riboflavin (VITAMIN B-2 PO), Take 1 Dose by mouth every day. Unknown OTC Strength., Disp: , Rfl:     Social History:  Social History     Socioeconomic History   • Marital status: Single     Spouse name: Not on file   • Number of children: Not on file   • Years of education: Not on file   • Highest education level: Not on file   Occupational History   • Not on file   Tobacco Use   • Smoking status: Current Every Day Smoker     Packs/day: 1.00     Years: 30.00     Pack years: 30.00     Types: Cigarettes   • Smokeless tobacco: Never Used   Vaping Use   • Vaping Use: Never used   Substance and Sexual Activity   • Alcohol use: No     Alcohol/week: 0.0 oz   • Drug use: No   • Sexual  "activity: Not on file   Other Topics Concern   • Not on file   Social History Narrative   • Not on file     Social Determinants of Health     Financial Resource Strain: Not on file   Food Insecurity: Not on file   Transportation Needs: Not on file   Physical Activity: Not on file   Stress: Not on file   Social Connections: Not on file   Intimate Partner Violence: Not on file   Housing Stability: Not on file        Family History:   Family History   Problem Relation Age of Onset   • Heart Disease Mother    • Heart Attack Mother 55        heart attack         PHYSICAL EXAM:   Vital signs: /60 (BP Location: Left arm, Patient Position: Sitting, BP Cuff Size: Adult)   Pulse 79   Ht 1.715 m (5' 7.5\")   Wt 68.9 kg (152 lb)   SpO2 98%   BMI 23.46 kg/m²   GENERAL: Well-developed, well-nourished  in no apparent distress.   EYE: No ocular and eyelid asymmetry, Anicteric sclerae,  PERRL  HENT: Hearing grossly intact, Normocephalic, atraumatic.   NECK: Supple. Trachea midline. thyroid is normal in size without nodules or tenderness  CARDIOVASCULAR: Regular rate and rhythm. No murmurs, rubs, or gallops.   LUNGS: Clear to auscultation bilaterally   EXTREMITIES: No clubbing, cyanosis, or edema.   NEUROLOGICAL: Cranial nerves II-XII are grossly intact   Symmetric reflexes at the patella no proximal muscle weakness  LYMPH: No cervical, supraclavicular,  adenopathy palpated.   SKIN: No rashes, lesions. Turgor is normal.    Labs:  Lab Results   Component Value Date/Time    WBC 6.5 01/04/2022 07:36 AM    WBC 5.5 10/02/2021 10:30 AM    RBC 4.90 01/04/2022 07:36 AM    RBC 4.77 10/02/2021 10:30 AM    HEMOGLOBIN 14.8 01/04/2022 07:36 AM    HEMOGLOBIN 14.7 10/02/2021 10:30 AM    MCV 89 01/04/2022 07:36 AM    MCV 89.9 10/02/2021 10:30 AM    MCH 30.2 01/04/2022 07:36 AM    MCH 30.8 10/02/2021 10:30 AM    MCHC 33.8 01/04/2022 07:36 AM    MCHC 34.3 10/02/2021 10:30 AM    RDW 12.9 01/04/2022 07:36 AM    RDW 44.2 10/02/2021 10:30 AM    " MPV 10.2 10/02/2021 10:30 AM       Lab Results   Component Value Date/Time    SODIUM 139 01/04/2022 07:36 AM    SODIUM 138 10/02/2021 10:30 AM    POTASSIUM 5.1 01/04/2022 07:36 AM    POTASSIUM 4.4 10/02/2021 10:30 AM    CHLORIDE 107 (H) 01/04/2022 07:36 AM    CHLORIDE 107 10/02/2021 10:30 AM    CO2 20 01/04/2022 07:36 AM    CO2 20 10/02/2021 10:30 AM    ANION 11.0 10/02/2021 10:30 AM    GLUCOSE 108 (H) 01/04/2022 07:36 AM    GLUCOSE 131 (H) 10/02/2021 10:30 AM    BUN 17 01/04/2022 07:36 AM    BUN 18 10/02/2021 10:30 AM    CREATININE 0.87 01/04/2022 07:36 AM    CREATININE 0.79 10/02/2021 10:30 AM    CALCIUM 9.1 01/04/2022 07:36 AM    CALCIUM 8.9 10/02/2021 10:30 AM    ASTSGOT 22 01/04/2022 07:36 AM    ASTSGOT 24 10/02/2021 10:30 AM    ALTSGPT 22 01/04/2022 07:36 AM    ALTSGPT 28 10/02/2021 10:30 AM    TBILIRUBIN 0.4 01/04/2022 07:36 AM    TBILIRUBIN 0.4 10/02/2021 10:30 AM    ALBUMIN 4.3 01/04/2022 07:36 AM    ALBUMIN 4.1 10/02/2021 10:30 AM    TOTPROTEIN 6.6 01/04/2022 07:36 AM    TOTPROTEIN 6.5 10/02/2021 10:30 AM    GLOBULIN 2.3 01/04/2022 07:36 AM    GLOBULIN 2.4 10/02/2021 10:30 AM    AGRATIO 1.9 01/04/2022 07:36 AM    AGRATIO 1.7 10/02/2021 10:30 AM       Lab Results   Component Value Date/Time    CHOLSTRLTOT 83 (L) 01/04/2022 0736    TRIGLYCERIDE 52 01/04/2022 0736    HDL 31 (L) 01/04/2022 0736    LDL 36 12/07/2020 1051       Lab Results   Component Value Date/Time    TSHULTRASEN 0.010 (L) 05/05/2022 1004     Lab Results   Component Value Date/Time    FREET4 1.44 05/05/2022 1004     Lab Results   Component Value Date/Time    FREET3 3.50 05/05/2022 1004           Imaging:      ASSESSMENT/PLAN:   1. Acquired hypothyroidism  Clinically unstable with reports of mild tremors for the past week  Biochemically his TSH is suppressed with normal free T4 and free T3, unknown whether he has primary or secondary hypothyroidism    I will evaluate other hormones, please see diagnoses #6  We discussed pathophysiology of  hypothyroidism, signs and symptoms  We discussed the risk of cardiovascular disease and osteoporosis is too much replacement of thyroid hormone is given      I discussed with patient that I would like to decrease his levothyroxine dose due to the reports of tremors and suppressed TSH to minimize risk of cardiovascular disease due to Hx (new diagnosis #2 ),  but at this timepatient would like to keep the same dose of levothyroxine 112 mcg and Cytomel 2.5 mg in the morning and 2.5 mg in the afternoon and reevaluate in 6 months    - TSH; Future  - FREE THYROXINE; Future  - T3 FREE; Future  - Comp Metabolic Panel; Future    2. Coronary artery disease due to calcified coronary lesion  Followed by cardiology    3. Type 2 diabetes mellitus with hyperglycemia, unspecified whether long term insulin use (HCC)  Follow up PCP    4. Gastroparesis  Followed by PCP    5. Vitamin D deficiency  Unstable  I will evaluate levels  - VITAMIN D,25 HYDROXY; Future  - Comp Metabolic Panel; Future    6. Low TSH level  Unstable  Low TSH levels with normal free T4 and T3  I will evaluate general pituitary hormone function  - PROLACTIN; Future  - ACTH; Future  - SALIVARY CORTISOL,MS  - IGF-1 SOMATOMEDIN; Future    Disposition: Make an appointment to follow-up in 6 months  Do your blood work 1 to 2 weeks prior to appointment, fasting between 8 AM and 9 AM    Thank you kindly for allowing me to participate in the thyroid care plan for this patient.    Chapito Nova, A.P.R.N.  05/13/22    CC:   Charbel Jaffe M.D.

## 2022-05-23 ENCOUNTER — TELEPHONE (OUTPATIENT)
Dept: MEDICAL GROUP | Facility: MEDICAL CENTER | Age: 72
End: 2022-05-23
Payer: MEDICARE

## 2022-05-23 DIAGNOSIS — E78.5 DYSLIPIDEMIA: ICD-10-CM

## 2022-05-23 DIAGNOSIS — E03.9 ACQUIRED HYPOTHYROIDISM: ICD-10-CM

## 2022-05-23 DIAGNOSIS — E11.65 CONTROLLED TYPE 2 DIABETES MELLITUS WITH HYPERGLYCEMIA, WITHOUT LONG-TERM CURRENT USE OF INSULIN (HCC): ICD-10-CM

## 2022-05-23 DIAGNOSIS — E55.9 VITAMIN D DEFICIENCY: ICD-10-CM

## 2022-05-23 DIAGNOSIS — Z12.5 PROSTATE CANCER SCREENING: ICD-10-CM

## 2022-06-02 LAB
ALBUMIN SERPL-MCNC: 4.2 G/DL (ref 3.7–4.7)
ALBUMIN/GLOB SERPL: 2.1 {RATIO} (ref 1.2–2.2)
ALP SERPL-CCNC: 88 IU/L (ref 44–121)
ALT SERPL-CCNC: 22 IU/L (ref 0–44)
AST SERPL-CCNC: 22 IU/L (ref 0–40)
BILIRUB SERPL-MCNC: 0.4 MG/DL (ref 0–1.2)
BUN SERPL-MCNC: 13 MG/DL (ref 8–27)
BUN/CREAT SERPL: 16 (ref 10–24)
CALCIUM SERPL-MCNC: 8.9 MG/DL (ref 8.6–10.2)
CHLORIDE SERPL-SCNC: 106 MMOL/L (ref 96–106)
CHOLEST SERPL-MCNC: 72 MG/DL (ref 100–199)
CO2 SERPL-SCNC: 21 MMOL/L (ref 20–29)
CREAT SERPL-MCNC: 0.8 MG/DL (ref 0.76–1.27)
EGFRCR SERPLBLD CKD-EPI 2021: 94 ML/MIN/1.73
GLOBULIN SER CALC-MCNC: 2 G/DL (ref 1.5–4.5)
GLUCOSE SERPL-MCNC: 123 MG/DL (ref 65–99)
HDLC SERPL-MCNC: 27 MG/DL
LABORATORY COMMENT REPORT: ABNORMAL
LDLC SERPL CALC-MCNC: 31 MG/DL (ref 0–99)
POTASSIUM SERPL-SCNC: 4.6 MMOL/L (ref 3.5–5.2)
PROT SERPL-MCNC: 6.2 G/DL (ref 6–8.5)
SODIUM SERPL-SCNC: 138 MMOL/L (ref 134–144)
TRIGL SERPL-MCNC: 59 MG/DL (ref 0–149)
VLDLC SERPL CALC-MCNC: 14 MG/DL (ref 5–40)

## 2022-06-03 DIAGNOSIS — E03.9 ACQUIRED HYPOTHYROIDISM: ICD-10-CM

## 2022-06-03 RX ORDER — LEVOTHYROXINE SODIUM 112 UG/1
TABLET ORAL
Qty: 90 TABLET | Refills: 0 | Status: SHIPPED | OUTPATIENT
Start: 2022-06-03 | End: 2022-09-06

## 2022-06-08 ENCOUNTER — OFFICE VISIT (OUTPATIENT)
Dept: CARDIOLOGY | Facility: MEDICAL CENTER | Age: 72
End: 2022-06-08
Payer: MEDICARE

## 2022-06-08 VITALS
RESPIRATION RATE: 14 BRPM | HEART RATE: 80 BPM | BODY MASS INDEX: 23.34 KG/M2 | WEIGHT: 154 LBS | SYSTOLIC BLOOD PRESSURE: 124 MMHG | OXYGEN SATURATION: 97 % | HEIGHT: 68 IN | DIASTOLIC BLOOD PRESSURE: 60 MMHG

## 2022-06-08 DIAGNOSIS — I35.1 NONRHEUMATIC AORTIC VALVE INSUFFICIENCY: ICD-10-CM

## 2022-06-08 DIAGNOSIS — E11.65 CONTROLLED TYPE 2 DIABETES MELLITUS WITH HYPERGLYCEMIA, WITHOUT LONG-TERM CURRENT USE OF INSULIN (HCC): Chronic | ICD-10-CM

## 2022-06-08 DIAGNOSIS — I77.810 ASCENDING AORTA DILATATION (HCC): ICD-10-CM

## 2022-06-08 DIAGNOSIS — E78.5 DYSLIPIDEMIA: Chronic | ICD-10-CM

## 2022-06-08 DIAGNOSIS — I25.10 CORONARY ARTERY DISEASE DUE TO CALCIFIED CORONARY LESION: ICD-10-CM

## 2022-06-08 DIAGNOSIS — I25.84 CORONARY ARTERY DISEASE DUE TO CALCIFIED CORONARY LESION: ICD-10-CM

## 2022-06-08 PROCEDURE — 99213 OFFICE O/P EST LOW 20 MIN: CPT | Performed by: INTERNAL MEDICINE

## 2022-06-08 ASSESSMENT — FIBROSIS 4 INDEX: FIB4 SCORE: 1.56

## 2022-06-08 NOTE — PROGRESS NOTES
Chief Complaint   Patient presents with   • Coronary Artery Disease     Fv Dx: Coronary artery disease due to calcified coronary lesion       Subjective     Orlando Arreguin is a 72 y.o. male who presents today for follow-up of coronary artery disease characterized by left main to LAD PCI Dr. ORR in 2015 and RCA PCI for ACS 2020 Dr. Weinberg.  Since that time is developed dyspnea on exertion that is new and chest discomfort after eating.  Has history of polio as a child and cannot ambulate long distances or participating in the treadmill test.  Taking his medications as directed.  Negative stress test recently.  Limited by polio and rehab.    Dyspnea with bending over and standing up persists he avoids activities.  He is able to leaves and sawing wood without significant symptoms.    Past Medical History:   Diagnosis Date   • Coronary artery disease due to calcified coronary lesion 8/11/2015   • Diabetes mellitus type II    • Gastroparesis    • Hyperlipidemia    • Hypothyroid    • Interstitial cystitis    • Sciatic neuritis      Past Surgical History:   Procedure Laterality Date   • ZZZ CARDIAC CATH  8/6/15    YESIKA to LAD.  Has RCA 70% occulsion.   • APPENDECTOMY     • OTHER      [facility]   • ID TRANSURETHRAL ELEC-SURG PROSTATECTOM       Family History   Problem Relation Age of Onset   • Heart Disease Mother    • Heart Attack Mother 55        heart attack     Social History     Socioeconomic History   • Marital status: Single     Spouse name: Not on file   • Number of children: Not on file   • Years of education: Not on file   • Highest education level: Not on file   Occupational History   • Not on file   Tobacco Use   • Smoking status: Current Every Day Smoker     Packs/day: 1.00     Years: 30.00     Pack years: 30.00     Types: Cigarettes   • Smokeless tobacco: Never Used   Vaping Use   • Vaping Use: Never used   Substance and Sexual Activity   • Alcohol use: No     Alcohol/week: 0.0  oz   • Drug use: No   • Sexual activity: Not on file   Other Topics Concern   • Not on file   Social History Narrative   • Not on file     Social Determinants of Health     Financial Resource Strain: Not on file   Food Insecurity: Not on file   Transportation Needs: Not on file   Physical Activity: Not on file   Stress: Not on file   Social Connections: Not on file   Intimate Partner Violence: Not on file   Housing Stability: Not on file     Allergies   Allergen Reactions   • Brilinta [Ticagrelor] Shortness of Breath     Total Sleep Apnea per patient; SOB   • Pcn [Penicillins] Anaphylaxis   • Other Environmental      Hayfever   • Tdap [Dipth, Acell Pertus, Tetanus]      tdap   • Tetanus Toxoid      Outpatient Encounter Medications as of 6/8/2022   Medication Sig Dispense Refill   • Carolee, Zingiber officinalis, (CAROLEE PO) Take  by mouth.     • levothyroxine (SYNTHROID) 112 MCG Tab TAKE 1 TABLET BY MOUTH IN THE MORNING ON AN EMPTY STOMACH 90 Tablet 0   • lisinopril (PRINIVIL) 2.5 MG Tab Take 1 tablet by mouth once daily 90 Tablet 2   • liothyronine (CYTOMEL) 25 MCG Tab TAKE 1/2 (ONE-HALF) TABLET BY MOUTH TWICE DAILY FOR 90 DAYS 90 Tablet 0   • LANTUS SOLOSTAR 100 UNIT/ML Solution Pen-injector injection INJECT 12 UNITS SUBCUTANEOUSLY ONCE DAILY IN THE EVENING AS  INSTRUCTED 15 mL 11   • B-D UF III MINI PEN NEEDLES 31G X 5 MM Misc USE PEN ONCE DAILY WITH LANTUS PEN     • atorvastatin (LIPITOR) 80 MG tablet Take 1 Tablet by mouth every day. 90 Tablet 3   • RELION PRIME TEST strip USE 1 STRIP TO CHECK GLUCOSE ONCE DAILY BEFORE A MEAL 100 Strip 11   • ReliOn Ultra Thin Lancets 30G Misc USE LANCET TO CHECK BLOOD SUGAR ONCE DAILY 100 Each 11   • RELION PEN NEEDLES 31G X 6 MM Misc USE PEN ONCE DAILY WITH LANTUS  Each 11   • metformin (GLUCOPHAGE) 1000 MG tablet Take 1 tablet by mouth 2 times a day with meals. 180 tablet 3   • aspirin EC 81 MG EC tablet Take 1 Tab by mouth every day. 30 Tab 0   • Cholecalciferol  "(VITAMIN D3 PO) Take 1 Dose by mouth every day. Unknown OTC Strength.     • Riboflavin (VITAMIN B-2 PO) Take 1 Dose by mouth every day. Unknown OTC Strength.     • Insulin Pen Needle 31G X 6 MM Misc  (Patient not taking: Reported on 6/8/2022)     • prasugrel (EFFIENT) 10 MG Tab Take 1 Tablet by mouth every day. (Patient not taking: Reported on 6/8/2022) 90 Tablet 3   • LIOTHYRONINE SODIUM PO Take 12.5 mcg by mouth 2 times a day. (Patient not taking: Reported on 6/8/2022)     • Insulin Pen Needle 31G X 6 MM Misc      • erythromycin base (UMM-TAB) 250 MG Tab Take 250 mg by mouth every day. Maintenance Medication. (Patient not taking: Reported on 6/8/2022)       No facility-administered encounter medications on file as of 6/8/2022.     Review of Systems   All other systems reviewed and are negative.             Objective     /60 (BP Location: Left arm, Patient Position: Sitting, BP Cuff Size: Adult)   Pulse 80   Resp 14   Ht 1.715 m (5' 7.5\")   Wt 69.9 kg (154 lb)   SpO2 97%   BMI 23.76 kg/m²     Physical Exam  Vitals reviewed.   Constitutional:       General: He is not in acute distress.     Appearance: He is well-developed. He is not diaphoretic.   HENT:      Head: Normocephalic and atraumatic.      Right Ear: External ear normal.      Left Ear: External ear normal.   Eyes:      General: No scleral icterus.        Right eye: No discharge.         Left eye: No discharge.      Conjunctiva/sclera: Conjunctivae normal.      Pupils: Pupils are equal, round, and reactive to light.   Neck:      Thyroid: No thyromegaly.      Vascular: No JVD.      Trachea: No tracheal deviation.   Cardiovascular:      Rate and Rhythm: Normal rate and regular rhythm.      Chest Wall: PMI is not displaced.      Pulses:           Carotid pulses are 2+ on the right side and 2+ on the left side.       Radial pulses are 2+ on the left side.        Popliteal pulses are 2+ on the right side and 2+ on the left side.        Dorsalis pedis " pulses are 2+ on the right side and 2+ on the left side.        Posterior tibial pulses are 2+ on the right side and 2+ on the left side.      Heart sounds: No murmur heard.    No friction rub. No gallop.   Pulmonary:      Effort: Pulmonary effort is normal. No respiratory distress.      Breath sounds: Normal breath sounds. No wheezing or rales.   Chest:      Chest wall: No tenderness.   Abdominal:      General: Bowel sounds are normal. There is no distension.      Palpations: Abdomen is soft.      Tenderness: There is no abdominal tenderness.   Musculoskeletal:         General: No tenderness or deformity. Normal range of motion.      Cervical back: Normal range of motion and neck supple.   Skin:     General: Skin is warm and dry.      Coloration: Skin is not pale.      Findings: No erythema or rash.   Neurological:      Mental Status: He is alert and oriented to person, place, and time.      Cranial Nerves: No cranial nerve deficit (cranial nerves II through XII grossly intact).      Coordination: Coordination normal.   Psychiatric:         Behavior: Behavior normal.         Thought Content: Thought content normal.       LABS:  Lab Results   Component Value Date/Time    CHOLSTRLTOT 72 (L) 06/01/2022 07:35 AM    CHOLSTRLTOT 72 (L) 12/07/2020 10:51 AM    LDL 36 12/07/2020 10:51 AM    HDL 27 (L) 06/01/2022 07:35 AM    HDL 23 (A) 12/07/2020 10:51 AM    TRIGLYCERIDE 59 06/01/2022 07:35 AM    TRIGLYCERIDE 65 12/07/2020 10:51 AM       Lab Results   Component Value Date/Time    WBC 6.5 01/04/2022 07:36 AM    WBC 5.5 10/02/2021 10:30 AM    RBC 4.90 01/04/2022 07:36 AM    RBC 4.77 10/02/2021 10:30 AM    HEMOGLOBIN 14.8 01/04/2022 07:36 AM    HEMOGLOBIN 14.7 10/02/2021 10:30 AM    HEMATOCRIT 43.8 01/04/2022 07:36 AM    HEMATOCRIT 42.9 10/02/2021 10:30 AM    MCV 89 01/04/2022 07:36 AM    MCV 89.9 10/02/2021 10:30 AM    NEUTSPOLYS 63 01/04/2022 07:36 AM    NEUTSPOLYS 70.10 10/02/2021 10:30 AM    LYMPHOCYTES 25 01/04/2022 07:36  AM    LYMPHOCYTES 20.40 (L) 10/02/2021 10:30 AM    MONOCYTES 9 01/04/2022 07:36 AM    MONOCYTES 6.90 10/02/2021 10:30 AM    EOSINOPHILS 2 01/04/2022 07:36 AM    EOSINOPHILS 1.30 10/02/2021 10:30 AM    BASOPHILS 1 01/04/2022 07:36 AM    BASOPHILS 0.90 10/02/2021 10:30 AM     Lab Results   Component Value Date/Time    SODIUM 138 06/01/2022 07:35 AM    SODIUM 138 10/02/2021 10:30 AM    POTASSIUM 4.6 06/01/2022 07:35 AM    POTASSIUM 4.4 10/02/2021 10:30 AM    CHLORIDE 106 06/01/2022 07:35 AM    CHLORIDE 107 10/02/2021 10:30 AM    CO2 21 06/01/2022 07:35 AM    CO2 20 10/02/2021 10:30 AM    GLUCOSE 123 (H) 06/01/2022 07:35 AM    GLUCOSE 131 (H) 10/02/2021 10:30 AM    BUN 13 06/01/2022 07:35 AM    BUN 18 10/02/2021 10:30 AM    CREATININE 0.80 06/01/2022 07:35 AM    CREATININE 0.79 10/02/2021 10:30 AM    BUNCREATRAT 16 06/01/2022 07:35 AM     Lab Results   Component Value Date    HBA1C 5.8 (H) 01/04/2022    HBA1C 5.9 (H) 12/07/2020      Lab Results   Component Value Date/Time    ALKPHOSPHAT 88 06/01/2022 07:35 AM    ALKPHOSPHAT 106 (H) 10/02/2021 10:30 AM    ASTSGOT 22 06/01/2022 07:35 AM    ASTSGOT 24 10/02/2021 10:30 AM    ALTSGPT 22 06/01/2022 07:35 AM    ALTSGPT 28 10/02/2021 10:30 AM    TBILIRUBIN 0.4 06/01/2022 07:35 AM    TBILIRUBIN 0.4 10/02/2021 10:30 AM      Lab Results   Component Value Date/Time    BNPBTYPENAT 178.8 (H) 08/19/2015 10:28 AM    BNPBTYPENAT 299 (H) 08/06/2015 04:59 AM      Lab Results   Component Value Date/Time    TSH <0.005 (L) 11/03/2021 07:19 AM     Lab Results   Component Value Date/Time    PROTHROMBTM 14.1 08/06/2015 04:59 AM    INR 1.09 08/06/2015 04:59 AM                   Assessment & Plan     1. Coronary artery disease due to calcified coronary lesion  Lipid Profile    Basic Metabolic Panel   2. Dyslipidemia     3. Controlled type 2 diabetes mellitus with hyperglycemia, without long-term current use of insulin (HCC)     4. Nonrheumatic aortic valve insufficiency     5. Ascending aorta  dilatation (HCC)         Medical Decision Making: Today's Assessment/Status/Plan:     CAD with good lipid profile and blood pressure.  Good medical therapy.  Cardiac evaluation is unremarkable.  Recommend routine stress testing for his left main PCI.  We will plan on stress testing set up at next visit in 6 months.  Lipid panel and BMP.  That will be for  As well.  Goal LDL is less than 70 continue on medical therapy.

## 2022-07-04 DIAGNOSIS — E03.9 ACQUIRED HYPOTHYROIDISM: ICD-10-CM

## 2022-07-05 RX ORDER — LIOTHYRONINE SODIUM 25 UG/1
TABLET ORAL
Qty: 90 TABLET | Refills: 0 | Status: SHIPPED | OUTPATIENT
Start: 2022-07-05 | End: 2022-10-04

## 2022-07-08 LAB
25(OH)D3+25(OH)D2 SERPL-MCNC: 51.9 NG/ML (ref 30–100)
ALBUMIN SERPL-MCNC: 4.1 G/DL (ref 3.7–4.7)
ALBUMIN/CREAT UR: <10 MG/G CREAT (ref 0–29)
ALBUMIN/GLOB SERPL: 1.8 {RATIO} (ref 1.2–2.2)
ALP SERPL-CCNC: 91 IU/L (ref 44–121)
ALT SERPL-CCNC: 30 IU/L (ref 0–44)
APPEARANCE UR: CLEAR
AST SERPL-CCNC: 30 IU/L (ref 0–40)
BACTERIA #/AREA URNS HPF: NORMAL /[HPF]
BASOPHILS # BLD AUTO: 0.1 X10E3/UL (ref 0–0.2)
BASOPHILS NFR BLD AUTO: 1 %
BILIRUB SERPL-MCNC: 0.3 MG/DL (ref 0–1.2)
BILIRUB UR QL STRIP: NEGATIVE
BUN SERPL-MCNC: 15 MG/DL (ref 8–27)
BUN/CREAT SERPL: 16 (ref 10–24)
CALCIUM SERPL-MCNC: 8.8 MG/DL (ref 8.6–10.2)
CASTS URNS MICRO: NORMAL
CASTS URNS QL MICRO: NORMAL /LPF
CHLORIDE SERPL-SCNC: 107 MMOL/L (ref 96–106)
CHOLEST SERPL-MCNC: 73 MG/DL (ref 100–199)
CO2 SERPL-SCNC: 21 MMOL/L (ref 20–29)
COLOR UR: YELLOW
CREAT SERPL-MCNC: 0.92 MG/DL (ref 0.76–1.27)
CREAT UR-MCNC: 31.3 MG/DL
CRYSTALS URNS MICRO: NORMAL
EGFRCR SERPLBLD CKD-EPI 2021: 88 ML/MIN/1.73
EOSINOPHIL # BLD AUTO: 0.1 X10E3/UL (ref 0–0.4)
EOSINOPHIL NFR BLD AUTO: 1 %
EPI CELLS #/AREA URNS HPF: NORMAL /HPF (ref 0–10)
ERYTHROCYTE [DISTWIDTH] IN BLOOD BY AUTOMATED COUNT: 12.7 % (ref 11.6–15.4)
GLOBULIN SER CALC-MCNC: 2.3 G/DL (ref 1.5–4.5)
GLUCOSE SERPL-MCNC: 115 MG/DL (ref 65–99)
GLUCOSE UR QL STRIP: NEGATIVE
HBA1C MFR BLD: 6 % (ref 4.8–5.6)
HCT VFR BLD AUTO: 46.4 % (ref 37.5–51)
HDLC SERPL-MCNC: 28 MG/DL
HGB BLD-MCNC: 15.4 G/DL (ref 13–17.7)
HGB UR QL STRIP: NEGATIVE
IMM GRANULOCYTES # BLD AUTO: 0 X10E3/UL (ref 0–0.1)
IMM GRANULOCYTES NFR BLD AUTO: 0 %
IMMATURE CELLS  115398: NORMAL
KETONES UR QL STRIP: NEGATIVE
LABORATORY COMMENT REPORT: ABNORMAL
LDLC SERPL CALC-MCNC: 30 MG/DL (ref 0–99)
LEUKOCYTE ESTERASE UR QL STRIP: NEGATIVE
LYMPHOCYTES # BLD AUTO: 1.5 X10E3/UL (ref 0.7–3.1)
LYMPHOCYTES NFR BLD AUTO: 24 %
MCH RBC QN AUTO: 29.9 PG (ref 26.6–33)
MCHC RBC AUTO-ENTMCNC: 33.2 G/DL (ref 31.5–35.7)
MCV RBC AUTO: 90 FL (ref 79–97)
MICRO URNS: NORMAL
MICRO URNS: NORMAL
MICROALBUMIN UR-MCNC: <3 UG/ML
MONOCYTES # BLD AUTO: 0.5 X10E3/UL (ref 0.1–0.9)
MONOCYTES NFR BLD AUTO: 8 %
MORPHOLOGY BLD-IMP: NORMAL
MUCOUS THREADS URNS QL MICRO: NORMAL
NEUTROPHILS # BLD AUTO: 4 X10E3/UL (ref 1.4–7)
NEUTROPHILS NFR BLD AUTO: 66 %
NITRITE UR QL STRIP: NEGATIVE
NRBC BLD AUTO-RTO: NORMAL %
PH UR STRIP: 5.5 [PH] (ref 5–7.5)
PLATELET # BLD AUTO: 207 X10E3/UL (ref 150–450)
POTASSIUM SERPL-SCNC: 5.1 MMOL/L (ref 3.5–5.2)
PROT SERPL-MCNC: 6.4 G/DL (ref 6–8.5)
PROT UR QL STRIP: NEGATIVE
PSA SERPL-MCNC: 1.5 NG/ML (ref 0–4)
RBC # BLD AUTO: 5.15 X10E6/UL (ref 4.14–5.8)
RBC #/AREA URNS HPF: NORMAL /HPF (ref 0–2)
RENAL EPI CELLS #/AREA URNS HPF: NORMAL /[HPF]
SODIUM SERPL-SCNC: 141 MMOL/L (ref 134–144)
SP GR UR STRIP: 1.01 (ref 1–1.03)
T VAGINALIS URNS QL MICRO: NORMAL
TRIGL SERPL-MCNC: 64 MG/DL (ref 0–149)
UNIDENT CRYS URNS QL MICRO: NORMAL
URINALYSIS REFLEX  377202: NORMAL
URNS CMNT MICRO: NORMAL
UROBILINOGEN UR STRIP-MCNC: 0.2 MG/DL (ref 0.2–1)
VLDLC SERPL CALC-MCNC: 15 MG/DL (ref 5–40)
WBC # BLD AUTO: 6.1 X10E3/UL (ref 3.4–10.8)
WBC #/AREA URNS HPF: NORMAL /HPF (ref 0–5)
YEAST #/AREA URNS HPF: NORMAL /[HPF]

## 2022-07-14 ENCOUNTER — OFFICE VISIT (OUTPATIENT)
Dept: MEDICAL GROUP | Facility: MEDICAL CENTER | Age: 72
End: 2022-07-14
Payer: MEDICARE

## 2022-07-14 VITALS
OXYGEN SATURATION: 97 % | SYSTOLIC BLOOD PRESSURE: 122 MMHG | HEART RATE: 95 BPM | TEMPERATURE: 97.5 F | BODY MASS INDEX: 24.01 KG/M2 | DIASTOLIC BLOOD PRESSURE: 70 MMHG | HEIGHT: 67 IN | WEIGHT: 153 LBS

## 2022-07-14 DIAGNOSIS — Z72.0 TOBACCO ABUSE: Chronic | ICD-10-CM

## 2022-07-14 DIAGNOSIS — Z00.00 PREVENTATIVE HEALTH CARE: Chronic | ICD-10-CM

## 2022-07-14 DIAGNOSIS — I73.9 PVD (PERIPHERAL VASCULAR DISEASE) (HCC): ICD-10-CM

## 2022-07-14 DIAGNOSIS — E11.65 CONTROLLED TYPE 2 DIABETES MELLITUS WITH HYPERGLYCEMIA, WITHOUT LONG-TERM CURRENT USE OF INSULIN (HCC): ICD-10-CM

## 2022-07-14 DIAGNOSIS — Z23 NEED FOR PNEUMOCOCCAL VACCINATION: ICD-10-CM

## 2022-07-14 PROCEDURE — 90677 PCV20 VACCINE IM: CPT | Performed by: INTERNAL MEDICINE

## 2022-07-14 PROCEDURE — G0009 ADMIN PNEUMOCOCCAL VACCINE: HCPCS | Performed by: INTERNAL MEDICINE

## 2022-07-14 PROCEDURE — 99214 OFFICE O/P EST MOD 30 MIN: CPT | Mod: 25 | Performed by: INTERNAL MEDICINE

## 2022-07-14 RX ORDER — ASPIRIN 81 MG/1
81 TABLET, CHEWABLE ORAL DAILY
Qty: 100 TABLET | Refills: 11
Start: 2022-07-14

## 2022-07-14 ASSESSMENT — FIBROSIS 4 INDEX: FIB4 SCORE: 1.91

## 2022-07-14 ASSESSMENT — PATIENT HEALTH QUESTIONNAIRE - PHQ9: CLINICAL INTERPRETATION OF PHQ2 SCORE: 0

## 2022-07-14 NOTE — PROGRESS NOTES
Subjective     Orlando Arreguin is a 72 y.o. male who presents with diabetes Follow-Up            HPI      Here for follow up diabetes and last a1c 7/8/22 A1c 6.0% on lantus 12 units and metformin 1000 mg bid no hypoglycemia and low blood sugar 79, typically sugars run 100-110.  He has been doing an outstanding job checking his blood sugars daily and recording his blood sugars and we have daily readings from January 10 through July 13.  His sugars have been remarkably stable and consistent, he does eat a consistent diet, perhaps deviating with [place] chocolate cake on occasion but rarely.  Still keeps active taking care of his 3 acres, does exercise bike 7 minutes per day, does have leg heaviness at times, known peripheral vascular disease but no leg pain, no open sores or lesions of his feet.  Tobacco one pack per day denies cough, shortness of breath at rest or with activity  Also remains on lisinopril 2.5 mg daily, Lipitor 80 mg daily, Cytomel 25 mcg half a tablet twice daily and levothyroxine 112 mcg daily per endocrinology, also on aspirin daily, no chest pain with activity.          Current Outpatient Medications   Medication Sig Dispense Refill   • liothyronine (CYTOMEL) 25 MCG Tab Take 1/2 (one-half) tablet by mouth twice daily 90 Tablet 0   • Carolee, Zingiber officinalis, (CAROLEE PO) Take  by mouth.     • levothyroxine (SYNTHROID) 112 MCG Tab TAKE 1 TABLET BY MOUTH IN THE MORNING ON AN EMPTY STOMACH 90 Tablet 0   • lisinopril (PRINIVIL) 2.5 MG Tab Take 1 tablet by mouth once daily 90 Tablet 2   • LANTUS SOLOSTAR 100 UNIT/ML Solution Pen-injector injection INJECT 12 UNITS SUBCUTANEOUSLY ONCE DAILY IN THE EVENING AS  INSTRUCTED 15 mL 11   • Insulin Pen Needle 31G X 6 MM Misc  (Patient not taking: Reported on 6/8/2022)     • B-D UF III MINI PEN NEEDLES 31G X 5 MM Misc USE PEN ONCE DAILY WITH LANTUS PEN     • atorvastatin (LIPITOR) 80 MG tablet Take 1 Tablet by mouth every day. 90 Tablet 3   • prasugrel (EFFIENT)  10 MG Tab Take 1 Tablet by mouth every day. (Patient not taking: Reported on 6/8/2022) 90 Tablet 3   • LIOTHYRONINE SODIUM PO Take 12.5 mcg by mouth 2 times a day. (Patient not taking: Reported on 6/8/2022)     • RELION PRIME TEST strip USE 1 STRIP TO CHECK GLUCOSE ONCE DAILY BEFORE A MEAL 100 Strip 11   • ReliOn Ultra Thin Lancets 30G Misc USE LANCET TO CHECK BLOOD SUGAR ONCE DAILY 100 Each 11   • RELION PEN NEEDLES 31G X 6 MM Misc USE PEN ONCE DAILY WITH LANTUS  Each 11   • metformin (GLUCOPHAGE) 1000 MG tablet Take 1 tablet by mouth 2 times a day with meals. 180 tablet 3   • Insulin Pen Needle 31G X 6 MM Misc      • aspirin EC 81 MG EC tablet Take 1 Tab by mouth every day. 30 Tab 0   • Cholecalciferol (VITAMIN D3 PO) Take 1 Dose by mouth every day. Unknown OTC Strength.     • erythromycin base (UMM-TAB) 250 MG Tab Take 250 mg by mouth every day. Maintenance Medication. (Patient not taking: Reported on 6/8/2022)     • Riboflavin (VITAMIN B-2 PO) Take 1 Dose by mouth every day. Unknown OTC Strength.       No current facility-administered medications for this visit.                              Tobacco  7/31/15 tobacco 1 ppd not interested in stopping smoking classes or education  8/28/15 tobacco 1 ppd not interested in stopping smoking classes or education  8/28/15 declines PFT    11/30/15 still smoking 20 cigs per day declines stop smoking classes or medications  5/31/16 declines stop smoking classes and medications, not interested in stop smoking classes, smoking ppd x 40 plus years, declines CT lung cancer screening, pulmonary function testing  11/29/16 still smoking 1 ppd, started smoking age 20, declines stop smoking classes and medication, declines lung cancer screening program and PFT  5/30/17 still smoking 1 ppd declines stop smoking classes and medications, and lung cancer screening program and PFT  12/5/17 still smokes 1 ppd declines stop smoking classes and medications, and lung cancer screening  program and PFT   6/12/18  still smokes 1 ppd declines stop smoking classes and medications, and lung cancer screening program and PFT   6/18/19 still smokes 1 ppd declines stop smoking classes and medications, and lung cancer screening program and PFT   12/10/19 still smoking 1 pack/day, declines stop smoking classes, medications, lung cancer screening, PFT  7/1/20 cardiology note stable, urged to quit smoking, follow-up 3 months  12/15/20 tobacco 1/2 ppd not interested to stop smoking classes, medications, CT lung cancer screening, or PFTs  6/14/21 1 ppd tobacco, declines stop smoking classes, CT lung cancer screening, PFT  1/10/22 1 ppd tobacco, declines stop smoking classes, CT lung cancer screening, PFT     s/p greenlight photo vaporization  10/04 cystoscopy and green light photovaporization of prostate in [place], [place]  1/5/22 psa 1.4     Preventative health  8/23/15 pneumovax  1/12/16 zoster vaccine at [facility]  5/30/17 prevnar   12/7/20 hep c ab negative  12/15/20 declines tdap  12/15/20 declines shingrix  12/15/20 declines colonoscopy  12/8/21 covid pfizer booster  7/8/22 vit d 52  7/8/22 psa 1.5     Postpolio syndrome  6/04 saw neurology in [place] , notes indicate chronic non specific complaints with normal brain MRI, no evidence to support post polio syndrome.     Peripheral vascular disease  12/22/17 ultrasound carotid less than 50% stenosis bilateral  12/28/17 JOSSUE lower extremities, normal at rest, post exercise right JOSSUE 0.8, left 0.8 mild-to-moderate arterial disease bilateral  12/28/17 ultrasound vascular lower extremities, no arterial occlusive disease from common femoral to popliteal bilateral, 50-75% stenosis proximal right external iliac, 50-75% stenosis distal left external iliac, patent right tibial arteries, left posterior tibial appears occluded distally, normal flow left anterior tibial, peroneal arteries retrograde suggesting proximal occlusion  7/11/19 ultrasound arterial lower  extremities, duplex right stenosis distal external iliac greater than 50%, elevated velocities proximal portion posterior tibial and peroneal, left greater than 50% stenosis mid external iliac artery, occluded proximal portion posterior tibial artery with distal reconstitution ankle, short segment occlusion mid peroneal artery right JOSSUE 1.09, left JOSSUE 1.07  7/14/19 referral vascular surgery placed  12/10/19 declines vascular surgery follow-up asymptomatic  7/1/20 cardiology note stable, urged to quit smoking, follow-up 3 months  6/14/21 asymptomatic declines follow-up testing, declines vascular surgery evaluation  1/10/22 recommend referral to vascular surgery but he declines      On ACE inhibitor  4/04 p.thallium no ischemia EF 62%  12/10 p.thallium no ischemia, EF 59%  5/9/11 rec ACE instead of atenolol patient declines  10/17/11 discontinued atenolol, rec start lotensin 10 mg; he declines  5/7/13 echo normal LV function, EF 60%, grade 1 diastolic dysfunction  5/7/13 p.thallium fixed defect mid inferior, inferoseptal, mid inferior walls suggest subdiaphragmatic uptake or prior infarction, no ischemia noted, EF 57%  8/8/15 hospital discharge on coreg 12.5 mg bid,lisinopril 5 mg, brilinta 90 mg bid, asa 81 mg,lipitor 80 mg  8/26/15 cardiology note, decrease coreg to 6.25 mg bid due to lower blood pressure continue lisinopril 5 mg    8/28/15 decrease lisinopril to 2.5 mg daily and cont coreg 6.25 mg bid  11/24/15 urine mac<2.5 on lisinopril 2.5 mg and coreg 6.25 mg bid  4/12/16 cardiology note decrease coreg to 3.125 mg bid consider discontinuation of coreg next evaluation and continue lisinopril 2.5 mg  5/20/16 urine mac <3 on lisinopril 2.5 mg  7/19/16 cardiology note clinically stable, episodes of chest tightness related to stress, blood pressure running low, discontinue carvedilol, follow-up in a few months  11/23/16 urine mac 3.2 on lisinopril 2.5 mg  11/29/17 urine mac<4 on lisinopril 2.5 mg  6/7/18 urine mac  3.8 on lisinopril 2.5 mg  6/10/19 urine mac<0.7 on lisinopril 2.5 mg  12/5/19 urine mac<0.7 on lisinopril 2.5 mg  6/11/20 urine mac<1.2 on lisinopril 2.5 mg  12/7/20 urine mac<1.2 on lisinopril 2.5 mg  6/8/21 urine mac<3 on lisinopril 2.5 mg  7/8/22 urine mac<10 on lisinopril 2.5 mg     mild nonproliferative diabetic retinopathy  6/4/19  eye exam, mild nonproliferative retinopathy  2/14/22  eye exam mild nonproliferative retinopathy without macular edema     Interstitial cystitis  5/02 urology note [facility] nonbacterial prostatitis vs interstitial cystitis given trial of flomax     Insomnia on Xanax as needed  On xanax 0.25 mg at night prn  3/8/18 refill xanax  5/30/19 refill xanax  11/22/20 refill xanax  11/22/20      Hypothyroid  10/08 tsh 0.126,free t4 1.4,free t3 2.7  8/10 tsh 0.199 taking levothyroxine 0.125 6 days a week, and 2 tabs on john  9/10/10 increase to levothyroxine 0.137 mg daily, repeat tsh in 6 weeks  10/10 tsh 0.9  12/10 tsh 1.2, free t4 1.4, free t3 2.9  4/11 tsh 0.8, thyroxine 9.6, free thyroxine uptake 3.4, free t3 uptake 35%  9/11 tsh 0.5, free t4 0.9, free t3 2.4 on levothyroxine 137 mcg  12/11 tsh 0.3,free t4 1.5,free t3 2.7 on levothyroxine 137 mcg  4/16/13 tsh 0.28,free t4 1.1, free t3 2.3 (2.4-4.2); on levothyroxine 137 mcg; change to armour thyroid 60 mg qday  5/6/13 tsh 0.3, free t4 1.0, free t3 2.5 on levothyroxine 137 mcg ? Change to armour 60 mg  5/9/13  note decrease levothyroxine to 125 mcg and add cytomel 5 mg bid  6/13 tsh 0.45, free t4 1.2, free t3 2.9, on synthroid 125 mcg and cytomel 5 mcg bid per   8/14/13 tsh 0.235,free t4 1.1,free t3 2.8 on synthroid 125 mcg and cytomel 5 mcg bid per ;change to synthroid 150 mcg and stop cytomel per pt request  10/16/13 tsh 0.4, free t4 1.34, free t3 2.2 on synthroid 150 mcg  10/23/13  endo note; change to synthroid 125 mcg and cytomel 5 mcg daily  4/16/14 tsh  0.67,free t4 1.0,free t3 2.4  4/23/14  endocrine note, increase levothyroxine to 137 mcg and cont cytomel 5 mcg  10/28/14  endocrine note, tsh 0.6,free t4 1.0, free t3 2.7 on thyroxine 137 mcg and cytomel 5 mcg; gastroparesis symptoms improve with cytomel, will reduce thryoxine to 125 mcg and use cytomel 25 mcg to cut and take more than once per day as needed, return in 4 months  7/3/15 tsh 0.04, free t4 0.6 and free t3 3.0 on levothyroxine 125 mcg and cytomel 12.5 mcg daily  7/21/15  endocrine note; on levothyroxine 125 mcg and cytomel 12.5 mcg daily, tsh 0.04, free t4 0.6 and free t3 3.0, patient does not want to change dose, no changes continue same dosing regimen; follow up 6 months  8/8/15  endocrine phone note, decrease levothyroxine to 112 mcg daily, continue cytomel 12.5 mg daily  1/13/16 tsh 0.016,free t4 0.9,free t3 2.5 on levothyroxine 112 mcg and cytomel 12.5 mg daily  1/22/16  endocrinology note on levothyroxine 112 mcg and cytomel 12.5 mg daily, patient declines to change dosing regimen  4/5/16 tsh 0.012,free t4 0.97,free t3 3.6 on levothyroxine 112 mcg and cytomel 12.5 mg daily  4/21/16  endocrinology note, no changes  10/13/16 tsh 0.14,free t4 0.87,free t3 3.1 on levothyroxine 112 mcg and cytomel 25 mg  4/17/17 tsh 0.016,free t4 0.9,free t3 4.0 on levothyroxine 112 mcg and cytomel 25 mg  4/19/17  endocrinology note no changes follow up 6 months  10/18/17  endocrine note no changes  4/12/18 tsh 0.012, free t4 0.99, free t3 4.7 on levothyroxine 112 mcg and cytomel 25 mg  4/18/18  endocrinology note, on levothyroxine 112 mcg daily and cytomel 12.5 mcg bid tsh 0.01, free 4 0.9, free t3 4.7, clinically feeling fine, no changes  10/15/18 endocrinology note repeat labs follow-up 6 months  10/8/19 tsh 0.010, free t4 0.80, free t3 2.9 on levothyroxine 112 mcg daily and cytomel 12.5 mcg bid per  endocrinology  10/14/19 endocrinology note, continue same thyroid medication dosing no changes follow-up 6 months  4/20/20 endocrinology note patient not willing to adjust his thyroid as it has been successful in controlling gastroparesis, follow-up 6 months  6/12/20 tsh 0.006 on levothyroxine 112 mcg daily and cytomel 12.5 mcg bid per endocrinology  12/7/20 tsh 0.009, free t4 1.23, t3 187 () on levothyroxine 112 mcg daily and cytomel 12.5 mcg bid per endocrinology  4/16/21 tsh<0.005,free t4 1.3,free t3 2.9  5/4/21 endocrinology note on levothyroxine 112 mcg and cytomel 12.5 mcg bid, continue and follow up 6 months  11/3/21 tsh<0.005,free t4 1.27,free t3 3.4  1/5/21 A1c 5.8% on lantus 12 units and metformin 1000 mg bid on levothyroxine 112 mcg and liothyronine 12.5 mcg bid, patient states that the thyroid dose is beneficial to her self and is reluctant to decrease the dose understanding that hyperthyroid can increase bone loss  11/10/21  endocrinology note he is retiring, follow-up with nurse practitioner  5/5/22 tsh 0.01,free t4 1.4,free t3 3.5 on levothyroxine 112 mcg and cytomel 25 mcg 1/2 bid per endocrine  5/13/22 endocrine note on levothyroxine 112 mcg and cytomel 25 mcg 1/2 bid discussed risk of decreased TSH and cardiovascular disease, osteoporosis, patient would like to remain on current regimen, follow-up 6 months     History shoulder pain  12/9/10 left shoulder pain,  note MRI pending  2/11 MRI left shoulder [facility] mild to moderate rotator cuff tendinopathy, adhesive capsulitis, small anterior and posterior labral tears  7/11 [facility] note  left shoulder adhesive capsulitis rec decompression  12/8/15 xray right shoulder at [facility] mild hydroxyapatite deposition adjacent to the greater tuberosity, no evidence of significant arthritis  5/11/16 MRI right shoulder thickening and increased signal with synovitis, suggestive of adhesive capsulitis, tendinopathy supraspinatus and  infraspinatus, fatty atrophy paraspinous muscle  1/4/17 [facility] discharge note     History MI  8/6/15 hospital admission non-STEMI inverted T waves in aVL, ST depression in lead 1, initial troponin 3.4  8/6/15   8/6/15 cardiac catheterization left main free of disease, triple vessel disease prominently involving distal segment nature epicardial vessels and branches, 95% ostial LAD descending artery stenosis and 70% mid RCA stenosis, ejection fraction 35-40%, stenting ostial LAD with xience drug-eluting stent  8/7/15 echo normal LV function EF 65-70%, grade 1 diastolic dysfunction, moderate concentric LVH, mild MR and AR  8/8/15 hospital discharge on coreg 12.5 mg bid,lisinopril 5 mg, brilinta 90 mg bid, asa 81 mg,lipitor 80 mg  8/11/15 cardiology note; continue brilenta and asa for one year, some dyspnea, if no improvement could consider another antiplatelet therapy, will recheck BNP in 2 weeks with followup visit, ultimately will need myocardial perfusion scan but will defer for a few months  8/26/15 cardiology note, decrease coreg to 6.25 mg bid due to lower blood pressure,continue lisinopril 5 mg, asa, lipitor,start effient 10 mg for one year due to brilinta causing shortness of breath, followup 2 months  10/14/15 cardiology note no chnges, repeat myocardial perfusion scan 3 months  11/30/15 cardiac rehab program  1/13/15 cardiology note nitroglycerin when necessary follow-up 3 months  1/12/16 persantine thallium small basal anterior inferior infarct with small ame-infarct ischemia, moderately sized moderate severity inferior, inferior lateral infarct with reversible ischemia  4/12/16 cardiology note decrease coreg to 3.125 mg bid consider discontinuation of coreg next evaluation and continue lisinopril 2.5 mg  7/19/16 cardiology note clinically stable, episodes of chest tightness related to stress, blood pressure running low, discontinue carvedilol, follow-up in a few months  11/3/16 cardiology note,  isosorbide mononitrate with heavy exertion, follow-up in a few months to determine if further evaluation is necessary, could consider repeat perfusion study or dobutamine stress echo  1/5/17 cardiology note stable CAD, follow-up 6 months  7/12/17 cardiology note, likely stable discussed smoking cessation, patient declines to quit smoking, follow-up one year  12/22/17 ultrasound carotid less than 50% stenosis bilateral  7/23/18 cardiology note asymptomatic, increased stress however, continues to smoke, follow-up 1 year continue atorvastatin plus erythromycin  7/15/19 cardiology note stable continues to smoke advised to quit follow-up 1 year  6/19/20 hospital admission, 6/21/20 hospital discharge, admission for chest pain, cardiac catheterization performed, drug-eluting stent placement of RCA  6/19/20 echo mild concentric LVH, EF 65%, subtle inferior hypokinesis, normal diastolic function  6/19/20 cardiac catheterization left main mild distal tapering 10% stenosis, stent distal part widely patent, LAD widely patent ostial stent, diffuse moderate stenosis distal LAD and small diagonal branches, circumflex tortuous origin 50%, obtuse marginal 1 occluded fills by collaterals stable compared to 2015, obtuse marginal 2 diffusely diseased with mild stenosis lateral wall, distal circumflex/obtuse marginal 3 is small with severe stenosis terminal segment, dominant RCA 40% proximal stenosis, 70% mid vessel stenosis, 40% distal stenosis, PDA multiple 50 to 70% stenosis mid and distal segments, successful mid RCA YESIKA placement, preintervention 70% stenosis, post intervention 0% residual stenosis  7/1/20 cardiology note stable, urged to quit smoking, follow-up 3 months  12/1/20 cardiology note side effects from metoprolol dry skin and cold fingers having already decreased from 25 mg to 12.5 mg daily, will discontinue metoprolol, continue asa, effient, lipitor, lisinopril  3/9/21 cardiology note consider increasing lisinopril,  follow-up 6 months back to discontinue effient at that time  10/5/21 myocardial perfusion study small fixed defect no ischemia, EF 47%  11/19/21 echo mild LVH, EF 60%, moderate aortic insufficiency, ascending aortic dilation 4.0 cm  12/3/21  cardiology note recommend routine stress testing for left main PCI, patient would like to stop effient and has been 1 year since his stent was placed, patient understands that DAPT is standard treatment but patient would like to be off that medication, okay to discontinue effient and continue aspirin 81 mg indefinitely, follow-up 1 year  6/8/22  cardiology note recommend routine stress testing, and follow-up 6 months     Gastroparesis  4/04 gastric emptying study severe gatroparesis done in [place], no hx of DM or MS  5/04 CT thorax in [place] negative  5/04 MRI brain in [place] no evid of MS  On Emycin  4/16/13 declined gastric stimulator  12/5/17 remains on erythromycin     Dyslipidemia  5/13 chol 158,trig 204,hdl 36,ldl 81  8/8/15 chol 97,trig 117,hdl 26,ldl 48 started on lipitor 80 mg on hospital discharge  8/21/15 chol 76,trig 85,hdl 24,ldl 35 on lipitor 80 mg  10/7/15 chol 67,trig 77,hdl 22,ldl 30 on lipitor 80 mg per cardiology  4/5/16 chol 64,trig 43,hdl 24,ldl 31 on lipitor 80 mg per cardiology  11/23/16 chol 83,trig 73,hdl 27,ldl 41 on lipitor 80 mg per cardiology  5/24/17 chol 85,trig 81,hdl 28,ldl 41 on lipitor 80 mg per cardiology  10/10/17 pharmacy known interaction with lipitor and erythromycin base, consider changing to crestor, offer made to patient to change to crestor but he would like to discuss with his cardiologist first  11/29/17 chol 72,trig 47,hdl 24,ldl 39 on lipitor 80 mg and erythromycin base, previously recommended changing to crestor because of potential interaction, patient would like to discuss with cardiology first  12/5/17 declines to change from lipitor understanding potential interaction with erythromycin  6/7/18 chol 78,trig 68,hdl  25,ldl 39,cpk 69 on lipitor 80 mg  7/23/18 cardiology note asymptomatic, increased stress however, continues to smoke, follow-up 1 year continue atorvastatin plus erythromycin  12/3/18 chol 81,trig 85,hdl 26,ldl 38 on lipitor 80 mg  6/10/19 chol 64,trig 70,hdl 20,ldl 30 on lipitor 80 mg  6/10/20 chol 81,trig 74,hdl 25,ldl 41 on lipitor 80 mg  12/7/20 chol 72,trig 65,hdl 23,ldl 36 on lipitor 80 mg  6/8/21 chol 81,trig 54,hdl 27,ldl 41 on lipitor 80 mg  1/5/22 chol 83,trig 52,hdl 31,ldl 39 on lipitor 80 mg  6/1/22 chol 72,trig 59,hdl 27,ldl 31 on lipitor 80 mg  7/8/22 chol 73,trig 64,hdl 28,ldl 30 on lipitor 80 mg     Diabetes  11/08 A1c 6.3%  8/10 A1c 7.9%  10/10 A1c 7.7%  12/10 A1c 8.0%  1/11 add metformin 500 mg bid  2/11 A1c 7.9%  4/11 A1c 8.2% increase metformin to 1000 mg bid  10/11 A1c 6.5 %  12/11 A1c 6.1% on metformin 1000 mg bid  4/16/13 A1c 6.2% on metformin 1000 mg bid; declines to check blood sugars at home; bs 194 non fasting; declines ACE  8/14/13 A1c 6.5% on metformin 1000 mg bid  12/9/13  eye exam grade 1 diabetic background retinopathy  4/16/14 A1c 7.0% per  on metformin 1000 mg bid  7/3/15 A1c 8.3% on metformin 1000 mg bid per endocrine   7/31/15 start lantus 5 units and continue metformin 1000 mg bid, declines ACE,statin,asa  8/8/15 hospital discharge on coreg 12.5 mg bid,lisinopril 5 mg, brilinta 90 mg bid, asa 81 mg,lipitor 80 mg; on lantus 8 units and metformin 1000 mg bid  8/28/15 declines nutrition consultation and diabetic education regarding diet  8/28/15 recommend increasing lantus to 10 units and metformin 1000 mg bid  11/24/15 A1c 6.3% on lantus 10 units and metformin 1000 mg bid  11/30/15 on lantus 12 units and metformin 1000 mg bid  5/20/16 A1c 6.3% on lantus 12 units and metformin 1000 mg bid  11/23/16 A1c 6.1% on lantus 12 units and metformin 1000 mg bid  1/9/17  eye exam  5/24/17 A1c 6.3% on lantus 12 units and metformin 1000 mg bid   5/24/17  urine mac 2.5 on lisinopril 2.5 mg  11/29/17 A1c 6.1% on lantus 12 units and metformin 1000 mg bid   6/7/18 A1c 5.9% on lantus 12 units and metformin 1000 mg bid   12/3/18 A1c 6.2% and urine mac< 3on lantus 12 units and metformin 1000 mg bid   6/4/19  eye exam, mild nonproliferative retinopathy  6/10/19 A1c 6.4% on lantus 12 units and metformin 1000 mg bid   12/5/19 A1c 6.2% on lantus 12 units and metformin 1000 mg bid   6/11/20 A1c 6.5% on lantus 12 units and metformin 1000 mg bid   12/7/20 A1c 5.9% on lantus 12 units and metformin 1000 mg bid   6/8/21 A1c 6.0% on lantus 12 units and metformin 1000 mg bid   1/5/22 A1c 5.8% on lantus 12 units and metformin 1000 mg bid   2/14/22  eye exam mild nonproliferative retinopathy without macular edema  7/8/22 A1c 6.0% on lantus 12 units and metformin 1000 mg bid     ascending aorta dilationa  6/19/20 echo mild concentric LVH, EF 65%, subtle inferior hypokinesis, normal diastolic function  10/5/21 myocardial perfusion study small fixed defect no ischemia, EF 47%  11/19/21 echo mild LVH, EF 60%, moderate aortic insufficiency, ascending aortic dilation 4.0 cm     aortic insufficiency  6/19/20 echo mild concentric LVH, EF 65%, subtle inferior hypokinesis, normal diastolic function  10/5/21 myocardial perfusion study small fixed defect no ischemia, EF 47%  11/19/21 echo mild LVH, EF 60%, moderate aortic insufficiency, ascending aortic dilation 4.0 cm       Patient Active Problem List   Diagnosis   • History of allergic rhinitis   • Gastroparesis   • S/p green light photovaporization prostate   • Interstitial cystitis   • Postpolio syndrome   • Hypothyroid   • On angiotensin-converting enzyme (ACE) inhibitors (for diabetes, not HTN)   • Diabetes mellitus type 2, controlled (HCC)   • Preventative health care   • History of shoulder pain   • Insomnia   • Dyslipidemia   • Tobacco abuse   • History of MI (myocardial infarction)   • Coronary artery disease due to  "calcified coronary lesion   • PVD (peripheral vascular disease) ([facility])   • Mild non proliferative diabetic retinopathy (HCC)   • Stented coronary artery   • Ascending aorta dilatation (HCC)   • Aortic insufficiency     Depression Screening  Little interest or pleasure in doing things?  0 - not at all  Feeling down, depressed , or hopeless? 0 - not at all  Patient Health Questionnaire Score: 0        Fall Risk Assessment  Has the patient had two or more falls in the last year or any fall with injury in the last year?  No     Patient Care Team:  Charbel Jaffe M.D. as PCP - General  Alex Stein M.D. as Consulting Physician (Endocrinology)    ROS           Objective     /64 (BP Location: Left arm, Patient Position: Sitting, BP Cuff Size: Adult)   Pulse 95   Temp 36.4 °C (97.5 °F)   Ht 1.689 m (5' 6.5\")   Wt 69.4 kg (153 lb)   SpO2 97%   BMI 24.32 kg/m²      Physical Exam  Vitals and nursing note reviewed.   Constitutional:       Appearance: Normal appearance.   HENT:      Head: Normocephalic and atraumatic.      Right Ear: External ear normal.      Left Ear: External ear normal.   Eyes:      Conjunctiva/sclera: Conjunctivae normal.   Cardiovascular:      Rate and Rhythm: Normal rate and regular rhythm.      Heart sounds: Normal heart sounds.   Pulmonary:      Effort: Pulmonary effort is normal.      Breath sounds: Normal breath sounds.   Abdominal:      General: There is no distension.   Neurological:      General: No focal deficit present.      Mental Status: He is alert.   Psychiatric:         Mood and Affect: Mood normal.         Behavior: Behavior normal.                        Monofilament testing with a 10 gram force: sensation intact: intact sensation  Visual Inspection: Feet without maceration, ulcers, fissures.  Pedal pulses: intact palpable right dorsalis pedis 2/4, left 1/4, no cyanosis, no open sores, lesions, ulcerations        Assessment & Plan        Assessment  #!  Diabetes mellitus " stable blood sugars on Lantus 12 units daily and metformin 1000 mg twice daily last A1c 6.0% July 8, no hypoglycemia    #2 dyslipidemia on Lipitor 80 mg per cardiology 7/8/22 chol 73,trig 64,hdl 28,ldl 30 on lipitor 80 mg    #3 CAD history of MI, remains on low-dose aspirin 81 mg a day, stress testing done last year by cardiology, asymptomatic with no chest pain, shortness of breath    #4 tobacco dependence, pack per day, asymptomatic with regards to cough, shortness of breath, has declined stop smoking classes, CT lung cancer screening, PFT    #5 peripheral vascular disease, last ultrasound arterial lower extremities right greater than 50% stenosis left greater than 50% stenosis, JOSSUE at that time 1.09 right, 1.07 left, does complain of some leg heaviness at times, no open sores, dorsalis pedis pulses today palpable bilateral    #6 hypothyroid on replacement per endocrinology    Plan  #1 recommend he get the 4th covid vaccine at the pharmacy    #2 prevnar 20 vaccine today     #3  Continue to check blood sugars daily and record, check blood pressure periodically and record, continue to hydrate with water especially during the summer months since he is active in his yard    #4 reviewed laboratory testing from July 8 with him, continue Lantus, metformin, Lipitor, baby aspirin, lisinopril low-dose     #5 recommend smoking cessation to decrease risk of lung disease, cancer, cardiovascular disease, he declines understanding the risks of continued tobacco use and he has declined lung cancer screening test, PFT, stop smoking classes    #6 follow-up cardiology and endocrinology    #7 recommend follow-up vascular surgery repeat lower extremity vascular studies he declines understanding increased risk of peripheral vascular disease, should he develop any leg pain, open sores or lesions to let us know    #8 continue his good exercise, continue his nutrition program limiting traum and chocolate cake    #9 follow-up 6  months

## 2022-09-06 DIAGNOSIS — E03.9 ACQUIRED HYPOTHYROIDISM: ICD-10-CM

## 2022-09-06 RX ORDER — LEVOTHYROXINE SODIUM 112 UG/1
TABLET ORAL
Qty: 90 TABLET | Refills: 0 | Status: SHIPPED | OUTPATIENT
Start: 2022-09-06 | End: 2022-12-05

## 2022-09-07 RX ORDER — BLOOD SUGAR DIAGNOSTIC
STRIP MISCELLANEOUS
Qty: 100 STRIP | Refills: 11 | Status: SHIPPED | OUTPATIENT
Start: 2022-09-07 | End: 2023-10-02

## 2022-10-04 DIAGNOSIS — E03.9 ACQUIRED HYPOTHYROIDISM: ICD-10-CM

## 2022-10-04 RX ORDER — LIOTHYRONINE SODIUM 25 UG/1
TABLET ORAL
Qty: 90 TABLET | Refills: 0 | Status: SHIPPED | OUTPATIENT
Start: 2022-10-04 | End: 2023-01-23

## 2022-10-28 LAB
25(OH)D3+25(OH)D2 SERPL-MCNC: 46.1 NG/ML (ref 30–100)
ACTH PLAS-MCNC: 5.4 PG/ML (ref 7.2–63.3)
ALBUMIN SERPL-MCNC: 4.3 G/DL (ref 3.7–4.7)
ALBUMIN/GLOB SERPL: 2.2 {RATIO} (ref 1.2–2.2)
ALP SERPL-CCNC: 110 IU/L (ref 44–121)
ALT SERPL-CCNC: 57 IU/L (ref 0–44)
AST SERPL-CCNC: 47 IU/L (ref 0–40)
BILIRUB SERPL-MCNC: 0.3 MG/DL (ref 0–1.2)
BUN SERPL-MCNC: 23 MG/DL (ref 8–27)
BUN/CREAT SERPL: 25 (ref 10–24)
CALCIUM SERPL-MCNC: 8.6 MG/DL (ref 8.6–10.2)
CHLORIDE SERPL-SCNC: 106 MMOL/L (ref 96–106)
CO2 SERPL-SCNC: 21 MMOL/L (ref 20–29)
CREAT SERPL-MCNC: 0.92 MG/DL (ref 0.76–1.27)
EGFRCR SERPLBLD CKD-EPI 2021: 88 ML/MIN/1.73
GLOBULIN SER CALC-MCNC: 2 G/DL (ref 1.5–4.5)
GLUCOSE SERPL-MCNC: 109 MG/DL (ref 70–99)
IGF-I SERPL-MCNC: 82 NG/ML (ref 53–222)
POTASSIUM SERPL-SCNC: 4.5 MMOL/L (ref 3.5–5.2)
PROLACTIN SERPL-MCNC: 5.6 NG/ML (ref 4–15.2)
PROT SERPL-MCNC: 6.3 G/DL (ref 6–8.5)
SODIUM SERPL-SCNC: 141 MMOL/L (ref 134–144)
T3FREE SERPL-MCNC: 3 PG/ML (ref 2–4.4)
T4 FREE SERPL-MCNC: 1.28 NG/DL (ref 0.82–1.77)
TSH SERPL DL<=0.005 MIU/L-ACNC: 0.01 UIU/ML (ref 0.45–4.5)

## 2022-11-05 LAB — CORTIS SAL-MCNC: 0.06 UG/DL

## 2022-11-10 ENCOUNTER — PATIENT MESSAGE (OUTPATIENT)
Dept: HEALTH INFORMATION MANAGEMENT | Facility: OTHER | Age: 72
End: 2022-11-10

## 2022-11-11 ENCOUNTER — OFFICE VISIT (OUTPATIENT)
Dept: ENDOCRINOLOGY | Facility: MEDICAL CENTER | Age: 72
End: 2022-11-11
Payer: MEDICARE

## 2022-11-11 VITALS
HEIGHT: 67 IN | WEIGHT: 150 LBS | HEART RATE: 85 BPM | DIASTOLIC BLOOD PRESSURE: 74 MMHG | OXYGEN SATURATION: 100 % | SYSTOLIC BLOOD PRESSURE: 116 MMHG | BODY MASS INDEX: 23.54 KG/M2

## 2022-11-11 DIAGNOSIS — E55.9 VITAMIN D DEFICIENCY: ICD-10-CM

## 2022-11-11 DIAGNOSIS — I25.10 CORONARY ARTERY DISEASE DUE TO CALCIFIED CORONARY LESION: ICD-10-CM

## 2022-11-11 DIAGNOSIS — E11.65 TYPE 2 DIABETES MELLITUS WITH HYPERGLYCEMIA, UNSPECIFIED WHETHER LONG TERM INSULIN USE (HCC): ICD-10-CM

## 2022-11-11 DIAGNOSIS — E03.9 HYPOTHYROIDISM, ACQUIRED: ICD-10-CM

## 2022-11-11 DIAGNOSIS — I25.84 CORONARY ARTERY DISEASE DUE TO CALCIFIED CORONARY LESION: ICD-10-CM

## 2022-11-11 DIAGNOSIS — R74.8 ELEVATED LIVER ENZYMES: ICD-10-CM

## 2022-11-11 DIAGNOSIS — K31.84 GASTROPARESIS: ICD-10-CM

## 2022-11-11 DIAGNOSIS — R79.89 LOW TSH LEVEL: ICD-10-CM

## 2022-11-11 PROCEDURE — 99211 OFF/OP EST MAY X REQ PHY/QHP: CPT

## 2022-11-11 PROCEDURE — 99214 OFFICE O/P EST MOD 30 MIN: CPT

## 2022-11-11 ASSESSMENT — FIBROSIS 4 INDEX: FIB4 SCORE: 2.17

## 2022-11-11 NOTE — PROGRESS NOTES
Chief Complaint: Follow-up for the following conditions  HPI:   Orlando Arreguin is a 72 y.o. male     1.Acquired hypothyroidism:  History of Hypothyroidism diagnosed on for the past 18 years and is here for initial evaluation.    He is currently on Levothyroxine 112 mcg daily and Cytomel 25 mcg (cut in half) in the am and pm.   He denies any recent dose changes.   He reports Good compliance and takes his thyroid hormone daily before breakfast.    Although at times he forgets to take his morning Cytomel for which she develops fatigue around lunchtime    He denies taking any iron, calcium supplements or antacids.   Denies taking any Biotin     He reports feeling cold and cold intolerance, tremulousness and weight loss     He denies fatigue, weight gain, constipation, swelling, feeling slow, losing hair, anxiousness, feeling excessive energy, palpitations and sweating.    Hx of gastroparesis,   He denies lumps or enlargement in the neck.    History of coronary artery disease with an MI in the past and 2 with stents     Latest Reference Range & Units 10/26/22 05:10   TSH 0.450 - 4.500 uIU/mL 0.007 (L)   Free T-4 0.82 - 1.77 ng/dL 1.28   T3,Free 2.0 - 4.4 pg/mL 3.0       2.  Coronary artery disease due to calcified coronary lesion:  Reports history of 2 heart attacks in the past  This is followed by cardiology  Family history of cardiovascular disease with his mom dying at 62 after her third heart attack    3.  Type 2 diabetes mellitus with hyperglycemia, unspecified whether long term insulin use:  Reports history of type 2 diabetes mellitus  This is followed by primary care   Latest Reference Range & Units 07/07/22 08:30   Glycohemoglobin 4.8 - 5.6 % 6.0 (H)       4.  Gastroparesis:  Reports history of gastroparesis for the past 18 to 20 years  This is followed by primary care  This condition was diagnosed when he was living in [facility] after a gastric emptying scintigraphy was done  He was prescribed Reglan  originally but after reading the side effects of Tardive Dyskinesis he decided not to take it  He was then prescribed Erythromycin and provided relief, he took erythromycin for her gastroparesis management for 18 years and he stopped 3 months ago due to high cost  Currently treatment for gastroparesis is gingerroot tablets    He reports that when his free T4 and free T3 are mid normal levels, his gastroparesis is better controlled    5.  Vitamin D deficiency:     Latest Reference Range & Units 10/26/22 05:10   25-Hydroxy   Vitamin D 25 30.0 - 100.0 ng/mL 46.1     6.  Low TSH level:  Low TSH levels on thyroid hormone blood work, please see lab results  with normal free T4 and free T3     Latest Reference Range & Units 10/26/22 05:10   ACTH Plasma 7.2 - 63.3 pg/mL 5.4 (L)   IGF-1 (Somat) 53 - 222 ng/mL 82   Prolactin 4.0 - 15.2 ng/mL 5.6      Latest Reference Range & Units 10/31/22 08:41   Salivary Cortisol, MS ug/dL 0.062     7.  Elevated liver enzymes:  Elevated liver enzyme and blood panel  Denies heavy alcohol consumption     Latest Reference Range & Units 10/26/22 05:10   AST(SGOT) 0 - 40 IU/L 47 (H)   ALT(SGPT) 0 - 44 IU/L 57 (H)       Patient's medications, allergies, and social histories were reviewed and updated as appropriate.      ROS:     CONS:     No fever, no chills, no weight loss, no fatigue   EYES:      No diplopia, no blurry vision, no redness of eyes, no swelling of eyelids   ENT:    No hearing loss, No ear pain, No sore throat, no dysphagia, no neck swelling   CV:     No chest pain, no palpitations, no claudication, no orthopnea, no PND   PULM:    No SOB, no cough, no hemoptysis, no wheezing    GI:   No nausea, no vomiting, no diarrhea, no constipation, no bloody stools   :  Passing urine well, no dysuria, no hematuria   ENDO:   No polyuria, no polydipsia, no heat intolerance, no cold intolerance   NEURO: No headaches, no dizziness, no convulsions, no tremors   MUSC:  No joint swellings, no  arthralgias, no myalgias, no weakness   SKIN:   No rash, no ulcers, no dry skin   PSYCH:   No depression, no anxiety, no difficulty sleeping       Past Medical History:  Patient Active Problem List    Diagnosis Date Noted    Aortic insufficiency 12/03/2021    Ascending aorta dilatation ([facility]) 11/19/2021    Stented coronary artery 10/01/2020    Mild non proliferative diabetic retinopathy ([facility]) 06/04/2019    PVD (peripheral vascular disease) ([facility]) 12/28/2017    Coronary artery disease due to calcified coronary lesion 07/12/2017    History of MI (myocardial infarction) 08/06/2015    Tobacco abuse 07/29/2015    Dyslipidemia 05/07/2013    Insomnia 04/18/2012    Preventative health care 12/09/2010    History of shoulder pain 12/09/2010    Diabetes mellitus type 2, controlled ([facility]) 09/10/2010    History of allergic rhinitis 08/21/2010    Gastroparesis 08/21/2010    S/p green light photovaporization prostate 08/21/2010    Interstitial cystitis 08/21/2010    Postpolio syndrome 08/21/2010    Hypothyroid 08/21/2010    On angiotensin-converting enzyme (ACE) inhibitors (for diabetes, not HTN) 08/21/2010       Past Surgical History:  Past Surgical History:   Procedure Laterality Date    ZZZ CARDIAC CATH  8/6/15    YESIKA to LAD.  Has RCA 70% occulsion.    APPENDECTOMY      OTHER      L shoulder surgery (arthroscopic, [place], 2011)    AL TRANSURETHRAL ELEC-SURG PROSTATECTOM          Allergies:  Brilinta [ticagrelor]; Pcn [penicillins]; Other environmental; Tdap [dipth, acell pertus, tetanus]; and Tetanus toxoid     Current Medications:    Current Outpatient Medications:     liothyronine (CYTOMEL) 25 MCG Tab, Take 1/2 (one-half) tablet by mouth twice daily, Disp: 90 Tablet, Rfl: 0    glucose blood (RELION PRIME TEST) strip, USE 1 STRIP TO CHECK GLUCOSE ONCE DAILY BEFORE A MEAL. E11.9, Disp: 100 Strip, Rfl: 11    levothyroxine (SYNTHROID) 112 MCG Tab, TAKE 1 TABLET BY MOUTH IN THE MORNING ON AN EMPTY STOMACH, Disp: 90 Tablet, Rfl: 0     RELION PEN NEEDLES 31G X 6 MM Misc, USE 1 NEEDLE ONCE DAILY WITH  LANTUS  PEN, Disp: 100 Each, Rfl: 2    ReliOn Ultra Thin Lancets 30G Misc, USE 1 LANCET  ONCE DAILY, Disp: 100 Each, Rfl: 2    metformin (GLUCOPHAGE) 1000 MG tablet, TAKE 1 TABLET BY MOUTH TWICE DAILY WITH MEALS, Disp: 180 Tablet, Rfl: 3    aspirin (ASA) 81 MG Chew Tab chewable tablet, Chew 1 Tablet every day., Disp: 100 Tablet, Rfl: 11    Carolee, Zingiber officinalis, (CAROLEE PO), Take  by mouth., Disp: , Rfl:     lisinopril (PRINIVIL) 2.5 MG Tab, Take 1 tablet by mouth once daily, Disp: 90 Tablet, Rfl: 2    LANTUS SOLOSTAR 100 UNIT/ML Solution Pen-injector injection, INJECT 12 UNITS SUBCUTANEOUSLY ONCE DAILY IN THE EVENING AS  INSTRUCTED, Disp: 15 mL, Rfl: 11    Insulin Pen Needle 31G X 6 MM Misc, , Disp: , Rfl:     B-D UF III MINI PEN NEEDLES 31G X 5 MM Misc, USE PEN ONCE DAILY WITH LANTUS PEN, Disp: , Rfl:     atorvastatin (LIPITOR) 80 MG tablet, Take 1 Tablet by mouth every day., Disp: 90 Tablet, Rfl: 3    LIOTHYRONINE SODIUM PO, Take 12.5 mcg by mouth 2 times a day. (Patient not taking: Reported on 6/8/2022), Disp: , Rfl:     Insulin Pen Needle 31G X 6 MM Misc, , Disp: , Rfl:     aspirin EC 81 MG EC tablet, Take 1 Tab by mouth every day., Disp: 30 Tab, Rfl: 0    Cholecalciferol (VITAMIN D3 PO), Take 1 Dose by mouth every day. Unknown OTC Strength., Disp: , Rfl:     erythromycin base (UMM-TAB) 250 MG Tab, Take 250 mg by mouth every day. Maintenance Medication. (Patient not taking: Reported on 6/8/2022), Disp: , Rfl:     Riboflavin (VITAMIN B-2 PO), Take 1 Dose by mouth every day. Unknown OTC Strength., Disp: , Rfl:     Social History:  Social History     Socioeconomic History    Marital status: Single     Spouse name: Not on file    Number of children: Not on file    Years of education: Not on file    Highest education level: Not on file   Occupational History    Not on file   Tobacco Use    Smoking status: Every Day     Packs/day: 1.00      "Years: 30.00     Pack years: 30.00     Types: Cigarettes    Smokeless tobacco: Never   Vaping Use    Vaping Use: Never used   Substance and Sexual Activity    Alcohol use: No     Alcohol/week: 0.0 oz    Drug use: No    Sexual activity: Not on file   Other Topics Concern    Not on file   Social History Narrative    Not on file     Social Determinants of Health     Financial Resource Strain: Not on file   Food Insecurity: Not on file   Transportation Needs: Not on file   Physical Activity: Not on file   Stress: Not on file   Social Connections: Not on file   Intimate Partner Violence: Not on file   Housing Stability: Not on file        Family History:   Family History   Problem Relation Age of Onset    Heart Disease Mother     Heart Attack Mother 55        heart attack         PHYSICAL EXAM:   Vital signs: /74   Pulse 85   Ht 1.689 m (5' 6.5\")   Wt 68 kg (150 lb)   SpO2 100%   BMI 23.85 kg/m²   GENERAL: Well-developed, well-nourished  in no apparent distress.   EYE: No ocular and eyelid asymmetry, Anicteric sclerae,  PERRL  HENT: Hearing grossly intact, Normocephalic, atraumatic.   NECK: Supple. Trachea midline. thyroid is normal in size without nodules or tenderness  CARDIOVASCULAR: Regular rate and rhythm. No murmurs, rubs, or gallops.   LUNGS: Clear to auscultation bilaterally   EXTREMITIES: No clubbing, cyanosis, or edema.   NEUROLOGICAL: Cranial nerves II-XII are grossly intact   Symmetric reflexes at the patella no proximal muscle weakness  LYMPH: No cervical, supraclavicular,  adenopathy palpated.   SKIN: No rashes, lesions. Turgor is normal.    Labs:  Lab Results   Component Value Date/Time    CHOLSTRLTOT 83 (L) 01/04/2022 0736    TRIGLYCERIDE 52 01/04/2022 0736    HDL 31 (L) 01/04/2022 0736    LDL 36 12/07/2020 1051       Lab Results   Component Value Date/Time    TSHULTRASEN 0.010 (L) 05/05/2022 1004     Lab Results   Component Value Date/Time    FREET4 1.44 05/05/2022 1004     Lab Results "   Component Value Date/Time    FREET3 3.50 05/05/2022 1004           Imaging:      ASSESSMENT/PLAN:   1. Hypothyroidism, acquired  Clinically unstable with reports of mild tremors for the past week  Biochemically his TSH is suppressed with normal free T4 and free T3  I discussed with patient that I would like to titrate his medication dose Due to diagnosis #2  At this time patient would like to keep the same dose, expressing that when he is free T4 and free T3 are at the middle range diagnosis #4 is controlled    We will continue the same dose of levothyroxine 112 mcg daily and Cytomel half a pill to 25 mcg in the morning and in the afternoon  - TSH; Future  - FREE THYROXINE; Future  - T3 FREE; Future  - Comp Metabolic Panel; Future    2. Coronary artery disease due to calcified coronary lesion  Stable  Followed by cardiology    3. Type 2 diabetes mellitus with hyperglycemia, unspecified whether long term insulin use (HCC)  Stable  Follow with PCP    4. Gastroparesis  Stable  Followed by PCP    5. Vitamin D deficiency  Stable  - VITAMIN D,25 HYDROXY (DEFICIENCY); Future    6. Low TSH level  Resolved  Mildly suppressed TSH levels with normal free T4 and free T3-possibly related to the short half life of Cytomel.     7. Elevated liver enzymes  Unstable  We will monitor liver enzymes prior to next appointment  He has a follow-up with his primary care provider in January 12th     Disposition: Make an appointment to follow-up in 6 months  Do your blood work 1 to 2 weeks prior to appointment      Thank you kindly for allowing me to participate in the thyroid care plan for this patient.    THOMAS Stokes  11/11/2022    CC:   Charbel Jaffe M.D.

## 2022-11-30 ENCOUNTER — TELEPHONE (OUTPATIENT)
Dept: MEDICAL GROUP | Facility: MEDICAL CENTER | Age: 72
End: 2022-11-30
Payer: MEDICARE

## 2022-11-30 DIAGNOSIS — R79.89 ABNORMAL LIVER FUNCTION TESTS: ICD-10-CM

## 2022-11-30 DIAGNOSIS — R94.5 ABNORMAL LIVER FUNCTION: ICD-10-CM

## 2022-11-30 DIAGNOSIS — R79.89 ABNORMAL LIVER FUNCTION TEST: ICD-10-CM

## 2022-11-30 DIAGNOSIS — E78.5 DYSLIPIDEMIA: Chronic | ICD-10-CM

## 2022-11-30 DIAGNOSIS — E11.65 CONTROLLED TYPE 2 DIABETES MELLITUS WITH HYPERGLYCEMIA, WITHOUT LONG-TERM CURRENT USE OF INSULIN (HCC): Chronic | ICD-10-CM

## 2022-12-03 DIAGNOSIS — E03.9 ACQUIRED HYPOTHYROIDISM: ICD-10-CM

## 2022-12-05 RX ORDER — LEVOTHYROXINE SODIUM 112 UG/1
TABLET ORAL
Qty: 90 TABLET | Refills: 0 | Status: SHIPPED | OUTPATIENT
Start: 2022-12-05 | End: 2023-03-06

## 2022-12-08 LAB
BUN SERPL-MCNC: 17 MG/DL (ref 8–27)
BUN/CREAT SERPL: 21 (ref 10–24)
CALCIUM SERPL-MCNC: 9 MG/DL (ref 8.6–10.2)
CHLORIDE SERPL-SCNC: 106 MMOL/L (ref 96–106)
CHOLEST SERPL-MCNC: 84 MG/DL (ref 100–199)
CO2 SERPL-SCNC: 23 MMOL/L (ref 20–29)
CREAT SERPL-MCNC: 0.8 MG/DL (ref 0.76–1.27)
EGFRCR SERPLBLD CKD-EPI 2021: 94 ML/MIN/1.73
GLUCOSE SERPL-MCNC: 105 MG/DL (ref 70–99)
HDLC SERPL-MCNC: 30 MG/DL
LABORATORY COMMENT REPORT: ABNORMAL
LDLC SERPL CALC-MCNC: 41 MG/DL (ref 0–99)
POTASSIUM SERPL-SCNC: 4.6 MMOL/L (ref 3.5–5.2)
SODIUM SERPL-SCNC: 141 MMOL/L (ref 134–144)
TRIGL SERPL-MCNC: 54 MG/DL (ref 0–149)
VLDLC SERPL CALC-MCNC: 13 MG/DL (ref 5–40)

## 2022-12-13 ENCOUNTER — OFFICE VISIT (OUTPATIENT)
Dept: CARDIOLOGY | Facility: MEDICAL CENTER | Age: 72
End: 2022-12-13
Payer: MEDICARE

## 2022-12-13 VITALS
SYSTOLIC BLOOD PRESSURE: 120 MMHG | HEART RATE: 80 BPM | DIASTOLIC BLOOD PRESSURE: 60 MMHG | HEIGHT: 67 IN | OXYGEN SATURATION: 97 % | RESPIRATION RATE: 16 BRPM | WEIGHT: 149 LBS | BODY MASS INDEX: 23.39 KG/M2

## 2022-12-13 DIAGNOSIS — I25.10 CORONARY ARTERY DISEASE DUE TO CALCIFIED CORONARY LESION: ICD-10-CM

## 2022-12-13 DIAGNOSIS — I25.84 CORONARY ARTERY DISEASE DUE TO CALCIFIED CORONARY LESION: ICD-10-CM

## 2022-12-13 DIAGNOSIS — E11.65 CONTROLLED TYPE 2 DIABETES MELLITUS WITH HYPERGLYCEMIA, WITHOUT LONG-TERM CURRENT USE OF INSULIN (HCC): ICD-10-CM

## 2022-12-13 DIAGNOSIS — I10 ESSENTIAL HYPERTENSION: ICD-10-CM

## 2022-12-13 DIAGNOSIS — R06.09 DOE (DYSPNEA ON EXERTION): ICD-10-CM

## 2022-12-13 DIAGNOSIS — R07.89 CHEST PRESSURE: ICD-10-CM

## 2022-12-13 DIAGNOSIS — E78.5 DYSLIPIDEMIA: Chronic | ICD-10-CM

## 2022-12-13 DIAGNOSIS — R00.2 PALPITATIONS: ICD-10-CM

## 2022-12-13 PROCEDURE — 99214 OFFICE O/P EST MOD 30 MIN: CPT | Performed by: INTERNAL MEDICINE

## 2022-12-13 RX ORDER — ALPRAZOLAM 0.25 MG/1
0.25 TABLET ORAL NIGHTLY PRN
COMMUNITY
End: 2023-02-02 | Stop reason: SDUPTHER

## 2022-12-13 RX ORDER — LISINOPRIL 2.5 MG/1
2.5 TABLET ORAL DAILY
Qty: 90 TABLET | Refills: 3 | Status: SHIPPED | OUTPATIENT
Start: 2022-12-13 | End: 2023-11-27 | Stop reason: SDUPTHER

## 2022-12-13 RX ORDER — ATORVASTATIN CALCIUM 80 MG/1
80 TABLET, FILM COATED ORAL DAILY
Qty: 90 TABLET | Refills: 3 | Status: SHIPPED | OUTPATIENT
Start: 2022-12-13 | End: 2024-02-12 | Stop reason: SDUPTHER

## 2022-12-13 ASSESSMENT — FIBROSIS 4 INDEX: FIB4 SCORE: 2.17

## 2022-12-13 NOTE — PROGRESS NOTES
Chief Complaint   Patient presents with    Coronary Artery Disease     Follow up         Subjective     Orlando Arreguin is a 72 y.o. male who presents today for follow-up of coronary artery disease characterized by left main to LAD PCI Dr. ORR in 2015 and RCA PCI for ACS 2020 Dr. Weinberg.  Has history of polio as a child and cannot ambulate long distances or participate in the treadmill test.  Taking his medications as directed.      Notes increasing chest pressure with emotional stress and associated with palpitations and HTN episodically. Able to exert himself mildly without reproducing those symptoms.     Past Medical History:   Diagnosis Date    Coronary artery disease due to calcified coronary lesion 8/11/2015    Diabetes mellitus type II     Gastroparesis     Hyperlipidemia     Hypothyroid     Interstitial cystitis     Sciatic neuritis      Past Surgical History:   Procedure Laterality Date    ZZZ CARDIAC CATH  8/6/15    YESIKA to LAD.  Has RCA 70% occulsion.    APPENDECTOMY      OTHER      L shoulder surgery (arthroscopic, [place], 2011)    ME TRANSURETHRAL ELEC-SURG PROSTATECTOM       Family History   Problem Relation Age of Onset    Heart Disease Mother     Heart Attack Mother 55        heart attack     Social History     Socioeconomic History    Marital status: Single     Spouse name: Not on file    Number of children: Not on file    Years of education: Not on file    Highest education level: Not on file   Occupational History    Not on file   Tobacco Use    Smoking status: Every Day     Packs/day: 1.00     Years: 30.00     Pack years: 30.00     Types: Cigarettes    Smokeless tobacco: Never   Vaping Use    Vaping Use: Never used   Substance and Sexual Activity    Alcohol use: No     Alcohol/week: 0.0 oz    Drug use: No    Sexual activity: Not on file   Other Topics Concern    Not on file   Social History Narrative    Not on file     Social Determinants of Health     Financial Resource Strain: Not on file   Food  Insecurity: Not on file   Transportation Needs: Not on file   Physical Activity: Not on file   Stress: Not on file   Social Connections: Not on file   Intimate Partner Violence: Not on file   Housing Stability: Not on file     Allergies   Allergen Reactions    Brilinta [Ticagrelor] Shortness of Breath     Total Sleep Apnea per patient; SOB    Pcn [Penicillins] Anaphylaxis    Other Environmental      Hayfever    Tdap [Dipth, Acell Pertus, Tetanus]      tdap    Tetanus Toxoid      Outpatient Encounter Medications as of 12/13/2022   Medication Sig Dispense Refill    ALPRAZolam (XANAX) 0.25 MG Tab Take 0.25 mg by mouth at bedtime as needed for Sleep.      lisinopril (PRINIVIL) 2.5 MG Tab Take 1 Tablet by mouth every day. 90 Tablet 3    atorvastatin (LIPITOR) 80 MG tablet Take 1 Tablet by mouth every day. 90 Tablet 3    levothyroxine (SYNTHROID) 112 MCG Tab TAKE 1 TABLET BY MOUTH IN THE MORNING ON AN EMPTY STOMACH 90 Tablet 0    liothyronine (CYTOMEL) 25 MCG Tab Take 1/2 (one-half) tablet by mouth twice daily 90 Tablet 0    metformin (GLUCOPHAGE) 1000 MG tablet TAKE 1 TABLET BY MOUTH TWICE DAILY WITH MEALS 180 Tablet 3    Ginger, Zingiber officinalis, (GINGER PO) Take  by mouth.      LANTUS SOLOSTAR 100 UNIT/ML Solution Pen-injector injection INJECT 12 UNITS SUBCUTANEOUSLY ONCE DAILY IN THE EVENING AS  INSTRUCTED 15 mL 11    aspirin EC 81 MG EC tablet Take 1 Tab by mouth every day. 30 Tab 0    Cholecalciferol (VITAMIN D3 PO) Take 1 Dose by mouth every day. Unknown OTC Strength.      Riboflavin (VITAMIN B-2 PO) Take 1 Dose by mouth every day. Unknown OTC Strength.      [DISCONTINUED] atorvastatin (LIPITOR) 80 MG tablet Take 1 tablet by mouth once daily 90 Tablet 1    glucose blood (RELION PRIME TEST) strip USE 1 STRIP TO CHECK GLUCOSE ONCE DAILY BEFORE A MEAL. E11.9 100 Strip 11    RELION PEN NEEDLES 31G X 6 MM Misc USE 1 NEEDLE ONCE DAILY WITH  LANTUS   Each 2    ReliOn Ultra Thin Lancets 30G Misc USE 1 LANCET   "ONCE DAILY 100 Each 2    aspirin (ASA) 81 MG Chew Tab chewable tablet Chew 1 Tablet every day. 100 Tablet 11    [DISCONTINUED] lisinopril (PRINIVIL) 2.5 MG Tab Take 1 tablet by mouth once daily 90 Tablet 2    Insulin Pen Needle 31G X 6 MM Misc       B-D UF III MINI PEN NEEDLES 31G X 5 MM Misc USE PEN ONCE DAILY WITH LANTUS PEN      [DISCONTINUED] atorvastatin (LIPITOR) 80 MG tablet Take 1 Tablet by mouth every day. 90 Tablet 3    [DISCONTINUED] LIOTHYRONINE SODIUM PO Take 12.5 mcg by mouth 2 times a day.      Insulin Pen Needle 31G X 6 MM Misc       erythromycin base (UMM-TAB) 250 MG Tab Take 250 mg by mouth every day. Maintenance Medication. (Patient not taking: Reported on 6/8/2022)       No facility-administered encounter medications on file as of 12/13/2022.     Review of Systems   All other systems reviewed and are negative.           Objective     /60 (BP Location: Left arm, Patient Position: Sitting)   Pulse 80   Resp 16   Ht 1.689 m (5' 6.5\")   Wt 67.6 kg (149 lb)   SpO2 97%   BMI 23.69 kg/m²     Physical Exam  Vitals reviewed.   Constitutional:       General: He is not in acute distress.     Appearance: He is well-developed. He is not diaphoretic.   HENT:      Head: Normocephalic and atraumatic.      Right Ear: External ear normal.      Left Ear: External ear normal.   Eyes:      General: No scleral icterus.        Right eye: No discharge.         Left eye: No discharge.      Conjunctiva/sclera: Conjunctivae normal.      Pupils: Pupils are equal, round, and reactive to light.   Neck:      Thyroid: No thyromegaly.      Vascular: No JVD.      Trachea: No tracheal deviation.   Cardiovascular:      Rate and Rhythm: Normal rate and regular rhythm.      Chest Wall: PMI is not displaced.      Pulses:           Carotid pulses are 2+ on the right side and 2+ on the left side.       Radial pulses are 2+ on the left side.        Popliteal pulses are 2+ on the right side and 2+ on the left side.        " Dorsalis pedis pulses are 2+ on the right side and 2+ on the left side.        Posterior tibial pulses are 2+ on the right side and 2+ on the left side.      Heart sounds: No murmur heard.    No friction rub. No gallop.   Pulmonary:      Effort: Pulmonary effort is normal. No respiratory distress.      Breath sounds: Normal breath sounds. No wheezing or rales.   Chest:      Chest wall: No tenderness.   Abdominal:      General: Bowel sounds are normal. There is no distension.      Palpations: Abdomen is soft.      Tenderness: There is no abdominal tenderness.   Musculoskeletal:         General: No tenderness or deformity. Normal range of motion.      Cervical back: Normal range of motion and neck supple.   Skin:     General: Skin is warm and dry.      Coloration: Skin is not pale.      Findings: No erythema or rash.   Neurological:      Mental Status: He is alert and oriented to person, place, and time.      Cranial Nerves: No cranial nerve deficit (cranial nerves II through XII grossly intact).      Coordination: Coordination normal.   Psychiatric:         Behavior: Behavior normal.         Thought Content: Thought content normal.     LABS:  Lab Results   Component Value Date/Time    CHOLSTRLTOT 84 (L) 12/07/2022 07:00 AM    CHOLSTRLTOT 72 (L) 12/07/2020 10:51 AM    LDL 36 12/07/2020 10:51 AM    HDL 30 (L) 12/07/2022 07:00 AM    HDL 23 (A) 12/07/2020 10:51 AM    TRIGLYCERIDE 54 12/07/2022 07:00 AM    TRIGLYCERIDE 65 12/07/2020 10:51 AM       Lab Results   Component Value Date/Time    WBC 6.1 07/07/2022 08:30 AM    WBC 5.5 10/02/2021 10:30 AM    RBC 5.15 07/07/2022 08:30 AM    RBC 4.77 10/02/2021 10:30 AM    HEMOGLOBIN 15.4 07/07/2022 08:30 AM    HEMOGLOBIN 14.7 10/02/2021 10:30 AM    HEMATOCRIT 46.4 07/07/2022 08:30 AM    HEMATOCRIT 42.9 10/02/2021 10:30 AM    MCV 90 07/07/2022 08:30 AM    MCV 89.9 10/02/2021 10:30 AM    NEUTSPOLYS 66 07/07/2022 08:30 AM    NEUTSPOLYS 70.10 10/02/2021 10:30 AM    LYMPHOCYTES 24  07/07/2022 08:30 AM    LYMPHOCYTES 20.40 (L) 10/02/2021 10:30 AM    MONOCYTES 8 07/07/2022 08:30 AM    MONOCYTES 6.90 10/02/2021 10:30 AM    EOSINOPHILS 1 07/07/2022 08:30 AM    EOSINOPHILS 1.30 10/02/2021 10:30 AM    BASOPHILS 1 07/07/2022 08:30 AM    BASOPHILS 0.90 10/02/2021 10:30 AM     Lab Results   Component Value Date/Time    SODIUM 141 12/07/2022 07:00 AM    SODIUM 138 10/02/2021 10:30 AM    POTASSIUM 4.6 12/07/2022 07:00 AM    POTASSIUM 4.4 10/02/2021 10:30 AM    CHLORIDE 106 12/07/2022 07:00 AM    CHLORIDE 107 10/02/2021 10:30 AM    CO2 23 12/07/2022 07:00 AM    CO2 20 10/02/2021 10:30 AM    GLUCOSE 105 (H) 12/07/2022 07:00 AM    GLUCOSE 131 (H) 10/02/2021 10:30 AM    BUN 17 12/07/2022 07:00 AM    BUN 18 10/02/2021 10:30 AM    CREATININE 0.80 12/07/2022 07:00 AM    CREATININE 0.79 10/02/2021 10:30 AM    BUNCREATRAT 21 12/07/2022 07:00 AM     Lab Results   Component Value Date    HBA1C 6.0 (H) 07/07/2022      Lab Results   Component Value Date/Time    ALKPHOSPHAT 110 10/26/2022 05:10 AM    ALKPHOSPHAT 106 (H) 10/02/2021 10:30 AM    ASTSGOT 47 (H) 10/26/2022 05:10 AM    ASTSGOT 24 10/02/2021 10:30 AM    ALTSGPT 57 (H) 10/26/2022 05:10 AM    ALTSGPT 28 10/02/2021 10:30 AM    TBILIRUBIN 0.3 10/26/2022 05:10 AM    TBILIRUBIN 0.4 10/02/2021 10:30 AM      Lab Results   Component Value Date/Time    BNPBTYPENAT 178.8 (H) 08/19/2015 10:28 AM    BNPBTYPENAT 299 (H) 08/06/2015 04:59 AM      Lab Results   Component Value Date/Time    TSH 0.007 (L) 10/26/2022 05:10 AM     Lab Results   Component Value Date/Time    PROTHROMBTM 14.1 08/06/2015 04:59 AM    INR 1.09 08/06/2015 04:59 AM                   Assessment & Plan     1. Essential hypertension  lisinopril (PRINIVIL) 2.5 MG Tab      2. Dyslipidemia  atorvastatin (LIPITOR) 80 MG tablet      3. Coronary artery disease due to calcified coronary lesion  [id number] PET W/CT ATTENUATION    [facility] PATCH MONITOR      4. Controlled type 2 diabetes mellitus with  hyperglycemia, without long-term current use of insulin (HCC)        5. BANG (dyspnea on exertion)  [id number] PET W/CT ATTENUATION    [facility] PATCH MONITOR      6. Chest pressure  [id number] PET W/CT ATTENUATION    [facility] PATCH MONITOR    EC-ECHOCARDIOGRAM COMPLETE W/O CONT      7. Palpitations  [facility] PATCH MONITOR    EC-ECHOCARDIOGRAM COMPLETE W/O CONT          Medical Decision Making: Today's Assessment/Status/Plan:     Atypical chest symptoms with history of complex PCI, mostly emotionally related to stress. DDX includes worsening CAD, dysrhymia or stress related. Recommend:    PET stress, Echo, Zio    FU after testing    Er precautions discussed  Continue medical therapy  Medications refilled without changes today.

## 2022-12-21 ENCOUNTER — NON-PROVIDER VISIT (OUTPATIENT)
Dept: CARDIOLOGY | Facility: MEDICAL CENTER | Age: 72
End: 2022-12-21
Attending: INTERNAL MEDICINE
Payer: MEDICARE

## 2022-12-21 DIAGNOSIS — I25.84 CORONARY ARTERY DISEASE DUE TO CALCIFIED CORONARY LESION: ICD-10-CM

## 2022-12-21 DIAGNOSIS — I25.10 CORONARY ARTERY DISEASE DUE TO CALCIFIED CORONARY LESION: ICD-10-CM

## 2022-12-21 DIAGNOSIS — R06.09 DOE (DYSPNEA ON EXERTION): ICD-10-CM

## 2022-12-21 DIAGNOSIS — I47.10 PSVT (PAROXYSMAL SUPRAVENTRICULAR TACHYCARDIA) (HCC): ICD-10-CM

## 2022-12-21 DIAGNOSIS — R07.89 CHEST PRESSURE: ICD-10-CM

## 2022-12-21 DIAGNOSIS — R00.2 PALPITATIONS: ICD-10-CM

## 2022-12-21 NOTE — PROGRESS NOTES
Patient enrolled in the 14 day Zio XT Holter monitoring program per Abran Kwong MD.   >In-Clinic hook-up, serial # [id number]..   >Currently pending EOS.

## 2023-01-06 LAB
ALBUMIN SERPL-MCNC: 4.1 G/DL (ref 3.7–4.7)
ALBUMIN/CREAT UR: 3 MG/G CREAT (ref 0–29)
ALP SERPL-CCNC: 110 IU/L (ref 44–121)
ALT SERPL-CCNC: 24 IU/L (ref 0–44)
ANA SER QL: NEGATIVE
AST SERPL-CCNC: 44 IU/L (ref 0–40)
BACTERIA UR CULT: NO GROWTH
BACTERIA UR CULT: NORMAL
BASOPHILS # BLD AUTO: 0 X10E3/UL (ref 0–0.2)
BASOPHILS NFR BLD AUTO: 1 %
BILIRUB DIRECT SERPL-MCNC: 0.18 MG/DL (ref 0–0.4)
BILIRUB SERPL-MCNC: 0.5 MG/DL (ref 0–1.2)
CHOLEST SERPL-MCNC: 71 MG/DL (ref 100–199)
CREAT UR-MCNC: 133.1 MG/DL
EOSINOPHIL # BLD AUTO: 0.1 X10E3/UL (ref 0–0.4)
EOSINOPHIL NFR BLD AUTO: 1 %
ERYTHROCYTE [DISTWIDTH] IN BLOOD BY AUTOMATED COUNT: 12.9 % (ref 11.6–15.4)
FERRITIN SERPL-MCNC: 73 NG/ML (ref 30–400)
HAV IGM SERPL QL IA: NEGATIVE
HBA1C MFR BLD: 5.8 % (ref 4.8–5.6)
HBV CORE IGM SERPL QL IA: NEGATIVE
HBV SURFACE AB SER QL: NON REACTIVE
HBV SURFACE AG SERPL QL IA: NEGATIVE
HCT VFR BLD AUTO: 42.7 % (ref 37.5–51)
HCV AB S/CO SERPL IA: <0.1 S/CO RATIO (ref 0–0.9)
HCV AB SERPL QL IA: NORMAL
HDLC SERPL-MCNC: 30 MG/DL
HGB BLD-MCNC: 14.4 G/DL (ref 13–17.7)
IMM GRANULOCYTES # BLD AUTO: 0 X10E3/UL (ref 0–0.1)
IMM GRANULOCYTES NFR BLD AUTO: 0 %
IMMATURE CELLS  115398: NORMAL
IRON SATN MFR SERPL: 29 % (ref 15–55)
IRON SERPL-MCNC: 59 UG/DL (ref 38–169)
LABORATORY COMMENT REPORT: ABNORMAL
LDLC SERPL CALC-MCNC: 28 MG/DL (ref 0–99)
LYMPHOCYTES # BLD AUTO: 1.3 X10E3/UL (ref 0.7–3.1)
LYMPHOCYTES NFR BLD AUTO: 22 %
MCH RBC QN AUTO: 29.8 PG (ref 26.6–33)
MCHC RBC AUTO-ENTMCNC: 33.7 G/DL (ref 31.5–35.7)
MCV RBC AUTO: 88 FL (ref 79–97)
MICROALBUMIN UR-MCNC: 4.2 UG/ML
MONOCYTES # BLD AUTO: 0.5 X10E3/UL (ref 0.1–0.9)
MONOCYTES NFR BLD AUTO: 8 %
MORPHOLOGY BLD-IMP: NORMAL
NEUTROPHILS # BLD AUTO: 3.9 X10E3/UL (ref 1.4–7)
NEUTROPHILS NFR BLD AUTO: 68 %
NRBC BLD AUTO-RTO: NORMAL %
PLATELET # BLD AUTO: 230 X10E3/UL (ref 150–450)
RBC # BLD AUTO: 4.83 X10E6/UL (ref 4.14–5.8)
TIBC SERPL-MCNC: 205 UG/DL (ref 250–450)
TRIGL SERPL-MCNC: 53 MG/DL (ref 0–149)
UIBC SERPL-MCNC: 146 UG/DL (ref 111–343)
VLDLC SERPL CALC-MCNC: 13 MG/DL (ref 5–40)
WBC # BLD AUTO: 5.8 X10E3/UL (ref 3.4–10.8)

## 2023-01-07 PROBLEM — R94.5 ABNORMAL LIVER FUNCTION: Status: ACTIVE | Noted: 2023-01-07

## 2023-01-12 ENCOUNTER — OFFICE VISIT (OUTPATIENT)
Dept: MEDICAL GROUP | Facility: MEDICAL CENTER | Age: 73
End: 2023-01-12
Payer: MEDICARE

## 2023-01-12 VITALS
SYSTOLIC BLOOD PRESSURE: 100 MMHG | WEIGHT: 147 LBS | TEMPERATURE: 97.7 F | HEART RATE: 90 BPM | HEIGHT: 67 IN | BODY MASS INDEX: 23.07 KG/M2 | DIASTOLIC BLOOD PRESSURE: 56 MMHG

## 2023-01-12 DIAGNOSIS — M89.8X9 METABOLIC BONE DISEASE: ICD-10-CM

## 2023-01-12 DIAGNOSIS — E11.3293 MILD NONPROLIFERATIVE DIABETIC RETINOPATHY OF BOTH EYES WITHOUT MACULAR EDEMA ASSOCIATED WITH TYPE 2 DIABETES MELLITUS (HCC): ICD-10-CM

## 2023-01-12 DIAGNOSIS — Z72.0 TOBACCO ABUSE: Chronic | ICD-10-CM

## 2023-01-12 DIAGNOSIS — I77.810 ASCENDING AORTA DILATATION (HCC): ICD-10-CM

## 2023-01-12 DIAGNOSIS — I73.9 PVD (PERIPHERAL VASCULAR DISEASE) (HCC): ICD-10-CM

## 2023-01-12 DIAGNOSIS — I25.2 HISTORY OF MI (MYOCARDIAL INFARCTION): Chronic | ICD-10-CM

## 2023-01-12 DIAGNOSIS — E11.65 CONTROLLED TYPE 2 DIABETES MELLITUS WITH HYPERGLYCEMIA, WITHOUT LONG-TERM CURRENT USE OF INSULIN (HCC): ICD-10-CM

## 2023-01-12 DIAGNOSIS — I70.0 ATHEROSCLEROSIS OF AORTA (HCC): ICD-10-CM

## 2023-01-12 DIAGNOSIS — I25.119 ATHEROSCLEROSIS OF NATIVE CORONARY ARTERY WITH ANGINA PECTORIS, UNSPECIFIED WHETHER NATIVE OR TRANSPLANTED HEART (HCC): ICD-10-CM

## 2023-01-12 DIAGNOSIS — K31.84 GASTROPARESIS: Chronic | ICD-10-CM

## 2023-01-12 DIAGNOSIS — Z79.818 LONG TERM (CURRENT) USE OF OTHER AGENTS AFFECTING ESTROGEN RECEPTORS AND ESTROGEN LEVELS: ICD-10-CM

## 2023-01-12 PROCEDURE — 99214 OFFICE O/P EST MOD 30 MIN: CPT | Performed by: INTERNAL MEDICINE

## 2023-01-12 ASSESSMENT — PATIENT HEALTH QUESTIONNAIRE - PHQ9: CLINICAL INTERPRETATION OF PHQ2 SCORE: 0

## 2023-01-12 ASSESSMENT — FIBROSIS 4 INDEX: FIB4 SCORE: 2.81

## 2023-01-12 NOTE — PROGRESS NOTES
Subjective     Orlando Arreguin is a 72 y.o. male who presents with diabetes  Follow-Up            HPI      Here for follow up diabetes blood sugars checking daily running 90s and low 80 on lantus 12 units and metformin 1000 mg bid.   Blood sugars at home running 90s to 110s, remains on Lantus 12 units, metformin 1000 mg twice daily.  No hypoglycemia.  Checks blood sugars daily. Sees cardiology has upcoming echo and stress test.  CAD stable no chest pain, shortness of breath with activity.  Dyslipidemia on Lipitor 80 mg.  Diabetic gastroparesis of erythromycin.  Patient likes to maintain his tsh though he believes that helps his gastroparesis, he is followed by endocrinology last TSH 0.007 with normal free T3 and free T4 and Synthroid 112 mcg and Cytomel 25 mg half a tablet twice daily per endocrinology.  Eye exam annually has been seeing Dr. Scott who retired, patient has had mild nonproliferative retinopathy without macular edema.  Continues to smoke a pack a day denies shortness of breath, cough, no recurrent lung infections.  Patient not interested in smoking cessation.  Cardiology did follow up with him and ordered an echocardiogram, stress test and zio monitor.  Patient is trying to limit sweets and processed foods in his diet.  Peripheral vascular disease, declines follow-up, has not had calf pain or claudication type symptoms.           Medications, allergies, medical history, surgical history, social history, family history  reviewed and updated  Current Outpatient Medications   Medication Sig Dispense Refill    ALPRAZolam (XANAX) 0.25 MG Tab Take 0.25 mg by mouth at bedtime as needed for Sleep.      lisinopril (PRINIVIL) 2.5 MG Tab Take 1 Tablet by mouth every day. 90 Tablet 3    atorvastatin (LIPITOR) 80 MG tablet Take 1 Tablet by mouth every day. 90 Tablet 3    levothyroxine (SYNTHROID) 112 MCG Tab TAKE 1 TABLET BY MOUTH IN THE MORNING ON AN EMPTY STOMACH 90 Tablet 0    liothyronine (CYTOMEL) 25 MCG Tab  Take 1/2 (one-half) tablet by mouth twice daily 90 Tablet 0    glucose blood (RELION PRIME TEST) strip USE 1 STRIP TO CHECK GLUCOSE ONCE DAILY BEFORE A MEAL. E11.9 100 Strip 11    RELION PEN NEEDLES 31G X 6 MM Misc USE 1 NEEDLE ONCE DAILY WITH  LANTUS   Each 2    ReliOn Ultra Thin Lancets 30G Misc USE 1 LANCET  ONCE DAILY 100 Each 2    metformin (GLUCOPHAGE) 1000 MG tablet TAKE 1 TABLET BY MOUTH TWICE DAILY WITH MEALS 180 Tablet 3    aspirin (ASA) 81 MG Chew Tab chewable tablet Chew 1 Tablet every day. 100 Tablet 11    Carolee, Zingiber officinalis, (CAROLEE PO) Take  by mouth.      LANTUS SOLOSTAR 100 UNIT/ML Solution Pen-injector injection INJECT 12 UNITS SUBCUTANEOUSLY ONCE DAILY IN THE EVENING AS  INSTRUCTED 15 mL 11    Insulin Pen Needle 31G X 6 MM Misc       B-D UF III MINI PEN NEEDLES 31G X 5 MM Misc USE PEN ONCE DAILY WITH LANTUS PEN      Insulin Pen Needle 31G X 6 MM Misc       aspirin EC 81 MG EC tablet Take 1 Tab by mouth every day. 30 Tab 0    Cholecalciferol (VITAMIN D3 PO) Take 1 Dose by mouth every day. Unknown OTC Strength.      erythromycin base (UMM-TAB) 250 MG Tab Take 250 mg by mouth every day. Maintenance Medication. (Patient not taking: Reported on 6/8/2022)      Riboflavin (VITAMIN B-2 PO) Take 1 Dose by mouth every day. Unknown OTC Strength.       No current facility-administered medications for this visit.     Patient Active Problem List   Diagnosis    History of allergic rhinitis    Gastroparesis    S/p green light photovaporization prostate    Interstitial cystitis    Postpolio syndrome    Hypothyroid    On angiotensin-converting enzyme (ACE) inhibitors (for diabetes, not HTN)    Diabetes mellitus type 2, controlled ([facility])    Preventative health care    History of shoulder pain    Insomnia    Dyslipidemia    Tobacco abuse    History of MI (myocardial infarction)    Coronary artery disease due to calcified coronary lesion    PVD (peripheral vascular disease) ([facility])    Mild non  "proliferative diabetic retinopathy (HCC)    Stented coronary artery    Ascending aorta dilatation (HCC)    Aortic insufficiency    Abnormal liver function       Depression Screening  Little interest or pleasure in doing things?  0 - not at all  Feeling down, depressed , or hopeless? 0 - not at all  Patient Health Questionnaire Score: 0                 Patient Care Team:  Charbel Jaffe M.D. as PCP - General  Alex Stein M.D. as Consulting Physician (Endocrinology)    ROS           Objective     /56 (BP Location: Left arm, Patient Position: Sitting, BP Cuff Size: Adult)   Pulse 90   Temp 36.5 °C (97.7 °F)   Ht 1.702 m (5' 7\")   Wt 66.7 kg (147 lb)   BMI 23.02 kg/m²      Physical Exam  Vitals and nursing note reviewed.   Constitutional:       Appearance: Normal appearance.   HENT:      Head: Normocephalic and atraumatic.      Right Ear: External ear normal.      Left Ear: External ear normal.   Eyes:      Conjunctiva/sclera: Conjunctivae normal.   Cardiovascular:      Rate and Rhythm: Normal rate and regular rhythm.   Pulmonary:      Effort: Pulmonary effort is normal.   Abdominal:      General: There is no distension.   Musculoskeletal:         General: No swelling.   Skin:     Coloration: Skin is not jaundiced.   Neurological:      General: No focal deficit present.      Mental Status: He is alert.   Psychiatric:         Mood and Affect: Mood normal.         Behavior: Behavior normal.     Monofilament testing with a 10 gram force: sensation intact: intact bilaterally  Visual Inspection: Feet without maceration, ulcers, fissures.  Pedal pulses: intact bilaterally                     Assessment & Plan        Assessment  #! Diabetes mellitus A1c 5.8% January 4 on Lantus 12 units, metformin 1000 mg twice daily    #2 hypothyroid on replacement 11/11/22 endocrine note tsh suppressed with normal free t3 and t4 on levothyroxine 112 mcg and cytomel 25 mcg 1/2 per day, TSH overly suppressed but patient " feels better as this he believes helps his diabetic gastropathy and gastroparesis, he has not had any palpitations, no arrhythmia    #3  Tobacco 1 ppd declines stop smoking classes, CT lung cancer screening, PFT, asymptomatic no shortness of breath no recurrent upper respiratory tract symptoms     #4 mild non proliferative diabetic retinopathy last eye exam 2/14/22     #5 peripheral vascular disease asymptomatic, no claudication, no foot lesions or sores    #6 hypothyroid 10/26/22 tsh 0.007,free t4 1.28,free t3 3.0 on synthroid 112 mcg and cytomel 25 mcg 1/2 bid per endocrinology     #7 coronary artery disease stable no chest pain followed by cardiology    #8 abnormal liver enzymes improved 1/4/23 AST 44,ALT 24,acute hepatitis panel negative,iron 205,%sat 29,ferritin 73,ELISEO negative, no alcohol    #9 aortic dilation 11/19/21 echo mild LVH, EF 60%, moderate aortic insufficiency, ascending aortic dilation 4.0 cm    Plan  #1 reviewed labs with him from January 4, continue Lantus, metformin, continue checking blood sugars daily    #2 follow-up cardiology    #3 follow-up endocrinology, patient understands that overly suppressed TSH may increase risk for arrhythmias, osteoporosis, heart disease    #4 recommend smoking cessation, patient declines, continues to smoke, offered CT lung cancer screening, pulmonary function testing    #5 ultrasound aorta evaluate for aneurysm evaluation with underlying aortic atherosclerosis     #6 bone density test with risk factors of smoking, suppressed TSH which may affect bone density strength and estrogen levels    #7 continue regular exercise and activity    #8 check feet daily, declines vascular surgery follow-up for peripheral vascular disease    #9 continue working on a good nutrition program    #10 he can get the shingles vaccine at the pharmacy    #11 follow-up 6 months

## 2023-01-16 ENCOUNTER — HOSPITAL ENCOUNTER (OUTPATIENT)
Dept: CARDIOLOGY | Facility: MEDICAL CENTER | Age: 73
End: 2023-01-16
Attending: INTERNAL MEDICINE
Payer: MEDICARE

## 2023-01-16 DIAGNOSIS — R07.89 CHEST PRESSURE: ICD-10-CM

## 2023-01-16 DIAGNOSIS — R00.2 PALPITATIONS: ICD-10-CM

## 2023-01-16 PROCEDURE — 93306 TTE W/DOPPLER COMPLETE: CPT

## 2023-01-18 ENCOUNTER — TELEPHONE (OUTPATIENT)
Dept: CARDIOLOGY | Facility: MEDICAL CENTER | Age: 73
End: 2023-01-18

## 2023-01-18 ENCOUNTER — HOSPITAL ENCOUNTER (OUTPATIENT)
Dept: RADIOLOGY | Facility: MEDICAL CENTER | Age: 73
End: 2023-01-18
Attending: INTERNAL MEDICINE
Payer: MEDICARE

## 2023-01-18 ENCOUNTER — HOSPITAL ENCOUNTER (EMERGENCY)
Facility: MEDICAL CENTER | Age: 73
End: 2023-01-18
Attending: EMERGENCY MEDICINE
Payer: MEDICARE

## 2023-01-18 ENCOUNTER — PATIENT MESSAGE (OUTPATIENT)
Dept: CARDIOLOGY | Facility: MEDICAL CENTER | Age: 73
End: 2023-01-18

## 2023-01-18 ENCOUNTER — APPOINTMENT (OUTPATIENT)
Dept: RADIOLOGY | Facility: MEDICAL CENTER | Age: 73
End: 2023-01-18
Attending: EMERGENCY MEDICINE
Payer: MEDICARE

## 2023-01-18 VITALS
TEMPERATURE: 98.6 F | HEIGHT: 67 IN | RESPIRATION RATE: 18 BRPM | HEART RATE: 99 BPM | OXYGEN SATURATION: 98 % | WEIGHT: 147 LBS | DIASTOLIC BLOOD PRESSURE: 61 MMHG | BODY MASS INDEX: 23.07 KG/M2 | SYSTOLIC BLOOD PRESSURE: 127 MMHG
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
## 2023-01-18 DIAGNOSIS — R07.89 CHEST PRESSURE: ICD-10-CM

## 2023-01-18 DIAGNOSIS — I25.10 CORONARY ARTERY DISEASE DUE TO CALCIFIED CORONARY LESION: ICD-10-CM

## 2023-01-18 DIAGNOSIS — I25.84 CORONARY ARTERY DISEASE DUE TO CALCIFIED CORONARY LESION: ICD-10-CM

## 2023-01-18 DIAGNOSIS — S70.02XA CONTUSION OF LEFT HIP, INITIAL ENCOUNTER: ICD-10-CM

## 2023-01-18 DIAGNOSIS — R06.09 DOE (DYSPNEA ON EXERTION): ICD-10-CM

## 2023-01-18 LAB
LV EJECT FRACT  99904: 55
LV EJECT FRACT MOD 2C 99903: 58.96
LV EJECT FRACT MOD 4C 99902: 50.7
LV EJECT FRACT MOD BP 99901: 54

## 2023-01-18 PROCEDURE — 93018 CV STRESS TEST I&R ONLY: CPT | Performed by: INTERNAL MEDICINE

## 2023-01-18 PROCEDURE — 73501 X-RAY EXAM HIP UNI 1 VIEW: CPT | Mod: LT

## 2023-01-18 PROCEDURE — 78431 MYOCRD IMG PET RST&STRS CT: CPT | Mod: 26 | Performed by: INTERNAL MEDICINE

## 2023-01-18 PROCEDURE — 78431 MYOCRD IMG PET RST&STRS CT: CPT

## 2023-01-18 PROCEDURE — 93306 TTE W/DOPPLER COMPLETE: CPT | Mod: 26 | Performed by: INTERNAL MEDICINE

## 2023-01-18 PROCEDURE — 99284 EMERGENCY DEPT VISIT MOD MDM: CPT

## 2023-01-18 ASSESSMENT — FIBROSIS 4 INDEX: FIB4 SCORE: 2.81

## 2023-01-18 NOTE — ED PROVIDER NOTES
ER Provider Note    Scribed for Caden Charlton M.d. by Fina Moses. 1/18/2023  1:04 PM    Primary Care Provider: Charbel Jaffe M.D.    CHIEF COMPLAINT  Chief Complaint   Patient presents with    Hip Pain     Left hip radiates to knee    Fall     EXTERNAL RECORDS REVIEWED  Outpatient Notes from primary showing tobacco abuse diabetes and Outpatient labs & studies apparently had an abnormal stress test earlier today.    HPI/ROS  LIMITATION TO HISTORY   Select: : None  OUTSIDE HISTORIAN(S):  None    Orlando Arreguin is a 72 y.o. male who presents to the ED complaining of left hip pain secondary to a ground level fall onset a few hours ago. Patient states he parked on the 2nd floor of the parking garage at the hospital in order to get a cardiac PET scan today. While walking into the hospital he slipped on ice landing on his left hip. He states the pain radiates to his left knee but he is able to walk on it. He denies any head trauma or rib pain. Patient denies use of blood thinners but does take a baby aspirin daily. He states he was getting a PET scan today due to a cardiac history of 2 heart attacks and 2 stent placements.    PAST MEDICAL HISTORY  Past Medical History:   Diagnosis Date    Coronary artery disease due to calcified coronary lesion 8/11/2015    Diabetes mellitus type II     Gastroparesis     Hyperlipidemia     Hypothyroid     Interstitial cystitis     Sciatic neuritis        SURGICAL HISTORY  Past Surgical History:   Procedure Laterality Date    ZZZ CARDIAC CATH  8/6/15    YESIKA to LAD.  Has RCA 70% occulsion.    APPENDECTOMY      OTHER      L shoulder surgery (arthroscopic, [place], 2011)    AR TRANSURETHRAL ELEC-SURG PROSTATECTOM         FAMILY HISTORY  Family History   Problem Relation Age of Onset    Heart Disease Mother     Heart Attack Mother 55        heart attack       SOCIAL HISTORY   reports that he has been smoking cigarettes. He has a 30.00 pack-year smoking history. He has never used  "smokeless tobacco. He reports that he does not drink alcohol and does not use drugs.    CURRENT MEDICATIONS  Previous Medications    ALPRAZOLAM (XANAX) 0.25 MG TAB    Take 0.25 mg by mouth at bedtime as needed for Sleep.    ASPIRIN (ASA) 81 MG CHEW TAB CHEWABLE TABLET    Chew 1 Tablet every day.    ASPIRIN EC 81 MG EC TABLET    Take 1 Tab by mouth every day.    ATORVASTATIN (LIPITOR) 80 MG TABLET    Take 1 Tablet by mouth every day.    B-D UF III MINI PEN NEEDLES 31G X 5 MM MISC    USE PEN ONCE DAILY WITH LANTUS PEN    CHOLECALCIFEROL (VITAMIN D3 PO)    Take 1 Dose by mouth every day. Unknown OTC Strength.    ERYTHROMYCIN BASE (UMM-TAB) 250 MG TAB    Take 250 mg by mouth every day. Maintenance Medication.    GINGER, ZINGIBER OFFICINALIS, (GINGER PO)    Take  by mouth.    GLUCOSE BLOOD (RELION PRIME TEST) STRIP    USE 1 STRIP TO CHECK GLUCOSE ONCE DAILY BEFORE A MEAL. E11.9    INSULIN PEN NEEDLE 31G X 6 MM MISC        INSULIN PEN NEEDLE 31G X 6 MM MISC        LANTUS SOLOSTAR 100 UNIT/ML SOLUTION PEN-INJECTOR INJECTION    INJECT 12 UNITS SUBCUTANEOUSLY ONCE DAILY IN THE EVENING AS  INSTRUCTED    LEVOTHYROXINE (SYNTHROID) 112 MCG TAB    TAKE 1 TABLET BY MOUTH IN THE MORNING ON AN EMPTY STOMACH    LIOTHYRONINE (CYTOMEL) 25 MCG TAB    Take 1/2 (one-half) tablet by mouth twice daily    LISINOPRIL (PRINIVIL) 2.5 MG TAB    Take 1 Tablet by mouth every day.    METFORMIN (GLUCOPHAGE) 1000 MG TABLET    TAKE 1 TABLET BY MOUTH TWICE DAILY WITH MEALS    RELION PEN NEEDLES 31G X 6 MM MISC    USE 1 NEEDLE ONCE DAILY WITH  LANTUS  PEN    RELION ULTRA THIN LANCETS 30G MISC    USE 1 LANCET  ONCE DAILY    RIBOFLAVIN (VITAMIN B-2 PO)    Take 1 Dose by mouth every day. Unknown OTC Strength.       ALLERGIES  Brilinta [ticagrelor]; Pcn [penicillins]; Other environmental; Tdap [dipth, acell pertus, tetanus]; and Tetanus toxoid    PHYSICAL EXAM  /69   Pulse (!) 101   Temp 36.1 °C (96.9 °F) (Temporal)   Resp 18   Ht 1.702 m (5' 7\")  "  Wt 66.7 kg (147 lb)   SpO2 99%   BMI 23.02 kg/m²     Constitutional: Well developed, Well nourished, mild distress, Non-toxic appearance.   HENT: Normocephalic, Atraumatic.  Oropharynx moist.   Eyes: PERRL, EOMI, Conjunctiva normal, No discharge.   Neck: no anterior cervical lymphadenopathy  CV: Good pulses  Thorax & Lungs: No respiratory distress. No chest wall tenderness  Skin: Warm, Dry, No erythema, No rash.    Musculoskeletal: Large hematoma over left lateral hip with full range of motion. Ambulates with difficulty but can ambulate.  Neurologic: Awake, alert. Moves all extremities spontaneously.  Psychiatric: Affect normal, Mood normal.     Radiology:   The attending emergency physician has independently interpreted the diagnostic imaging associated with this visit and am waiting the final reading from the radiologist.   DX-HIP-UNILATERAL-WITH PELVIS-1 VIEW LEFT   Final Result      No radiographic evidence of acute traumatic injury.           COURSE & MEDICAL DECISION MAKING     ED Observation Status? No; Patient does not meet criteria for ED Observation.     INITIAL ASSESSMENT AND PLAN  Care Narrative: Patient is coming in status post fall, the patient has a large hematoma on his left hip, patient is declining any pain medicines.  Possibility that is fractured versus contusion.  The patient just had a PET scan earlier today.  X-rays are unremarkable, the patient is ambulatory, of note I was reading the chart with an abnormal stress test after the patient has been discharged.  They will follow-up with cardiology for this.  Patient return with worsening symptoms, follow-up with orthopedics if her pain has not improved in approximately a week    1:13 PM - Patient seen and examined at bedside. Discussed plan of care, including imaging of the left hip. Patient agrees to the plan of care. Ordered for DX-hop unilateral with pelvis view left to evaluate his symptoms.      2:13 PM - I reevaluated the patient at  bedside. I discussed the patient's diagnostic study results which show no fractures or other abnormalities. I discussed plan for discharge and follow up as outlined below. The patient is stable for discharge at this time and will return for any new or worsening symptoms. Patient verbalizes understanding and support with my plan for discharge.     .     Decision tools and prescription drugs considered including, but not limited to: Pain Medications   .     The patient will return for new or worsening symptoms and is stable at the time of discharge.    DISPOSITION:  Patient will be discharged home in stable condition.    FOLLOW UP:  No follow-up provider specified.    OUTPATIENT MEDICATIONS:  New Prescriptions    No medications on file       FINAL DIANGOSIS  No diagnosis found.   Fina ALGERE (Abbyibmae), am scribing for, and in the presence of, Caden Charlton M.D..    Electronically signed by: Fina Moses (Carlota), 1/18/2023    Caden ALEGRE M.D. personally performed the services described in this documentation, as scribed by Fina Moses in my presence, and it is both accurate and complete.     The note accurately reflects work and decisions made by me.  Caden Charlton M.D.  1/18/2023  7:58 PM

## 2023-01-18 NOTE — TELEPHONE ENCOUNTER
Noted TW recommendations below:  Received: Today  Abran Kwong M.D.  MICHELA Freeman.  Can you please call and let this gentleman know that his stress test from today is abnormal with a moderate area of heart muscle at risk.  This could explain his symptoms. His stress test and Holter monitor are largely under remarkable.  I suggest coronary angiography with me with possible PCI.  If he would like to wait and discuss it with me in the office first that is fine, otherwise it would be reasonable to set him up with my next available procedural appointment.     Thanks     TW        Attempted to call patient, left voicemail to call back. No detail left on voicemail.     My chart message sent to patient.

## 2023-01-18 NOTE — TELEPHONE ENCOUNTER
Received printed copy of EOS from [facility]. EOS noted in media dated today.     Printed copy to  to read for final results. Thank you!

## 2023-01-18 NOTE — ED TRIAGE NOTES
Chief Complaint   Patient presents with    Hip Pain     Left hip radiates to knee    Fall     Pt ambulated to triage he was at [facility] parking lot slipped and fell. Denies hitting head c/I left hip pain. Pt able to ambulate .   Assisted pt making phone call to doctor that helped him.   Provided with wheelchair.

## 2023-01-19 NOTE — PATIENT COMMUNICATION
"Warm transfer from [place] with Specialty Scheduling. Patient returned call. Patient stated he had a \"nasty fall\" prior to the scheduled PET scan today. Patient went to ER after PET scan complete for evaluation of bruising and injury.     Discussed Dr. Kwong's recommendations with patient. Patient stated he is \"in no shape to be making any decisions today\". Patient advised to review the my chart message and get back to us about how he would like to proceed.       TW: ARIELLE. Thank you!  "

## 2023-01-20 PROCEDURE — 93246 EXT ECG>7D<15D RECORDING: CPT | Performed by: INTERNAL MEDICINE

## 2023-01-20 PROCEDURE — 93248 EXT ECG>7D<15D REV&INTERPJ: CPT | Performed by: INTERNAL MEDICINE

## 2023-01-22 DIAGNOSIS — E03.9 ACQUIRED HYPOTHYROIDISM: ICD-10-CM

## 2023-01-23 RX ORDER — LIOTHYRONINE SODIUM 25 UG/1
TABLET ORAL
Qty: 90 TABLET | Refills: 0 | Status: SHIPPED | OUTPATIENT
Start: 2023-01-23 | End: 2023-04-10

## 2023-02-02 ENCOUNTER — OFFICE VISIT (OUTPATIENT)
Dept: MEDICAL GROUP | Facility: MEDICAL CENTER | Age: 73
End: 2023-02-02
Payer: MEDICARE

## 2023-02-02 VITALS
DIASTOLIC BLOOD PRESSURE: 60 MMHG | WEIGHT: 148 LBS | OXYGEN SATURATION: 97 % | SYSTOLIC BLOOD PRESSURE: 122 MMHG | BODY MASS INDEX: 23.23 KG/M2 | HEART RATE: 101 BPM | TEMPERATURE: 97.6 F | HEIGHT: 67 IN

## 2023-02-02 DIAGNOSIS — S80.12XS HEMATOMA OF LEFT LOWER EXTREMITY, SEQUELA: ICD-10-CM

## 2023-02-02 DIAGNOSIS — S80.12XS CONTUSION OF LEFT LOWER LEG, SEQUELA: ICD-10-CM
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
## 2023-02-02 DIAGNOSIS — G47.00 INSOMNIA, UNSPECIFIED TYPE: ICD-10-CM

## 2023-02-02 PROCEDURE — 99214 OFFICE O/P EST MOD 30 MIN: CPT | Performed by: INTERNAL MEDICINE

## 2023-02-02 RX ORDER — ALPRAZOLAM 0.25 MG/1
0.25 TABLET ORAL NIGHTLY PRN
Qty: 30 TABLET | Refills: 0 | Status: SHIPPED | OUTPATIENT
Start: 2023-02-02 | End: 2023-03-04

## 2023-02-02 ASSESSMENT — FIBROSIS 4 INDEX: FIB4 SCORE: 2.81

## 2023-02-02 NOTE — PROGRESS NOTES
Subjective     Orlando Arreguin is a 72 y.o. male who presents with fall Follow-Up            HPI  New complaint  Patient about 2 weeks ago went to have PET scan done [place] at the main imaging center and because of the lack of parking, had to park on the top level of the parking lot, which was not enclosed are covered, and slipped on ice in the parking lot and fell off to the left side on his left leg, no head trauma, no loss of consciousness, noticed lump on the left thigh, subsequently went to the ER 1/18/23 and had xray left hip negative for fracture.  Patient still has a lump on the left gluteal region, it was bigger initially has decreased in size and he has noticed bruising down his left leg since the fall.  Patient is on aspirin day followed by cardiology for CAD, no other blood thinners.  No history of recurrent falls.  Patient lives on his own and is normally quite active in his yard, and has to go to his well every day and bring 25 gallons water with each trip.  No current NSAIDs.  Recent PET scan per cardiology showed area of ischemia, EF 66%.  Remains on lisinopril, Lipitor per cardiology along with baby aspirin daily.  Still has some discomfort left lateral thigh and left inner thigh.  Able to ambulate but is avoided walking long distances.  Insomnia has had Xanax in the past last refill the 1.5 years ago for 30 tablets, takes this quite sparingly without drowsiness or hangover effect.          Current Outpatient Medications   Medication Sig Dispense Refill    liothyronine (CYTOMEL) 25 MCG Tab Take 1/2 (one-half) tablet by mouth twice daily 90 Tablet 0    ALPRAZolam (XANAX) 0.25 MG Tab Take 0.25 mg by mouth at bedtime as needed for Sleep.      lisinopril (PRINIVIL) 2.5 MG Tab Take 1 Tablet by mouth every day. 90 Tablet 3    atorvastatin (LIPITOR) 80 MG tablet Take 1 Tablet by mouth every day. 90 Tablet 3    levothyroxine (SYNTHROID) 112 MCG Tab TAKE 1 TABLET BY MOUTH IN THE MORNING ON AN EMPTY STOMACH 90  Tablet 0    glucose blood (RELION PRIME TEST) strip USE 1 STRIP TO CHECK GLUCOSE ONCE DAILY BEFORE A MEAL. E11.9 100 Strip 11    RELION PEN NEEDLES 31G X 6 MM Misc USE 1 NEEDLE ONCE DAILY WITH  LANTUS   Each 2    ReliOn Ultra Thin Lancets 30G Misc USE 1 LANCET  ONCE DAILY 100 Each 2    metformin (GLUCOPHAGE) 1000 MG tablet TAKE 1 TABLET BY MOUTH TWICE DAILY WITH MEALS 180 Tablet 3    aspirin (ASA) 81 MG Chew Tab chewable tablet Chew 1 Tablet every day. 100 Tablet 11    Carolee, Zingiber officinalis, (CAROLEE PO) Take  by mouth.      LANTUS SOLOSTAR 100 UNIT/ML Solution Pen-injector injection INJECT 12 UNITS SUBCUTANEOUSLY ONCE DAILY IN THE EVENING AS  INSTRUCTED 15 mL 11    Insulin Pen Needle 31G X 6 MM Misc       B-D UF III MINI PEN NEEDLES 31G X 5 MM Misc USE PEN ONCE DAILY WITH LANTUS PEN      Insulin Pen Needle 31G X 6 MM Misc       aspirin EC 81 MG EC tablet Take 1 Tab by mouth every day. 30 Tab 0    Cholecalciferol (VITAMIN D3 PO) Take 1 Dose by mouth every day. Unknown OTC Strength.      erythromycin base (UMM-TAB) 250 MG Tab Take 250 mg by mouth every day. Maintenance Medication. (Patient not taking: Reported on 6/8/2022)      Riboflavin (VITAMIN B-2 PO) Take 1 Dose by mouth every day. Unknown OTC Strength.       No current facility-administered medications for this visit.     Patient Active Problem List   Diagnosis    History of allergic rhinitis    Gastroparesis    S/p green light photovaporization prostate    Interstitial cystitis    Postpolio syndrome    Hypothyroid    On angiotensin-converting enzyme (ACE) inhibitors (for diabetes, not HTN)    Diabetes mellitus type 2, controlled ([facility])    Preventative health care    History of shoulder pain    Insomnia    Dyslipidemia    Tobacco abuse    History of MI (myocardial infarction)    Atherosclerosis of native coronary artery with angina pectoris, unspecified whether native or transplanted heart ([facility])    PVD (peripheral vascular disease) ([facility])    Mild non  "proliferative diabetic retinopathy (HCC)    Stented coronary artery    Ascending aorta dilatation (HCC)    Aortic insufficiency    Abnormal liver function              Patient Care Team:  Charbel Jaffe M.D. as PCP - General  Alex Stein M.D. as Consulting Physician (Endocrinology)      ROS           Objective     /60 (BP Location: Left arm, Patient Position: Sitting, BP Cuff Size: Adult)   Pulse (!) 101   Temp 36.4 °C (97.6 °F)   Ht 1.689 m (5' 6.5\")   Wt 67.1 kg (148 lb)   SpO2 97%   BMI 23.53 kg/m²      Physical Exam  Vitals and nursing note reviewed.   Constitutional:       General: He is not in acute distress.     Appearance: He is obese. He is not ill-appearing.   HENT:      Head: Normocephalic and atraumatic.      Right Ear: External ear normal.      Left Ear: External ear normal.   Eyes:      Conjunctiva/sclera: Conjunctivae normal.   Cardiovascular:      Rate and Rhythm: Normal rate.   Pulmonary:      Effort: Pulmonary effort is normal.      Breath sounds: Normal breath sounds.   Abdominal:      General: There is no distension.   Musculoskeletal:         General: Signs of injury present. No swelling.      Cervical back: No rigidity.   Skin:     Coloration: Skin is not jaundiced.      Findings: Bruising present.   Neurological:      General: No focal deficit present.      Mental Status: He is alert.   Psychiatric:         Mood and Affect: Mood normal.         Behavior: Behavior normal.   Left gluteal region prominence consistent with hematoma although no bruising over that area, he does have some bruising over his left lateral thigh going down his leg, no edema, no tenderness over the knee, able to flex and extend left hip and left knee, and stand without assist  No edema left lower extremity             Assessment & Plan        Assessment   #1 recent fall January 18 seen in emergency room, slipped on ice in a parking lot of the hospital, x-ray negative for fracture, patient has hematoma " left gluteal region and bruising and ecchymosis left lower leg, no swelling left lower leg, still has some pain left lateral hip and left medial proximal hip quadricep muscle area, no evidence of secondary infection, no open sores, lesions, ulcerations    #2 CAD followed by cardiology recent PET scan January 18 moderate-sized area ischemia anterolateral base, EF 66%    #3 insomnia on Xanax as needed last refill August 15, 21 for 30 tablets does not cause drowsiness during the day    Plan  #!  Reviewed ER records, reassurance that x-ray was negative, hematoma and bruising will resolve slowly and this may take some time    #2 no need for additional imaging    #3 do not recommend evacuation of hematoma, it will heal spontaneously    #4 falling precautions, he could use an attachment for his boots if he does walk in the snow    #5 follow-up cardiology    #6 refill xanax quantity of 30 for insomnia prescription to pharmacy last refill over a year ago, does not cause drowsiness, sedation, memory loss previously, uses quite rarely

## 2023-02-07 ENCOUNTER — TELEPHONE (OUTPATIENT)
Dept: MEDICAL GROUP | Facility: MEDICAL CENTER | Age: 73
End: 2023-02-07
Payer: MEDICARE

## 2023-02-07 ENCOUNTER — APPOINTMENT (OUTPATIENT)
Dept: RADIOLOGY | Facility: MEDICAL CENTER | Age: 73
End: 2023-02-07
Attending: INTERNAL MEDICINE
Payer: MEDICARE

## 2023-02-07 DIAGNOSIS — I70.0 ATHEROSCLEROSIS OF AORTA (HCC): ICD-10-CM

## 2023-02-07 PROCEDURE — 76775 US EXAM ABDO BACK WALL LIM: CPT

## 2023-02-20 ENCOUNTER — TELEPHONE (OUTPATIENT)
Dept: MEDICAL GROUP | Facility: MEDICAL CENTER | Age: 73
End: 2023-02-20

## 2023-02-21 NOTE — TELEPHONE ENCOUNTER
Please call [facility], patient had labs done on 1/6/23 and he is indicating that his insurance is not covering the labs.    Could you please ask [facility] if they need different codes for the lab tests to be covered?  If so could they fax me a form?

## 2023-02-28 NOTE — TELEPHONE ENCOUNTER
[facility] with [facility]. She cannot send me any info but is emailing a form to the pt. I called and LM for pt explaining that When he got the email, to bring it to the office.

## 2023-03-06 DIAGNOSIS — E03.9 ACQUIRED HYPOTHYROIDISM: ICD-10-CM

## 2023-03-06 RX ORDER — LEVOTHYROXINE SODIUM 112 UG/1
TABLET ORAL
Qty: 90 TABLET | Refills: 0 | Status: SHIPPED | OUTPATIENT
Start: 2023-03-06 | End: 2023-05-22 | Stop reason: SDUPTHER

## 2023-03-09 NOTE — TELEPHONE ENCOUNTER
Spoke with [facility] billing again. They will send documents to the pt and he will have to deliver to us.

## 2023-03-24 ENCOUNTER — TELEPHONE (OUTPATIENT)
Dept: MEDICAL GROUP | Facility: MEDICAL CENTER | Age: 73
End: 2023-03-24
Payer: MEDICARE

## 2023-03-24 NOTE — TELEPHONE ENCOUNTER
I called [facility] again, they once again told me that they could not fax me a form for you to fill out. I spoke with Anne Marie and she took codes R31.9 (Hematuria) and codes Z11.59 (Hepatitis screening). She did not know if they would be covered but she did explain that with this resubmission, it would put the account on lock and nothing would be done for 45 days. If you think these codes won't work, let me know which codes you want me to use and I will call again.

## 2023-03-24 NOTE — TELEPHONE ENCOUNTER
I had a phone conversation with patient today. He knows that the codes I submitted today give him a 45 day cary period. He also know that should this not be successful, he needs to request a recode form because they will not send it to me.

## 2023-03-24 NOTE — TELEPHONE ENCOUNTER
Patient is sending me another message again that some his laboratory testd done in January at [facility] are being denied.  [facility] about which specific tests are being denied.  Could you please check with [employer] about this, the labs were done on January 4 at [facility]?

## 2023-04-10 DIAGNOSIS — E03.9 ACQUIRED HYPOTHYROIDISM: ICD-10-CM

## 2023-04-10 RX ORDER — LIOTHYRONINE SODIUM 25 UG/1
TABLET ORAL
Qty: 90 TABLET | Refills: 0 | Status: SHIPPED | OUTPATIENT
Start: 2023-04-10 | End: 2023-05-22 | Stop reason: SDUPTHER

## 2023-05-04 ENCOUNTER — HOSPITAL ENCOUNTER (OUTPATIENT)
Dept: LAB | Facility: MEDICAL CENTER | Age: 73
End: 2023-05-04
Payer: MEDICARE

## 2023-05-04 DIAGNOSIS — E03.9 HYPOTHYROIDISM, ACQUIRED: ICD-10-CM

## 2023-05-04 DIAGNOSIS — R79.89 LOW TSH LEVEL: ICD-10-CM

## 2023-05-04 DIAGNOSIS — E55.9 VITAMIN D DEFICIENCY: ICD-10-CM

## 2023-05-04 DIAGNOSIS — I25.84 CORONARY ARTERY DISEASE DUE TO CALCIFIED CORONARY LESION: ICD-10-CM

## 2023-05-04 DIAGNOSIS — I25.10 CORONARY ARTERY DISEASE DUE TO CALCIFIED CORONARY LESION: ICD-10-CM

## 2023-05-04 LAB
25(OH)D3 SERPL-MCNC: 37 NG/ML (ref 30–100)
ALBUMIN SERPL BCP-MCNC: 4.3 G/DL (ref 3.2–4.9)
ALBUMIN/GLOB SERPL: 1.5 G/DL
ALP SERPL-CCNC: 128 U/L (ref 30–99)
ALT SERPL-CCNC: 26 U/L (ref 2–50)
ANION GAP SERPL CALC-SCNC: 14 MMOL/L (ref 7–16)
AST SERPL-CCNC: 33 U/L (ref 12–45)
BILIRUB SERPL-MCNC: 0.4 MG/DL (ref 0.1–1.5)
BUN SERPL-MCNC: 15 MG/DL (ref 8–22)
CALCIUM ALBUM COR SERPL-MCNC: 9 MG/DL (ref 8.5–10.5)
CALCIUM SERPL-MCNC: 9.2 MG/DL (ref 8.5–10.5)
CHLORIDE SERPL-SCNC: 106 MMOL/L (ref 96–112)
CHOLEST SERPL-MCNC: 72 MG/DL (ref 100–199)
CO2 SERPL-SCNC: 20 MMOL/L (ref 20–33)
CREAT SERPL-MCNC: 0.94 MG/DL (ref 0.5–1.4)
GFR SERPLBLD CREATININE-BSD FMLA CKD-EPI: 85 ML/MIN/1.73 M 2
GLOBULIN SER CALC-MCNC: 2.9 G/DL (ref 1.9–3.5)
GLUCOSE SERPL-MCNC: 84 MG/DL (ref 65–99)
HDLC SERPL-MCNC: 28 MG/DL
LDLC SERPL CALC-MCNC: 33 MG/DL
POTASSIUM SERPL-SCNC: 3.7 MMOL/L (ref 3.6–5.5)
PROT SERPL-MCNC: 7.2 G/DL (ref 6–8.2)
SODIUM SERPL-SCNC: 140 MMOL/L (ref 135–145)
T3FREE SERPL-MCNC: 2.99 PG/ML (ref 2–4.4)
T4 FREE SERPL-MCNC: 1.15 NG/DL (ref 0.93–1.7)
TRIGL SERPL-MCNC: 54 MG/DL (ref 0–149)
TSH SERPL DL<=0.005 MIU/L-ACNC: 0.01 UIU/ML (ref 0.38–5.33)

## 2023-05-04 PROCEDURE — 84439 ASSAY OF FREE THYROXINE: CPT

## 2023-05-04 PROCEDURE — 80053 COMPREHEN METABOLIC PANEL: CPT

## 2023-05-04 PROCEDURE — 80061 LIPID PANEL: CPT

## 2023-05-04 PROCEDURE — 36415 COLL VENOUS BLD VENIPUNCTURE: CPT

## 2023-05-04 PROCEDURE — 82024 ASSAY OF ACTH: CPT

## 2023-05-04 PROCEDURE — 82306 VITAMIN D 25 HYDROXY: CPT

## 2023-05-04 PROCEDURE — 84481 FREE ASSAY (FT-3): CPT

## 2023-05-04 PROCEDURE — 84443 ASSAY THYROID STIM HORMONE: CPT

## 2023-05-06 LAB — ACTH PLAS-MCNC: 12 PG/ML (ref 7.2–63.3)

## 2023-05-12 ENCOUNTER — TELEPHONE (OUTPATIENT)
Dept: ENDOCRINOLOGY | Facility: MEDICAL CENTER | Age: 73
End: 2023-05-12

## 2023-05-12 ENCOUNTER — OFFICE VISIT (OUTPATIENT)
Dept: CARDIOLOGY | Facility: MEDICAL CENTER | Age: 73
End: 2023-05-12
Payer: MEDICARE

## 2023-05-12 VITALS
SYSTOLIC BLOOD PRESSURE: 120 MMHG | DIASTOLIC BLOOD PRESSURE: 64 MMHG | BODY MASS INDEX: 21.5 KG/M2 | HEIGHT: 67 IN | WEIGHT: 137 LBS | RESPIRATION RATE: 16 BRPM | HEART RATE: 74 BPM | OXYGEN SATURATION: 99 %

## 2023-05-12 DIAGNOSIS — I25.84 CORONARY ARTERY DISEASE DUE TO CALCIFIED CORONARY LESION: ICD-10-CM

## 2023-05-12 DIAGNOSIS — I10 ESSENTIAL HYPERTENSION: ICD-10-CM

## 2023-05-12 DIAGNOSIS — I25.10 CORONARY ARTERY DISEASE DUE TO CALCIFIED CORONARY LESION: ICD-10-CM

## 2023-05-12 DIAGNOSIS — Z95.5 STENTED CORONARY ARTERY: ICD-10-CM

## 2023-05-12 DIAGNOSIS — I35.1 NONRHEUMATIC AORTIC VALVE INSUFFICIENCY: ICD-10-CM

## 2023-05-12 DIAGNOSIS — I47.10 PSVT (PAROXYSMAL SUPRAVENTRICULAR TACHYCARDIA) (HCC): ICD-10-CM

## 2023-05-12 PROCEDURE — 99214 OFFICE O/P EST MOD 30 MIN: CPT | Performed by: INTERNAL MEDICINE

## 2023-05-12 PROCEDURE — 3078F DIAST BP <80 MM HG: CPT | Performed by: INTERNAL MEDICINE

## 2023-05-12 PROCEDURE — 3074F SYST BP LT 130 MM HG: CPT | Performed by: INTERNAL MEDICINE

## 2023-05-12 RX ORDER — ALPRAZOLAM 0.25 MG/1
0.25 TABLET ORAL NIGHTLY PRN
COMMUNITY

## 2023-05-12 ASSESSMENT — FIBROSIS 4 INDEX: FIB4 SCORE: 2.05

## 2023-05-12 NOTE — TELEPHONE ENCOUNTER
Called patient and left a voicemail to move him from Dr. Luu' schedule to [facility]'s on Wednesday 5/17 at 7:50. I mentioned to call us in case this time does not work for him.

## 2023-05-12 NOTE — PROGRESS NOTES
Chief Complaint   Patient presents with    Coronary Artery Disease     Follow up       Subjective     Orlando Arreguin is a 73 y.o. male who presents today for follow-up of coronary artery disease characterized by left main to LAD PCI Dr. ORR in 2015 and RCA PCI for ACS 2020 Dr. Weinberg.  Has history of polio as a child and cannot ambulate long distances or participate in the treadmill test.  Taking his medications as directed.      At last visit he had noted increasing emotionally triggered chest discomfort and palpitations.  Holter revealed PSVT.  Brief.  Stress revealed a moderate anterior and anterolateral region of ischemia.  This was in January and he declined intervention continues to exert himself without exertional chest discomfort but does have occasional emotional trigger chest discomfort still.    Past Medical History:   Diagnosis Date    Coronary artery disease due to calcified coronary lesion 8/11/2015    Diabetes mellitus type II     Gastroparesis     Hyperlipidemia     Hypothyroid     Interstitial cystitis     Sciatic neuritis      Past Surgical History:   Procedure Laterality Date    ZZZ CARDIAC CATH  8/6/15    YESIKA to LAD.  Has RCA 70% occulsion.    APPENDECTOMY      OTHER      L shoulder surgery (arthroscopic, [place], 2011)    [place] TRANSURETHRAL ELEC-SURG PROSTATECTOM       Family History   Problem Relation Age of Onset    Heart Disease Mother     Heart Attack Mother 55        heart attack     Social History     Socioeconomic History    Marital status: Single     Spouse name: Not on file    Number of children: Not on file    Years of education: Not on file    Highest education level: Not on file   Occupational History    Not on file   Tobacco Use    Smoking status: Every Day     Packs/day: 1.00     Years: 30.00     Pack years: 30.00     Types: Cigarettes    Smokeless tobacco: Never   Vaping Use    Vaping Use: Never used   Substance and Sexual Activity    Alcohol use: No     Alcohol/week: 0.0 oz    Drug use:  No    Sexual activity: Not on file   Other Topics Concern    Not on file   Social History Narrative    Not on file     Social Determinants of Health     Financial Resource Strain: Not on file   Food Insecurity: Not on file   Transportation Needs: Not on file   Physical Activity: Not on file   Stress: Not on file   Social Connections: Not on file   Intimate Partner Violence: Not on file   Housing Stability: Not on file     Allergies   Allergen Reactions    Brilinta [Ticagrelor] Shortness of Breath     Total Sleep Apnea per patient; SOB    Pcn [Penicillins] Anaphylaxis    Other Environmental      Hayfever    Tdap [Dipth, Acell Pertus, Tetanus]      tdap    Tetanus Toxoid      Outpatient Encounter Medications as of 5/12/2023   Medication Sig Dispense Refill    ALPRAZolam (XANAX) 0.25 MG Tab Take 0.25 mg by mouth at bedtime as needed for Sleep.      liothyronine (CYTOMEL) 25 MCG Tab Take 1/2 (one-half) tablet by mouth twice daily 90 Tablet 0    levothyroxine (SYNTHROID) 112 MCG Tab TAKE 1 TABLET BY MOUTH IN THE MORNING ON AN EMPTY STOMACH 90 Tablet 0    lisinopril (PRINIVIL) 2.5 MG Tab Take 1 Tablet by mouth every day. 90 Tablet 3    atorvastatin (LIPITOR) 80 MG tablet Take 1 Tablet by mouth every day. 90 Tablet 3    metformin (GLUCOPHAGE) 1000 MG tablet TAKE 1 TABLET BY MOUTH TWICE DAILY WITH MEALS 180 Tablet 3    aspirin (ASA) 81 MG Chew Tab chewable tablet Chew 1 Tablet every day. 100 Tablet 11    Ginger, Zingiber officinalis, (GINGER PO) Take  by mouth.      LANTUS SOLOSTAR 100 UNIT/ML Solution Pen-injector injection INJECT 12 UNITS SUBCUTANEOUSLY ONCE DAILY IN THE EVENING AS  INSTRUCTED 15 mL 11    Cholecalciferol (VITAMIN D3 PO) Take 1 Dose by mouth every day. Unknown OTC Strength.      Riboflavin (VITAMIN B-2 PO) Take 1 Dose by mouth every day. Unknown OTC Strength.      glucose blood (RELION PRIME TEST) strip USE 1 STRIP TO CHECK GLUCOSE ONCE DAILY BEFORE A MEAL. E11.9 100 Strip 11    RELION PEN NEEDLES 31G X 6  "MM Misc USE 1 NEEDLE ONCE DAILY WITH  LANTUS   Each 2    ReliOn Ultra Thin Lancets 30G Misc USE 1 LANCET  ONCE DAILY 100 Each 2    Insulin Pen Needle 31G X 6 MM Misc       B-D UF III MINI PEN NEEDLES 31G X 5 MM Misc USE PEN ONCE DAILY WITH LANTUS PEN      Insulin Pen Needle 31G X 6 MM Misc       erythromycin base (UMM-TAB) 250 MG Tab Take 250 mg by mouth every day. Maintenance Medication. (Patient not taking: Reported on 6/8/2022)       No facility-administered encounter medications on file as of 5/12/2023.     Review of Systems   All other systems reviewed and are negative.             Objective     /64 (BP Location: Left arm)   Pulse 74   Resp 16   Ht 1.689 m (5' 6.5\")   Wt 62.1 kg (137 lb)   SpO2 99%   BMI 21.78 kg/m²     Physical Exam  Vitals reviewed.   Constitutional:       General: He is not in acute distress.     Appearance: He is well-developed. He is not diaphoretic.   HENT:      Head: Normocephalic and atraumatic.      Right Ear: External ear normal.      Left Ear: External ear normal.   Eyes:      General: No scleral icterus.        Right eye: No discharge.         Left eye: No discharge.      Conjunctiva/sclera: Conjunctivae normal.      Pupils: Pupils are equal, round, and reactive to light.   Neck:      Thyroid: No thyromegaly.      Vascular: No JVD.      Trachea: No tracheal deviation.   Cardiovascular:      Rate and Rhythm: Normal rate and regular rhythm.      Chest Wall: PMI is not displaced.      Pulses:           Carotid pulses are 2+ on the right side and 2+ on the left side.       Radial pulses are 2+ on the left side.        Popliteal pulses are 2+ on the right side and 2+ on the left side.        Dorsalis pedis pulses are 2+ on the right side and 2+ on the left side.        Posterior tibial pulses are 2+ on the right side and 2+ on the left side.      Heart sounds: No murmur heard.     No friction rub. No gallop.   Pulmonary:      Effort: Pulmonary effort is normal. No " respiratory distress.      Breath sounds: Normal breath sounds. No wheezing or rales.   Chest:      Chest wall: No tenderness.   Abdominal:      General: Bowel sounds are normal. There is no distension.      Palpations: Abdomen is soft.      Tenderness: There is no abdominal tenderness.   Musculoskeletal:         General: No tenderness or deformity. Normal range of motion.      Cervical back: Normal range of motion and neck supple.   Skin:     General: Skin is warm and dry.      Coloration: Skin is not pale.      Findings: No erythema or rash.   Neurological:      Mental Status: He is alert and oriented to person, place, and time.      Cranial Nerves: No cranial nerve deficit (cranial nerves II through XII grossly intact).      Coordination: Coordination normal.   Psychiatric:         Behavior: Behavior normal.         Thought Content: Thought content normal.       LABS:  Lab Results   Component Value Date/Time    CHOLSTRLTOT 72 (L) 05/04/2023 10:30 AM    LDL 33 05/04/2023 10:30 AM    HDL 28 (A) 05/04/2023 10:30 AM    TRIGLYCERIDE 54 05/04/2023 10:30 AM       Lab Results   Component Value Date/Time    WBC 5.8 01/04/2023 07:37 AM    WBC 5.5 10/02/2021 10:30 AM    RBC 4.83 01/04/2023 07:37 AM    RBC 4.77 10/02/2021 10:30 AM    HEMOGLOBIN 14.4 01/04/2023 07:37 AM    HEMOGLOBIN 14.7 10/02/2021 10:30 AM    HEMATOCRIT 42.7 01/04/2023 07:37 AM    HEMATOCRIT 42.9 10/02/2021 10:30 AM    MCV 88 01/04/2023 07:37 AM    MCV 89.9 10/02/2021 10:30 AM    NEUTSPOLYS 68 01/04/2023 07:37 AM    NEUTSPOLYS 70.10 10/02/2021 10:30 AM    LYMPHOCYTES 22 01/04/2023 07:37 AM    LYMPHOCYTES 20.40 (L) 10/02/2021 10:30 AM    MONOCYTES 8 01/04/2023 07:37 AM    MONOCYTES 6.90 10/02/2021 10:30 AM    EOSINOPHILS 1 01/04/2023 07:37 AM    EOSINOPHILS 1.30 10/02/2021 10:30 AM    BASOPHILS 1 01/04/2023 07:37 AM    BASOPHILS 0.90 10/02/2021 10:30 AM     Lab Results   Component Value Date/Time    SODIUM 140 05/04/2023 10:29 AM    POTASSIUM 3.7 05/04/2023  10:29 AM    CHLORIDE 106 05/04/2023 10:29 AM    CO2 20 05/04/2023 10:29 AM    GLUCOSE 84 05/04/2023 10:29 AM    BUN 15 05/04/2023 10:29 AM    CREATININE 0.94 05/04/2023 10:29 AM    BUNCREATRAT 21 12/07/2022 07:00 AM     Lab Results   Component Value Date    HBA1C 5.8 (H) 01/04/2023      Lab Results   Component Value Date/Time    ALKPHOSPHAT 128 (H) 05/04/2023 10:29 AM    ASTSGOT 33 05/04/2023 10:29 AM    ALTSGPT 26 05/04/2023 10:29 AM    TBILIRUBIN 0.4 05/04/2023 10:29 AM      Lab Results   Component Value Date/Time    BNPBTYPENAT 178.8 (H) 08/19/2015 10:28 AM    BNPBTYPENAT 299 (H) 08/06/2015 04:59 AM      Lab Results   Component Value Date/Time    TSH 0.007 (L) 10/26/2022 05:10 AM     Lab Results   Component Value Date/Time    PROTHROMBTM 14.1 08/06/2015 04:59 AM    INR 1.09 08/06/2015 04:59 AM                   Assessment & Plan     1. Coronary artery disease due to calcified coronary lesion        2. Stented coronary artery        3. PSVT (paroxysmal supraventricular tachycardia) (HCC)        4. Nonrheumatic aortic valve insufficiency        5. Essential hypertension            Medical Decision Making: Today's Assessment/Status/Plan:     Complex PCI with a moderate risk stress test SDS 6 minimally symptomatic on reasonable medical therapy however not on a beta-blocker.  Discussed that he would benefit from an upfront invasive strategy given the risk profile from his stress test and offered him coronary angiography.  He declines and prefers medical therapy as he feels relatively well.  He understands that this is a second line treatment paradigm.  Recommend addition of a beta-blocker, declines today but he will consider.  Blood pressure and LDL are at goal.  Aortic insufficiency is moderate at last check and was stable over the intervening 1/2 years.  We will discuss follow-up aortic regurgitation follow-up at next visit.  Unfortunately his long-term feline  recently passed away.      We will follow-up  routinely.

## 2023-05-22 ENCOUNTER — OFFICE VISIT (OUTPATIENT)
Dept: ENDOCRINOLOGY | Facility: MEDICAL CENTER | Age: 73
End: 2023-05-22
Payer: MEDICARE

## 2023-05-22 VITALS
WEIGHT: 138 LBS | BODY MASS INDEX: 21.66 KG/M2 | OXYGEN SATURATION: 98 % | HEART RATE: 84 BPM | SYSTOLIC BLOOD PRESSURE: 102 MMHG | DIASTOLIC BLOOD PRESSURE: 64 MMHG | HEIGHT: 67 IN

## 2023-05-22 DIAGNOSIS — E03.9 HYPOTHYROIDISM, ACQUIRED: ICD-10-CM

## 2023-05-22 DIAGNOSIS — R74.8 ELEVATED LIVER ENZYMES: ICD-10-CM

## 2023-05-22 DIAGNOSIS — K31.84 GASTROPARESIS: ICD-10-CM

## 2023-05-22 DIAGNOSIS — I25.10 CORONARY ARTERY DISEASE DUE TO CALCIFIED CORONARY LESION: ICD-10-CM

## 2023-05-22 DIAGNOSIS — E11.65 TYPE 2 DIABETES MELLITUS WITH HYPERGLYCEMIA, WITHOUT LONG-TERM CURRENT USE OF INSULIN (HCC): ICD-10-CM

## 2023-05-22 DIAGNOSIS — I25.84 CORONARY ARTERY DISEASE DUE TO CALCIFIED CORONARY LESION: ICD-10-CM

## 2023-05-22 DIAGNOSIS — E55.9 VITAMIN D DEFICIENCY: ICD-10-CM

## 2023-05-22 PROCEDURE — 3074F SYST BP LT 130 MM HG: CPT

## 2023-05-22 PROCEDURE — 3078F DIAST BP <80 MM HG: CPT

## 2023-05-22 PROCEDURE — 99211 OFF/OP EST MAY X REQ PHY/QHP: CPT

## 2023-05-22 PROCEDURE — 99214 OFFICE O/P EST MOD 30 MIN: CPT

## 2023-05-22 RX ORDER — LEVOTHYROXINE SODIUM 112 UG/1
112 TABLET ORAL
Qty: 360 TABLET | Refills: 0 | Status: SHIPPED | OUTPATIENT
Start: 2023-05-22

## 2023-05-22 RX ORDER — LIOTHYRONINE SODIUM 25 UG/1
12.5 TABLET ORAL 2 TIMES DAILY
Qty: 360 TABLET | Refills: 0 | Status: SHIPPED | OUTPATIENT
Start: 2023-05-22

## 2023-05-22 ASSESSMENT — FIBROSIS 4 INDEX: FIB4 SCORE: 2.05

## 2023-05-22 NOTE — PROGRESS NOTES
Chief Complaint: Follow-up for the following conditions  HPI:   Orlando Arreguin is a 73 y.o. male     1.Acquired hypothyroidism:  History of Hypothyroidism diagnosed on for the past 18 years and is here for initial evaluation.    He is currently on Levothyroxine 112 mcg daily and Cytomel 25 mcg (cut in half) in the am and pm.   He reports Good compliance and takes his thyroid hormone daily before breakfast.    Although at times he forgets to take his morning Cytomel for which she develops fatigue around lunchtime    He denies taking any iron, calcium supplements or antacids.   Denies taking any Biotin      He denies fatigue, weight gain, constipation, tremors, swelling, feeling slow, losing hair, anxiousness, feeling excessive energy, palpitations and sweating.    Hx of gastroparesis,   He denies lumps or enlargement in the neck.    History of coronary artery disease with an MI in the past and 2 with stents   Latest Reference Range & Units 05/04/23 10:29   TSH 0.380 - 5.330 uIU/mL 0.010 (L)   Free T-4 0.93 - 1.70 ng/dL 1.15   T3,Free 2.00 - 4.40 pg/mL 2.99      Latest Reference Range & Units 05/04/23 10:29   GFR (CKD-EPI) >60 mL/min/1.73 m 2 85       2.  Coronary artery disease due to calcified coronary lesion:  Reports history of 2 heart attacks in the past  This is followed by cardiology  Family history of cardiovascular disease with his mom dying at 62 after her third heart attack    3.  Type 2 diabetes mellitus with hyperglycemia, unspecified whether long term insulin use:  Reports history of type 2 diabetes mellitus  This is followed by primary care    A1c on 1/4/2023 at 5.8%  Metformin 1000 mg twice/day   Lantus 12 units/night   Latest Reference Range & Units 07/07/22 08:30   Glycohemoglobin 4.8 - 5.6 % 6.0 (H)      Latest Reference Range & Units 05/04/23 10:30   Cholesterol,Tot 100 - 199 mg/dL 72 (L)   Triglycerides 0 - 149 mg/dL 54   HDL >=40 mg/dL 28 !   LDL <100 mg/dL 33     4.  Gastroparesis:  Reports  history of gastroparesis for the past 18 to 20 years  This is followed by primary care  This condition was diagnosed when he was living in [facility] after a gastric emptying scintigraphy was done  He was prescribed Reglan originally but after reading the side effects of Tardive Dyskinesis he decided not to take it  He was then prescribed Erythromycin and provided relief, he took erythromycin for her gastroparesis management for 18 years and he stopped 3 months ago due to high cost  Currently treatment for gastroparesis is gingerroot tablets    He reports that when his free T4 and free T3 are mid normal levels, his gastroparesis is better controlled    5.  Vitamin D deficiency:     Latest Reference Range & Units 05/04/23 10:29   25-Hydroxy   Vitamin D 25 30 - 100 ng/mL 37       7.  Elevated liver enzymes:  Elevated liver enzyme and blood panel  Denies heavy alcohol consumption   Latest Reference Range & Units 05/04/23 10:29   AST(SGOT) 12 - 45 U/L 33   ALT(SGPT) 2 - 50 U/L 26      Latest Reference Range & Units 10/26/22 05:10   AST(SGOT) 0 - 40 IU/L 47 (H)   ALT(SGPT) 0 - 44 IU/L 57 (H)       Patient's medications, allergies, and social histories were reviewed and updated as appropriate.      ROS:     CONS:     No fever, no chills, no weight loss, no fatigue   EYES:      No diplopia, no blurry vision, no redness of eyes, no swelling of eyelids   ENT:    No hearing loss, No ear pain, No sore throat, no dysphagia, no neck swelling   CV:     No chest pain, no palpitations, no claudication, no orthopnea, no PND   PULM:    No SOB, no cough, no hemoptysis, no wheezing    GI:   No nausea, no vomiting, no diarrhea, no constipation, no bloody stools   :  Passing urine well, no dysuria, no hematuria   ENDO:   No polyuria, no polydipsia, no heat intolerance, no cold intolerance   NEURO: No headaches, no dizziness, no convulsions, no tremors   MUSC:  No joint swellings, no arthralgias, no myalgias, no weakness    SKIN:   No rash, no ulcers, no dry skin   PSYCH:   No depression, no anxiety, no difficulty sleeping       Past Medical History:  Patient Active Problem List    Diagnosis Date Noted    Aortic atherosclerosis ([facility]) 02/07/2023    Abnormal liver function 01/07/2023    Aortic insufficiency 12/03/2021    Ascending aorta dilatation ([facility]) 11/19/2021    Stented coronary artery 10/01/2020    Mild non proliferative diabetic retinopathy ([facility]) 06/04/2019    PVD (peripheral vascular disease) ([facility]) 12/28/2017    Atherosclerosis of native coronary artery with angina pectoris, unspecified whether native or transplanted heart ([facility])     History of MI (myocardial infarction) 08/06/2015    Tobacco abuse 07/29/2015    Dyslipidemia 05/07/2013    Insomnia 04/18/2012    Preventative health care 12/09/2010    History of shoulder pain 12/09/2010    Diabetes mellitus type 2, controlled ([facility]) 09/10/2010    History of allergic rhinitis 08/21/2010    Gastroparesis 08/21/2010    S/p green light photovaporization prostate 08/21/2010    Interstitial cystitis 08/21/2010    Postpolio syndrome 08/21/2010    Hypothyroid 08/21/2010    On angiotensin-converting enzyme (ACE) inhibitors (for diabetes, not HTN) 08/21/2010       Past Surgical History:  Past Surgical History:   Procedure Laterality Date    ZZZ CARDIAC CATH  8/6/15    YESIKA to LAD.  Has RCA 70% occulsion.    APPENDECTOMY      OTHER      L shoulder surgery (arthroscopic, [place], 2011)    [place] TRANSURETHRAL ELEC-SURG PROSTATECTOM          Allergies:  Brilinta [ticagrelor]; Pcn [penicillins]; Other environmental; Tdap [dipth, acell pertus, tetanus]; and Tetanus toxoid     Current Medications:    Current Outpatient Medications:     ALPRAZolam (XANAX) 0.25 MG Tab, Take 0.25 mg by mouth at bedtime as needed for Sleep., Disp: , Rfl:     liothyronine (CYTOMEL) 25 MCG Tab, Take 1/2 (one-half) tablet by mouth twice daily, Disp: 90 Tablet, Rfl: 0    levothyroxine (SYNTHROID) 112 MCG Tab, TAKE 1 TABLET BY  MOUTH IN THE MORNING ON AN EMPTY STOMACH, Disp: 90 Tablet, Rfl: 0    lisinopril (PRINIVIL) 2.5 MG Tab, Take 1 Tablet by mouth every day., Disp: 90 Tablet, Rfl: 3    atorvastatin (LIPITOR) 80 MG tablet, Take 1 Tablet by mouth every day., Disp: 90 Tablet, Rfl: 3    glucose blood (RELION PRIME TEST) strip, USE 1 STRIP TO CHECK GLUCOSE ONCE DAILY BEFORE A MEAL. E11.9, Disp: 100 Strip, Rfl: 11    RELION PEN NEEDLES 31G X 6 MM Misc, USE 1 NEEDLE ONCE DAILY WITH  LANTUS  PEN, Disp: 100 Each, Rfl: 2    ReliOn Ultra Thin Lancets 30G Misc, USE 1 LANCET  ONCE DAILY, Disp: 100 Each, Rfl: 2    metformin (GLUCOPHAGE) 1000 MG tablet, TAKE 1 TABLET BY MOUTH TWICE DAILY WITH MEALS, Disp: 180 Tablet, Rfl: 3    aspirin (ASA) 81 MG Chew Tab chewable tablet, Chew 1 Tablet every day., Disp: 100 Tablet, Rfl: 11    Carolee, Zingiber officinalis, (CAROLEE PO), Take  by mouth., Disp: , Rfl:     LANTUS SOLOSTAR 100 UNIT/ML Solution Pen-injector injection, INJECT 12 UNITS SUBCUTANEOUSLY ONCE DAILY IN THE EVENING AS  INSTRUCTED, Disp: 15 mL, Rfl: 11    Insulin Pen Needle 31G X 6 MM Misc, , Disp: , Rfl:     B-D UF III MINI PEN NEEDLES 31G X 5 MM Misc, USE PEN ONCE DAILY WITH LANTUS PEN, Disp: , Rfl:     Insulin Pen Needle 31G X 6 MM Misc, , Disp: , Rfl:     Cholecalciferol (VITAMIN D3 PO), Take 1 Dose by mouth every day. Unknown OTC Strength., Disp: , Rfl:     erythromycin base (UMM-TAB) 250 MG Tab, Take 250 mg by mouth every day. Maintenance Medication. (Patient not taking: Reported on 6/8/2022), Disp: , Rfl:     Riboflavin (VITAMIN B-2 PO), Take 1 Dose by mouth every day. Unknown OTC Strength., Disp: , Rfl:     Social History:  Social History     Socioeconomic History    Marital status: Single     Spouse name: Not on file    Number of children: Not on file    Years of education: Not on file    Highest education level: Not on file   Occupational History    Not on file   Tobacco Use    Smoking status: Every Day     Packs/day: 1.00     Years: 30.00  "    Pack years: 30.00     Types: Cigarettes    Smokeless tobacco: Never   Vaping Use    Vaping Use: Never used   Substance and Sexual Activity    Alcohol use: No     Alcohol/week: 0.0 oz    Drug use: No    Sexual activity: Not on file   Other Topics Concern    Not on file   Social History Narrative    Not on file     Social Determinants of Health     Financial Resource Strain: Not on file   Food Insecurity: Not on file   Transportation Needs: Not on file   Physical Activity: Not on file   Stress: Not on file   Social Connections: Not on file   Intimate Partner Violence: Not on file   Housing Stability: Not on file        Family History:   Family History   Problem Relation Age of Onset    Heart Disease Mother     Heart Attack Mother 55        heart attack         PHYSICAL EXAM:   Vital signs: /64 (BP Location: Left arm, Patient Position: Sitting, BP Cuff Size: Adult)   Pulse 84   Ht 1.702 m (5' 7\")   Wt 62.6 kg (138 lb)   SpO2 98%   BMI 21.61 kg/m²   GENERAL: Well-developed, well-nourished  in no apparent distress.     Labs:  Lab Results   Component Value Date/Time    CHOLSTRLTOT 83 (L) 01/04/2022 0736    TRIGLYCERIDE 52 01/04/2022 0736    HDL 31 (L) 01/04/2022 0736    LDL 36 12/07/2020 1051       Lab Results   Component Value Date/Time    TSHULTRASEN 0.010 (L) 05/05/2022 1004     Lab Results   Component Value Date/Time    FREET4 1.44 05/05/2022 1004     Lab Results   Component Value Date/Time    FREET3 3.50 05/05/2022 1004           Imaging:      ASSESSMENT/PLAN:   1. Hypothyroidism, acquired  Clinically stable  Biochemically his TSH is mildly suppressed with normal free T4 and free T3  Discussed risk of thyroid overtreatment such as cardiovascular disease and osteoporosis  Patient would like to stay on the same dose-see HPI  - TSH; Future  - FREE THYROXINE; Future  - Comp Metabolic Panel; Future  - liothyronine (CYTOMEL) 25 MCG Tab; Take 0.5 Tablets by mouth 2 times a day.  Dispense: 360 Tablet; Refill: " 0  - levothyroxine (SYNTHROID) 112 MCG Tab; Take 1 Tablet by mouth every morning on an empty stomach.  Dispense: 360 Tablet; Refill: 0    2. Coronary artery disease due to calcified coronary lesion  Unstable  Followed by cardiology    3. Type 2 diabetes mellitus with hyperglycemia, without long-term current use of insulin (HCC)  Controlled with an A1c of 5.8%    4. Gastroparesis  Unstable  Followed by PCP    5. Vitamin D deficiency  Stable  - VITAMIN D,25 HYDROXY (DEFICIENCY); Future    6. Elevated liver enzymes  Resolved    Disposition: Make an appointment to follow-up in 1 year  Do your blood work 2 weeks prior to appointment      Thank you kindly for allowing me to participate in the thyroid care plan for this patient.    Chapito Nova, [email or web address]  5/22/2023    CC:   Charbel Jaffe M.D.

## 2023-05-23 DIAGNOSIS — E03.9 HYPOTHYROIDISM, ACQUIRED: ICD-10-CM

## 2023-06-12 ENCOUNTER — PATIENT MESSAGE (OUTPATIENT)
Dept: MEDICAL GROUP | Facility: MEDICAL CENTER | Age: 73
End: 2023-06-12
Payer: MEDICARE

## 2023-06-12 DIAGNOSIS — E78.5 DYSLIPIDEMIA: Chronic | ICD-10-CM

## 2023-06-12 DIAGNOSIS — E11.65 CONTROLLED TYPE 2 DIABETES MELLITUS WITH HYPERGLYCEMIA, WITHOUT LONG-TERM CURRENT USE OF INSULIN (HCC): Chronic | ICD-10-CM

## 2023-06-24 ENCOUNTER — HOSPITAL ENCOUNTER (OUTPATIENT)
Dept: LAB | Facility: MEDICAL CENTER | Age: 73
End: 2023-06-24
Attending: INTERNAL MEDICINE
Payer: MEDICARE

## 2023-06-24 DIAGNOSIS — E78.5 DYSLIPIDEMIA: Chronic | ICD-10-CM

## 2023-06-24 DIAGNOSIS — E11.65 CONTROLLED TYPE 2 DIABETES MELLITUS WITH HYPERGLYCEMIA, WITHOUT LONG-TERM CURRENT USE OF INSULIN (HCC): Chronic | ICD-10-CM

## 2023-06-24 LAB
ALBUMIN SERPL BCP-MCNC: 4.1 G/DL (ref 3.2–4.9)
ALP SERPL-CCNC: 115 U/L (ref 30–99)
ALT SERPL-CCNC: 36 U/L (ref 2–50)
APPEARANCE UR: CLEAR
AST SERPL-CCNC: 39 U/L (ref 12–45)
BASOPHILS # BLD AUTO: 0.7 % (ref 0–1.8)
BASOPHILS # BLD: 0.04 K/UL (ref 0–0.12)
BILIRUB CONJ SERPL-MCNC: <0.2 MG/DL (ref 0.1–0.5)
BILIRUB INDIRECT SERPL-MCNC: ABNORMAL MG/DL (ref 0–1)
BILIRUB SERPL-MCNC: 0.4 MG/DL (ref 0.1–1.5)
BILIRUB UR QL STRIP.AUTO: NEGATIVE
COLOR UR: YELLOW
CREAT UR-MCNC: 37.48 MG/DL
EOSINOPHIL # BLD AUTO: 0.07 K/UL (ref 0–0.51)
EOSINOPHIL NFR BLD: 1.2 % (ref 0–6.9)
ERYTHROCYTE [DISTWIDTH] IN BLOOD BY AUTOMATED COUNT: 52 FL (ref 35.9–50)
EST. AVERAGE GLUCOSE BLD GHB EST-MCNC: 114 MG/DL
GLUCOSE UR STRIP.AUTO-MCNC: NEGATIVE MG/DL
HBA1C MFR BLD: 5.6 % (ref 4–5.6)
HCT VFR BLD AUTO: 45.9 % (ref 42–52)
HGB BLD-MCNC: 14.9 G/DL (ref 14–18)
IMM GRANULOCYTES # BLD AUTO: 0.01 K/UL (ref 0–0.11)
IMM GRANULOCYTES NFR BLD AUTO: 0.2 % (ref 0–0.9)
KETONES UR STRIP.AUTO-MCNC: NEGATIVE MG/DL
LEUKOCYTE ESTERASE UR QL STRIP.AUTO: NEGATIVE
LYMPHOCYTES # BLD AUTO: 1.33 K/UL (ref 1–4.8)
LYMPHOCYTES NFR BLD: 23.6 % (ref 22–41)
MCH RBC QN AUTO: 30 PG (ref 27–33)
MCHC RBC AUTO-ENTMCNC: 32.5 G/DL (ref 32.3–36.5)
MCV RBC AUTO: 92.5 FL (ref 81.4–97.8)
MICRO URNS: NORMAL
MICROALBUMIN UR-MCNC: <1.2 MG/DL
MICROALBUMIN/CREAT UR: NORMAL MG/G (ref 0–30)
MONOCYTES # BLD AUTO: 0.5 K/UL (ref 0–0.85)
MONOCYTES NFR BLD AUTO: 8.9 % (ref 0–13.4)
NEUTROPHILS # BLD AUTO: 3.68 K/UL (ref 1.82–7.42)
NEUTROPHILS NFR BLD: 65.4 % (ref 44–72)
NITRITE UR QL STRIP.AUTO: NEGATIVE
NRBC # BLD AUTO: 0 K/UL
NRBC BLD-RTO: 0 /100 WBC (ref 0–0.2)
PH UR STRIP.AUTO: 5.5 [PH] (ref 5–8)
PLATELET # BLD AUTO: 195 K/UL (ref 164–446)
PMV BLD AUTO: 11.2 FL (ref 9–12.9)
PROT SERPL-MCNC: 6.8 G/DL (ref 6–8.2)
PROT UR QL STRIP: NEGATIVE MG/DL
RBC # BLD AUTO: 4.96 M/UL (ref 4.7–6.1)
RBC UR QL AUTO: NEGATIVE
SP GR UR STRIP.AUTO: 1.01
UROBILINOGEN UR STRIP.AUTO-MCNC: 0.2 MG/DL
WBC # BLD AUTO: 5.6 K/UL (ref 4.8–10.8)

## 2023-06-24 PROCEDURE — 36415 COLL VENOUS BLD VENIPUNCTURE: CPT

## 2023-06-24 PROCEDURE — 81003 URINALYSIS AUTO W/O SCOPE: CPT

## 2023-06-24 PROCEDURE — 82570 ASSAY OF URINE CREATININE: CPT

## 2023-06-24 PROCEDURE — 82043 UR ALBUMIN QUANTITATIVE: CPT

## 2023-06-24 PROCEDURE — 83036 HEMOGLOBIN GLYCOSYLATED A1C: CPT | Mod: GA

## 2023-06-24 PROCEDURE — 85025 COMPLETE CBC W/AUTO DIFF WBC: CPT

## 2023-06-24 PROCEDURE — 80076 HEPATIC FUNCTION PANEL: CPT

## 2023-07-06 ENCOUNTER — OFFICE VISIT (OUTPATIENT)
Dept: MEDICAL GROUP | Facility: MEDICAL CENTER | Age: 73
End: 2023-07-06
Payer: MEDICARE

## 2023-07-06 VITALS
SYSTOLIC BLOOD PRESSURE: 110 MMHG | TEMPERATURE: 98.3 F | RESPIRATION RATE: 16 BRPM | HEIGHT: 66 IN | HEART RATE: 77 BPM | OXYGEN SATURATION: 98 % | WEIGHT: 140 LBS | DIASTOLIC BLOOD PRESSURE: 58 MMHG | BODY MASS INDEX: 22.5 KG/M2

## 2023-07-06 DIAGNOSIS — E11.9 DIABETIC EYE EXAM (HCC): ICD-10-CM

## 2023-07-06 DIAGNOSIS — Z01.00 DIABETIC EYE EXAM (HCC): ICD-10-CM

## 2023-07-06 PROCEDURE — 3074F SYST BP LT 130 MM HG: CPT | Performed by: INTERNAL MEDICINE

## 2023-07-06 PROCEDURE — 99214 OFFICE O/P EST MOD 30 MIN: CPT | Performed by: INTERNAL MEDICINE

## 2023-07-06 PROCEDURE — 3078F DIAST BP <80 MM HG: CPT | Performed by: INTERNAL MEDICINE

## 2023-07-06 RX ORDER — INSULIN GLARGINE-YFGN 100 [IU]/ML
INJECTION, SOLUTION SUBCUTANEOUS
COMMUNITY
Start: 2023-06-19 | End: 2023-10-18

## 2023-07-06 ASSESSMENT — FIBROSIS 4 INDEX: FIB4 SCORE: 2.433333333333333333

## 2023-07-06 NOTE — PROGRESS NOTES
Subjective     Orlando Arreguin is a 73 y.o. male who presents with Follow-Up (results)            HPI      Here for follow up diabetes, patient has been checking his blood sugars twice a day, brings his blood sugar logs from January through this week.  Blood sugars have been stable running 70s to 80s on Lantus 12 units, metformin 1000 mg twice daily, last A1c June 21 was 5.6%.  Kept active during the winter with a large amount of snow, he had to keep busy with his snowblower and shoveling snow since he has quite a bit of property to care for and during the summer months he has been doing more weeding.  Blood pressure typically runs in the 100 depending on his activity level.  Hypothyroid on replacement per endocrinology, most recent visit 5/22/23 endocrine note mildly suppressed with normal free T3 and free T4 understanding risk of overtreatment, continues on cytomel 25 mcg 1.2 tab bid and synthroid 112 mcg daily   Blood pressure running 100s but depends on activity remaining on low-dose lisinopril, dyslipidemia followed by cardiology on atorvastatin 80 mg, recent cardiology note from May with no changes.  Hypothyroid on replacement per endocrine   Tobacco a bit less than 1 pack/day  Unfortunately his cat passed away, his cat was 16 years old and he had had the cat for 14 years from the animal shelter and his cat was an excellent .          Current Outpatient Medications   Medication Sig Dispense Refill    metformin (GLUCOPHAGE) 1000 MG tablet TAKE 1 TABLET BY MOUTH TWICE DAILY WITH MEALS 180 Tablet 3    RELION PEN NEEDLES 31G X 6 MM Misc USE 1 NEEDLE ONCE DAILY WITH LANTUS  Each 11    ReliOn Ultra Thin Lancets 30G Misc USE 1 LANCET ONCE DAILY 100 Each 11    insulin glargine (LANTUS SOLOSTAR) 100 UNIT/ML Solution Pen-injector injection Inject 12 Units under the skin every evening. 15 mL 11    liothyronine (CYTOMEL) 25 MCG Tab Take 0.5 Tablets by mouth 2 times a day. 360 Tablet 0    levothyroxine  (SYNTHROID) 112 MCG Tab Take 1 Tablet by mouth every morning on an empty stomach. 360 Tablet 0    ALPRAZolam (XANAX) 0.25 MG Tab Take 0.25 mg by mouth at bedtime as needed for Sleep.      lisinopril (PRINIVIL) 2.5 MG Tab Take 1 Tablet by mouth every day. 90 Tablet 3    atorvastatin (LIPITOR) 80 MG tablet Take 1 Tablet by mouth every day. 90 Tablet 3    glucose blood (RELION PRIME TEST) strip USE 1 STRIP TO CHECK GLUCOSE ONCE DAILY BEFORE A MEAL. E11.9 100 Strip 11    aspirin (ASA) 81 MG Chew Tab chewable tablet Chew 1 Tablet every day. 100 Tablet 11    Ginger, Zingiber officinalis, (GINGER PO) Take  by mouth.      Insulin Pen Needle 31G X 6 MM Misc       B-D UF III MINI PEN NEEDLES 31G X 5 MM Misc USE PEN ONCE DAILY WITH LANTUS PEN      Insulin Pen Needle 31G X 6 MM Misc       Cholecalciferol (VITAMIN D3 PO) Take 1 Dose by mouth every day. Unknown OTC Strength.      erythromycin base (UMM-TAB) 250 MG Tab Take 250 mg by mouth every day. Maintenance Medication. (Patient not taking: Reported on 6/8/2022)      Riboflavin (VITAMIN B-2 PO) Take 1 Dose by mouth every day. Unknown OTC Strength.       No current facility-administered medications for this visit.         Tobacco  7/31/15 tobacco 1 ppd not interested in stopping smoking classes or education  8/28/15 tobacco 1 ppd not interested in stopping smoking classes or education  8/28/15 declines PFT    11/30/15 still smoking 20 cigs per day declines stop smoking classes or medications  5/31/16 declines stop smoking classes and medications, not interested in stop smoking classes, smoking ppd x 40 plus years, declines CT lung cancer screening, pulmonary function testing  11/29/16 still smoking 1 ppd, started smoking age 20, declines stop smoking classes and medication, declines lung cancer screening program and PFT  5/30/17 still smoking 1 ppd declines stop smoking classes and medications, and lung cancer screening program and PFT  12/5/17 still smokes 1 ppd declines stop  smoking classes and medications, and lung cancer screening program and PFT   6/12/18  still smokes 1 ppd declines stop smoking classes and medications, and lung cancer screening program and PFT   6/18/19 still smokes 1 ppd declines stop smoking classes and medications, and lung cancer screening program and PFT   12/10/19 still smoking 1 pack/day, declines stop smoking classes, medications, lung cancer screening, PFT  7/1/20 cardiology note stable, urged to quit smoking, follow-up 3 months  12/15/20 tobacco 1/2 ppd not interested to stop smoking classes, medications, CT lung cancer screening, or PFTs  6/14/21 1 ppd tobacco, declines stop smoking classes, CT lung cancer screening, PFT  1/10/22 1 ppd tobacco, declines stop smoking classes, CT lung cancer screening, PFT  7/14/22 declines stop smoking classes  1/12/23 1 ppd tobacco, declines stop smoking classes, CT lung cancer screening, PFT      s/p greenlight photo vaporization  10/04 cystoscopy and green light photovaporization of prostate in [place], [place]  1/5/22 psa 1.4     Preventative health  8/23/15 pneumovax  1/12/16 zoster vaccine at [facility]  12/7/20 hep c ab negative  12/15/20 declines tdap  12/15/20 declines shingrix  12/15/20 declines colonoscopy  12/8/21 covid pfizer booster  7/8/22 psa 1.5  7/14/22 prevnar 20  5/4/23 vit d 37     Postpolio syndrome  6/04 saw neurology in [place] , notes indicate chronic non specific complaints with normal brain MRI, no evidence to support post polio syndrome.     pad  12/22/17 ultrasound carotid less than 50% stenosis bilateral  12/28/17 JOSSUE lower extremities, normal at rest, post exercise right JOSSUE 0.8, left 0.8 mild-to-moderate arterial disease bilateral  12/28/17 ultrasound vascular lower extremities, no arterial occlusive disease from common femoral to popliteal bilateral, 50-75% stenosis proximal right external iliac, 50-75% stenosis distal left external iliac, patent right tibial arteries, left posterior  tibial appears occluded distally, normal flow left anterior tibial, peroneal arteries retrograde suggesting proximal occlusion  7/11/19 ultrasound arterial lower extremities, duplex right stenosis distal external iliac greater than 50%, elevated velocities proximal portion posterior tibial and peroneal, left greater than 50% stenosis mid external iliac artery, occluded proximal portion posterior tibial artery with distal reconstitution ankle, short segment occlusion mid peroneal artery right JOSSUE 1.09, left JOSSUE 1.07  7/14/19 referral vascular surgery placed  12/10/19 declines vascular surgery follow-up asymptomatic  7/1/20 cardiology note stable, urged to quit smoking, follow-up 3 months  6/14/21 asymptomatic declines follow-up testing, declines vascular surgery evaluation  1/10/22 recommend referral to vascular surgery but he declines   7/14/22 declines vascular surgery evaluation  1/12/23 declines vascular surgery evaluation     On ACE inhibitor  4/04 p.thallium no ischemia EF 62%  12/10 p.thallium no ischemia, EF 59%  5/9/11 rec ACE instead of atenolol patient declines  10/17/11 discontinued atenolol, rec start lotensin 10 mg; he declines  5/7/13 echo normal LV function, EF 60%, grade 1 diastolic dysfunction  5/7/13 p.thallium fixed defect mid inferior, inferoseptal, mid inferior walls suggest subdiaphragmatic uptake or prior infarction, no ischemia noted, EF 57%  8/8/15 hospital discharge on coreg 12.5 mg bid,lisinopril 5 mg, brilinta 90 mg bid, asa 81 mg,lipitor 80 mg  8/26/15 cardiology note, decrease coreg to 6.25 mg bid due to lower blood pressure continue lisinopril 5 mg    8/28/15 decrease lisinopril to 2.5 mg daily and cont coreg 6.25 mg bid  11/24/15 urine mac<2.5 on lisinopril 2.5 mg and coreg 6.25 mg bid  4/12/16 cardiology note decrease coreg to 3.125 mg bid consider discontinuation of coreg next evaluation and continue lisinopril 2.5 mg  5/20/16 urine mac <3 on lisinopril 2.5 mg  7/19/16 cardiology  note clinically stable, episodes of chest tightness related to stress, blood pressure running low, discontinue carvedilol, follow-up in a few months  11/23/16 urine mac 3.2 on lisinopril 2.5 mg  11/29/17 urine mac<4 on lisinopril 2.5 mg  6/7/18 urine mac 3.8 on lisinopril 2.5 mg  6/10/19 urine mac<0.7 on lisinopril 2.5 mg  12/5/19 urine mac<0.7 on lisinopril 2.5 mg  6/11/20 urine mac<1.2 on lisinopril 2.5 mg  12/7/20 urine mac<1.2 on lisinopril 2.5 mg  6/8/21 urine mac<3 on lisinopril 2.5 mg  7/8/22 urine mac<10 on lisinopril 2.5 mg  1/4/23 urine mac 3 on lisinopril 2.5 mg     mild nonproliferative diabetic retinopathy  6/4/19  eye exam, mild nonproliferative retinopathy  2/14/22  eye exam mild nonproliferative retinopathy without macular edema     Interstitial cystitis  5/02 urology note [facility] nonbacterial prostatitis vs interstitial cystitis given trial of flomax     Insomnia on Xanax as needed  On xanax 0.25 mg at night prn  3/8/18 refill xanax  5/30/19 refill xanax  11/22/20 refill xanax  11/22/20   8/15/21 refill xanax  2/2/23 refill xanax     Hypothyroid  10/08 tsh 0.126,free t4 1.4,free t3 2.7  8/10 tsh 0.199 taking levothyroxine 0.125 6 days a week, and 2 tabs on john  9/10/10 increase to levothyroxine 0.137 mg daily, repeat tsh in 6 weeks  10/10 tsh 0.9  12/10 tsh 1.2, free t4 1.4, free t3 2.9  4/11 tsh 0.8, thyroxine 9.6, free thyroxine uptake 3.4, free t3 uptake 35%  9/11 tsh 0.5, free t4 0.9, free t3 2.4 on levothyroxine 137 mcg  12/11 tsh 0.3,free t4 1.5,free t3 2.7 on levothyroxine 137 mcg  4/16/13 tsh 0.28,free t4 1.1, free t3 2.3 (2.4-4.2); on levothyroxine 137 mcg; change to armour thyroid 60 mg qday  5/6/13 tsh 0.3, free t4 1.0, free t3 2.5 on levothyroxine 137 mcg ? Change to armour 60 mg  5/9/13  note decrease levothyroxine to 125 mcg and add cytomel 5 mg bid  6/13 tsh 0.45, free t4 1.2, free t3 2.9, on synthroid 125 mcg and cytomel 5 mcg bid per    8/14/13 tsh 0.235,free t4 1.1,free t3 2.8 on synthroid 125 mcg and cytomel 5 mcg bid per ;change to synthroid 150 mcg and stop cytomel per pt request  10/16/13 tsh 0.4, free t4 1.34, free t3 2.2 on synthroid 150 mcg  10/23/13  endo note; change to synthroid 125 mcg and cytomel 5 mcg daily  4/16/14 tsh 0.67,free t4 1.0,free t3 2.4  4/23/14  endocrine note, increase levothyroxine to 137 mcg and cont cytomel 5 mcg  10/28/14  endocrine note, tsh 0.6,free t4 1.0, free t3 2.7 on thyroxine 137 mcg and cytomel 5 mcg; gastroparesis symptoms improve with cytomel, will reduce thryoxine to 125 mcg and use cytomel 25 mcg to cut and take more than once per day as needed, return in 4 months  7/3/15 tsh 0.04, free t4 0.6 and free t3 3.0 on levothyroxine 125 mcg and cytomel 12.5 mcg daily  7/21/15  endocrine note; on levothyroxine 125 mcg and cytomel 12.5 mcg daily, tsh 0.04, free t4 0.6 and free t3 3.0, patient does not want to change dose, no changes continue same dosing regimen; follow up 6 months  8/8/15  endocrine phone note, decrease levothyroxine to 112 mcg daily, continue cytomel 12.5 mg daily  1/13/16 tsh 0.016,free t4 0.9,free t3 2.5 on levothyroxine 112 mcg and cytomel 12.5 mg daily  1/22/16  endocrinology note on levothyroxine 112 mcg and cytomel 12.5 mg daily, patient declines to change dosing regimen  4/5/16 tsh 0.012,free t4 0.97,free t3 3.6 on levothyroxine 112 mcg and cytomel 12.5 mg daily  4/21/16  endocrinology note, no changes  10/13/16 tsh 0.14,free t4 0.87,free t3 3.1 on levothyroxine 112 mcg and cytomel 25 mg  4/17/17 tsh 0.016,free t4 0.9,free t3 4.0 on levothyroxine 112 mcg and cytomel 25 mg  4/19/17  endocrinology note no changes follow up 6 months  10/18/17  endocrine note no changes  4/12/18 tsh 0.012, free t4 0.99, free t3 4.7 on levothyroxine 112 mcg and cytomel 25 mg  4/18/18   endocrinology note, on levothyroxine 112 mcg daily and cytomel 12.5 mcg bid tsh 0.01, free 4 0.9, free t3 4.7, clinically feeling fine, no changes  10/15/18 endocrinology note repeat labs follow-up 6 months  10/8/19 tsh 0.010, free t4 0.80, free t3 2.9 on levothyroxine 112 mcg daily and cytomel 12.5 mcg bid per endocrinology  10/14/19 endocrinology note, continue same thyroid medication dosing no changes follow-up 6 months  4/20/20 endocrinology note patient not willing to adjust his thyroid as it has been successful in controlling gastroparesis, follow-up 6 months  6/12/20 tsh 0.006 on levothyroxine 112 mcg daily and cytomel 12.5 mcg bid per endocrinology  12/7/20 tsh 0.009, free t4 1.23, t3 187 () on levothyroxine 112 mcg daily and cytomel 12.5 mcg bid per endocrinology  4/16/21 tsh<0.005,free t4 1.3,free t3 2.9  5/4/21 endocrinology note on levothyroxine 112 mcg and cytomel 12.5 mcg bid, continue and follow up 6 months  11/3/21 tsh<0.005,free t4 1.27,free t3 3.4  1/5/21 A1c 5.8% on lantus 12 units and metformin 1000 mg bid on levothyroxine 112 mcg and liothyronine 12.5 mcg bid, patient states that the thyroid dose is beneficial to her self and is reluctant to decrease the dose understanding that hyperthyroid can increase bone loss  11/10/21  endocrinology note he is retiring, follow-up with nurse practitioner  5/5/22 tsh 0.01,free t4 1.4,free t3 3.5 on levothyroxine 112 mcg and cytomel 25 mcg 1/2 bid per endocrine  5/13/22 endocrine note on levothyroxine 112 mcg and cytomel 25 mcg 1/2 bid discussed risk of decreased TSH and cardiovascular disease, osteoporosis, patient would like to remain on current regimen, follow-up 6 months  10/26/22 tsh 0.007,free t4 1.28,free t3 3.0 on synthroid 112 mcg and cytomel 25 mcg 1/2 bid per endocrinology   10/25/22 salivary cortisol 0.062, ACTH 5.4,prolactin 5.6,IGF 82 per endocrinology  5/4/23 tsh 0.01,free t4 1.15,free t3 2.99 on synthroid 112 mcg  and cytomel 25 mcg 1/2 bid per endocrinology    5/22/23 endocrine note mildly suppressed with normal free T3 and free T4 understanding risk of overtreatment, continues on cytomel 25 mcg 1.2 tab bid and synthroid 112 mcg daily     History shoulder pain  12/9/10 left shoulder pain,  note MRI pending  2/11 MRI left shoulder [facility] mild to moderate rotator cuff tendinopathy, adhesive capsulitis, small anterior and posterior labral tears  7/11 [facility] note  left shoulder adhesive capsulitis rec decompression  12/8/15 xray right shoulder at [facility] mild hydroxyapatite deposition adjacent to the greater tuberosity, no evidence of significant arthritis  5/11/16 MRI right shoulder thickening and increased signal with synovitis, suggestive of adhesive capsulitis, tendinopathy supraspinatus and infraspinatus, fatty atrophy paraspinous muscle  1/4/17 [facility] discharge note     History MI  8/6/15 hospital admission non-STEMI inverted T waves in aVL, ST depression in lead 1, initial troponin 3.4  8/6/15   8/6/15 cardiac catheterization left main free of disease, triple vessel disease prominently involving distal segment nature epicardial vessels and branches, 95% ostial LAD descending artery stenosis and 70% mid RCA stenosis, ejection fraction 35-40%, stenting ostial LAD with xience drug-eluting stent  8/7/15 echo normal LV function EF 65-70%, grade 1 diastolic dysfunction, moderate concentric LVH, mild MR and AR  8/8/15 hospital discharge on coreg 12.5 mg bid,lisinopril 5 mg, brilinta 90 mg bid, asa 81 mg,lipitor 80 mg  8/11/15 cardiology note; continue brilenta and asa for one year, some dyspnea, if no improvement could consider another antiplatelet therapy, will recheck BNP in 2 weeks with followup visit, ultimately will need myocardial perfusion scan but will defer for a few months  8/26/15 cardiology note, decrease coreg to 6.25 mg bid due to lower blood pressure,continue lisinopril 5 mg, asa, lipitor,start  effient 10 mg for one year due to brilinta causing shortness of breath, followup 2 months  10/14/15 cardiology note no chnges, repeat myocardial perfusion scan 3 months  11/30/15 cardiac rehab program  1/13/15 cardiology note nitroglycerin when necessary follow-up 3 months  1/12/16 persantine thallium small basal anterior inferior infarct with small ame-infarct ischemia, moderately sized moderate severity inferior, inferior lateral infarct with reversible ischemia  4/12/16 cardiology note decrease coreg to 3.125 mg bid consider discontinuation of coreg next evaluation and continue lisinopril 2.5 mg  7/19/16 cardiology note clinically stable, episodes of chest tightness related to stress, blood pressure running low, discontinue carvedilol, follow-up in a few months  11/3/16 cardiology note, isosorbide mononitrate with heavy exertion, follow-up in a few months to determine if further evaluation is necessary, could consider repeat perfusion study or dobutamine stress echo  1/5/17 cardiology note stable CAD, follow-up 6 months  7/12/17 cardiology note, likely stable discussed smoking cessation, patient declines to quit smoking, follow-up one year  12/22/17 ultrasound carotid less than 50% stenosis bilateral  7/23/18 cardiology note asymptomatic, increased stress however, continues to smoke, follow-up 1 year continue atorvastatin plus erythromycin  7/15/19 cardiology note stable continues to smoke advised to quit follow-up 1 year  6/19/20 hospital admission, 6/21/20 hospital discharge, admission for chest pain, cardiac catheterization performed, drug-eluting stent placement of RCA  6/19/20 echo mild concentric LVH, EF 65%, subtle inferior hypokinesis, normal diastolic function  6/19/20 cardiac catheterization left main mild distal tapering 10% stenosis, stent distal part widely patent, LAD widely patent ostial stent, diffuse moderate stenosis distal LAD and small diagonal branches, circumflex tortuous origin 50%, obtuse  marginal 1 occluded fills by collaterals stable compared to 2015, obtuse marginal 2 diffusely diseased with mild stenosis lateral wall, distal circumflex/obtuse marginal 3 is small with severe stenosis terminal segment, dominant RCA 40% proximal stenosis, 70% mid vessel stenosis, 40% distal stenosis, PDA multiple 50 to 70% stenosis mid and distal segments, successful mid RCA YESIKA placement, preintervention 70% stenosis, post intervention 0% residual stenosis  7/1/20 cardiology note stable, urged to quit smoking, follow-up 3 months  12/1/20 cardiology note side effects from metoprolol dry skin and cold fingers having already decreased from 25 mg to 12.5 mg daily, will discontinue metoprolol, continue asa, effient, lipitor, lisinopril  3/9/21 cardiology note consider increasing lisinopril, follow-up 6 months back to discontinue effient at that time  10/5/21 myocardial perfusion study small fixed defect no ischemia, EF 47%  11/19/21 echo mild LVH, EF 60%, moderate aortic insufficiency, ascending aortic dilation 4.0 cm  12/3/21  cardiology note recommend routine stress testing for left main PCI, patient would like to stop effient and has been 1 year since his stent was placed, patient understands that DAPT is standard treatment but patient would like to be off that medication, okay to discontinue effient and continue aspirin 81 mg indefinitely, follow-up 1 year  6/8/22  cardiology note recommend routine stress testing, and follow-up 6 months  12/13/22 cardiology note ordered zio, echo, stress test   12/21/22 to 1/4/23 zio event monitor predominant rhythm sinus, minimum heart rate 46, maximum heart rate 179, SVT runs fastest 6 beats maximum rate 179, longest 12.3 seconds average rate 109, PVC<1%  1/16/23 echo EF 55%, normal diastolic function, moderate aortic insufficiency, trace MR, no significant change compared to previous  1/18/23 myocardial perfusion study moderate sized area ischemia  anterolateral base to mid LAD, resting ejection fraction 66%, stress ejection fraction 65%   5/12/23 cardiology note recommend stress test and coronary angiogram, patient declined since he is feeling well, recommendation beta-blocker, patient will consider, discussed follow-up aortic regurgitation next visit     Gastroparesis  4/04 gastric emptying study severe gatroparesis done in [place], no hx of DM or MS, started on erythromycin base  5/04 CT thorax in [place] negative  5/04 MRI brain in [place] no evid of MS  4/16/13 declined gastric stimulator  12/5/17 remains on erythromycin  1/12/23 off erythromycin due to cost      Dyslipidemia  5/13 chol 158,trig 204,hdl 36,ldl 81  8/8/15 chol 97,trig 117,hdl 26,ldl 48 started on lipitor 80 mg on hospital discharge  8/21/15 chol 76,trig 85,hdl 24,ldl 35 on lipitor 80 mg  10/7/15 chol 67,trig 77,hdl 22,ldl 30 on lipitor 80 mg per cardiology  4/5/16 chol 64,trig 43,hdl 24,ldl 31 on lipitor 80 mg per cardiology  11/23/16 chol 83,trig 73,hdl 27,ldl 41 on lipitor 80 mg per cardiology  5/24/17 chol 85,trig 81,hdl 28,ldl 41 on lipitor 80 mg per cardiology  10/10/17 pharmacy known interaction with lipitor and erythromycin base, consider changing to crestor, offer made to patient to change to crestor but he would like to discuss with his cardiologist first  11/29/17 chol 72,trig 47,hdl 24,ldl 39 on lipitor 80 mg and erythromycin base, previously recommended changing to crestor because of potential interaction, patient would like to discuss with cardiology first  12/5/17 declines to change from lipitor understanding potential interaction with erythromycin  6/7/18 chol 78,trig 68,hdl 25,ldl 39,cpk 69 on lipitor 80 mg  7/23/18 cardiology note asymptomatic, increased stress however, continues to smoke, follow-up 1 year continue atorvastatin plus erythromycin  12/3/18 chol 81,trig 85,hdl 26,ldl 38 on lipitor 80 mg  6/10/19 chol 64,trig 70,hdl 20,ldl 30 on lipitor 80 mg  6/10/20 chol 81,trig 74,hdl  25,ldl 41 on lipitor 80 mg  12/7/20 chol 72,trig 65,hdl 23,ldl 36 on lipitor 80 mg  6/8/21 chol 81,trig 54,hdl 27,ldl 41 on lipitor 80 mg  1/5/22 chol 83,trig 52,hdl 31,ldl 39 on lipitor 80 mg  6/1/22 chol 72,trig 59,hdl 27,ldl 31 on lipitor 80 mg  7/8/22 chol 73,trig 64,hdl 28,ldl 30 on lipitor 80 mg  12/7/22 chol 84,trig 54,hdl 30,ldl 41 on lipitor 80 mg  1/4/23 chol 71,trig 53,hdl 30,ldl 28 on lipitor 80 mg  5/4/23 chol 72,trig 54,hdl 28,ldl 33 on lipitor 80 mg     Diabetes  11/08 A1c 6.3%  8/10 A1c 7.9%  10/10 A1c 7.7%  12/10 A1c 8.0%  1/11 add metformin 500 mg bid  2/11 A1c 7.9%  4/11 A1c 8.2% increase metformin to 1000 mg bid  10/11 A1c 6.5 %  12/11 A1c 6.1% on metformin 1000 mg bid  4/16/13 A1c 6.2% on metformin 1000 mg bid; declines to check blood sugars at home; bs 194 non fasting; declines ACE  8/14/13 A1c 6.5% on metformin 1000 mg bid  12/9/13  eye exam grade 1 diabetic background retinopathy  4/16/14 A1c 7.0% per  on metformin 1000 mg bid  7/3/15 A1c 8.3% on metformin 1000 mg bid per endocrine   7/31/15 start lantus 5 units and continue metformin 1000 mg bid, declines ACE,statin,asa  8/8/15 hospital discharge on coreg 12.5 mg bid,lisinopril 5 mg, brilinta 90 mg bid, asa 81 mg,lipitor 80 mg; on lantus 8 units and metformin 1000 mg bid  8/28/15 declines nutrition consultation and diabetic education regarding diet  8/28/15 recommend increasing lantus to 10 units and metformin 1000 mg bid  11/24/15 A1c 6.3% on lantus 10 units and metformin 1000 mg bid  11/30/15 on lantus 12 units and metformin 1000 mg bid  5/20/16 A1c 6.3% on lantus 12 units and metformin 1000 mg bid  11/23/16 A1c 6.1% on lantus 12 units and metformin 1000 mg bid  1/9/17  eye exam  5/24/17 A1c 6.3% on lantus 12 units and metformin 1000 mg bid   5/24/17 urine mac 2.5 on lisinopril 2.5 mg  11/29/17 A1c 6.1% on lantus 12 units and metformin 1000 mg bid   6/7/18 A1c 5.9% on lantus 12 units and metformin  1000 mg bid   12/3/18 A1c 6.2% and urine mac< 3on lantus 12 units and metformin 1000 mg bid   6/4/19  eye exam, mild nonproliferative retinopathy  6/10/19 A1c 6.4% on lantus 12 units and metformin 1000 mg bid   12/5/19 A1c 6.2% on lantus 12 units and metformin 1000 mg bid   6/11/20 A1c 6.5% on lantus 12 units and metformin 1000 mg bid   12/7/20 A1c 5.9% on lantus 12 units and metformin 1000 mg bid   6/8/21 A1c 6.0% on lantus 12 units and metformin 1000 mg bid   1/5/22 A1c 5.8% on lantus 12 units and metformin 1000 mg bid   2/14/22  eye exam mild nonproliferative retinopathy without macular edema  7/8/22 A1c 6.0% on lantus 12 units and metformin 1000 mg bid   1/4/23 A1c 5.8% on lantus 12 units and metformin 1000 mg bid   6/24/23 urine mac<1.2  6/21/23 A1c 5.6% on lantus 12 units and metformin 1000 mg bid      ascending aorta dilationa  6/19/20 echo mild concentric LVH, EF 65%, subtle inferior hypokinesis, normal diastolic function  10/5/21 myocardial perfusion study small fixed defect no ischemia, EF 47%  11/19/21 echo mild LVH, EF 60%, moderate aortic insufficiency, ascending aortic dilation 4.0 cm  1/16/23 echo EF moderate aortic insufficiency, ascending aorta 3.9 cm  2/7/23 ultrasound aorta no evidence of AAA, mild ectasia of distal abdominal aorta, atherosclerotic plaque  5/12/23 cardiology note discussed follow-up aortic regurgitation next visit     aortic insufficiency  6/19/20 echo mild concentric LVH, EF 65%, subtle inferior hypokinesis, normal diastolic function  10/5/21 myocardial perfusion study small fixed defect no ischemia, EF 47%  11/19/21 echo mild LVH, EF 60%, moderate aortic insufficiency, ascending aortic dilation 4.0 cm  1/16/23 echo EF 55%, normal diastolic function, moderate aortic insufficiency, trace MR, no significant change compared to previous, ascending aorta 3.9 cm  5/12/23 cardiology note discussed follow-up aortic regurgitation next visit    aortic  "atherosclerosis  2/7/23 us aorta no evidence of AAA, mild ectasia of distal abdominal aorta, atherosclerotic plaque    abn liver enzymes  7/7/22 AST 30,ALT 30  10/26/22 AST 47,ALT 57 per endocrine  1/4/23 AST 44,ALT 24,acute hepatitis panel negative,iron 205,%sat 29,ferritin 73,ELISEO negative  6/24/23 AST 39,ALT 36,alk phos 115                         Patient Active Problem List   Diagnosis    History of allergic rhinitis    Gastroparesis    S/p green light photovaporization prostate    Interstitial cystitis    Postpolio syndrome    Hypothyroid    On angiotensin-converting enzyme (ACE) inhibitors (for diabetes, not HTN)    Diabetes mellitus type 2, controlled (HCC)    Preventative health care    History of shoulder pain    Insomnia    Dyslipidemia    Tobacco abuse    History of MI (myocardial infarction)    Atherosclerosis of native coronary artery with angina pectoris, unspecified whether native or transplanted heart (HCC)    PAD (peripheral artery disease) (HCC)    Mild non proliferative diabetic retinopathy (HCC)    Stented coronary artery    Ascending aorta dilatation (HCC)    Aortic insufficiency    Abnormal liver function    Aortic atherosclerosis (HCC)                  Patient Care Team:  Charbel Jaffe M.D. as PCP - General  Alex Stein M.D. as Consulting Physician (Endocrinology)    ROS           Objective     /58 (BP Location: Left arm, Patient Position: Sitting, BP Cuff Size: Adult)   Pulse 77   Temp 36.8 °C (98.3 °F)   Resp 16   Ht 1.676 m (5' 6\")   Wt 63.5 kg (140 lb)   SpO2 98%   BMI 22.60 kg/m²      Physical Exam  Vitals and nursing note reviewed.   Constitutional:       Appearance: Normal appearance.   HENT:      Head: Normocephalic and atraumatic.      Right Ear: External ear normal.      Left Ear: External ear normal.   Eyes:      Conjunctiva/sclera: Conjunctivae normal.   Cardiovascular:      Rate and Rhythm: Normal rate and regular rhythm.      Heart sounds: Normal heart " sounds.   Pulmonary:      Effort: Pulmonary effort is normal.      Breath sounds: Normal breath sounds.   Abdominal:      General: There is no distension.   Musculoskeletal:         General: No swelling.   Skin:     Coloration: Skin is not jaundiced.   Neurological:      Mental Status: He is alert.   Psychiatric:         Mood and Affect: Mood normal.         Behavior: Behavior normal.            Assessment & Plan        Assessment  #1 diabetes mellitus, blood sugar stable on Lantus 12 units and metformin with no hypoglycemia, A1c is excellent at 5.6%, due for an eye exam    #2  Low-dose ACE inhibitor with stable blood pressure    #3 dyslipidemia 5/4/23 chol 72,trig 54,hdl 28,ldl 33 on lipitor 80 mg    #4 hypothyroid on Cytomel and Synthroid per endocrinology    #5 tobacco 1 ppd declines to quit smoking    #6 aortic ectasia by ultrasound no aneurysm    #7 aortic regurgitation by echo in January stable followed by cardiology      #8 mild nonproliferative diabetic retinopathy last eye exam February 2022    Plan  #!  Continue checking blood sugar daily, he can consider decreasing Lantus to 11 units, monitor for hypoglycemia    #2 follow-up in 6 months notify me before his visit and I can order labs prior to his appointment    #3 continue checking blood pressure regularly, ensuring adequate hydration during the hot summer months    #4 continue to work on regular activity, limiting sweets, candies, processed foods in his diet    #5 referral back to ophthalmology for annual eye exam    #6 follow-up cardiology and endocrinology    #7 recommend work on smoking cessation as long-term tobacco may increase risk for heart and lung disease    #8 he is working with [employer] regarding reimbursement on labs from January, he will keep me updated on his progress as [facility] is denying some of the testing and charging him for the test when the tests should be covered under Medicare based on the coding

## 2023-09-28 NOTE — PROGRESS NOTES
2/11/17  -     Annual Wellness Visit Scheduling    Scheduling Status:  Not Scheduled    If Not Scheduled, Choose Reason Why: JEDSAID THAT HE IS RECEIVING ALL THE CARE THAT HE NEEDS, DECLINED VACCINES AND DOESN'T WANT TO BE CALLED ANYMORE REGARDING AWV.    MyChart Activation:  Already Active   Simponi Pregnancy And Lactation Text: The risk during pregnancy and breastfeeding is uncertain with this medication.

## 2023-10-18 RX ORDER — INSULIN GLARGINE 100 [IU]/ML
12 INJECTION, SOLUTION SUBCUTANEOUS EVERY EVENING
Qty: 15 ML | Refills: 11 | Status: SHIPPED | OUTPATIENT
Start: 2023-10-18 | End: 2023-10-20 | Stop reason: SDUPTHER

## 2023-10-20 RX ORDER — INSULIN GLARGINE 100 [IU]/ML
12 INJECTION, SOLUTION SUBCUTANEOUS EVERY EVENING
Qty: 15 ML | Refills: 11 | Status: SHIPPED | OUTPATIENT
Start: 2023-10-20 | End: 2023-10-22 | Stop reason: SDUPTHER

## 2023-10-22 RX ORDER — INSULIN GLARGINE 100 [IU]/ML
12 INJECTION, SOLUTION SUBCUTANEOUS EVERY EVENING
Qty: 12 ML | Refills: 0 | Status: SHIPPED | OUTPATIENT
Start: 2023-10-22 | End: 2024-01-30

## 2023-11-27 DIAGNOSIS — I10 ESSENTIAL HYPERTENSION: ICD-10-CM

## 2023-11-27 NOTE — TELEPHONE ENCOUNTER
Received request via: Patient    Was the patient seen in the last year in this department? Yes    Does the patient have an active prescription (recently filled or refills available) for medication(s) requested? Yes    Does the patient have [facility] and need 100 day supply (blood pressure, diabetes and cholesterol meds only)? Patient does not have [facility]    Thank you,     Luis Angel WILDER

## 2023-11-28 RX ORDER — LISINOPRIL 2.5 MG/1
2.5 TABLET ORAL DAILY
Qty: 90 TABLET | Refills: 3 | OUTPATIENT
Start: 2023-11-28

## 2023-11-28 RX ORDER — LISINOPRIL 2.5 MG/1
2.5 TABLET ORAL DAILY
Qty: 90 TABLET | Refills: 3 | Status: SHIPPED | OUTPATIENT
Start: 2023-11-28

## 2023-12-28 RX ORDER — BLOOD SUGAR DIAGNOSTIC
STRIP MISCELLANEOUS
Qty: 100 STRIP | Refills: 11 | Status: SHIPPED | OUTPATIENT
Start: 2023-12-28

## 2023-12-28 NOTE — TELEPHONE ENCOUNTER
Received request via: Pharmacy    Was the patient seen in the last year in this department? Yes    Does the patient have an active prescription (recently filled or refills available) for medication(s) requested? yes    Does the patient have [facility] and need 100 day supply (blood pressure, diabetes and cholesterol meds only)? Patient does not have SCP    Requested Prescriptions     Pending Prescriptions Disp Refills    glucose blood (RELION PRIME TEST) strip 100 Strip 11     Sig: USE 1 STRIP TO CHECK GLUCOSE ONCE DAILY BEFORE A MEAL, E11.9

## 2024-01-04 ENCOUNTER — TELEPHONE (OUTPATIENT)
Dept: MEDICAL GROUP | Facility: MEDICAL CENTER | Age: 74
End: 2024-01-04
Payer: MEDICARE

## 2024-01-04 DIAGNOSIS — E55.9 VITAMIN D DEFICIENCY: ICD-10-CM

## 2024-01-04 DIAGNOSIS — E11.65 CONTROLLED TYPE 2 DIABETES MELLITUS WITH HYPERGLYCEMIA, WITHOUT LONG-TERM CURRENT USE OF INSULIN (HCC): Chronic | ICD-10-CM

## 2024-01-04 DIAGNOSIS — Z12.5 PROSTATE CANCER SCREENING: ICD-10-CM

## 2024-01-04 DIAGNOSIS — I10 HYPERTENSION, UNSPECIFIED TYPE: ICD-10-CM

## 2024-01-04 DIAGNOSIS — E78.5 DYSLIPIDEMIA: Chronic | ICD-10-CM

## 2024-01-05 ENCOUNTER — HOSPITAL ENCOUNTER (OUTPATIENT)
Dept: LAB | Facility: MEDICAL CENTER | Age: 74
End: 2024-01-05
Attending: INTERNAL MEDICINE
Payer: MEDICARE

## 2024-01-05 DIAGNOSIS — E55.9 VITAMIN D DEFICIENCY: ICD-10-CM

## 2024-01-05 DIAGNOSIS — Z12.5 PROSTATE CANCER SCREENING: ICD-10-CM

## 2024-01-05 DIAGNOSIS — E11.65 CONTROLLED TYPE 2 DIABETES MELLITUS WITH HYPERGLYCEMIA, WITHOUT LONG-TERM CURRENT USE OF INSULIN (HCC): Chronic | ICD-10-CM

## 2024-01-05 DIAGNOSIS — E78.5 DYSLIPIDEMIA: Chronic | ICD-10-CM

## 2024-01-05 LAB
25(OH)D3 SERPL-MCNC: 30 NG/ML (ref 30–100)
ALBUMIN SERPL BCP-MCNC: 3.7 G/DL (ref 3.2–4.9)
ALBUMIN/GLOB SERPL: 1.5 G/DL
ALP SERPL-CCNC: 114 U/L (ref 30–99)
ALT SERPL-CCNC: 44 U/L (ref 2–50)
ANION GAP SERPL CALC-SCNC: 10 MMOL/L (ref 7–16)
APPEARANCE UR: CLEAR
AST SERPL-CCNC: 45 U/L (ref 12–45)
BASOPHILS # BLD AUTO: 0.4 % (ref 0–1.8)
BASOPHILS # BLD: 0.02 K/UL (ref 0–0.12)
BILIRUB SERPL-MCNC: 0.4 MG/DL (ref 0.1–1.5)
BILIRUB UR QL STRIP.AUTO: NEGATIVE
BUN SERPL-MCNC: 25 MG/DL (ref 8–22)
CALCIUM ALBUM COR SERPL-MCNC: 8.5 MG/DL (ref 8.5–10.5)
CALCIUM SERPL-MCNC: 8.3 MG/DL (ref 8.5–10.5)
CHLORIDE SERPL-SCNC: 110 MMOL/L (ref 96–112)
CHOLEST SERPL-MCNC: 62 MG/DL (ref 100–199)
CO2 SERPL-SCNC: 19 MMOL/L (ref 20–33)
COLOR UR: YELLOW
CREAT SERPL-MCNC: 0.79 MG/DL (ref 0.5–1.4)
CREAT UR-MCNC: 74.46 MG/DL
EOSINOPHIL # BLD AUTO: 0.06 K/UL (ref 0–0.51)
EOSINOPHIL NFR BLD: 1.1 % (ref 0–6.9)
ERYTHROCYTE [DISTWIDTH] IN BLOOD BY AUTOMATED COUNT: 49.1 FL (ref 35.9–50)
EST. AVERAGE GLUCOSE BLD GHB EST-MCNC: 111 MG/DL
GFR SERPLBLD CREATININE-BSD FMLA CKD-EPI: 93 ML/MIN/1.73 M 2
GLOBULIN SER CALC-MCNC: 2.5 G/DL (ref 1.9–3.5)
GLUCOSE SERPL-MCNC: 82 MG/DL (ref 65–99)
GLUCOSE UR STRIP.AUTO-MCNC: NEGATIVE MG/DL
HBA1C MFR BLD: 5.5 % (ref 4–5.6)
HCT VFR BLD AUTO: 44.2 % (ref 42–52)
HDLC SERPL-MCNC: 19 MG/DL
HGB BLD-MCNC: 14.2 G/DL (ref 14–18)
IMM GRANULOCYTES # BLD AUTO: 0.02 K/UL (ref 0–0.11)
IMM GRANULOCYTES NFR BLD AUTO: 0.4 % (ref 0–0.9)
KETONES UR STRIP.AUTO-MCNC: NEGATIVE MG/DL
LDLC SERPL CALC-MCNC: 33 MG/DL
LEUKOCYTE ESTERASE UR QL STRIP.AUTO: NEGATIVE
LYMPHOCYTES # BLD AUTO: 1.58 K/UL (ref 1–4.8)
LYMPHOCYTES NFR BLD: 28.1 % (ref 22–41)
MCH RBC QN AUTO: 29.6 PG (ref 27–33)
MCHC RBC AUTO-ENTMCNC: 32.1 G/DL (ref 32.3–36.5)
MCV RBC AUTO: 92.3 FL (ref 81.4–97.8)
MICRO URNS: NORMAL
MICROALBUMIN UR-MCNC: <1.2 MG/DL
MICROALBUMIN/CREAT UR: NORMAL MG/G (ref 0–30)
MONOCYTES # BLD AUTO: 0.51 K/UL (ref 0–0.85)
MONOCYTES NFR BLD AUTO: 9.1 % (ref 0–13.4)
NEUTROPHILS # BLD AUTO: 3.43 K/UL (ref 1.82–7.42)
NEUTROPHILS NFR BLD: 60.9 % (ref 44–72)
NITRITE UR QL STRIP.AUTO: NEGATIVE
NRBC # BLD AUTO: 0 K/UL
NRBC BLD-RTO: 0 /100 WBC (ref 0–0.2)
PH UR STRIP.AUTO: 5.5 [PH] (ref 5–8)
PLATELET # BLD AUTO: 178 K/UL (ref 164–446)
PMV BLD AUTO: 11.1 FL (ref 9–12.9)
POTASSIUM SERPL-SCNC: 4.3 MMOL/L (ref 3.6–5.5)
PROT SERPL-MCNC: 6.2 G/DL (ref 6–8.2)
PROT UR QL STRIP: NEGATIVE MG/DL
PSA SERPL-MCNC: 1.23 NG/ML (ref 0–4)
RBC # BLD AUTO: 4.79 M/UL (ref 4.7–6.1)
RBC UR QL AUTO: NEGATIVE
SODIUM SERPL-SCNC: 139 MMOL/L (ref 135–145)
SP GR UR STRIP.AUTO: 1.02
TRIGL SERPL-MCNC: 49 MG/DL (ref 0–149)
UROBILINOGEN UR STRIP.AUTO-MCNC: 0.2 MG/DL
WBC # BLD AUTO: 5.6 K/UL (ref 4.8–10.8)

## 2024-01-05 PROCEDURE — 83036 HEMOGLOBIN GLYCOSYLATED A1C: CPT | Mod: GA

## 2024-01-05 PROCEDURE — 80053 COMPREHEN METABOLIC PANEL: CPT

## 2024-01-05 PROCEDURE — 82570 ASSAY OF URINE CREATININE: CPT

## 2024-01-05 PROCEDURE — 82043 UR ALBUMIN QUANTITATIVE: CPT

## 2024-01-05 PROCEDURE — 81003 URINALYSIS AUTO W/O SCOPE: CPT

## 2024-01-05 PROCEDURE — 84153 ASSAY OF PSA TOTAL: CPT | Mod: GA

## 2024-01-05 PROCEDURE — 80061 LIPID PANEL: CPT

## 2024-01-05 PROCEDURE — 36415 COLL VENOUS BLD VENIPUNCTURE: CPT | Mod: GA

## 2024-01-05 PROCEDURE — 82306 VITAMIN D 25 HYDROXY: CPT

## 2024-01-05 PROCEDURE — 85025 COMPLETE CBC W/AUTO DIFF WBC: CPT

## 2024-01-07 PROBLEM — R74.8 ABNORMAL ALKALINE PHOSPHATASE TEST: Status: ACTIVE | Noted: 2023-01-07

## 2024-01-11 ENCOUNTER — OFFICE VISIT (OUTPATIENT)
Dept: MEDICAL GROUP | Facility: MEDICAL CENTER | Age: 74
End: 2024-01-11
Payer: MEDICARE

## 2024-01-11 VITALS
RESPIRATION RATE: 20 BRPM | HEIGHT: 65 IN | SYSTOLIC BLOOD PRESSURE: 118 MMHG | TEMPERATURE: 98 F | HEART RATE: 78 BPM | BODY MASS INDEX: 23.49 KG/M2 | OXYGEN SATURATION: 96 % | WEIGHT: 141 LBS | DIASTOLIC BLOOD PRESSURE: 78 MMHG

## 2024-01-11 DIAGNOSIS — I25.119 ATHEROSCLEROSIS OF NATIVE CORONARY ARTERY WITH ANGINA PECTORIS, UNSPECIFIED WHETHER NATIVE OR TRANSPLANTED HEART (HCC): ICD-10-CM

## 2024-01-11 DIAGNOSIS — I77.810 ASCENDING AORTA DILATATION (HCC): ICD-10-CM

## 2024-01-11 DIAGNOSIS — I73.9 PAD (PERIPHERAL ARTERY DISEASE) (HCC): ICD-10-CM

## 2024-01-11 DIAGNOSIS — E11.65 CONTROLLED TYPE 2 DIABETES MELLITUS WITH HYPERGLYCEMIA, WITHOUT LONG-TERM CURRENT USE OF INSULIN (HCC): ICD-10-CM

## 2024-01-11 DIAGNOSIS — I70.0 AORTIC ATHEROSCLEROSIS (HCC): ICD-10-CM

## 2024-01-11 DIAGNOSIS — F17.200 TOBACCO DEPENDENCE: ICD-10-CM

## 2024-01-11 PROCEDURE — 3078F DIAST BP <80 MM HG: CPT | Performed by: INTERNAL MEDICINE

## 2024-01-11 PROCEDURE — 99214 OFFICE O/P EST MOD 30 MIN: CPT | Performed by: INTERNAL MEDICINE

## 2024-01-11 PROCEDURE — 3074F SYST BP LT 130 MM HG: CPT | Performed by: INTERNAL MEDICINE

## 2024-01-11 ASSESSMENT — FIBROSIS 4 INDEX: FIB4 SCORE: 2.78

## 2024-01-11 NOTE — PROGRESS NOTES
Subjective     Orlando Arreguin is a 73 y.o. male who presents with diabetes Follow-Up            HPI    Patient here to follow-up diabetes, currently on Semglee 12 units daily and metformin 1000 mg twice daily, patient has been tracking his blood sugars diligently over the past 6 months.  Checking his blood sugar once a day usually around midday, blood sugars have been consistent 70s to 80s, although none of December and early January he did have 2 readings with blood sugars less than 70, at 66 and 69.  His A1c was 5.5% on January 5.  No changes with his good nutrition program other than eating perhaps more onions and sugar-free Jell-O.  Blood pressure 115/70 on lisinopril 2.5 mg daily, tries to keep adequately hydrated with water.  Followed by cardiology, will experience an occasional skipping sensation at times, does have a pulse oximeter at home to follow his heart rate.  Typically this can be is not related to lightheadedness or dizziness.  No significant caffeinated beverage intake.  He does have hypothyroid on replacement per endocrinology, on Synthroid and Cytomel his TSH typically will run low although he prefers that since his thyroid if under replaced will cause his gastroparesis symptoms to be worse.  Continues to smoke a pack of cigarettes per day  Medications, allergies, medical history, surgical history, social history, family history  reviewed and updated      Current Outpatient Medications   Medication Sig Dispense Refill    glucose blood (RELION PRIME TEST) strip USE 1 STRIP TO CHECK GLUCOSE ONCE DAILY BEFORE A MEAL, E11.9 100 Strip 11    lisinopril (PRINIVIL) 2.5 MG Tab Take 1 Tablet by mouth every day. 90 Tablet 3    LANTUS SOLOSTAR 100 UNIT/ML Solution Pen-injector injection Inject 12 Units under the skin every evening for 100 days. 12 mL 0    metformin (GLUCOPHAGE) 1000 MG tablet TAKE 1 TABLET BY MOUTH TWICE DAILY WITH MEALS 180 Tablet 3    RELION PEN NEEDLES 31G X 6 MM Misc USE 1 NEEDLE ONCE DAILY  WITH LANTUS  Each 11    ReliOn Ultra Thin Lancets 30G Misc USE 1 LANCET ONCE DAILY 100 Each 11    liothyronine (CYTOMEL) 25 MCG Tab Take 0.5 Tablets by mouth 2 times a day. 360 Tablet 0    levothyroxine (SYNTHROID) 112 MCG Tab Take 1 Tablet by mouth every morning on an empty stomach. 360 Tablet 0    ALPRAZolam (XANAX) 0.25 MG Tab Take 0.25 mg by mouth at bedtime as needed for Sleep.      atorvastatin (LIPITOR) 80 MG tablet Take 1 Tablet by mouth every day. 90 Tablet 3    aspirin (ASA) 81 MG Chew Tab chewable tablet Chew 1 Tablet every day. 100 Tablet 11    Ginger, Zingiber officinalis, (GINGER PO) Take  by mouth.      Insulin Pen Needle 31G X 6 MM Misc       B-D UF III MINI PEN NEEDLES 31G X 5 MM Misc USE PEN ONCE DAILY WITH LANTUS PEN      Insulin Pen Needle 31G X 6 MM Misc       Cholecalciferol (VITAMIN D3 PO) Take 1 Dose by mouth every day. Unknown OTC Strength.      Riboflavin (VITAMIN B-2 PO) Take 1 Dose by mouth every day. Unknown OTC Strength.       No current facility-administered medications for this visit.         Tobacco  7/31/15 tobacco 1 ppd not interested in stopping smoking classes or education  8/28/15 tobacco 1 ppd not interested in stopping smoking classes or education  8/28/15 declines PFT    11/30/15 still smoking 20 cigs per day declines stop smoking classes or medications  5/31/16 declines stop smoking classes and medications, not interested in stop smoking classes, smoking ppd x 40 plus years, declines CT lung cancer screening, pulmonary function testing  11/29/16 still smoking 1 ppd, started smoking age 20, declines stop smoking classes and medication, declines lung cancer screening program and PFT  5/30/17 still smoking 1 ppd declines stop smoking classes and medications, and lung cancer screening program and PFT  12/5/17 still smokes 1 ppd declines stop smoking classes and medications, and lung cancer screening program and PFT   6/12/18  still smokes 1 ppd declines stop smoking  classes and medications, and lung cancer screening program and PFT   6/18/19 still smokes 1 ppd declines stop smoking classes and medications, and lung cancer screening program and PFT   12/10/19 still smoking 1 pack/day, declines stop smoking classes, medications, lung cancer screening, PFT  7/1/20 cardiology note stable, urged to quit smoking, follow-up 3 months  12/15/20 tobacco 1/2 ppd not interested to stop smoking classes, medications, CT lung cancer screening, or PFTs  6/14/21 1 ppd tobacco, declines stop smoking classes, CT lung cancer screening, PFT  1/10/22 1 ppd tobacco, declines stop smoking classes, CT lung cancer screening, PFT  7/14/22 declines stop smoking classes  1/12/23 1 ppd tobacco, declines stop smoking classes, CT lung cancer screening, PFT      s/p greenlight photo vaporization  10/04 cystoscopy and green light photovaporization of prostate in [place], [place]  1/5/22 psa 1.4     Preventative health  1/12/16 zoster vaccine at [facility]  12/7/20 hep c ab negative  12/15/20 declines tdap  12/15/20 declines shingrix  12/15/20 declines colonoscopy  7/14/22 prevnar 20  1/5/24 psa 1.23  1/5/24 vit d 30  10/9/23 flu  10/24/23 covid     Postpolio syndrome  6/04 saw neurology in [place] , notes indicate chronic non specific complaints with normal brain MRI, no evidence to support post polio syndrome.     pad  12/22/17 ultrasound carotid less than 50% stenosis bilateral  12/28/17 JOSSUE lower extremities, normal at rest, post exercise right JOSSUE 0.8, left 0.8 mild-to-moderate arterial disease bilateral  12/28/17 ultrasound vascular lower extremities, no arterial occlusive disease from common femoral to popliteal bilateral, 50-75% stenosis proximal right external iliac, 50-75% stenosis distal left external iliac, patent right tibial arteries, left posterior tibial appears occluded distally, normal flow left anterior tibial, peroneal arteries retrograde suggesting proximal occlusion  7/11/19 ultrasound  arterial lower extremities, duplex right stenosis distal external iliac greater than 50%, elevated velocities proximal portion posterior tibial and peroneal, left greater than 50% stenosis mid external iliac artery, occluded proximal portion posterior tibial artery with distal reconstitution ankle, short segment occlusion mid peroneal artery right JOSSUE 1.09, left JOSSUE 1.07  7/14/19 referral vascular surgery placed  12/10/19 declines vascular surgery follow-up asymptomatic  7/1/20 cardiology note stable, urged to quit smoking, follow-up 3 months  6/14/21 asymptomatic declines follow-up testing, declines vascular surgery evaluation  1/10/22 recommend referral to vascular surgery but he declines   7/14/22 declines vascular surgery evaluation  1/12/23 declines vascular surgery evaluation     On ACE inhibitor  4/04 p.thallium no ischemia EF 62%  12/10 p.thallium no ischemia, EF 59%  5/9/11 rec ACE instead of atenolol patient declines  10/17/11 discontinued atenolol, rec start lotensin 10 mg; he declines  5/7/13 echo normal LV function, EF 60%, grade 1 diastolic dysfunction  5/7/13 p.thallium fixed defect mid inferior, inferoseptal, mid inferior walls suggest subdiaphragmatic uptake or prior infarction, no ischemia noted, EF 57%  8/8/15 hospital discharge on coreg 12.5 mg bid,lisinopril 5 mg, brilinta 90 mg bid, asa 81 mg,lipitor 80 mg  8/26/15 cardiology note, decrease coreg to 6.25 mg bid due to lower blood pressure continue lisinopril 5 mg    8/28/15 decrease lisinopril to 2.5 mg daily and cont coreg 6.25 mg bid  11/24/15 urine mac<2.5 on lisinopril 2.5 mg and coreg 6.25 mg bid  4/12/16 cardiology note decrease coreg to 3.125 mg bid consider discontinuation of coreg next evaluation and continue lisinopril 2.5 mg  5/20/16 urine mac <3 on lisinopril 2.5 mg  7/19/16 cardiology note clinically stable, episodes of chest tightness related to stress, blood pressure running low, discontinue carvedilol, follow-up in a few  months  11/23/16 urine mac 3.2 on lisinopril 2.5 mg  11/29/17 urine mac<4 on lisinopril 2.5 mg  6/7/18 urine mac 3.8 on lisinopril 2.5 mg  6/10/19 urine mac<0.7 on lisinopril 2.5 mg  12/5/19 urine mac<0.7 on lisinopril 2.5 mg  6/11/20 urine mac<1.2 on lisinopril 2.5 mg  12/7/20 urine mac<1.2 on lisinopril 2.5 mg  6/8/21 urine mac<3 on lisinopril 2.5 mg  7/8/22 urine mac<10 on lisinopril 2.5 mg  1/4/23 urine mac 3 on lisinopril 2.5 mg  1/5/24 urine mac<1.2 on lisinopril 2.5 mg     Interstitial cystitis  5/02 urology note [facility] nonbacterial prostatitis vs interstitial cystitis given trial of flomax     Insomnia on Xanax as needed  On xanax 0.25 mg at night prn  3/8/18 refill xanax  5/30/19 refill xanax  11/22/20 refill xanax  11/22/20   8/15/21 refill xanax  2/2/23 refill xanax     Hypothyroid  10/08 tsh 0.126,free t4 1.4,free t3 2.7  8/10 tsh 0.199 taking levothyroxine 0.125 6 days a week, and 2 tabs on john  9/10/10 increase to levothyroxine 0.137 mg daily, repeat tsh in 6 weeks  10/10 tsh 0.9  12/10 tsh 1.2, free t4 1.4, free t3 2.9  4/11 tsh 0.8, thyroxine 9.6, free thyroxine uptake 3.4, free t3 uptake 35%  9/11 tsh 0.5, free t4 0.9, free t3 2.4 on levothyroxine 137 mcg  12/11 tsh 0.3,free t4 1.5,free t3 2.7 on levothyroxine 137 mcg  4/16/13 tsh 0.28,free t4 1.1, free t3 2.3 (2.4-4.2); on levothyroxine 137 mcg; change to armour thyroid 60 mg qday  5/6/13 tsh 0.3, free t4 1.0, free t3 2.5 on levothyroxine 137 mcg ? Change to armour 60 mg  5/9/13  note decrease levothyroxine to 125 mcg and add cytomel 5 mg bid  6/13 tsh 0.45, free t4 1.2, free t3 2.9, on synthroid 125 mcg and cytomel 5 mcg bid per   8/14/13 tsh 0.235,free t4 1.1,free t3 2.8 on synthroid 125 mcg and cytomel 5 mcg bid per ;change to synthroid 150 mcg and stop cytomel per pt request  10/16/13 tsh 0.4, free t4 1.34, free t3 2.2 on synthroid 150 mcg  10/23/13  endo note; change to synthroid 125  mcg and cytomel 5 mcg daily  4/16/14 tsh 0.67,free t4 1.0,free t3 2.4  4/23/14  endocrine note, increase levothyroxine to 137 mcg and cont cytomel 5 mcg  10/28/14  endocrine note, tsh 0.6,free t4 1.0, free t3 2.7 on thyroxine 137 mcg and cytomel 5 mcg; gastroparesis symptoms improve with cytomel, will reduce thryoxine to 125 mcg and use cytomel 25 mcg to cut and take more than once per day as needed, return in 4 months  7/3/15 tsh 0.04, free t4 0.6 and free t3 3.0 on levothyroxine 125 mcg and cytomel 12.5 mcg daily  7/21/15  endocrine note; on levothyroxine 125 mcg and cytomel 12.5 mcg daily, tsh 0.04, free t4 0.6 and free t3 3.0, patient does not want to change dose, no changes continue same dosing regimen; follow up 6 months  8/8/15  endocrine phone note, decrease levothyroxine to 112 mcg daily, continue cytomel 12.5 mg daily  1/13/16 tsh 0.016,free t4 0.9,free t3 2.5 on levothyroxine 112 mcg and cytomel 12.5 mg daily  1/22/16  endocrinology note on levothyroxine 112 mcg and cytomel 12.5 mg daily, patient declines to change dosing regimen  4/5/16 tsh 0.012,free t4 0.97,free t3 3.6 on levothyroxine 112 mcg and cytomel 12.5 mg daily  4/21/16  endocrinology note, no changes  10/13/16 tsh 0.14,free t4 0.87,free t3 3.1 on levothyroxine 112 mcg and cytomel 25 mg  4/17/17 tsh 0.016,free t4 0.9,free t3 4.0 on levothyroxine 112 mcg and cytomel 25 mg  4/19/17  endocrinology note no changes follow up 6 months  10/18/17  endocrine note no changes  4/12/18 tsh 0.012, free t4 0.99, free t3 4.7 on levothyroxine 112 mcg and cytomel 25 mg  4/18/18  endocrinology note, on levothyroxine 112 mcg daily and cytomel 12.5 mcg bid tsh 0.01, free 4 0.9, free t3 4.7, clinically feeling fine, no changes  10/15/18 endocrinology note repeat labs follow-up 6 months  10/8/19 tsh 0.010, free t4 0.80, free t3 2.9 on levothyroxine 112 mcg daily and  cytomel 12.5 mcg bid per endocrinology  10/14/19 endocrinology note, continue same thyroid medication dosing no changes follow-up 6 months  4/20/20 endocrinology note patient not willing to adjust his thyroid as it has been successful in controlling gastroparesis, follow-up 6 months  6/12/20 tsh 0.006 on levothyroxine 112 mcg daily and cytomel 12.5 mcg bid per endocrinology  12/7/20 tsh 0.009, free t4 1.23, t3 187 () on levothyroxine 112 mcg daily and cytomel 12.5 mcg bid per endocrinology  4/16/21 tsh<0.005,free t4 1.3,free t3 2.9  5/4/21 endocrinology note on levothyroxine 112 mcg and cytomel 12.5 mcg bid, continue and follow up 6 months  11/3/21 tsh<0.005,free t4 1.27,free t3 3.4  1/5/21 A1c 5.8% on lantus 12 units and metformin 1000 mg bid on levothyroxine 112 mcg and liothyronine 12.5 mcg bid, patient states that the thyroid dose is beneficial to her self and is reluctant to decrease the dose understanding that hyperthyroid can increase bone loss  11/10/21  endocrinology note he is retiring, follow-up with nurse practitioner  5/5/22 tsh 0.01,free t4 1.4,free t3 3.5 on levothyroxine 112 mcg and cytomel 25 mcg 1/2 bid per endocrine  5/13/22 endocrine note on levothyroxine 112 mcg and cytomel 25 mcg 1/2 bid discussed risk of decreased TSH and cardiovascular disease, osteoporosis, patient would like to remain on current regimen, follow-up 6 months  10/26/22 tsh 0.007,free t4 1.28,free t3 3.0 on synthroid 112 mcg and cytomel 25 mcg 1/2 bid per endocrinology   10/25/22 salivary cortisol 0.062, ACTH 5.4,prolactin 5.6,IGF 82 per endocrinology  5/4/23 tsh 0.01,free t4 1.15,free t3 2.99 on synthroid 112 mcg and cytomel 25 mcg 1/2 bid per endocrinology    5/22/23 endocrine note mildly suppressed with normal free T3 and free T4 understanding risk of overtreatment, continues on cytomel 25 mcg 1.2 tab bid and synthroid 112 mcg daily      History shoulder pain  12/9/10 left shoulder pain,  note MRI  pending  2/11 MRI left shoulder [facility] mild to moderate rotator cuff tendinopathy, adhesive capsulitis, small anterior and posterior labral tears  7/11 [facility]LE note  left shoulder adhesive capsulitis rec decompression  12/8/15 xray right shoulder at [facility] mild hydroxyapatite deposition adjacent to the greater tuberosity, no evidence of significant arthritis  5/11/16 MRI right shoulder thickening and increased signal with synovitis, suggestive of adhesive capsulitis, tendinopathy supraspinatus and infraspinatus, fatty atrophy paraspinous muscle  1/4/17 [facility] discharge note     History MI  8/6/15 hospital admission non-STEMI inverted T waves in aVL, ST depression in lead 1, initial troponin 3.4  8/6/15   8/6/15 cardiac catheterization left main free of disease, triple vessel disease prominently involving distal segment nature epicardial vessels and branches, 95% ostial LAD descending artery stenosis and 70% mid RCA stenosis, ejection fraction 35-40%, stenting ostial LAD with xience drug-eluting stent  8/7/15 echo normal LV function EF 65-70%, grade 1 diastolic dysfunction, moderate concentric LVH, mild MR and AR  8/8/15 hospital discharge on coreg 12.5 mg bid,lisinopril 5 mg, brilinta 90 mg bid, asa 81 mg,lipitor 80 mg  8/11/15 cardiology note; continue brilenta and asa for one year, some dyspnea, if no improvement could consider another antiplatelet therapy, will recheck BNP in 2 weeks with followup visit, ultimately will need myocardial perfusion scan but will defer for a few months  8/26/15 cardiology note, decrease coreg to 6.25 mg bid due to lower blood pressure,continue lisinopril 5 mg, asa, lipitor,start effient 10 mg for one year due to brilinta causing shortness of breath, followup 2 months  10/14/15 cardiology note no chnges, repeat myocardial perfusion scan 3 months  11/30/15 cardiac rehab program  1/13/15 cardiology note nitroglycerin when necessary follow-up 3 months  1/12/16 persantine  thallium small basal anterior inferior infarct with small ame-infarct ischemia, moderately sized moderate severity inferior, inferior lateral infarct with reversible ischemia  4/12/16 cardiology note decrease coreg to 3.125 mg bid consider discontinuation of coreg next evaluation and continue lisinopril 2.5 mg  7/19/16 cardiology note clinically stable, episodes of chest tightness related to stress, blood pressure running low, discontinue carvedilol, follow-up in a few months  11/3/16 cardiology note, isosorbide mononitrate with heavy exertion, follow-up in a few months to determine if further evaluation is necessary, could consider repeat perfusion study or dobutamine stress echo  1/5/17 cardiology note stable CAD, follow-up 6 months  7/12/17 cardiology note, likely stable discussed smoking cessation, patient declines to quit smoking, follow-up one year  12/22/17 ultrasound carotid less than 50% stenosis bilateral  7/23/18 cardiology note asymptomatic, increased stress however, continues to smoke, follow-up 1 year continue atorvastatin plus erythromycin  7/15/19 cardiology note stable continues to smoke advised to quit follow-up 1 year  6/19/20 hospital admission, 6/21/20 hospital discharge, admission for chest pain, cardiac catheterization performed, drug-eluting stent placement of RCA  6/19/20 echo mild concentric LVH, EF 65%, subtle inferior hypokinesis, normal diastolic function  6/19/20 cardiac catheterization left main mild distal tapering 10% stenosis, stent distal part widely patent, LAD widely patent ostial stent, diffuse moderate stenosis distal LAD and small diagonal branches, circumflex tortuous origin 50%, obtuse marginal 1 occluded fills by collaterals stable compared to 2015, obtuse marginal 2 diffusely diseased with mild stenosis lateral wall, distal circumflex/obtuse marginal 3 is small with severe stenosis terminal segment, dominant RCA 40% proximal stenosis, 70% mid vessel stenosis, 40% distal  stenosis, PDA multiple 50 to 70% stenosis mid and distal segments, successful mid RCA YESIKA placement, preintervention 70% stenosis, post intervention 0% residual stenosis  7/1/20 cardiology note stable, urged to quit smoking, follow-up 3 months  12/1/20 cardiology note side effects from metoprolol dry skin and cold fingers having already decreased from 25 mg to 12.5 mg daily, will discontinue metoprolol, continue asa, effient, lipitor, lisinopril  3/9/21 cardiology note consider increasing lisinopril, follow-up 6 months back to discontinue effient at that time  10/5/21 myocardial perfusion study small fixed defect no ischemia, EF 47%  11/19/21 echo mild LVH, EF 60%, moderate aortic insufficiency, ascending aortic dilation 4.0 cm  12/3/21  cardiology note recommend routine stress testing for left main PCI, patient would like to stop effient and has been 1 year since his stent was placed, patient understands that DAPT is standard treatment but patient would like to be off that medication, okay to discontinue effient and continue aspirin 81 mg indefinitely, follow-up 1 year  6/8/22  cardiology note recommend routine stress testing, and follow-up 6 months  12/13/22 cardiology note ordered zio, echo, stress test   12/21/22 to 1/4/23 zio event monitor predominant rhythm sinus, minimum heart rate 46, maximum heart rate 179, SVT runs fastest 6 beats maximum rate 179, longest 12.3 seconds average rate 109, PVC<1%  1/16/23 echo EF 55%, normal diastolic function, moderate aortic insufficiency, trace MR, no significant change compared to previous  1/18/23 myocardial perfusion study moderate sized area ischemia anterolateral base to mid LAD, resting ejection fraction 66%, stress ejection fraction 65%   5/12/23 cardiology note recommend stress test and coronary angiogram, patient declined since he is feeling well, recommendation beta-blocker, patient will consider, discussed follow-up aortic regurgitation  next visit    history of mild nonproliferative diabetic retinopathy  6/4/19  eye exam, mild nonproliferative retinopathy  2/14/22  eye exam mild nonproliferative retinopathy without macular edema  9/28/23  eye exam no retinopathy     Gastroparesis  4/04 gastric emptying study severe gatroparesis done in [place], no hx of DM or MS, started on erythromycin base  5/04 CT thorax in [place] negative  5/04 MRI brain in [place] no evid of MS  4/16/13 declined gastric stimulator  12/5/17 remains on erythromycin  1/12/23 off erythromycin due to cost      Dyslipidemia  5/13 chol 158,trig 204,hdl 36,ldl 81  8/8/15 chol 97,trig 117,hdl 26,ldl 48 started on lipitor 80 mg on hospital discharge  8/21/15 chol 76,trig 85,hdl 24,ldl 35 on lipitor 80 mg  10/7/15 chol 67,trig 77,hdl 22,ldl 30 on lipitor 80 mg per cardiology  4/5/16 chol 64,trig 43,hdl 24,ldl 31 on lipitor 80 mg per cardiology  11/23/16 chol 83,trig 73,hdl 27,ldl 41 on lipitor 80 mg per cardiology  5/24/17 chol 85,trig 81,hdl 28,ldl 41 on lipitor 80 mg per cardiology  10/10/17 pharmacy known interaction with lipitor and erythromycin base, consider changing to crestor, offer made to patient to change to crestor but he would like to discuss with his cardiologist first  11/29/17 chol 72,trig 47,hdl 24,ldl 39 on lipitor 80 mg and erythromycin base, previously recommended changing to crestor because of potential interaction, patient would like to discuss with cardiology first  12/5/17 declines to change from lipitor understanding potential interaction with erythromycin  6/7/18 chol 78,trig 68,hdl 25,ldl 39,cpk 69 on lipitor 80 mg  7/23/18 cardiology note asymptomatic, increased stress however, continues to smoke, follow-up 1 year continue atorvastatin plus erythromycin  12/3/18 chol 81,trig 85,hdl 26,ldl 38 on lipitor 80 mg  6/10/19 chol 64,trig 70,hdl 20,ldl 30 on lipitor 80 mg  6/10/20 chol 81,trig 74,hdl 25,ldl 41 on lipitor 80 mg  12/7/20 chol 72,trig  65,hdl 23,ldl 36 on lipitor 80 mg  6/8/21 chol 81,trig 54,hdl 27,ldl 41 on lipitor 80 mg  1/5/22 chol 83,trig 52,hdl 31,ldl 39 on lipitor 80 mg  6/1/22 chol 72,trig 59,hdl 27,ldl 31 on lipitor 80 mg  7/8/22 chol 73,trig 64,hdl 28,ldl 30 on lipitor 80 mg  12/7/22 chol 84,trig 54,hdl 30,ldl 41 on lipitor 80 mg  1/4/23 chol 71,trig 53,hdl 30,ldl 28 on lipitor 80 mg  5/4/23 chol 72,trig 54,hdl 28,ldl 33 on lipitor 80 mg  1/5/24 chol 62,trig 49,hdl 19,ldl 33 on lipitor 80 mg     Diabetes  11/08 A1c 6.3%  8/10 A1c 7.9%  10/10 A1c 7.7%  12/10 A1c 8.0%  1/11 add metformin 500 mg bid  2/11 A1c 7.9%  4/11 A1c 8.2% increase metformin to 1000 mg bid  10/11 A1c 6.5 %  12/11 A1c 6.1% on metformin 1000 mg bid  4/16/13 A1c 6.2% on metformin 1000 mg bid; declines to check blood sugars at home; bs 194 non fasting; declines ACE  8/14/13 A1c 6.5% on metformin 1000 mg bid  12/9/13  eye exam grade 1 diabetic background retinopathy  4/16/14 A1c 7.0% per  on metformin 1000 mg bid  7/3/15 A1c 8.3% on metformin 1000 mg bid per endocrine   7/31/15 start lantus 5 units and continue metformin 1000 mg bid, declines ACE,statin,asa  8/8/15 hospital discharge on coreg 12.5 mg bid,lisinopril 5 mg, brilinta 90 mg bid, asa 81 mg,lipitor 80 mg; on lantus 8 units and metformin 1000 mg bid  8/28/15 declines nutrition consultation and diabetic education regarding diet  8/28/15 recommend increasing lantus to 10 units and metformin 1000 mg bid  11/24/15 A1c 6.3% on lantus 10 units and metformin 1000 mg bid  11/30/15 on lantus 12 units and metformin 1000 mg bid  5/20/16 A1c 6.3% on lantus 12 units and metformin 1000 mg bid  11/23/16 A1c 6.1% on lantus 12 units and metformin 1000 mg bid  1/9/17  eye exam  5/24/17 A1c 6.3% on lantus 12 units and metformin 1000 mg bid   5/24/17 urine mac 2.5 on lisinopril 2.5 mg  11/29/17 A1c 6.1% on lantus 12 units and metformin 1000 mg bid   6/7/18 A1c 5.9% on lantus 12 units and  metformin 1000 mg bid   12/3/18 A1c 6.2% and urine mac< 3on lantus 12 units and metformin 1000 mg bid   6/4/19  eye exam, mild nonproliferative retinopathy  6/10/19 A1c 6.4% on lantus 12 units and metformin 1000 mg bid   12/5/19 A1c 6.2% on lantus 12 units and metformin 1000 mg bid   6/11/20 A1c 6.5% on lantus 12 units and metformin 1000 mg bid   12/7/20 A1c 5.9% on lantus 12 units and metformin 1000 mg bid   6/8/21 A1c 6.0% on lantus 12 units and metformin 1000 mg bid   1/5/22 A1c 5.8% on lantus 12 units and metformin 1000 mg bid   2/14/22  eye exam mild nonproliferative retinopathy without macular edema  7/8/22 A1c 6.0% on lantus 12 units and metformin 1000 mg bid   1/4/23 A1c 5.8% on lantus 12 units and metformin 1000 mg bid   6/24/23 urine mac<1.2  6/21/23 A1c 5.6% on lantus 12 units and metformin 1000 mg bid   9/28/23  eye exam  1/5/24 A1c 5.5% on lantus 12 units and metformin 1000 mg bid      ascending aorta dilationa  6/19/20 echo mild concentric LVH, EF 65%, subtle inferior hypokinesis, normal diastolic function  10/5/21 myocardial perfusion study small fixed defect no ischemia, EF 47%  11/19/21 echo mild LVH, EF 60%, moderate aortic insufficiency, ascending aortic dilation 4.0 cm  1/16/23 echo EF moderate aortic insufficiency, ascending aorta 3.9 cm  2/7/23 ultrasound aorta no evidence of AAA, mild ectasia of distal abdominal aorta, atherosclerotic plaque  5/12/23 cardiology note discussed follow-up aortic regurgitation next visit     aortic insufficiency  6/19/20 echo mild concentric LVH, EF 65%, subtle inferior hypokinesis, normal diastolic function  10/5/21 myocardial perfusion study small fixed defect no ischemia, EF 47%  11/19/21 echo mild LVH, EF 60%, moderate aortic insufficiency, ascending aortic dilation 4.0 cm  1/16/23 echo EF 55%, normal diastolic function, moderate aortic insufficiency, trace MR, no significant change compared to previous, ascending aorta 3.9  cm  5/12/23 cardiology note discussed follow-up aortic regurgitation next visit     aortic atherosclerosis  2/7/23 us aorta no evidence of AAA, mild ectasia of distal abdominal aorta, atherosclerotic plaque     abn liver enzymes  7/7/22 AST 30,ALT 30  10/26/22 AST 47,ALT 57 per endocrine  1/4/23 AST 44,ALT 24,acute hepatitis panel negative,iron 205,%sat 29,ferritin 73,ELISEO negative  6/24/23 AST 39,ALT 36,alk phos 115  1/5/24 alk phos 114       Patient Active Problem List   Diagnosis    History of allergic rhinitis    Gastroparesis    S/p green light photovaporization prostate    Interstitial cystitis    Postpolio syndrome    Hypothyroid    On angiotensin-converting enzyme (ACE) inhibitors (for diabetes, not HTN)    Diabetes mellitus type 2, controlled (HCC)    Preventative health care    History of shoulder pain    Insomnia    Dyslipidemia    Tobacco abuse    History of MI (myocardial infarction)    Atherosclerosis of native coronary artery with angina pectoris, unspecified whether native or transplanted heart (HCC)    PAD (peripheral artery disease) (HCC)    History of mild non proliferative diabetic retinopathy    Stented coronary artery    Ascending aorta dilatation (HCC)    Aortic insufficiency    Abnormal alkaline phosphatase test    Aortic atherosclerosis (HCC)                Patient Care Team:  Charbel Jaffe M.D. as PCP - General  Alex Stein M.D. as Consulting Physician (Endocrinology)      ROS           Objective          Physical Exam  Vitals and nursing note reviewed.   Constitutional:       Appearance: Normal appearance.   HENT:      Head: Normocephalic and atraumatic.      Right Ear: External ear normal.      Left Ear: External ear normal.   Eyes:      Conjunctiva/sclera: Conjunctivae normal.   Cardiovascular:      Rate and Rhythm: Normal rate and regular rhythm.      Heart sounds: Normal heart sounds.   Pulmonary:      Effort: Pulmonary effort is normal.      Breath sounds: Normal breath  sounds.   Abdominal:      General: There is no distension.   Musculoskeletal:         General: No swelling.   Skin:     Findings: No bruising.   Neurological:      Mental Status: He is alert.      Cranial Nerves: No cranial nerve deficit.   Psychiatric:         Mood and Affect: Mood normal.         Behavior: Behavior normal.                           Assessment & Plan        Assessment  #1  Diabetes mellitus, blood sugars stable and actually running a bit lower from his usual baseline, Semglee 12 units and metformin 1000 mg twice daily, with 2 blood sugar readings in the 60s, A1c was excellent at 5.5%, most recent eye exam Dr. Prescott September 28, no retinopathy noted, on low-dose lisinopril for renal protection    #2 hypothyroid followed by endocrinology on Synthroid and Cytomel, TSH remains suppressed as patient prefers a lower TSH as this helps his diabetic gastroparesis    #3 history of MI, atherosclerotic disease, cardiac catheterization 2020 with RCA stent, followed by cardiology, patient has declined follow-up angiogram    #4 dyslipidemia on Lipitor 1/5/24 chol 62,trig 49,hdl 19,ldl 33 on lipitor 80 mg    #5 aortic atherosclerosis no aneurysm    #6 aortic insufficiency and ascending aortic dilation, most recent echo ascending 3.9 cm    #7 peripheral arterial disease, has declined referral to vascular surgery    #8 tobacco dependence 1 pack/day, has declined to work on smoking cessation, declines screening test    #9 diabetic gastroparesis stable      Plan  #1 continue to check his blood sugars daily    #2 A1c is excellent, in fact he is at high risk for hypoglycemia, and has had 2 blood sugar readings in the 60s, recommend decreasing Semglee to 10 units but he would prefer to go slowly from 12 units to 11 units, advised him that if he still continues to have lower blood sugars in the 60s to 70s he can decrease to 10 units, continue metformin    #3 continue his regular activity, ensure adequate hydration with  water    #4 continue atorvastatin    #5 follow-up cardiology    #6 follow-up endocrinology    #7 recommend consider smoking cessation, he declines, offered PFT and CT lung cancer screening, he declines    #8 take calcium 500 mg daily    #9 continue annual ophthalmologic exam    #10 reviewed lab results from January 11 with him    #11 follow-up 6 months

## 2024-01-30 ENCOUNTER — OFFICE VISIT (OUTPATIENT)
Dept: CARDIOLOGY | Facility: MEDICAL CENTER | Age: 74
End: 2024-01-30
Attending: INTERNAL MEDICINE
Payer: MEDICARE

## 2024-01-30 VITALS
HEIGHT: 65 IN | HEART RATE: 84 BPM | BODY MASS INDEX: 23.66 KG/M2 | WEIGHT: 142 LBS | DIASTOLIC BLOOD PRESSURE: 64 MMHG | RESPIRATION RATE: 16 BRPM | OXYGEN SATURATION: 98 % | SYSTOLIC BLOOD PRESSURE: 114 MMHG

## 2024-01-30 DIAGNOSIS — Z95.5 STENTED CORONARY ARTERY: ICD-10-CM

## 2024-01-30 DIAGNOSIS — I25.10 CORONARY ARTERY DISEASE DUE TO CALCIFIED CORONARY LESION: ICD-10-CM

## 2024-01-30 DIAGNOSIS — I25.84 CORONARY ARTERY DISEASE DUE TO CALCIFIED CORONARY LESION: ICD-10-CM

## 2024-01-30 DIAGNOSIS — I35.1 NONRHEUMATIC AORTIC VALVE INSUFFICIENCY: ICD-10-CM

## 2024-01-30 DIAGNOSIS — I10 ESSENTIAL HYPERTENSION: ICD-10-CM

## 2024-01-30 DIAGNOSIS — R94.39 ABNORMAL STRESS TEST: ICD-10-CM

## 2024-01-30 DIAGNOSIS — I47.10 PSVT (PAROXYSMAL SUPRAVENTRICULAR TACHYCARDIA) (HCC): ICD-10-CM

## 2024-01-30 DIAGNOSIS — E11.65 CONTROLLED TYPE 2 DIABETES MELLITUS WITH HYPERGLYCEMIA, WITHOUT LONG-TERM CURRENT USE OF INSULIN (HCC): ICD-10-CM

## 2024-01-30 PROCEDURE — 3074F SYST BP LT 130 MM HG: CPT | Performed by: INTERNAL MEDICINE

## 2024-01-30 PROCEDURE — 99213 OFFICE O/P EST LOW 20 MIN: CPT | Performed by: INTERNAL MEDICINE

## 2024-01-30 PROCEDURE — 99214 OFFICE O/P EST MOD 30 MIN: CPT | Performed by: INTERNAL MEDICINE

## 2024-01-30 PROCEDURE — 3078F DIAST BP <80 MM HG: CPT | Performed by: INTERNAL MEDICINE

## 2024-01-30 ASSESSMENT — FIBROSIS 4 INDEX: FIB4 SCORE: 2.78

## 2024-01-30 NOTE — PROGRESS NOTES
Chief Complaint   Patient presents with    Coronary Artery Disease     Follow up        Subjective     Orlando Arreguin is a 73 y.o. male who presents today for follow-up of coronary artery disease characterized by left main to LAD PCI Dr. ORR in 2015 and RCA PCI for ACS 2020 Dr. Weinberg.  Has history of polio as a child and cannot ambulate long distances or participate in the treadmill test.  Taking his medications as directed.      Since last minute brief isolated palpitations no further angina.  Tolerating medications.  Active.    Past Medical History:   Diagnosis Date    Coronary artery disease due to calcified coronary lesion 8/11/2015    Diabetes mellitus type II     Gastroparesis     Hyperlipidemia     Hypothyroid     Interstitial cystitis     Sciatic neuritis      Past Surgical History:   Procedure Laterality Date    ZZZ CARDIAC CATH  8/6/15    YESIKA to LAD.  Has RCA 70% occulsion.    APPENDECTOMY      OTHER      L shoulder surgery (arthroscopic, [place], 2011)    HI TRANSURETHRAL ELEC-SURG PROSTATECTOM       Family History   Problem Relation Age of Onset    Heart Disease Mother     Heart Attack Mother 55        heart attack     Social History     Socioeconomic History    Marital status: Single     Spouse name: Not on file    Number of children: Not on file    Years of education: Not on file    Highest education level: Not on file   Occupational History    Not on file   Tobacco Use    Smoking status: Every Day     Current packs/day: 1.00     Average packs/day: 1 pack/day for 30.0 years (30.0 ttl pk-yrs)     Types: Cigarettes    Smokeless tobacco: Never   Vaping Use    Vaping Use: Never used   Substance and Sexual Activity    Alcohol use: No     Alcohol/week: 0.0 oz    Drug use: No    Sexual activity: Not on file   Other Topics Concern    Not on file   Social History Narrative    Not on file     Social Determinants of Health     Financial Resource Strain: Not on file   Food Insecurity: Not on file   Transportation  Needs: Not on file   Physical Activity: Not on file   Stress: Not on file   Social Connections: Not on file   Intimate Partner Violence: Not on file   Housing Stability: Not on file     Allergies   Allergen Reactions    Brilinta [Ticagrelor] Shortness of Breath     Total Sleep Apnea per patient; SOB    Pcn [Penicillins] Anaphylaxis    Other Environmental      Hayfever    Tdap [Dipth, Acell Pertus, Tetanus]      tdap    Tetanus Toxoid      Outpatient Encounter Medications as of 1/30/2024   Medication Sig Dispense Refill    Calcium Carb-Cholecalciferol (CALCIUM 600+D HIGH POTENCY PO) Take  by mouth.      metoprolol tartrate (LOPRESSOR) 25 MG Tab Take 0.5 Tablets by mouth 2 times a day. 45 Tablet 6    lisinopril (PRINIVIL) 2.5 MG Tab Take 1 Tablet by mouth every day. 90 Tablet 3    LANTUS SOLOSTAR 100 UNIT/ML Solution Pen-injector injection Inject 12 Units under the skin every evening for 100 days. 12 mL 0    metformin (GLUCOPHAGE) 1000 MG tablet TAKE 1 TABLET BY MOUTH TWICE DAILY WITH MEALS 180 Tablet 3    liothyronine (CYTOMEL) 25 MCG Tab Take 0.5 Tablets by mouth 2 times a day. 360 Tablet 0    levothyroxine (SYNTHROID) 112 MCG Tab Take 1 Tablet by mouth every morning on an empty stomach. 360 Tablet 0    ALPRAZolam (XANAX) 0.25 MG Tab Take 0.25 mg by mouth at bedtime as needed for Sleep.      atorvastatin (LIPITOR) 80 MG tablet Take 1 Tablet by mouth every day. 90 Tablet 3    aspirin (ASA) 81 MG Chew Tab chewable tablet Chew 1 Tablet every day. 100 Tablet 11    Ginger, Zingiber officinalis, (GINGER PO) Take  by mouth.      Cholecalciferol (VITAMIN D3 PO) Take 1 Dose by mouth every day. Unknown OTC Strength.      Riboflavin (VITAMIN B-2 PO) Take 1 Dose by mouth every day. Unknown OTC Strength.      glucose blood (RELION PRIME TEST) strip USE 1 STRIP TO CHECK GLUCOSE ONCE DAILY BEFORE A MEAL, E11.9 100 Strip 11    RELION PEN NEEDLES 31G X 6 MM Misc USE 1 NEEDLE ONCE DAILY WITH LANTUS  Each 11    ReliOn Ultra  "Thin Lancets 30G Misc USE 1 LANCET ONCE DAILY 100 Each 11    Insulin Pen Needle 31G X 6 MM Misc       B-D UF III MINI PEN NEEDLES 31G X 5 MM Misc USE PEN ONCE DAILY WITH LANTUS PEN      Insulin Pen Needle 31G X 6 MM Misc        No facility-administered encounter medications on file as of 1/30/2024.     Review of Systems   All other systems reviewed and are negative.             Objective     /64 (BP Location: Left arm, Patient Position: Sitting)   Pulse 84   Resp 16   Ht 1.651 m (5' 5\")   Wt 64.4 kg (142 lb)   SpO2 98%   BMI 23.63 kg/m²     Physical Exam  Vitals reviewed.   Constitutional:       General: He is not in acute distress.     Appearance: He is well-developed. He is not diaphoretic.   HENT:      Head: Normocephalic and atraumatic.      Right Ear: External ear normal.      Left Ear: External ear normal.   Eyes:      General: No scleral icterus.        Right eye: No discharge.         Left eye: No discharge.      Conjunctiva/sclera: Conjunctivae normal.      Pupils: Pupils are equal, round, and reactive to light.   Neck:      Thyroid: No thyromegaly.      Vascular: No JVD.      Trachea: No tracheal deviation.   Cardiovascular:      Rate and Rhythm: Normal rate and regular rhythm.      Chest Wall: PMI is not displaced.      Pulses:           Carotid pulses are 2+ on the right side and 2+ on the left side.       Radial pulses are 2+ on the left side.        Popliteal pulses are 2+ on the right side and 2+ on the left side.        Dorsalis pedis pulses are 2+ on the right side and 2+ on the left side.        Posterior tibial pulses are 2+ on the right side and 2+ on the left side.      Heart sounds: No murmur heard.     No friction rub. No gallop.   Pulmonary:      Effort: Pulmonary effort is normal. No respiratory distress.      Breath sounds: Normal breath sounds. No wheezing or rales.   Chest:      Chest wall: No tenderness.   Abdominal:      General: Bowel sounds are normal. There is no " distension.      Palpations: Abdomen is soft.      Tenderness: There is no abdominal tenderness.   Musculoskeletal:         General: No tenderness or deformity. Normal range of motion.      Cervical back: Normal range of motion and neck supple.   Skin:     General: Skin is warm and dry.      Coloration: Skin is not pale.      Findings: No erythema or rash.   Neurological:      Mental Status: He is alert and oriented to person, place, and time.      Cranial Nerves: No cranial nerve deficit (cranial nerves II through XII grossly intact).      Coordination: Coordination normal.   Psychiatric:         Behavior: Behavior normal.         Thought Content: Thought content normal.       LABS:  Lab Results   Component Value Date/Time    CHOLSTRLTOT 62 (L) 01/05/2024 11:51 AM    LDL 33 01/05/2024 11:51 AM    HDL 19 (A) 01/05/2024 11:51 AM    TRIGLYCERIDE 49 01/05/2024 11:51 AM       Lab Results   Component Value Date/Time    WBC 5.6 01/05/2024 11:51 AM    RBC 4.79 01/05/2024 11:51 AM    HEMOGLOBIN 14.2 01/05/2024 11:51 AM    HEMATOCRIT 44.2 01/05/2024 11:51 AM    MCV 92.3 01/05/2024 11:51 AM    NEUTSPOLYS 60.90 01/05/2024 11:51 AM    LYMPHOCYTES 28.10 01/05/2024 11:51 AM    MONOCYTES 9.10 01/05/2024 11:51 AM    EOSINOPHILS 1.10 01/05/2024 11:51 AM    BASOPHILS 0.40 01/05/2024 11:51 AM     Lab Results   Component Value Date/Time    SODIUM 139 01/05/2024 11:51 AM    POTASSIUM 4.3 01/05/2024 11:51 AM    CHLORIDE 110 01/05/2024 11:51 AM    CO2 19 (L) 01/05/2024 11:51 AM    GLUCOSE 82 01/05/2024 11:51 AM    BUN 25 (H) 01/05/2024 11:51 AM    CREATININE 0.79 01/05/2024 11:51 AM    BUNCREATRAT 21 12/07/2022 07:00 AM     Lab Results   Component Value Date    HBA1C 5.5 01/05/2024      Lab Results   Component Value Date/Time    ALKPHOSPHAT 114 (H) 01/05/2024 11:51 AM    ASTSGOT 45 01/05/2024 11:51 AM    ALTSGPT 44 01/05/2024 11:51 AM    TBILIRUBIN 0.4 01/05/2024 11:51 AM      Lab Results   Component Value Date/Time    BNPBTYPENAT 178.8  (H) 08/19/2015 10:28 AM    BNPBTYPENAT 299 (H) 08/06/2015 04:59 AM      Lab Results   Component Value Date/Time    TSH 0.007 (L) 10/26/2022 05:10 AM     Lab Results   Component Value Date/Time    PROTHROMBTM 14.1 08/06/2015 04:59 AM    INR 1.09 08/06/2015 04:59 AM                   Assessment & Plan     1. Essential hypertension        2. Stented coronary artery        3. Coronary artery disease due to calcified coronary lesion  metoprolol tartrate (LOPRESSOR) 25 MG Tab      4. PSVT (paroxysmal supraventricular tachycardia)        5. Nonrheumatic aortic valve insufficiency  EC-ECHOCARDIOGRAM COMPLETE W/O CONT      6. Controlled type 2 diabetes mellitus with hyperglycemia, without long-term current use of insulin (HCC)        7. Abnormal stress test  metoprolol tartrate (LOPRESSOR) 25 MG Tab          Medical Decision Making: Today's Assessment/Status/Plan:     Complex PCI with a moderate risk stress test SDS 6 minimally symptomatic on reasonable medical therapy however not on a beta-blocker.  Discussed that he would benefit from an upfront invasive strategy given the risk profile from his stress test and offered him coronary angiography.  He declines and prefers medical therapy as he feels relatively well.  He understands that this is a second line treatment paradigm.  These discussions are repeated today from previous visit as well.  Recommend addition of a beta-blocker again this visit which she agrees to we will initiate low-dose metoprolol if tolerated.  Due for echocardiographic surveillance of his aortic insufficiency.

## 2024-02-12 DIAGNOSIS — E78.5 DYSLIPIDEMIA: Chronic | ICD-10-CM

## 2024-02-13 NOTE — TELEPHONE ENCOUNTER
Is the patient due for a refill? Yes    Was the patient seen the past year? Yes    Date of last office visit: 01/30/2024    Does the patient have an upcoming appointment?  Yes   If yes, When? 08/15/2024    Provider to refill:TW    Does the patients insurance require a 100 day supply?  No

## 2024-02-14 DIAGNOSIS — E78.5 DYSLIPIDEMIA: Chronic | ICD-10-CM

## 2024-02-14 RX ORDER — ATORVASTATIN CALCIUM 80 MG/1
80 TABLET, FILM COATED ORAL DAILY
Qty: 90 TABLET | Refills: 3 | Status: SHIPPED | OUTPATIENT
Start: 2024-02-14 | End: 2024-02-14

## 2024-02-14 RX ORDER — ATORVASTATIN CALCIUM 80 MG/1
80 TABLET, FILM COATED ORAL DAILY
Qty: 90 TABLET | Refills: 3 | Status: SHIPPED | OUTPATIENT
Start: 2024-02-14

## 2024-03-15 ENCOUNTER — HOSPITAL ENCOUNTER (OUTPATIENT)
Dept: CARDIOLOGY | Facility: MEDICAL CENTER | Age: 74
End: 2024-03-15
Attending: INTERNAL MEDICINE
Payer: MEDICARE

## 2024-03-15 DIAGNOSIS — I35.1 NONRHEUMATIC AORTIC VALVE INSUFFICIENCY: ICD-10-CM

## 2024-03-15 PROCEDURE — 93306 TTE W/DOPPLER COMPLETE: CPT

## 2024-03-25 LAB — LV EJECT FRACT  99904: 55

## 2024-03-25 PROCEDURE — 93306 TTE W/DOPPLER COMPLETE: CPT | Mod: 26 | Performed by: INTERNAL MEDICINE

## 2024-04-05 DIAGNOSIS — E03.9 HYPOTHYROIDISM, ACQUIRED: ICD-10-CM

## 2024-04-07 RX ORDER — LEVOTHYROXINE SODIUM 112 UG/1
112 TABLET ORAL
Qty: 90 TABLET | Refills: 0 | Status: SHIPPED | OUTPATIENT
Start: 2024-04-07

## 2024-04-07 RX ORDER — LEVOTHYROXINE SODIUM 112 UG/1
112 TABLET ORAL
Qty: 90 TABLET | Refills: 1 | Status: SHIPPED | OUTPATIENT
Start: 2024-04-07

## 2024-04-25 ENCOUNTER — HOSPITAL ENCOUNTER (OUTPATIENT)
Dept: LAB | Facility: MEDICAL CENTER | Age: 74
End: 2024-04-25
Payer: MEDICARE

## 2024-04-25 DIAGNOSIS — E03.9 HYPOTHYROIDISM, ACQUIRED: ICD-10-CM

## 2024-04-25 LAB
T3FREE SERPL-MCNC: 2.77 PG/ML (ref 2–4.4)
T4 FREE SERPL-MCNC: 0.97 NG/DL (ref 0.93–1.7)
TSH SERPL DL<=0.005 MIU/L-ACNC: 0.09 UIU/ML (ref 0.38–5.33)

## 2024-04-25 PROCEDURE — 84481 FREE ASSAY (FT-3): CPT

## 2024-04-25 PROCEDURE — 84443 ASSAY THYROID STIM HORMONE: CPT

## 2024-04-25 PROCEDURE — 84439 ASSAY OF FREE THYROXINE: CPT

## 2024-04-25 PROCEDURE — 36415 COLL VENOUS BLD VENIPUNCTURE: CPT

## 2024-05-29 ENCOUNTER — OFFICE VISIT (OUTPATIENT)
Dept: ENDOCRINOLOGY | Facility: MEDICAL CENTER | Age: 74
End: 2024-05-29
Payer: MEDICARE

## 2024-05-29 VITALS
RESPIRATION RATE: 16 BRPM | OXYGEN SATURATION: 96 % | HEIGHT: 67 IN | SYSTOLIC BLOOD PRESSURE: 111 MMHG | BODY MASS INDEX: 22.63 KG/M2 | HEART RATE: 70 BPM | DIASTOLIC BLOOD PRESSURE: 50 MMHG | WEIGHT: 144.2 LBS

## 2024-05-29 DIAGNOSIS — E55.9 VITAMIN D DEFICIENCY: ICD-10-CM

## 2024-05-29 DIAGNOSIS — R74.8 ELEVATED LIVER ENZYMES: ICD-10-CM

## 2024-05-29 DIAGNOSIS — I25.84 CORONARY ARTERY DISEASE DUE TO CALCIFIED CORONARY LESION: ICD-10-CM

## 2024-05-29 DIAGNOSIS — E11.65 TYPE 2 DIABETES MELLITUS WITH HYPERGLYCEMIA, WITHOUT LONG-TERM CURRENT USE OF INSULIN (HCC): ICD-10-CM

## 2024-05-29 DIAGNOSIS — I25.10 CORONARY ARTERY DISEASE DUE TO CALCIFIED CORONARY LESION: ICD-10-CM

## 2024-05-29 DIAGNOSIS — E03.9 HYPOTHYROIDISM, ACQUIRED: ICD-10-CM

## 2024-05-29 DIAGNOSIS — K31.84 GASTROPARESIS: ICD-10-CM

## 2024-05-29 ASSESSMENT — FIBROSIS 4 INDEX: FIB4 SCORE: 2.82

## 2024-05-29 NOTE — PROGRESS NOTES
Chief Complaint: Follow-up for the following conditions  HPI:   Orlando Arreguin is a 73 y.o. male   He was previously followed by Chapito CASILLAS    1.Acquired hypothyroidism:  History of Hypothyroidism diagnosed on for the past 20 years and is here for initial evaluation.    He is currently on Levothyroxine 112 mcg daily and Cytomel 25 mcg (cut in half) in the am and pm. He has been on these doses for many years.     He reports Good compliance and takes his thyroid hormone daily before breakfast.    Although at times he forgets to take his morning Cytomel for which he develops fatigue around lunchtime    He denies taking any iron, calcium supplements or antacids.   Denies taking any Biotin      He denies fatigue, weight gain, constipation, tremors, swelling, feeling slow, losing hair, anxiousness, feeling excessive energy, palpitations and sweating.    Hx of gastroparesis,   He denies lumps or enlargement in the neck.    History of coronary artery disease with an MI in the past and 2 with stents   Latest Reference Range & Units 04/25/24 10:31   TSH 0.380 - 5.330 uIU/mL 0.090 (L)   Free T-4 0.93 - 1.70 ng/dL 0.97   T3,Free 2.00 - 4.40 pg/mL 2.77        Latest Reference Range & Units 05/04/23 10:29   GFR (CKD-EPI) >60 mL/min/1.73 m 2 85       2.  Coronary artery disease due to calcified coronary lesion:  Reports history of 2 heart attacks in the past  This is followed by cardiology  Family history of cardiovascular disease with his mom dying at 62 after her third heart attack    3.  Type 2 diabetes mellitus with hyperglycemia, unspecified whether long term insulin use:  Reports history of type 2 diabetes mellitus  This is followed by primary care    A1c on 1/4/2023 at 5.8%  Metformin 1000 mg twice/day   Lantus 12 units/night   Latest Reference Range & Units 01/05/24 11:51   Glycohemoglobin 4.0 - 5.6 % 5.5      Latest Reference Range & Units 07/07/22 08:30   Glycohemoglobin 4.8 - 5.6 % 6.0 (H)      Latest Reference  Range & Units 01/05/24 11:51   Cholesterol,Tot 100 - 199 mg/dL 62 (L)   Triglycerides 0 - 149 mg/dL 49   HDL >=40 mg/dL 19 !   LDL <100 mg/dL 33      Latest Reference Range & Units 01/05/24 11:52   Micro Alb Creat Ratio 0 - 30 mg/g see below   Creatinine, Urine mg/dL 74.46   Microalbumin, Urine Random mg/dL <1.2   Urobilinogen, Urine Negative  0.2     4.  Gastroparesis:  Reports history of gastroparesis for the past 18 to 20 years  This is followed by primary care  This condition was diagnosed when he was living in [facility] after a gastric emptying scintigraphy was done  He was prescribed Reglan originally but after reading the side effects of Tardive Dyskinesis he decided not to take it  He was then prescribed Erythromycin and provided relief, he took erythromycin for her gastroparesis management for 18 years and he stopped 3 months ago due to high cost  Currently treatment for gastroparesis is gingerroot tablets    He reports that when his free T4 and free T3 are mid normal levels, his gastroparesis is better controlled    5.  Vitamin D deficiency:  Currently taking Vitamin D 3 3 caps daily    Latest Reference Range & Units 01/05/24 11:51   25-Hydroxy   Vitamin D 25 30 - 100 ng/mL 30      Latest Reference Range & Units 01/05/24 11:51   Calcium 8.5 - 10.5 mg/dL 8.3 (L)   Correct Calcium 8.5 - 10.5 mg/dL 8.5       7.  Elevated liver enzymes:  Elevated liver enzyme and blood panel  Denies heavy alcohol consumption   Latest Reference Range & Units 01/05/24 11:51   AST(SGOT) 12 - 45 U/L 45   ALT(SGPT) 2 - 50 U/L 44      Latest Reference Range & Units 10/26/22 05:10   AST(SGOT) 0 - 40 IU/L 47 (H)   ALT(SGPT) 0 - 44 IU/L 57 (H)       Patient's medications, allergies, and social histories were reviewed and updated as appropriate.      ROS:     CONS:     No fever, no chills, no weight loss, no fatigue   EYES:      No diplopia, no blurry vision, no redness of eyes, no swelling of eyelids   ENT:    No hearing  loss, No ear pain, No sore throat, no dysphagia, no neck swelling   CV:     No chest pain, no palpitations, no claudication, no orthopnea, no PND   PULM:    No SOB, no cough, no hemoptysis, no wheezing    GI:   No nausea, no vomiting, no diarrhea, no constipation, no bloody stools   :  Passing urine well, no dysuria, no hematuria   ENDO:   No polyuria, no polydipsia, no heat intolerance, no cold intolerance   NEURO: No headaches, no dizziness, no convulsions, no tremors   MUSC:  No joint swellings, no arthralgias, no myalgias, no weakness   SKIN:   No rash, no ulcers, no dry skin   PSYCH:   No depression, no anxiety, no difficulty sleeping       Past Medical History:  Patient Active Problem List    Diagnosis Date Noted    Aortic atherosclerosis ([facility]) 02/07/2023    Abnormal alkaline phosphatase test 01/07/2023    Aortic insufficiency 12/03/2021    Ascending aorta dilatation ([facility]) 11/19/2021    Stented coronary artery 10/01/2020    PAD (peripheral artery disease) ([facility]) 12/28/2017    Atherosclerosis of native coronary artery with angina pectoris, unspecified whether native or transplanted heart ([facility])     History of MI (myocardial infarction) 08/06/2015    Tobacco dependence 07/29/2015    Dyslipidemia 05/07/2013    Insomnia 04/18/2012    Preventative health care 12/09/2010    History of shoulder pain 12/09/2010    Diabetes mellitus type 2, controlled ([facility]) 09/10/2010    History of allergic rhinitis 08/21/2010    Gastroparesis 08/21/2010    S/p green light photovaporization prostate 08/21/2010    Interstitial cystitis 08/21/2010    Hypothyroid 08/21/2010    On angiotensin-converting enzyme (ACE) inhibitors (for diabetes, not HTN) 08/21/2010       Past Surgical History:  Past Surgical History:   Procedure Laterality Date    ZZZ CARDIAC CATH  8/6/15    YESIKA to LAD.  Has RCA 70% occulsion.    APPENDECTOMY      OTHER      L shoulder surgery (arthroscopic, [place], 2011)    [facility] TRANSURETHRAL ELEC-SURG PROSTATECTOM           Allergies:  Brilinta [ticagrelor]; Pcn [penicillins]; Other environmental; Tdap [dipth, acell pertus, tetanus]; and Tetanus toxoid     Current Medications:    Current Outpatient Medications:     levothyroxine (SYNTHROID) 112 MCG Tab, TAKE 1 TABLET BY MOUTH IN THE MORNING ON AN EMPTY STOMACH, Disp: 90 Tablet, Rfl: 0    levothyroxine (SYNTHROID) 112 MCG Tab, Take 1 Tablet by mouth every morning on an empty stomach., Disp: 90 Tablet, Rfl: 1    atorvastatin (LIPITOR) 80 MG tablet, Take 1 Tablet by mouth every day., Disp: 90 Tablet, Rfl: 3    Calcium Carb-Cholecalciferol (CALCIUM 600+D HIGH POTENCY PO), Take  by mouth., Disp: , Rfl:     metoprolol tartrate (LOPRESSOR) 25 MG Tab, Take 0.5 Tablets by mouth 2 times a day., Disp: 45 Tablet, Rfl: 6    glucose blood (RELION PRIME TEST) strip, USE 1 STRIP TO CHECK GLUCOSE ONCE DAILY BEFORE A MEAL, E11.9, Disp: 100 Strip, Rfl: 11    lisinopril (PRINIVIL) 2.5 MG Tab, Take 1 Tablet by mouth every day., Disp: 90 Tablet, Rfl: 3    metformin (GLUCOPHAGE) 1000 MG tablet, TAKE 1 TABLET BY MOUTH TWICE DAILY WITH MEALS, Disp: 180 Tablet, Rfl: 3    RELION PEN NEEDLES 31G X 6 MM Misc, USE 1 NEEDLE ONCE DAILY WITH LANTUS PEN, Disp: 100 Each, Rfl: 11    ReliOn Ultra Thin Lancets 30G Misc, USE 1 LANCET ONCE DAILY, Disp: 100 Each, Rfl: 11    liothyronine (CYTOMEL) 25 MCG Tab, Take 0.5 Tablets by mouth 2 times a day., Disp: 360 Tablet, Rfl: 0    ALPRAZolam (XANAX) 0.25 MG Tab, Take 0.25 mg by mouth at bedtime as needed for Sleep., Disp: , Rfl:     aspirin (ASA) 81 MG Chew Tab chewable tablet, Chew 1 Tablet every day., Disp: 100 Tablet, Rfl: 11    Ginger, Zingiber officinalis, (GINGER PO), Take  by mouth., Disp: , Rfl:     Insulin Pen Needle 31G X 6 MM Misc, , Disp: , Rfl:     B-D UF III MINI PEN NEEDLES 31G X 5 MM Misc, USE PEN ONCE DAILY WITH LANTUS PEN, Disp: , Rfl:     Insulin Pen Needle 31G X 6 MM Misc, , Disp: , Rfl:     Cholecalciferol (VITAMIN D3 PO), Take 1 Dose by mouth every day.  "Unknown OTC Strength., Disp: , Rfl:     Riboflavin (VITAMIN B-2 PO), Take 1 Dose by mouth every day. Unknown OTC Strength., Disp: , Rfl:     Social History:  Social History     Socioeconomic History    Marital status: Single     Spouse name: Not on file    Number of children: Not on file    Years of education: Not on file    Highest education level: Not on file   Occupational History    Not on file   Tobacco Use    Smoking status: Every Day     Current packs/day: 1.00     Average packs/day: 1 pack/day for 30.0 years (30.0 ttl pk-yrs)     Types: Cigarettes    Smokeless tobacco: Never   Vaping Use    Vaping status: Never Used   Substance and Sexual Activity    Alcohol use: No     Alcohol/week: 0.0 oz    Drug use: No    Sexual activity: Not on file   Other Topics Concern    Not on file   Social History Narrative    Not on file     Social Determinants of Health     Financial Resource Strain: Not on file   Food Insecurity: Not on file   Transportation Needs: Not on file   Physical Activity: Not on file   Stress: Not on file   Social Connections: Not on file   Intimate Partner Violence: Not on file   Housing Stability: Not on file        Family History:   Family History   Problem Relation Age of Onset    Heart Disease Mother     Heart Attack Mother 55        heart attack         PHYSICAL EXAM:   Vital signs: /50   Pulse 70   Resp 16   Ht 1.702 m (5' 7\")   Wt 65.4 kg (144 lb 3.2 oz)   SpO2 96%   BMI 22.58 kg/m²   GENERAL: Well-developed, well-nourished  in no apparent distress.     Labs:  Lab Results   Component Value Date/Time    CHOLSTRLTOT 83 (L) 01/04/2022 0736    TRIGLYCERIDE 52 01/04/2022 0736    HDL 31 (L) 01/04/2022 0736    LDL 36 12/07/2020 1051       Lab Results   Component Value Date/Time    TSHULTRASEN 0.010 (L) 05/05/2022 1004     Lab Results   Component Value Date/Time    FREET4 1.44 05/05/2022 1004     Lab Results   Component Value Date/Time    FREET3 3.50 05/05/2022 1004 "           Imaging:      ASSESSMENT/PLAN:   1. Hypothyroidism, acquired  Unstable  TSH suppressed at 0.09  Discussed risk of thyroid overtreatment such as cardiovascular disease and osteoporosis  Patient would like to stay on the same dose  Medication:  Levothyroxine 112 mcg daily - continue  Cytomel 12.5 mcg BID - continue  - TSH; Future  - FREE THYROXINE; Future  - T3 FREE; Future    2. Coronary artery disease due to calcified coronary lesion  Unstable  Followed by cardiology  Discussed with to follow up with cardiology prior to stopping metoprolol    3. Type 2 diabetes mellitus with hyperglycemia, without long-term current use of insulin (HCC)  Stable  A1c 5.5%  Followed by pcp    4. Gastroparesis  Unstable  Followed by pcp    5. Vitamin D deficiency  Stable  Continue current regimen- see HPI  - VITAMIN D,25 HYDROXY (DEFICIENCY); Future    6. Elevated liver enzymes  resolved  - Comp Metabolic Panel; Future     Disposition: Return in about 3 months (around 8/29/2024).    Do your blood work 2 weeks prior to appointment      Thank you for allowing me to participate in the thyroid care plan for this patient.    [email or web address] [email or web address].  5/29/24    CC:   Charbel Jaffe M.D.

## 2024-06-09 ENCOUNTER — TELEPHONE (OUTPATIENT)
Dept: MEDICAL GROUP | Facility: MEDICAL CENTER | Age: 74
End: 2024-06-09
Payer: MEDICARE

## 2024-06-09 DIAGNOSIS — E53.8 B12 DEFICIENCY: ICD-10-CM

## 2024-06-09 DIAGNOSIS — E11.9 DIABETES MELLITUS WITHOUT COMPLICATION (HCC): ICD-10-CM

## 2024-06-09 DIAGNOSIS — E78.5 DYSLIPIDEMIA: Chronic | ICD-10-CM

## 2024-06-09 DIAGNOSIS — K76.0 FATTY LIVER: ICD-10-CM

## 2024-06-09 DIAGNOSIS — E55.9 VITAMIN D DEFICIENCY: ICD-10-CM

## 2024-06-21 DIAGNOSIS — E11.9 DIABETES MELLITUS WITHOUT COMPLICATION (HCC): ICD-10-CM

## 2024-06-24 DIAGNOSIS — E11.9 DIABETES MELLITUS WITHOUT COMPLICATION (HCC): ICD-10-CM

## 2024-06-24 RX ORDER — LORATADINE 5 MG/5 ML
1 SOLUTION, ORAL ORAL DAILY
Qty: 100 EACH | Refills: 1 | Status: SHIPPED | OUTPATIENT
Start: 2024-06-24

## 2024-07-01 ENCOUNTER — HOSPITAL ENCOUNTER (OUTPATIENT)
Dept: LAB | Facility: MEDICAL CENTER | Age: 74
End: 2024-07-01
Attending: INTERNAL MEDICINE
Payer: MEDICARE

## 2024-07-01 DIAGNOSIS — E53.8 B12 DEFICIENCY: ICD-10-CM

## 2024-07-01 DIAGNOSIS — E55.9 VITAMIN D DEFICIENCY: ICD-10-CM

## 2024-07-01 DIAGNOSIS — E78.5 DYSLIPIDEMIA: Chronic | ICD-10-CM

## 2024-07-01 DIAGNOSIS — K76.0 FATTY LIVER: ICD-10-CM

## 2024-07-01 DIAGNOSIS — E11.9 DIABETES MELLITUS WITHOUT COMPLICATION (HCC): ICD-10-CM

## 2024-07-01 LAB
25(OH)D3 SERPL-MCNC: 35 NG/ML (ref 30–100)
ALBUMIN SERPL BCP-MCNC: 3.9 G/DL (ref 3.2–4.9)
ALBUMIN/GLOB SERPL: 1.6 G/DL
ALP SERPL-CCNC: 94 U/L (ref 30–99)
ALT SERPL-CCNC: 41 U/L (ref 2–50)
ANION GAP SERPL CALC-SCNC: 9 MMOL/L (ref 7–16)
APPEARANCE UR: CLEAR
AST SERPL-CCNC: 41 U/L (ref 12–45)
BASOPHILS # BLD AUTO: 0.5 % (ref 0–1.8)
BASOPHILS # BLD: 0.03 K/UL (ref 0–0.12)
BILIRUB SERPL-MCNC: 0.4 MG/DL (ref 0.1–1.5)
BILIRUB UR QL STRIP.AUTO: NEGATIVE
BUN SERPL-MCNC: 26 MG/DL (ref 8–22)
CALCIUM ALBUM COR SERPL-MCNC: 8.8 MG/DL (ref 8.5–10.5)
CALCIUM SERPL-MCNC: 8.7 MG/DL (ref 8.5–10.5)
CHLORIDE SERPL-SCNC: 103 MMOL/L (ref 96–112)
CHOLEST SERPL-MCNC: 83 MG/DL (ref 100–199)
CO2 SERPL-SCNC: 21 MMOL/L (ref 20–33)
COLOR UR: YELLOW
CREAT SERPL-MCNC: 0.89 MG/DL (ref 0.5–1.4)
CREAT UR-MCNC: 47.28 MG/DL
EOSINOPHIL # BLD AUTO: 0.05 K/UL (ref 0–0.51)
EOSINOPHIL NFR BLD: 0.9 % (ref 0–6.9)
ERYTHROCYTE [DISTWIDTH] IN BLOOD BY AUTOMATED COUNT: 47.9 FL (ref 35.9–50)
EST. AVERAGE GLUCOSE BLD GHB EST-MCNC: 123 MG/DL
GFR SERPLBLD CREATININE-BSD FMLA CKD-EPI: 90 ML/MIN/1.73 M 2
GLOBULIN SER CALC-MCNC: 2.4 G/DL (ref 1.9–3.5)
GLUCOSE SERPL-MCNC: 85 MG/DL (ref 65–99)
GLUCOSE UR STRIP.AUTO-MCNC: NEGATIVE MG/DL
HBA1C MFR BLD: 5.9 % (ref 4–5.6)
HCT VFR BLD AUTO: 44.4 % (ref 42–52)
HDLC SERPL-MCNC: 28 MG/DL
HGB BLD-MCNC: 14.2 G/DL (ref 14–18)
IMM GRANULOCYTES # BLD AUTO: 0.01 K/UL (ref 0–0.11)
IMM GRANULOCYTES NFR BLD AUTO: 0.2 % (ref 0–0.9)
KETONES UR STRIP.AUTO-MCNC: NEGATIVE MG/DL
LDLC SERPL CALC-MCNC: 45 MG/DL
LEUKOCYTE ESTERASE UR QL STRIP.AUTO: NEGATIVE
LYMPHOCYTES # BLD AUTO: 1.46 K/UL (ref 1–4.8)
LYMPHOCYTES NFR BLD: 25.6 % (ref 22–41)
MCH RBC QN AUTO: 29.8 PG (ref 27–33)
MCHC RBC AUTO-ENTMCNC: 32 G/DL (ref 32.3–36.5)
MCV RBC AUTO: 93.3 FL (ref 81.4–97.8)
MICRO URNS: NORMAL
MICROALBUMIN UR-MCNC: <1.2 MG/DL
MICROALBUMIN/CREAT UR: NORMAL MG/G (ref 0–30)
MONOCYTES # BLD AUTO: 0.46 K/UL (ref 0–0.85)
MONOCYTES NFR BLD AUTO: 8.1 % (ref 0–13.4)
NEUTROPHILS # BLD AUTO: 3.7 K/UL (ref 1.82–7.42)
NEUTROPHILS NFR BLD: 64.7 % (ref 44–72)
NITRITE UR QL STRIP.AUTO: NEGATIVE
NRBC # BLD AUTO: 0 K/UL
NRBC BLD-RTO: 0 /100 WBC (ref 0–0.2)
PH UR STRIP.AUTO: 5.5 [PH] (ref 5–8)
PLATELET # BLD AUTO: 204 K/UL (ref 164–446)
PMV BLD AUTO: 10.7 FL (ref 9–12.9)
POTASSIUM SERPL-SCNC: 4.5 MMOL/L (ref 3.6–5.5)
PROT SERPL-MCNC: 6.3 G/DL (ref 6–8.2)
PROT UR QL STRIP: NEGATIVE MG/DL
RBC # BLD AUTO: 4.76 M/UL (ref 4.7–6.1)
RBC UR QL AUTO: NEGATIVE
SODIUM SERPL-SCNC: 133 MMOL/L (ref 135–145)
SP GR UR STRIP.AUTO: 1.01
TRIGL SERPL-MCNC: 49 MG/DL (ref 0–149)
UROBILINOGEN UR STRIP.AUTO-MCNC: 0.2 MG/DL
VIT B12 SERPL-MCNC: 256 PG/ML (ref 211–911)
WBC # BLD AUTO: 5.7 K/UL (ref 4.8–10.8)

## 2024-07-01 PROCEDURE — 82172 ASSAY OF APOLIPOPROTEIN: CPT

## 2024-07-01 PROCEDURE — 83883 ASSAY NEPHELOMETRY NOT SPEC: CPT

## 2024-07-01 PROCEDURE — 82247 BILIRUBIN TOTAL: CPT | Mod: XU

## 2024-07-01 PROCEDURE — 82465 ASSAY BLD/SERUM CHOLESTEROL: CPT

## 2024-07-01 PROCEDURE — 84460 ALANINE AMINO (ALT) (SGPT): CPT | Mod: XU

## 2024-07-01 PROCEDURE — 80061 LIPID PANEL: CPT

## 2024-07-01 PROCEDURE — 84478 ASSAY OF TRIGLYCERIDES: CPT

## 2024-07-01 PROCEDURE — 82607 VITAMIN B-12: CPT

## 2024-07-01 PROCEDURE — 82043 UR ALBUMIN QUANTITATIVE: CPT

## 2024-07-01 PROCEDURE — 82947 ASSAY GLUCOSE BLOOD QUANT: CPT | Mod: XU

## 2024-07-01 PROCEDURE — 85025 COMPLETE CBC W/AUTO DIFF WBC: CPT

## 2024-07-01 PROCEDURE — 36415 COLL VENOUS BLD VENIPUNCTURE: CPT

## 2024-07-01 PROCEDURE — 84450 TRANSFERASE (AST) (SGOT): CPT | Mod: XU

## 2024-07-01 PROCEDURE — 80053 COMPREHEN METABOLIC PANEL: CPT

## 2024-07-01 PROCEDURE — 81003 URINALYSIS AUTO W/O SCOPE: CPT

## 2024-07-01 PROCEDURE — 83010 ASSAY OF HAPTOGLOBIN QUANT: CPT

## 2024-07-01 PROCEDURE — 82977 ASSAY OF GGT: CPT

## 2024-07-01 PROCEDURE — 83036 HEMOGLOBIN GLYCOSYLATED A1C: CPT | Mod: GA

## 2024-07-01 PROCEDURE — 82306 VITAMIN D 25 HYDROXY: CPT

## 2024-07-01 PROCEDURE — 82570 ASSAY OF URINE CREATININE: CPT

## 2024-07-03 LAB
A2 MACROGLOB SERPL-MCNC: 302 MG/DL (ref 110–276)
ALT SERPL W P-5'-P-CCNC: 45 IU/L (ref 0–55)
APO A-I SERPL-MCNC: 89 MG/DL (ref 101–178)
AST SERPL W P-5'-P-CCNC: 50 IU/L (ref 0–40)
BILIRUB SERPL-MCNC: 0.4 MG/DL (ref 0–1.2)
CHOLEST SERPL-MCNC: 82 MG/DL (ref 100–199)
FIBROSIS SCORING 550107: ABNORMAL
FIBROSIS STAGE SERPL QL: ABNORMAL
GGT SERPL-CCNC: 10 IU/L (ref 0–65)
GLUCOSE SERPL-MCNC: 79 MG/DL (ref 70–99)
HAPTOGLOB SERPL-MCNC: 164 MG/DL (ref 34–355)
LABORATORY COMMENT REPORT: ABNORMAL
LIVER FIBR SCORE SERPL CALC.FIBROSURE: 0.53 (ref 0–0.21)
LIVER STEATOSIS GRADE SERPL QL: ABNORMAL
LIVER STEATOSIS SCORE SERPL: 0.22 (ref 0–0.4)
NASH GRADE SERPL QL: ABNORMAL
NASH INTERPRETATION SERPL-IMP: ABNORMAL
NASH SCORE SERPL: 0 (ref 0–0.25)
NASH SCORING Z4789: ABNORMAL
STEATOSIS SCORING NL11718: ABNORMAL
TEST PERFORMANCE INFO SPEC: ABNORMAL
TEST PERFORMANCE INFO SPEC: ABNORMAL
TRIGL SERPL-MCNC: 55 MG/DL (ref 0–149)

## 2024-07-07 PROBLEM — G14 POST-POLIO SYNDROME: Status: ACTIVE | Noted: 2024-07-07

## 2024-07-11 ENCOUNTER — OFFICE VISIT (OUTPATIENT)
Dept: MEDICAL GROUP | Facility: MEDICAL CENTER | Age: 74
End: 2024-07-11
Payer: MEDICARE

## 2024-07-11 VITALS
SYSTOLIC BLOOD PRESSURE: 102 MMHG | OXYGEN SATURATION: 97 % | DIASTOLIC BLOOD PRESSURE: 68 MMHG | BODY MASS INDEX: 23.46 KG/M2 | RESPIRATION RATE: 16 BRPM | WEIGHT: 146 LBS | TEMPERATURE: 98.2 F | HEIGHT: 66 IN | HEART RATE: 74 BPM

## 2024-07-11 DIAGNOSIS — I25.2 HISTORY OF MI (MYOCARDIAL INFARCTION): Chronic | ICD-10-CM

## 2024-07-11 DIAGNOSIS — E11.9 DIABETES MELLITUS WITHOUT COMPLICATION (HCC): ICD-10-CM

## 2024-07-11 DIAGNOSIS — I73.9 PAD (PERIPHERAL ARTERY DISEASE) (HCC): ICD-10-CM

## 2024-07-11 DIAGNOSIS — I70.0 ATHEROSCLEROSIS OF AORTA (HCC): ICD-10-CM

## 2024-07-11 DIAGNOSIS — E11.65 CONTROLLED TYPE 2 DIABETES MELLITUS WITH HYPERGLYCEMIA, WITHOUT LONG-TERM CURRENT USE OF INSULIN (HCC): ICD-10-CM

## 2024-07-11 DIAGNOSIS — I70.0 AORTIC ATHEROSCLEROSIS (HCC): ICD-10-CM

## 2024-07-11 DIAGNOSIS — I73.9 PVD (PERIPHERAL VASCULAR DISEASE) (HCC): ICD-10-CM

## 2024-07-11 DIAGNOSIS — I77.810 ASCENDING AORTA DILATATION (HCC): ICD-10-CM

## 2024-07-11 DIAGNOSIS — F17.200 TOBACCO DEPENDENCE: ICD-10-CM

## 2024-07-11 DIAGNOSIS — Z00.00 PREVENTATIVE HEALTH CARE: Chronic | ICD-10-CM

## 2024-07-11 DIAGNOSIS — R74.8 ABNORMAL ALKALINE PHOSPHATASE TEST: ICD-10-CM

## 2024-07-11 PROCEDURE — 3078F DIAST BP <80 MM HG: CPT | Performed by: INTERNAL MEDICINE

## 2024-07-11 PROCEDURE — 3074F SYST BP LT 130 MM HG: CPT | Performed by: INTERNAL MEDICINE

## 2024-07-11 PROCEDURE — 99214 OFFICE O/P EST MOD 30 MIN: CPT | Performed by: INTERNAL MEDICINE

## 2024-07-11 ASSESSMENT — FIBROSIS 4 INDEX: FIB4 SCORE: 2.32

## 2024-07-11 ASSESSMENT — PATIENT HEALTH QUESTIONNAIRE - PHQ9: CLINICAL INTERPRETATION OF PHQ2 SCORE: 0

## 2024-07-18 DIAGNOSIS — E03.9 HYPOTHYROIDISM, ACQUIRED: ICD-10-CM

## 2024-07-21 RX ORDER — LIOTHYRONINE SODIUM 25 UG/1
12.5 TABLET ORAL 2 TIMES DAILY
Qty: 45 TABLET | Refills: 3 | Status: SHIPPED | OUTPATIENT
Start: 2024-07-21

## 2024-07-22 ENCOUNTER — TELEPHONE (OUTPATIENT)
Dept: ENDOCRINOLOGY | Facility: MEDICAL CENTER | Age: 74
End: 2024-07-22
Payer: MEDICARE

## 2024-08-20 ENCOUNTER — OFFICE VISIT (OUTPATIENT)
Dept: CARDIOLOGY | Facility: MEDICAL CENTER | Age: 74
End: 2024-08-20
Attending: INTERNAL MEDICINE
Payer: MEDICARE

## 2024-08-20 VITALS
WEIGHT: 147 LBS | RESPIRATION RATE: 16 BRPM | OXYGEN SATURATION: 99 % | HEART RATE: 68 BPM | BODY MASS INDEX: 23.63 KG/M2 | HEIGHT: 66 IN | SYSTOLIC BLOOD PRESSURE: 100 MMHG | DIASTOLIC BLOOD PRESSURE: 46 MMHG

## 2024-08-20 DIAGNOSIS — E78.5 DYSLIPIDEMIA: ICD-10-CM

## 2024-08-20 DIAGNOSIS — I25.10 CORONARY ARTERY DISEASE DUE TO CALCIFIED CORONARY LESION: ICD-10-CM

## 2024-08-20 DIAGNOSIS — I35.1 NONRHEUMATIC AORTIC VALVE INSUFFICIENCY: ICD-10-CM

## 2024-08-20 DIAGNOSIS — Z95.5 STENTED CORONARY ARTERY: ICD-10-CM

## 2024-08-20 DIAGNOSIS — E11.65 CONTROLLED TYPE 2 DIABETES MELLITUS WITH HYPERGLYCEMIA, WITHOUT LONG-TERM CURRENT USE OF INSULIN (HCC): ICD-10-CM

## 2024-08-20 DIAGNOSIS — I25.84 CORONARY ARTERY DISEASE DUE TO CALCIFIED CORONARY LESION: ICD-10-CM

## 2024-08-20 DIAGNOSIS — I10 ESSENTIAL HYPERTENSION: ICD-10-CM

## 2024-08-20 DIAGNOSIS — I47.10 PSVT (PAROXYSMAL SUPRAVENTRICULAR TACHYCARDIA) (HCC): ICD-10-CM

## 2024-08-20 PROCEDURE — 3074F SYST BP LT 130 MM HG: CPT | Performed by: INTERNAL MEDICINE

## 2024-08-20 PROCEDURE — 99213 OFFICE O/P EST LOW 20 MIN: CPT | Performed by: INTERNAL MEDICINE

## 2024-08-20 PROCEDURE — 3078F DIAST BP <80 MM HG: CPT | Performed by: INTERNAL MEDICINE

## 2024-08-20 PROCEDURE — G2211 COMPLEX E/M VISIT ADD ON: HCPCS | Performed by: INTERNAL MEDICINE

## 2024-08-20 PROCEDURE — 99214 OFFICE O/P EST MOD 30 MIN: CPT | Performed by: INTERNAL MEDICINE

## 2024-08-20 RX ORDER — INSULIN GLARGINE 100 [IU]/ML
INJECTION, SOLUTION SUBCUTANEOUS
COMMUNITY
Start: 2024-06-14

## 2024-08-20 ASSESSMENT — FIBROSIS 4 INDEX: FIB4 SCORE: 2.32

## 2024-08-20 NOTE — PROGRESS NOTES
Chief Complaint   Patient presents with    Coronary Artery Disease     Follow up       Subjective     Orlando Arreguin is a 74 y.o. male who presents today for follow-up of coronary artery disease characterized by left main to LAD PCI Dr. ORR in 2015 and RCA PCI for ACS 2020 Dr. Weinberg.  Has history of polio as a child and cannot ambulate long distances or participate in the treadmill test.  Taking his medications as directed.      Doing well since last visit with no cardiovascular complaints taking his medications as directed and ambulatory without symptoms.    Past Medical History:   Diagnosis Date    Coronary artery disease due to calcified coronary lesion 8/11/2015    Diabetes mellitus type II     Gastroparesis     Hyperlipidemia     Hypothyroid     Interstitial cystitis     Sciatic neuritis      Past Surgical History:   Procedure Laterality Date    ZZZ CARDIAC CATH  8/6/15    YESIKA to LAD.  Has RCA 70% occulsion.    APPENDECTOMY      OTHER      L shoulder surgery (arthroscopic, [place], 2011)    [facility] TRANSURETHRAL ELEC-SURG PROSTATECTOM       Family History   Problem Relation Age of Onset    Heart Disease Mother     Heart Attack Mother 55        heart attack     Social History     Socioeconomic History    Marital status: Single     Spouse name: Not on file    Number of children: Not on file    Years of education: Not on file    Highest education level: Not on file   Occupational History    Not on file   Tobacco Use    Smoking status: Every Day     Current packs/day: 1.00     Average packs/day: 1 pack/day for 30.0 years (30.0 ttl pk-yrs)     Types: Cigarettes    Smokeless tobacco: Never   Vaping Use    Vaping status: Never Used   Substance and Sexual Activity    Alcohol use: No     Alcohol/week: 0.0 oz    Drug use: No    Sexual activity: Not on file   Other Topics Concern    Not on file   Social History Narrative    Not on file     Social Determinants of Health     Financial Resource Strain: Not on file   Food Insecurity:  Not on file   Transportation Needs: Not on file   Physical Activity: Not on file   Stress: Not on file   Social Connections: Not on file   Intimate Partner Violence: Not on file   Housing Stability: Not on file     Allergies   Allergen Reactions    Brilinta [Ticagrelor] Shortness of Breath     Total Sleep Apnea per patient; SOB    Pcn [Penicillins] Anaphylaxis    Other Environmental      Hayfever    Tdap [Dipth, Acell Pertus, Tetanus]      tdap    Tetanus Toxoid      Outpatient Encounter Medications as of 8/20/2024   Medication Sig Dispense Refill    LANTUS SOLOSTAR 100 UNIT/ML Solution Pen-injector injection INJECT 12 UNITS SUBCUTANEOUSLY IN THE EVENING      liothyronine (CYTOMEL) 25 MCG Tab Take 0.5 Tablets by mouth 2 times a day. 45 Tablet 3    metformin (GLUCOPHAGE) 1000 MG tablet Take 1 Tablet by mouth 2 times a day with meals. 180 Tablet 1    levothyroxine (SYNTHROID) 112 MCG Tab TAKE 1 TABLET BY MOUTH IN THE MORNING ON AN EMPTY STOMACH 90 Tablet 0    atorvastatin (LIPITOR) 80 MG tablet Take 1 Tablet by mouth every day. 90 Tablet 3    Calcium Carb-Cholecalciferol (CALCIUM 600+D HIGH POTENCY PO) Take  by mouth.      metoprolol tartrate (LOPRESSOR) 25 MG Tab Take 0.5 Tablets by mouth 2 times a day. (Patient taking differently: Take 12.5 mg by mouth every day.) 45 Tablet 6    lisinopril (PRINIVIL) 2.5 MG Tab Take 1 Tablet by mouth every day. 90 Tablet 3    ALPRAZolam (XANAX) 0.25 MG Tab Take 0.25 mg by mouth at bedtime as needed for Sleep.      aspirin (ASA) 81 MG Chew Tab chewable tablet Chew 1 Tablet every day. 100 Tablet 11    Ginger, Zingiber officinalis, (GINGER PO) Take  by mouth.      Cholecalciferol (VITAMIN D3 PO) Take 1 Dose by mouth every day. Unknown OTC Strength.      Riboflavin (VITAMIN B-2 PO) Take 1 Dose by mouth every day. Unknown OTC Strength.      ReliOn Lancets Micro-Thin 33G Misc 1 Device every day. Check blood sugar once a day E11.9 100 Each 1    [DISCONTINUED] levothyroxine (SYNTHROID) 112  "MCG Tab Take 1 Tablet by mouth every morning on an empty stomach. 90 Tablet 1    glucose blood (RELION PRIME TEST) strip USE 1 STRIP TO CHECK GLUCOSE ONCE DAILY BEFORE A MEAL, E11.9 100 Strip 11    RELION PEN NEEDLES 31G X 6 MM Misc USE 1 NEEDLE ONCE DAILY WITH LANTUS  Each 11    ReliOn Ultra Thin Lancets 30G Misc USE 1 LANCET ONCE DAILY 100 Each 11    Insulin Pen Needle 31G X 6 MM Misc       B-D UF III MINI PEN NEEDLES 31G X 5 MM Misc USE PEN ONCE DAILY WITH LANTUS PEN      Insulin Pen Needle 31G X 6 MM Misc        No facility-administered encounter medications on file as of 8/20/2024.     Review of Systems   All other systems reviewed and are negative.             Objective     /46 (BP Location: Left arm, Patient Position: Sitting)   Pulse 68   Resp 16   Ht 1.676 m (5' 6\")   Wt 66.7 kg (147 lb)   SpO2 99%   BMI 23.73 kg/m²     Physical Exam  Vitals reviewed.   Constitutional:       General: He is not in acute distress.     Appearance: He is well-developed. He is not diaphoretic.   HENT:      Head: Normocephalic and atraumatic.      Right Ear: External ear normal.      Left Ear: External ear normal.   Eyes:      General: No scleral icterus.        Right eye: No discharge.         Left eye: No discharge.      Conjunctiva/sclera: Conjunctivae normal.      Pupils: Pupils are equal, round, and reactive to light.   Neck:      Thyroid: No thyromegaly.      Vascular: No JVD.      Trachea: No tracheal deviation.   Cardiovascular:      Rate and Rhythm: Normal rate and regular rhythm.      Chest Wall: PMI is not displaced.      Pulses:           Carotid pulses are 2+ on the right side and 2+ on the left side.       Radial pulses are 2+ on the left side.        Popliteal pulses are 2+ on the right side and 2+ on the left side.        Dorsalis pedis pulses are 2+ on the right side and 2+ on the left side.        Posterior tibial pulses are 2+ on the right side and 2+ on the left side.      Heart sounds: No " murmur heard.     No friction rub. No gallop.   Pulmonary:      Effort: Pulmonary effort is normal. No respiratory distress.      Breath sounds: Normal breath sounds. No wheezing or rales.   Chest:      Chest wall: No tenderness.   Abdominal:      General: Bowel sounds are normal. There is no distension.      Palpations: Abdomen is soft.      Tenderness: There is no abdominal tenderness.   Musculoskeletal:         General: No tenderness or deformity. Normal range of motion.      Cervical back: Normal range of motion and neck supple.   Skin:     General: Skin is warm and dry.      Coloration: Skin is not pale.      Findings: No erythema or rash.   Neurological:      Mental Status: He is alert and oriented to person, place, and time.      Cranial Nerves: No cranial nerve deficit (cranial nerves II through XII grossly intact).      Coordination: Coordination normal.   Psychiatric:         Behavior: Behavior normal.         Thought Content: Thought content normal.       LABS:  Lab Results   Component Value Date/Time    CHOLSTRLTOT 82 (L) 07/01/2024 10:39 AM    CHOLSTRLTOT 83 (L) 07/01/2024 10:39 AM    LDL 45 07/01/2024 10:39 AM    HDL 28 (A) 07/01/2024 10:39 AM    TRIGLYCERIDE 55 07/01/2024 10:39 AM    TRIGLYCERIDE 49 07/01/2024 10:39 AM       Lab Results   Component Value Date/Time    WBC 5.7 07/01/2024 10:39 AM    RBC 4.76 07/01/2024 10:39 AM    HEMOGLOBIN 14.2 07/01/2024 10:39 AM    HEMATOCRIT 44.4 07/01/2024 10:39 AM    MCV 93.3 07/01/2024 10:39 AM    NEUTSPOLYS 64.70 07/01/2024 10:39 AM    LYMPHOCYTES 25.60 07/01/2024 10:39 AM    MONOCYTES 8.10 07/01/2024 10:39 AM    EOSINOPHILS 0.90 07/01/2024 10:39 AM    BASOPHILS 0.50 07/01/2024 10:39 AM     Lab Results   Component Value Date/Time    SODIUM 133 (L) 07/01/2024 10:39 AM    POTASSIUM 4.5 07/01/2024 10:39 AM    CHLORIDE 103 07/01/2024 10:39 AM    CO2 21 07/01/2024 10:39 AM    GLUCOSE 85 07/01/2024 10:39 AM    BUN 26 (H) 07/01/2024 10:39 AM    CREATININE 0.89  07/01/2024 10:39 AM    BUNCREATRAT 21 12/07/2022 07:00 AM     Lab Results   Component Value Date    HBA1C 5.9 (H) 07/01/2024      Lab Results   Component Value Date/Time    ALKPHOSPHAT 94 07/01/2024 10:39 AM    ASTSGOT 50 (H) 07/01/2024 10:39 AM    ASTSGOT 41 07/01/2024 10:39 AM    ALTSGPT 45 07/01/2024 10:39 AM    ALTSGPT 41 07/01/2024 10:39 AM    TBILIRUBIN 0.4 07/01/2024 10:39 AM    TBILIRUBIN 0.4 07/01/2024 10:39 AM      Lab Results   Component Value Date/Time    BNPBTYPENAT 178.8 (H) 08/19/2015 10:28 AM    BNPBTYPENAT 299 (H) 08/06/2015 04:59 AM      Lab Results   Component Value Date/Time    TSH 0.007 (L) 10/26/2022 05:10 AM     Lab Results   Component Value Date/Time    PROTHROMBTM 14.1 08/06/2015 04:59 AM    INR 1.09 08/06/2015 04:59 AM                   Assessment & Plan     1. Nonrheumatic aortic valve insufficiency  EC-ECHOCARDIOGRAM COMPLETE W/O CONT      2. PSVT (paroxysmal supraventricular tachycardia) (HCC)        3. Coronary artery disease due to calcified coronary lesion        4. Controlled type 2 diabetes mellitus with hyperglycemia, without long-term current use of insulin (HCC)        5. Essential hypertension        6. Stented coronary artery        7. Dyslipidemia            Medical Decision Making: Today's Assessment/Status/Plan:     Doing well with previously discussed medical therapy for his current abnormalities without recurrent symptoms.  He would like to continue this risk profile.  He is due for echocardiographic surveillance of his aortic insufficiency in 1 year from prior.  He will follow-up routinely.    () Today's E/M visit is associated with medical care services that serve as the continuing focal point for all needed health care services and/or with medical care services that  are part of ongoing care related to a patient's single, serious condition, or a complex condition: This includes  furnishing services to patients on an ongoing basis that result in care that is  personalized  to the patient. The services result in a comprehensive, longitudinal, and continuous  relationship with the patient and involve delivery of team-based care that is accessible, coordinated with other practitioners and providers, and integrated with the broader health  care landscape.

## 2024-08-21 ENCOUNTER — HOSPITAL ENCOUNTER (OUTPATIENT)
Dept: LAB | Facility: MEDICAL CENTER | Age: 74
End: 2024-08-21
Payer: MEDICARE

## 2024-08-21 DIAGNOSIS — E55.9 VITAMIN D DEFICIENCY: ICD-10-CM

## 2024-08-21 DIAGNOSIS — E03.9 HYPOTHYROIDISM, ACQUIRED: ICD-10-CM

## 2024-08-21 DIAGNOSIS — R74.8 ELEVATED LIVER ENZYMES: ICD-10-CM

## 2024-08-21 LAB
25(OH)D3 SERPL-MCNC: 29 NG/ML (ref 30–100)
ALBUMIN SERPL BCP-MCNC: 3.9 G/DL (ref 3.2–4.9)
ALBUMIN/GLOB SERPL: 1.6 G/DL
ALP SERPL-CCNC: 111 U/L (ref 30–99)
ALT SERPL-CCNC: 38 U/L (ref 2–50)
ANION GAP SERPL CALC-SCNC: 11 MMOL/L (ref 7–16)
AST SERPL-CCNC: 46 U/L (ref 12–45)
BILIRUB SERPL-MCNC: 0.5 MG/DL (ref 0.1–1.5)
BUN SERPL-MCNC: 18 MG/DL (ref 8–22)
CALCIUM ALBUM COR SERPL-MCNC: 8.7 MG/DL (ref 8.5–10.5)
CALCIUM SERPL-MCNC: 8.6 MG/DL (ref 8.5–10.5)
CHLORIDE SERPL-SCNC: 109 MMOL/L (ref 96–112)
CO2 SERPL-SCNC: 18 MMOL/L (ref 20–33)
CREAT SERPL-MCNC: 1 MG/DL (ref 0.5–1.4)
GFR SERPLBLD CREATININE-BSD FMLA CKD-EPI: 79 ML/MIN/1.73 M 2
GLOBULIN SER CALC-MCNC: 2.4 G/DL (ref 1.9–3.5)
GLUCOSE SERPL-MCNC: 98 MG/DL (ref 65–99)
POTASSIUM SERPL-SCNC: 4.9 MMOL/L (ref 3.6–5.5)
PROT SERPL-MCNC: 6.3 G/DL (ref 6–8.2)
SODIUM SERPL-SCNC: 138 MMOL/L (ref 135–145)
T3FREE SERPL-MCNC: 2.65 PG/ML (ref 2–4.4)
T4 FREE SERPL-MCNC: 0.88 NG/DL (ref 0.93–1.7)
TSH SERPL-ACNC: 0.14 UIU/ML (ref 0.35–5.5)

## 2024-08-21 PROCEDURE — 84443 ASSAY THYROID STIM HORMONE: CPT

## 2024-08-21 PROCEDURE — 84439 ASSAY OF FREE THYROXINE: CPT

## 2024-08-21 PROCEDURE — 84481 FREE ASSAY (FT-3): CPT

## 2024-08-21 PROCEDURE — 36415 COLL VENOUS BLD VENIPUNCTURE: CPT

## 2024-08-21 PROCEDURE — 82306 VITAMIN D 25 HYDROXY: CPT

## 2024-08-21 PROCEDURE — 80053 COMPREHEN METABOLIC PANEL: CPT

## 2024-08-29 ENCOUNTER — OFFICE VISIT (OUTPATIENT)
Dept: ENDOCRINOLOGY | Facility: MEDICAL CENTER | Age: 74
End: 2024-08-29
Payer: MEDICARE

## 2024-08-29 VITALS
BODY MASS INDEX: 23.39 KG/M2 | DIASTOLIC BLOOD PRESSURE: 62 MMHG | WEIGHT: 149 LBS | HEART RATE: 100 BPM | OXYGEN SATURATION: 94 % | HEIGHT: 67 IN | SYSTOLIC BLOOD PRESSURE: 110 MMHG

## 2024-08-29 DIAGNOSIS — I25.84 CORONARY ARTERY DISEASE DUE TO CALCIFIED CORONARY LESION: ICD-10-CM

## 2024-08-29 DIAGNOSIS — I25.10 CORONARY ARTERY DISEASE DUE TO CALCIFIED CORONARY LESION: ICD-10-CM

## 2024-08-29 DIAGNOSIS — E55.9 VITAMIN D DEFICIENCY: ICD-10-CM

## 2024-08-29 DIAGNOSIS — K31.84 GASTROPARESIS: ICD-10-CM

## 2024-08-29 DIAGNOSIS — E78.5 DYSLIPIDEMIA: Chronic | ICD-10-CM

## 2024-08-29 DIAGNOSIS — E03.9 HYPOTHYROIDISM, ACQUIRED: ICD-10-CM

## 2024-08-29 DIAGNOSIS — E11.65 TYPE 2 DIABETES MELLITUS WITH HYPERGLYCEMIA, WITHOUT LONG-TERM CURRENT USE OF INSULIN (HCC): ICD-10-CM

## 2024-08-29 PROCEDURE — 99211 OFF/OP EST MAY X REQ PHY/QHP: CPT

## 2024-08-29 ASSESSMENT — FIBROSIS 4 INDEX: FIB4 SCORE: 2.71

## 2024-08-29 NOTE — PROGRESS NOTES
Chief Complaint: Follow-up for the following conditions  HPI:   Orlando Arreguin is a 73 y.o. male   He was previously followed by Chap[facility] CASILLAS    1.Acquired hypothyroidism:  History of Hypothyroidism diagnosed on for the past 20 years and is here for initial evaluation.    He is currently on Levothyroxine 112 mcg daily and Cytomel 25 mcg (cut in half) in the am and pm. He has been on these doses for many years.     He reports Good compliance and takes his thyroid hormone daily before breakfast.    Although at times he forgets to take his morning Cytomel for which he develops fatigue around lunchtime    He denies taking any iron, calcium supplements or antacids.   Denies taking any Biotin      He denies fatigue, weight gain, constipation, tremors, swelling, feeling slow, losing hair, anxiousness, feeling excessive energy, palpitations and sweating.    Hx of gastroparesis,   He denies lumps or enlargement in the neck.    History of coronary artery disease with an MI in the past and 2 with stents   Latest Reference Range & Units 08/21/24 10:45   TSH 0.350 - 5.500 uIU/mL 0.137 (L)   Free T-4 0.93 - 1.70 ng/dL 0.88 (L)   T3,Free 2.00 - 4.40 pg/mL 2.65      Latest Reference Range & Units 05/04/23 10:29   GFR (CKD-EPI) >60 mL/min/1.73 m 2 85       2.  Coronary artery disease due to calcified coronary lesion:  Reports history of 2 heart attacks in the past  This is followed by cardiology  Family history of cardiovascular disease with his mom dying at 62 after her third heart attack    3.  Type 2 diabetes mellitus with hyperglycemia, unspecified whether long term insulin use:  Reports history of type 2 diabetes mellitus  This is followed by primary care   Latest Reference Range & Units 07/01/24 10:39   Glycohemoglobin 4.0 - 5.6 % 5.9 (H)     A1c on 1/4/2023 at 5.8%  Metformin 1000 mg twice/day   Lantus 12 units/night    Dyslipidemia  Currently taking atorvastatin 80 mg every evening   Latest Reference Range & Units  07/01/24 10:39   Cholesterol,Tot 100 - 199 mg/dL  100 - 199 mg/dL 82 (L)  83 (L)   Triglycerides 0 - 149 mg/dL  0 - 149 mg/dL 55  49   HDL >=40 mg/dL 28 !   LDL <100 mg/dL 45   Apolipoprotein A1 101 - 178 mg/dL 89 (L)      Latest Reference Range & Units 07/01/24 10:38   Micro Alb Creat Ratio 0 - 30 mg/g see below   Creatinine, Urine mg/dL 47.28   Microalbumin, Urine Random mg/dL <1.2   Urobilinogen, Urine Negative  0.2     4.  Gastroparesis:  Reports history of gastroparesis for the past 18 to 20 years  This is followed by primary care  This condition was diagnosed when he was living in [facility] after a gastric emptying scintigraphy was done  He was prescribed Reglan originally but after reading the side effects of Tardive Dyskinesis he decided not to take it  He was then prescribed Erythromycin and provided relief, he took erythromycin for her gastroparesis management for 18 years and he stopped 3 months ago due to high cost  Currently treatment for gastroparesis is gingerroot tablets    He reports that when his free T4 and free T3 are mid normal levels, his gastroparesis is better controlled    5.  Vitamin D deficiency:  Currently taking Vitamin D 3 3 caps daily    Latest Reference Range & Units 08/21/24 10:45   25-Hydroxy   Vitamin D 25 30 - 100 ng/mL 29 (L)        Latest Reference Range & Units 08/21/24 10:45   Calcium 8.5 - 10.5 mg/dL 8.6   Correct Calcium 8.5 - 10.5 mg/dL 8.7       7.  Elevated liver enzymes:  Elevated liver enzyme and blood panel  Denies heavy alcohol consumption  This is followed by pcp   Latest Reference Range & Units 07/01/24 10:39 08/21/24 10:45   AST (SGOT) 0 - 40 IU/L 50 (H)    ALT (SGPT) 0 - 55 IU/L 45    AST(SGOT) 12 - 45 U/L 41 46 (H)   ALT(SGPT) 2 - 50 U/L 41 38         Patient's medications, allergies, and social histories were reviewed and updated as appropriate.      ROS:     CONS:     No fever, no chills, no weight loss, no fatigue   EYES:      No diplopia, no blurry  vision, no redness of eyes, no swelling of eyelids   ENT:    No hearing loss, No ear pain, No sore throat, no dysphagia, no neck swelling   CV:     No chest pain, no palpitations, no claudication, no orthopnea, no PND   PULM:    No SOB, no cough, no hemoptysis, no wheezing    GI:   No nausea, no vomiting, no diarrhea, no constipation, no bloody stools   :  Passing urine well, no dysuria, no hematuria   ENDO:   No polyuria, no polydipsia, no heat intolerance, no cold intolerance   NEURO: No headaches, no dizziness, no convulsions, no tremors   MUSC:  No joint swellings, no arthralgias, no myalgias, no weakness   SKIN:   No rash, no ulcers, no dry skin   PSYCH:   No depression, no anxiety, no difficulty sleeping       Past Medical History:  Patient Active Problem List    Diagnosis Date Noted    Post-polio syndrome 07/07/2024    Aortic atherosclerosis ([facility]) 02/07/2023    Abnormal alkaline phosphatase test 01/07/2023    Aortic insufficiency 12/03/2021    Ascending aorta dilatation ([facility]) 11/19/2021    Stented coronary artery 10/01/2020    PAD (peripheral artery disease) ([facility]) 12/28/2017    Atherosclerosis of native coronary artery with angina pectoris, unspecified whether native or transplanted heart ([facility])     History of MI (myocardial infarction) 08/06/2015    Tobacco dependence 07/29/2015    Dyslipidemia 05/07/2013    Insomnia 04/18/2012    Preventative health care 12/09/2010    History of shoulder pain 12/09/2010    Diabetes mellitus type 2, controlled ([facility]) 09/10/2010    History of allergic rhinitis 08/21/2010    Gastroparesis 08/21/2010    S/p green light photovaporization prostate 08/21/2010    Interstitial cystitis 08/21/2010    Hypothyroid 08/21/2010    On angiotensin-converting enzyme (ACE) inhibitors (for diabetes, not HTN) 08/21/2010       Past Surgical History:  Past Surgical History:   Procedure Laterality Date    [facility] CARDIAC CATH  8/6/15    YESIKA to LAD.  Has RCA 70% occulsion.    APPENDECTOMY      OTHER       L shoulder surgery (arthroscopic, [place], 2011)    [facility] TRANSURETHRAL ELEC-SURG PROSTATECTOM          Allergies:  Brilinta [ticagrelor]; Pcn [penicillins]; Other environmental; Tdap [dipth, acell pertus, tetanus]; and Tetanus toxoid     Current Medications:    Current Outpatient Medications:     LANTUS SOLOSTAR 100 UNIT/ML Solution Pen-injector injection, INJECT 12 UNITS SUBCUTANEOUSLY IN THE EVENING, Disp: , Rfl:     liothyronine (CYTOMEL) 25 MCG Tab, Take 0.5 Tablets by mouth 2 times a day., Disp: 45 Tablet, Rfl: 3    metformin (GLUCOPHAGE) 1000 MG tablet, Take 1 Tablet by mouth 2 times a day with meals., Disp: 180 Tablet, Rfl: 1    ReliOn Lancets Micro-Thin 33G Misc, 1 Device every day. Check blood sugar once a day E11.9, Disp: 100 Each, Rfl: 1    levothyroxine (SYNTHROID) 112 MCG Tab, TAKE 1 TABLET BY MOUTH IN THE MORNING ON AN EMPTY STOMACH, Disp: 90 Tablet, Rfl: 0    atorvastatin (LIPITOR) 80 MG tablet, Take 1 Tablet by mouth every day., Disp: 90 Tablet, Rfl: 3    Calcium Carb-Cholecalciferol (CALCIUM 600+D HIGH POTENCY PO), Take  by mouth., Disp: , Rfl:     metoprolol tartrate (LOPRESSOR) 25 MG Tab, Take 0.5 Tablets by mouth 2 times a day. (Patient taking differently: Take 12.5 mg by mouth every day.), Disp: 45 Tablet, Rfl: 6    glucose blood (RELION PRIME TEST) strip, USE 1 STRIP TO CHECK GLUCOSE ONCE DAILY BEFORE A MEAL, E11.9, Disp: 100 Strip, Rfl: 11    lisinopril (PRINIVIL) 2.5 MG Tab, Take 1 Tablet by mouth every day., Disp: 90 Tablet, Rfl: 3    RELION PEN NEEDLES 31G X 6 MM Misc, USE 1 NEEDLE ONCE DAILY WITH LANTUS PEN, Disp: 100 Each, Rfl: 11    ReliOn Ultra Thin Lancets 30G Misc, USE 1 LANCET ONCE DAILY, Disp: 100 Each, Rfl: 11    ALPRAZolam (XANAX) 0.25 MG Tab, Take 0.25 mg by mouth at bedtime as needed for Sleep., Disp: , Rfl:     aspirin (ASA) 81 MG Chew Tab chewable tablet, Chew 1 Tablet every day., Disp: 100 Tablet, Rfl: 11    Ginger, Zingiber officinalis, (GINGER PO), Take  by mouth., Disp: ,  "Rfl:     Insulin Pen Needle 31G X 6 MM Misc, , Disp: , Rfl:     B-D UF III MINI PEN NEEDLES 31G X 5 MM Misc, USE PEN ONCE DAILY WITH LANTUS PEN, Disp: , Rfl:     Insulin Pen Needle 31G X 6 MM Misc, , Disp: , Rfl:     Cholecalciferol (VITAMIN D3 PO), Take 1 Dose by mouth every day. Unknown OTC Strength., Disp: , Rfl:     Riboflavin (VITAMIN B-2 PO), Take 1 Dose by mouth every day. Unknown OTC Strength., Disp: , Rfl:     Social History:  Social History     Socioeconomic History    Marital status: Single     Spouse name: Not on file    Number of children: Not on file    Years of education: Not on file    Highest education level: Not on file   Occupational History    Not on file   Tobacco Use    Smoking status: Every Day     Current packs/day: 1.00     Average packs/day: 1 pack/day for 30.0 years (30.0 ttl pk-yrs)     Types: Cigarettes    Smokeless tobacco: Never   Vaping Use    Vaping status: Never Used   Substance and Sexual Activity    Alcohol use: No     Alcohol/week: 0.0 oz    Drug use: No    Sexual activity: Not on file   Other Topics Concern    Not on file   Social History Narrative    Not on file     Social Determinants of Health     Financial Resource Strain: Not on file   Food Insecurity: Not on file   Transportation Needs: Not on file   Physical Activity: Not on file   Stress: Not on file   Social Connections: Not on file   Intimate Partner Violence: Not on file   Housing Stability: Not on file        Family History:   Family History   Problem Relation Age of Onset    Heart Disease Mother     Heart Attack Mother 55        heart attack         PHYSICAL EXAM:   Vital signs: /62 (BP Location: Left arm, Patient Position: Sitting, BP Cuff Size: Adult)   Pulse 100   Ht 1.702 m (5' 7\")   Wt 67.6 kg (149 lb)   SpO2 94%   BMI 23.34 kg/m²   GENERAL: Well-developed, well-nourished  in no apparent distress.     Labs:  Lab Results   Component Value Date/Time    CHOLSTRLTOT 83 (L) 01/04/2022 0736    TRIGLYCERIDE " 52 01/04/2022 0736    HDL 31 (L) 01/04/2022 0736    LDL 36 12/07/2020 1051       Lab Results   Component Value Date/Time    TSHULTRASEN 0.010 (L) 05/05/2022 1004     Lab Results   Component Value Date/Time    FREET4 1.44 05/05/2022 1004     Lab Results   Component Value Date/Time    FREET3 3.50 05/05/2022 1004           Imaging:      ASSESSMENT/PLAN:   1. Hypothyroidism, acquired  Unstable  Thyroid levels are unreliable due to lab interference  I have recommended patient repeat the blood work for dose adjustments, however he would like to wait another 3 months prior to repeating the blood work he believes that his Medicare will not be covering his thyroid levels so soon  Medication:  Levothyroxine 112 mcg daily - continue  Cytomel 12.5 mcg BID - continue  Discussed risk of thyroid overtreatment such as cardiovascular disease and osteoporosis  - TSH; Future  - FREE THYROXINE; Future  - T3 FREE; Future  - THYROID PEROXIDASE  (TPO) AB; Future    2. Coronary artery disease due to calcified coronary lesion  Unstable  Followed by cardiology    3. Type 2 diabetes mellitus with hyperglycemia, without long-term current use of insulin (HCC)  Stable  A1c 5.9%  Continue current regimen-see HPI  Followed by pcp    4. Gastroparesis  Unstable  Followed by PCP    5. Vitamin D deficiency  Stable  Continue current regimen-see HPI    6. Dyslipidemia  Stable  Continue current regimen-see HPI    Disposition: Return in about 6 months (around 2/28/2025).  Patient will have blood work done in 3 months and reach out via [employer] for review and dose adjustment  Do your blood work 2 weeks prior to appointment      Thank you for allowing me to participate in the thyroid care plan for this patient.    Fede Pederson A.P.R.N.  8/29/24    CC:   Charbel Jaffe M.D.

## 2024-10-18 RX ORDER — PEN NEEDLE, DIABETIC 32GX 5/32"
1 NEEDLE, DISPOSABLE MISCELLANEOUS DAILY
Qty: 100 EACH | Refills: 11 | Status: SHIPPED | OUTPATIENT
Start: 2024-10-18

## 2024-10-31 NOTE — TELEPHONE ENCOUNTER
Was the patient seen in the last year in this department? Yes    Does patient have an active prescription for medications requested? No     Received Request Via: Pharmacy  
Hpi Title: Evaluation of Skin Lesions

## 2024-11-03 DIAGNOSIS — I10 ESSENTIAL HYPERTENSION: ICD-10-CM

## 2024-11-05 RX ORDER — LISINOPRIL 2.5 MG/1
2.5 TABLET ORAL DAILY
Qty: 90 TABLET | Refills: 3 | Status: SHIPPED | OUTPATIENT
Start: 2024-11-05

## 2024-12-02 ENCOUNTER — HOSPITAL ENCOUNTER (OUTPATIENT)
Dept: LAB | Facility: MEDICAL CENTER | Age: 74
End: 2024-12-02
Payer: MEDICARE

## 2024-12-02 DIAGNOSIS — E03.9 HYPOTHYROIDISM, ACQUIRED: ICD-10-CM

## 2024-12-02 LAB
T3FREE SERPL-MCNC: 2.58 PG/ML (ref 2–4.4)
T4 FREE SERPL-MCNC: 0.75 NG/DL (ref 0.93–1.7)
THYROPEROXIDASE AB SERPL-ACNC: <9 IU/ML (ref 0–9)
TSH SERPL-ACNC: 0.45 UIU/ML (ref 0.35–5.5)

## 2024-12-02 PROCEDURE — 86376 MICROSOMAL ANTIBODY EACH: CPT

## 2024-12-02 PROCEDURE — 84439 ASSAY OF FREE THYROXINE: CPT

## 2024-12-02 PROCEDURE — 36415 COLL VENOUS BLD VENIPUNCTURE: CPT

## 2024-12-02 PROCEDURE — 84481 FREE ASSAY (FT-3): CPT

## 2024-12-02 PROCEDURE — 84443 ASSAY THYROID STIM HORMONE: CPT

## 2024-12-03 DIAGNOSIS — E03.9 HYPOTHYROIDISM, ACQUIRED: ICD-10-CM

## 2024-12-03 DIAGNOSIS — E55.9 VITAMIN D DEFICIENCY: ICD-10-CM

## 2024-12-09 ENCOUNTER — PATIENT MESSAGE (OUTPATIENT)
Dept: MEDICAL GROUP | Facility: MEDICAL CENTER | Age: 74
End: 2024-12-09
Payer: MEDICARE

## 2024-12-09 DIAGNOSIS — E11.65 CONTROLLED TYPE 2 DIABETES MELLITUS WITH HYPERGLYCEMIA, WITHOUT LONG-TERM CURRENT USE OF INSULIN (HCC): Chronic | ICD-10-CM

## 2024-12-09 DIAGNOSIS — E78.5 DYSLIPIDEMIA: Chronic | ICD-10-CM

## 2024-12-09 DIAGNOSIS — Z12.5 PROSTATE CANCER SCREENING: ICD-10-CM

## 2024-12-09 DIAGNOSIS — D51.1 VIT B12 DEFIC ANEMIA D/T SLCTV VIT B12 MALABSORP W PROTEIN: ICD-10-CM

## 2024-12-09 DIAGNOSIS — E53.8 B12 DEFICIENCY: ICD-10-CM

## 2024-12-10 DIAGNOSIS — E03.9 HYPOTHYROIDISM, ACQUIRED: ICD-10-CM

## 2024-12-10 RX ORDER — LIOTHYRONINE SODIUM 25 UG/1
12.5 TABLET ORAL 2 TIMES DAILY
Qty: 45 TABLET | Refills: 0 | Status: SHIPPED | OUTPATIENT
Start: 2024-12-10

## 2025-01-09 ENCOUNTER — HOSPITAL ENCOUNTER (OUTPATIENT)
Dept: LAB | Facility: MEDICAL CENTER | Age: 75
End: 2025-01-09
Attending: INTERNAL MEDICINE
Payer: MEDICARE

## 2025-01-09 DIAGNOSIS — Z12.5 PROSTATE CANCER SCREENING: ICD-10-CM

## 2025-01-09 DIAGNOSIS — E11.65 CONTROLLED TYPE 2 DIABETES MELLITUS WITH HYPERGLYCEMIA, WITHOUT LONG-TERM CURRENT USE OF INSULIN (HCC): Chronic | ICD-10-CM

## 2025-01-09 DIAGNOSIS — E78.5 DYSLIPIDEMIA: Chronic | ICD-10-CM

## 2025-01-09 DIAGNOSIS — E03.9 HYPOTHYROIDISM, ACQUIRED: ICD-10-CM

## 2025-01-09 DIAGNOSIS — E53.8 B12 DEFICIENCY: ICD-10-CM

## 2025-01-09 LAB
APPEARANCE UR: CLEAR
BASOPHILS # BLD AUTO: 0.6 % (ref 0–1.8)
BASOPHILS # BLD: 0.03 K/UL (ref 0–0.12)
BILIRUB UR QL STRIP.AUTO: NEGATIVE
COLOR UR: YELLOW
EOSINOPHIL # BLD AUTO: 0.06 K/UL (ref 0–0.51)
EOSINOPHIL NFR BLD: 1.2 % (ref 0–6.9)
ERYTHROCYTE [DISTWIDTH] IN BLOOD BY AUTOMATED COUNT: 50.1 FL (ref 35.9–50)
EST. AVERAGE GLUCOSE BLD GHB EST-MCNC: 111 MG/DL
GLUCOSE UR STRIP.AUTO-MCNC: NEGATIVE MG/DL
HBA1C MFR BLD: 5.5 % (ref 4–5.6)
HCT VFR BLD AUTO: 45.3 % (ref 42–52)
HGB BLD-MCNC: 14.6 G/DL (ref 14–18)
IMM GRANULOCYTES # BLD AUTO: 0.02 K/UL (ref 0–0.11)
IMM GRANULOCYTES NFR BLD AUTO: 0.4 % (ref 0–0.9)
KETONES UR STRIP.AUTO-MCNC: ABNORMAL MG/DL
LEUKOCYTE ESTERASE UR QL STRIP.AUTO: NEGATIVE
LYMPHOCYTES # BLD AUTO: 1.49 K/UL (ref 1–4.8)
LYMPHOCYTES NFR BLD: 30.5 % (ref 22–41)
MCH RBC QN AUTO: 29.9 PG (ref 27–33)
MCHC RBC AUTO-ENTMCNC: 32.2 G/DL (ref 32.3–36.5)
MCV RBC AUTO: 92.6 FL (ref 81.4–97.8)
MICRO URNS: ABNORMAL
MONOCYTES # BLD AUTO: 0.4 K/UL (ref 0–0.85)
MONOCYTES NFR BLD AUTO: 8.2 % (ref 0–13.4)
NEUTROPHILS # BLD AUTO: 2.89 K/UL (ref 1.82–7.42)
NEUTROPHILS NFR BLD: 59.1 % (ref 44–72)
NITRITE UR QL STRIP.AUTO: NEGATIVE
NRBC # BLD AUTO: 0 K/UL
NRBC BLD-RTO: 0 /100 WBC (ref 0–0.2)
PH UR STRIP.AUTO: 5.5 [PH] (ref 5–8)
PLATELET # BLD AUTO: 191 K/UL (ref 164–446)
PMV BLD AUTO: 10.9 FL (ref 9–12.9)
PROT UR QL STRIP: NEGATIVE MG/DL
RBC # BLD AUTO: 4.89 M/UL (ref 4.7–6.1)
RBC UR QL AUTO: NEGATIVE
SP GR UR STRIP.AUTO: 1.02
UROBILINOGEN UR STRIP.AUTO-MCNC: 0.2 EU/DL
WBC # BLD AUTO: 4.9 K/UL (ref 4.8–10.8)

## 2025-01-09 PROCEDURE — 83036 HEMOGLOBIN GLYCOSYLATED A1C: CPT | Mod: GA

## 2025-01-09 PROCEDURE — 80061 LIPID PANEL: CPT

## 2025-01-09 PROCEDURE — 85025 COMPLETE CBC W/AUTO DIFF WBC: CPT

## 2025-01-09 PROCEDURE — 82607 VITAMIN B-12: CPT

## 2025-01-09 PROCEDURE — 84153 ASSAY OF PSA TOTAL: CPT | Mod: GA

## 2025-01-09 PROCEDURE — 36415 COLL VENOUS BLD VENIPUNCTURE: CPT

## 2025-01-09 PROCEDURE — 82043 UR ALBUMIN QUANTITATIVE: CPT

## 2025-01-09 PROCEDURE — 82570 ASSAY OF URINE CREATININE: CPT

## 2025-01-09 PROCEDURE — 81003 URINALYSIS AUTO W/O SCOPE: CPT

## 2025-01-09 NOTE — TELEPHONE ENCOUNTER
Received request via: Pharmacy    Was the patient seen in the last year in this department? Yes    Does the patient have an active prescription (recently filled or refills available) for medication(s) requested? No    Pharmacy Name: [facility]    Does the patient have [facility] and need 100-day supply? (This applies to ALL medications) Patient does not have SCP

## 2025-01-10 PROBLEM — E53.8 B12 DEFICIENCY: Status: ACTIVE | Noted: 2025-01-10

## 2025-01-10 LAB
CHOLEST SERPL-MCNC: 65 MG/DL (ref 100–199)
CREAT UR-MCNC: 89.62 MG/DL
HDLC SERPL-MCNC: 21 MG/DL
LDLC SERPL CALC-MCNC: 29 MG/DL
MICROALBUMIN UR-MCNC: <1.2 MG/DL
MICROALBUMIN/CREAT UR: NORMAL MG/G (ref 0–30)
PSA SERPL DL<=0.01 NG/ML-MCNC: 2 NG/ML (ref 0–4)
TRIGL SERPL-MCNC: 73 MG/DL (ref 0–149)
VIT B12 SERPL-MCNC: 198 PG/ML (ref 211–911)

## 2025-01-13 RX ORDER — LEVOTHYROXINE SODIUM 112 UG/1
112 TABLET ORAL
Qty: 90 TABLET | Refills: 0 | Status: SHIPPED | OUTPATIENT
Start: 2025-01-13

## 2025-01-16 ENCOUNTER — OFFICE VISIT (OUTPATIENT)
Dept: MEDICAL GROUP | Facility: MEDICAL CENTER | Age: 75
End: 2025-01-16
Payer: MEDICARE

## 2025-01-16 VITALS
WEIGHT: 150.6 LBS | TEMPERATURE: 97.9 F | OXYGEN SATURATION: 98 % | HEART RATE: 64 BPM | HEIGHT: 66 IN | SYSTOLIC BLOOD PRESSURE: 122 MMHG | BODY MASS INDEX: 24.2 KG/M2 | DIASTOLIC BLOOD PRESSURE: 60 MMHG

## 2025-01-16 DIAGNOSIS — Z00.00 PREVENTATIVE HEALTH CARE: Chronic | ICD-10-CM

## 2025-01-16 DIAGNOSIS — E11.9 DIABETES MELLITUS WITHOUT COMPLICATION (HCC): ICD-10-CM

## 2025-01-16 DIAGNOSIS — K74.00 HEPATIC FIBROSIS: ICD-10-CM

## 2025-01-16 DIAGNOSIS — R79.89 ABNORMAL LIVER FUNCTION TEST: ICD-10-CM

## 2025-01-16 DIAGNOSIS — E53.8 B12 DEFICIENCY: ICD-10-CM

## 2025-01-16 DIAGNOSIS — I70.0 AORTIC ATHEROSCLEROSIS (HCC): ICD-10-CM

## 2025-01-16 DIAGNOSIS — E78.5 DYSLIPIDEMIA: Chronic | ICD-10-CM

## 2025-01-16 DIAGNOSIS — F17.200 TOBACCO DEPENDENCE: ICD-10-CM

## 2025-01-16 DIAGNOSIS — R74.8 ABNORMAL ALKALINE PHOSPHATASE TEST: ICD-10-CM

## 2025-01-16 PROCEDURE — 3074F SYST BP LT 130 MM HG: CPT | Performed by: INTERNAL MEDICINE

## 2025-01-16 PROCEDURE — 3078F DIAST BP <80 MM HG: CPT | Performed by: INTERNAL MEDICINE

## 2025-01-16 PROCEDURE — 99214 OFFICE O/P EST MOD 30 MIN: CPT | Performed by: INTERNAL MEDICINE

## 2025-01-16 ASSESSMENT — FIBROSIS 4 INDEX: FIB4 SCORE: 2.89

## 2025-01-16 NOTE — PROGRESS NOTES
Subjective     Orlando Arreguin is a 74 y.o. male who presents with Follow-Up and Other (Discuss missed lab orders CMP and liver )              HPI        Here for follow up labs and brings blood sugar readings, patient checks his blood sugars twice a day, typically 2 to 5 minutes apart, usually checks his blood sugars between noon and 1:00 PM after he gets up, blood sugars have been stable running 70s to 90s, low blood sugar 68 on November 22, the last blood sugar above 100 was November 1st at 104 but otherwise blood sugars have been regular and consistent.  Continues on the metformin 1000 mg twice daily, Lantus 12 units daily.  CAD followed by cardiology, continues on atorvastatin 80 mg daily, tries to keep active working in his yard but without snow he is less active compared to the summer months and has gained a bit of weight 3 pounds on our scale since the summer.  Hypothyroid followed by endocrinology on replacement.  Tries to follow a good nutrition program, limiting processed foods, also avoids foods because of the acidity does affect his interstitial cystitis.  Smoking down to less than a pack a day no shortness of breath with activity.  Has had the influenza and COVID-vaccine October 16  Medications, allergies, medical history, surgical history, social history, family history  reviewed and updated          Current Outpatient Medications   Medication Sig Dispense Refill    levothyroxine (SYNTHROID) 112 MCG Tab Take 1 Tablet by mouth every morning on an empty stomach. 90 Tablet 0    liothyronine (CYTOMEL) 25 MCG Tab Take 1/2 (one-half) tablet by mouth twice daily 45 Tablet 0    lisinopril (PRINIVIL) 2.5 MG Tab Take 1 tablet by mouth once daily 90 Tablet 3    Insulin Pen Needle (RELION PEN NEEDLES) 31G X 6 MM Misc Inject 1 Device under the skin every day. E11.9 100 Each 11    LANTUS SOLOSTAR 100 UNIT/ML Solution Pen-injector injection INJECT 12 UNITS SUBCUTANEOUSLY IN THE EVENING      metformin (GLUCOPHAGE) 1000  MG tablet Take 1 Tablet by mouth 2 times a day with meals. 180 Tablet 1    ReliOn Lancets Micro-Thin 33G Misc 1 Device every day. Check blood sugar once a day E11.9 100 Each 1    atorvastatin (LIPITOR) 80 MG tablet Take 1 Tablet by mouth every day. 90 Tablet 3    Calcium Carb-Cholecalciferol (CALCIUM 600+D HIGH POTENCY PO) Take  by mouth.      metoprolol tartrate (LOPRESSOR) 25 MG Tab Take 0.5 Tablets by mouth 2 times a day. (Patient taking differently: Take 12.5 mg by mouth every day.) 45 Tablet 6    glucose blood (RELION PRIME TEST) strip USE 1 STRIP TO CHECK GLUCOSE ONCE DAILY BEFORE A MEAL, E11.9 100 Strip 11    ReliOn Ultra Thin Lancets 30G Misc USE 1 LANCET ONCE DAILY 100 Each 11    ALPRAZolam (XANAX) 0.25 MG Tab Take 0.25 mg by mouth at bedtime as needed for Sleep.      aspirin (ASA) 81 MG Chew Tab chewable tablet Chew 1 Tablet every day. 100 Tablet 11    Ginger, Zingiber officinalis, (GINGER PO) Take  by mouth.      Insulin Pen Needle 31G X 6 MM Misc       B-D UF III MINI PEN NEEDLES 31G X 5 MM Misc USE PEN ONCE DAILY WITH LANTUS PEN      Insulin Pen Needle 31G X 6 MM Misc       Cholecalciferol (VITAMIN D3 PO) Take 1 Dose by mouth every day. Unknown OTC Strength.      Riboflavin (VITAMIN B-2 PO) Take 1 Dose by mouth every day. Unknown OTC Strength.       No current facility-administered medications for this visit.         Tobacco  7/31/15 tobacco 1 ppd not interested in stopping smoking classes or education  8/28/15 tobacco 1 ppd not interested in stopping smoking classes or education  8/28/15 declines PFT    11/30/15 still smoking 20 cigs per day declines stop smoking classes or medications  5/31/16 declines stop smoking classes and medications, not interested in stop smoking classes, smoking ppd x 40 plus years, declines CT lung cancer screening, pulmonary function testing  11/29/16 still smoking 1 ppd, started smoking age 20, declines stop smoking classes and medication, declines lung cancer screening  program and PFT  5/30/17 still smoking 1 ppd declines stop smoking classes and medications, and lung cancer screening program and PFT  12/5/17 still smokes 1 ppd declines stop smoking classes and medications, and lung cancer screening program and PFT   6/12/18  still smokes 1 ppd declines stop smoking classes and medications, and lung cancer screening program and PFT   6/18/19 still smokes 1 ppd declines stop smoking classes and medications, and lung cancer screening program and PFT   12/10/19 still smoking 1 pack/day, declines stop smoking classes, medications, lung cancer screening, PFT  7/1/20 cardiology note stable, urged to quit smoking, follow-up 3 months  12/15/20 tobacco 1/2 ppd not interested to stop smoking classes, medications, CT lung cancer screening, or PFTs  6/14/21 1 ppd tobacco, declines stop smoking classes, CT lung cancer screening, PFT  1/10/22 1 ppd tobacco, declines stop smoking classes, CT lung cancer screening, PFT  7/14/22 declines stop smoking classes  1/12/23 1 ppd tobacco, declines stop smoking classes, CT lung cancer screening, PFT   1/11/24 1 ppd tobacco, declines CT lung cancer screening and PFT   7/11/24 1 ppd tobacco, declines CT lung cancer screening and PFT     s/p greenlight photo vaporization  10/04 cystoscopy and green light photovaporization of prostate in [place], [place]  1/5/22 psa 1.4     Preventative health  1/12/16 zoster vaccine at [facility]  12/7/20 hep c ab negative  12/15/20 declines tdap  12/15/20 declines shingrix  12/15/20 declines colonoscopy  7/14/22 prevnar 20  7/1/24 vit d 35  7/1/24 BALLARD fibrosure fibrosis score 0.53 (F2),steatosis score 0.22 (S0),BALLARD 0.0 (N0)  7/11/24 declines cologuard  7/11/24 declines tdap and shingrx   10/16/24 flu  10/16/24 covid  1/9/25 psa 2.0     Postpolio syndrome  6/04 saw neurology in [place] , notes indicate chronic non specific complaints with normal brain MRI, no evidence to support post polio syndrome     pad  12/22/17  ultrasound carotid less than 50% stenosis bilateral  12/28/17 JOSSUE lower extremities, normal at rest, post exercise right JOSSUE 0.8, left 0.8 mild-to-moderate arterial disease bilateral  12/28/17 ultrasound vascular lower extremities, no arterial occlusive disease from common femoral to popliteal bilateral, 50-75% stenosis proximal right external iliac, 50-75% stenosis distal left external iliac, patent right tibial arteries, left posterior tibial appears occluded distally, normal flow left anterior tibial, peroneal arteries retrograde suggesting proximal occlusion  7/11/19 ultrasound arterial lower extremities, duplex right stenosis distal external iliac greater than 50%, elevated velocities proximal portion posterior tibial and peroneal, left greater than 50% stenosis mid external iliac artery, occluded proximal portion posterior tibial artery with distal reconstitution ankle, short segment occlusion mid peroneal artery right JOSSUE 1.09, left JOSSUE 1.07  7/14/19 referral vascular surgery placed  12/10/19 declines vascular surgery follow-up asymptomatic  7/1/20 cardiology note stable, urged to quit smoking, follow-up 3 months  6/14/21 asymptomatic declines follow-up testing, declines vascular surgery evaluation  1/10/22 recommend referral to vascular surgery but he declines   7/14/22 declines vascular surgery evaluation  1/12/23 declines vascular surgery evaluation  7/11/24 declines vascular surgery evaluation, declines follow-up ultrasound vascular     On ACE inhibitor  4/04 p.thallium no ischemia EF 62%  12/10 p.thallium no ischemia, EF 59%  5/9/11 rec ACE instead of atenolol patient declines  10/17/11 discontinued atenolol, rec start lotensin 10 mg; he declines  5/7/13 echo normal LV function, EF 60%, grade 1 diastolic dysfunction  5/7/13 p.thallium fixed defect mid inferior, inferoseptal, mid inferior walls suggest subdiaphragmatic uptake or prior infarction, no ischemia noted, EF 57%  8/8/15 hospital discharge on  coreg 12.5 mg bid,lisinopril 5 mg, brilinta 90 mg bid, asa 81 mg,lipitor 80 mg  8/26/15 cardiology note, decrease coreg to 6.25 mg bid due to lower blood pressure continue lisinopril 5 mg    8/28/15 decrease lisinopril to 2.5 mg daily and cont coreg 6.25 mg bid  11/24/15 urine mac<2.5 on lisinopril 2.5 mg and coreg 6.25 mg bid  4/12/16 cardiology note decrease coreg to 3.125 mg bid consider discontinuation of coreg next evaluation and continue lisinopril 2.5 mg  5/20/16 urine mac <3 on lisinopril 2.5 mg  7/19/16 cardiology note clinically stable, episodes of chest tightness related to stress, blood pressure running low, discontinue carvedilol, follow-up in a few months  11/23/16 urine mac 3.2 on lisinopril 2.5 mg  11/29/17 urine mac<4 on lisinopril 2.5 mg  6/7/18 urine mac 3.8 on lisinopril 2.5 mg  6/10/19 urine mac<0.7 on lisinopril 2.5 mg  12/5/19 urine mac<0.7 on lisinopril 2.5 mg  6/11/20 urine mac<1.2 on lisinopril 2.5 mg  12/7/20 urine mac<1.2 on lisinopril 2.5 mg  6/8/21 urine mac<3 on lisinopril 2.5 mg  7/8/22 urine mac<10 on lisinopril 2.5 mg  1/4/23 urine mac 3 on lisinopril 2.5 mg  1/5/24 urine mac<1.2 on lisinopril 2.5 mg  7/1/24 urine mac<1.2 on lisinopril   1/9/25 urine mac<1.2 on lisinopril      Interstitial cystitis  5/02 urology note [facility] nonbacterial prostatitis vs interstitial cystitis given trial of flomax     Insomnia on Xanax as needed  On xanax 0.25 mg at night prn  3/8/18 refill xanax  5/30/19 refill xanax  11/22/20 refill xanax  11/22/20   8/15/21 refill xanax  2/2/23 refill xanax     Hypothyroid  10/08 tsh 0.126,free t4 1.4,free t3 2.7  8/10 tsh 0.199 taking levothyroxine 0.125 6 days a week, and 2 tabs on john  9/10/10 increase to levothyroxine 0.137 mg daily, repeat tsh in 6 weeks  10/10 tsh 0.9  12/10 tsh 1.2, free t4 1.4, free t3 2.9  4/11 tsh 0.8, thyroxine 9.6, free thyroxine uptake 3.4, free t3 uptake 35%  9/11 tsh 0.5, free t4 0.9, free t3 2.4 on levothyroxine 137  mcg  12/11 tsh 0.3,free t4 1.5,free t3 2.7 on levothyroxine 137 mcg  4/16/13 tsh 0.28,free t4 1.1, free t3 2.3 (2.4-4.2); on levothyroxine 137 mcg; change to armour thyroid 60 mg qday  5/6/13 tsh 0.3, free t4 1.0, free t3 2.5 on levothyroxine 137 mcg ? Change to armour 60 mg  5/9/13  note decrease levothyroxine to 125 mcg and add cytomel 5 mg bid  6/13 tsh 0.45, free t4 1.2, free t3 2.9, on synthroid 125 mcg and cytomel 5 mcg bid per   8/14/13 tsh 0.235,free t4 1.1,free t3 2.8 on synthroid 125 mcg and cytomel 5 mcg bid per ;change to synthroid 150 mcg and stop cytomel per pt request  10/16/13 tsh 0.4, free t4 1.34, free t3 2.2 on synthroid 150 mcg  10/23/13  endo note; change to synthroid 125 mcg and cytomel 5 mcg daily  4/16/14 tsh 0.67,free t4 1.0,free t3 2.4  4/23/14  endocrine note, increase levothyroxine to 137 mcg and cont cytomel 5 mcg  10/28/14  endocrine note, tsh 0.6,free t4 1.0, free t3 2.7 on thyroxine 137 mcg and cytomel 5 mcg; gastroparesis symptoms improve with cytomel, will reduce thryoxine to 125 mcg and use cytomel 25 mcg to cut and take more than once per day as needed, return in 4 months  7/3/15 tsh 0.04, free t4 0.6 and free t3 3.0 on levothyroxine 125 mcg and cytomel 12.5 mcg daily  7/21/15  endocrine note; on levothyroxine 125 mcg and cytomel 12.5 mcg daily, tsh 0.04, free t4 0.6 and free t3 3.0, patient does not want to change dose, no changes continue same dosing regimen; follow up 6 months  8/8/15  endocrine phone note, decrease levothyroxine to 112 mcg daily, continue cytomel 12.5 mg daily  1/13/16 tsh 0.016,free t4 0.9,free t3 2.5 on levothyroxine 112 mcg and cytomel 12.5 mg daily  1/22/16  endocrinology note on levothyroxine 112 mcg and cytomel 12.5 mg daily, patient declines to change dosing regimen  4/5/16 tsh 0.012,free t4 0.97,free t3 3.6 on levothyroxine 112 mcg and cytomel 12.5 mg  daily  4/21/16  endocrinology note, no changes  10/13/16 tsh 0.14,free t4 0.87,free t3 3.1 on levothyroxine 112 mcg and cytomel 25 mg  4/17/17 tsh 0.016,free t4 0.9,free t3 4.0 on levothyroxine 112 mcg and cytomel 25 mg  4/19/17  endocrinology note no changes follow up 6 months  10/18/17  endocrine note no changes  4/12/18 tsh 0.012, free t4 0.99, free t3 4.7 on levothyroxine 112 mcg and cytomel 25 mg  4/18/18  endocrinology note, on levothyroxine 112 mcg daily and cytomel 12.5 mcg bid tsh 0.01, free 4 0.9, free t3 4.7, clinically feeling fine, no changes  10/15/18 endocrinology note repeat labs follow-up 6 months  10/8/19 tsh 0.010, free t4 0.80, free t3 2.9 on levothyroxine 112 mcg daily and cytomel 12.5 mcg bid per endocrinology  10/14/19 endocrinology note, continue same thyroid medication dosing no changes follow-up 6 months  4/20/20 endocrinology note patient not willing to adjust his thyroid as it has been successful in controlling gastroparesis, follow-up 6 months  6/12/20 tsh 0.006 on levothyroxine 112 mcg daily and cytomel 12.5 mcg bid per endocrinology  12/7/20 tsh 0.009, free t4 1.23, t3 187 () on levothyroxine 112 mcg daily and cytomel 12.5 mcg bid per endocrinology  4/16/21 tsh<0.005,free t4 1.3,free t3 2.9  5/4/21 endocrinology note on levothyroxine 112 mcg and cytomel 12.5 mcg bid, continue and follow up 6 months  11/3/21 tsh<0.005,free t4 1.27,free t3 3.4  1/5/21 A1c 5.8% on lantus 12 units and metformin 1000 mg bid on levothyroxine 112 mcg and liothyronine 12.5 mcg bid, patient states that the thyroid dose is beneficial to her self and is reluctant to decrease the dose understanding that hyperthyroid can increase bone loss  11/10/21  endocrinology note he is retiring, follow-up with nurse practitioner  5/5/22 tsh 0.01,free t4 1.4,free t3 3.5 on levothyroxine 112 mcg and cytomel 25 mcg 1/2 bid per endocrine  5/13/22 endocrine note on  levothyroxine 112 mcg and cytomel 25 mcg 1/2 bid discussed risk of decreased TSH and cardiovascular disease, osteoporosis, patient would like to remain on current regimen, follow-up 6 months  10/26/22 tsh 0.007,free t4 1.28,free t3 3.0 on synthroid 112 mcg and cytomel 25 mcg 1/2 bid per endocrinology   10/25/22 salivary cortisol 0.062, ACTH 5.4,prolactin 5.6,IGF 82 per endocrinology  5/4/23 tsh 0.01,free t4 1.15,free t3 2.99 on synthroid 112 mcg and cytomel 25 mcg 1/2 bid per endocrinology    5/22/23 endocrine note mildly suppressed with normal free T3 and free T4 understanding risk of overtreatment, continues on cytomel 25 mcg 1.2 tab bid and synthroid 112 mcg daily   4/25/24 tsh 0.09,free t4 0.97,free t3 2.7 on levothyroxine 112 mcg and cytomel 25 mcg 1/2 bid  5/29/24 endocrine note on levothyroxine 112 mcg and cytomel 25 mcg 1/2 bid, tsh suppressed at 0.9 patient understands risk of overtreatment and would like to maintain on the same dosing regimen, follow-up 3 months with labs  8/29/24 [facility] note thyroid levels unreliable due to lab interference, will repeat in 3 months, continue levothyroxine 112 mcg and cytomel 12.5 mcg bid, follow-up 6 months  12/2/24 tsh 0.448,free t4 0.75,free t3 2.58,TPO<9.0     History shoulder pain  12/9/10 left shoulder pain,  note MRI pending  2/11 MRI left shoulder [facility] mild to moderate rotator cuff tendinopathy, adhesive capsulitis, small anterior and posterior labral tears  7/11 [facility] note  left shoulder adhesive capsulitis rec decompression  12/8/15 xray right shoulder at [facility] mild hydroxyapatite deposition adjacent to the greater tuberosity, no evidence of significant arthritis  5/11/16 MRI right shoulder thickening and increased signal with synovitis, suggestive of adhesive capsulitis, tendinopathy supraspinatus and infraspinatus, fatty atrophy paraspinous muscle  1/4/17 [facility] discharge note     History MI  8/6/15 hospital admission non-STEMI  inverted T waves in aVL, ST depression in lead 1, initial troponin 3.4  8/6/15   8/6/15 cardiac catheterization left main free of disease, triple vessel disease prominently involving distal segment nature epicardial vessels and branches, 95% ostial LAD descending artery stenosis and 70% mid RCA stenosis, ejection fraction 35-40%, stenting ostial LAD with xience drug-eluting stent  8/7/15 echo normal LV function EF 65-70%, grade 1 diastolic dysfunction, moderate concentric LVH, mild MR and AR  8/8/15 hospital discharge on coreg 12.5 mg bid,lisinopril 5 mg, brilinta 90 mg bid, asa 81 mg,lipitor 80 mg  8/11/15 cardiology note; continue brilenta and asa for one year, some dyspnea, if no improvement could consider another antiplatelet therapy, will recheck BNP in 2 weeks with followup visit, ultimately will need myocardial perfusion scan but will defer for a few months  8/26/15 cardiology note, decrease coreg to 6.25 mg bid due to lower blood pressure,continue lisinopril 5 mg, asa, lipitor,start effient 10 mg for one year due to brilinta causing shortness of breath, followup 2 months  10/14/15 cardiology note no chnges, repeat myocardial perfusion scan 3 months  11/30/15 cardiac rehab program  1/13/15 cardiology note nitroglycerin when necessary follow-up 3 months  1/12/16 persantine thallium small basal anterior inferior infarct with small ame-infarct ischemia, moderately sized moderate severity inferior, inferior lateral infarct with reversible ischemia  4/12/16 cardiology note decrease coreg to 3.125 mg bid consider discontinuation of coreg next evaluation and continue lisinopril 2.5 mg  7/19/16 cardiology note clinically stable, episodes of chest tightness related to stress, blood pressure running low, discontinue carvedilol, follow-up in a few months  11/3/16 cardiology note, isosorbide mononitrate with heavy exertion, follow-up in a few months to determine if further evaluation is necessary, could consider  repeat perfusion study or dobutamine stress echo  1/5/17 cardiology note stable CAD, follow-up 6 months  7/12/17 cardiology note, likely stable discussed smoking cessation, patient declines to quit smoking, follow-up one year  12/22/17 ultrasound carotid less than 50% stenosis bilateral  7/23/18 cardiology note asymptomatic, increased stress however, continues to smoke, follow-up 1 year continue atorvastatin plus erythromycin  7/15/19 cardiology note stable continues to smoke advised to quit follow-up 1 year  6/19/20 hospital admission, 6/21/20 hospital discharge, admission for chest pain, cardiac catheterization performed, drug-eluting stent placement of RCA  6/19/20 echo mild concentric LVH, EF 65%, subtle inferior hypokinesis, normal diastolic function  6/19/20 cardiac catheterization left main mild distal tapering 10% stenosis, stent distal part widely patent, LAD widely patent ostial stent, diffuse moderate stenosis distal LAD and small diagonal branches, circumflex tortuous origin 50%, obtuse marginal 1 occluded fills by collaterals stable compared to 2015, obtuse marginal 2 diffusely diseased with mild stenosis lateral wall, distal circumflex/obtuse marginal 3 is small with severe stenosis terminal segment, dominant RCA 40% proximal stenosis, 70% mid vessel stenosis, 40% distal stenosis, PDA multiple 50 to 70% stenosis mid and distal segments, successful mid RCA YESIKA placement, preintervention 70% stenosis, post intervention 0% residual stenosis  7/1/20 cardiology note stable, urged to quit smoking, follow-up 3 months  12/1/20 cardiology note side effects from metoprolol dry skin and cold fingers having already decreased from 25 mg to 12.5 mg daily, will discontinue metoprolol, continue asa, effient, lipitor, lisinopril  3/9/21 cardiology note consider increasing lisinopril, follow-up 6 months back to discontinue effient at that time  10/5/21 myocardial perfusion study small fixed defect no ischemia, EF  47%  11/19/21 echo mild LVH, EF 60%, moderate aortic insufficiency, ascending aortic dilation 4.0 cm  12/3/21  cardiology note recommend routine stress testing for left main PCI, patient would like to stop effient and has been 1 year since his stent was placed, patient understands that DAPT is standard treatment but patient would like to be off that medication, okay to discontinue effient and continue aspirin 81 mg indefinitely, follow-up 1 year  6/8/22  cardiology note recommend routine stress testing, and follow-up 6 months  12/13/22 cardiology note ordered zio, echo, stress test   12/21/22 to 1/4/23 zio event monitor predominant rhythm sinus, minimum heart rate 46, maximum heart rate 179, SVT runs fastest 6 beats maximum rate 179, longest 12.3 seconds average rate 109, PVC<1%  1/16/23 echo EF 55%, normal diastolic function, moderate aortic insufficiency, trace MR, no significant change compared to previous  1/18/23 myocardial perfusion study moderate sized area ischemia anterolateral base to mid LAD, resting ejection fraction 66%, stress ejection fraction 65%   5/12/23 cardiology note recommend stress test and coronary angiogram, patient declined since he is feeling well, recommendation beta-blocker, patient will consider, discussed follow-up aortic regurgitation next visit  3/15/24 echo per cardiology mild LVH, normal systolic function EF 55%, grade 1 diastolic dysfunction, moderate aortic insufficiency, trace MR, aortic diameter 4.1 cm, normal aortic root for body surface  8/20/24 cardiology note repeat echo     history of mild nonproliferative diabetic retinopathy  6/4/19  eye exam, mild nonproliferative retinopathy  2/14/22  eye exam mild nonproliferative retinopathy without macular edema  9/28/23  eye exam no retinopathy     Gastroparesis  4/04 gastric emptying study severe gatroparesis done in [place], no hx of DM or MS, started on erythromycin base  5/04  CT thorax in [place] negative  5/04 MRI brain in [place] no evid of MS  4/16/13 declined gastric stimulator  12/5/17 remains on erythromycin  1/12/23 off erythromycin due to cost      Dyslipidemia  5/13 chol 158,trig 204,hdl 36,ldl 81  8/8/15 chol 97,trig 117,hdl 26,ldl 48 started on lipitor 80 mg on hospital discharge  8/21/15 chol 76,trig 85,hdl 24,ldl 35 on lipitor 80 mg  10/7/15 chol 67,trig 77,hdl 22,ldl 30 on lipitor 80 mg per cardiology  4/5/16 chol 64,trig 43,hdl 24,ldl 31 on lipitor 80 mg per cardiology  11/23/16 chol 83,trig 73,hdl 27,ldl 41 on lipitor 80 mg per cardiology  5/24/17 chol 85,trig 81,hdl 28,ldl 41 on lipitor 80 mg per cardiology  10/10/17 pharmacy known interaction with lipitor and erythromycin base, consider changing to crestor, offer made to patient to change to crestor but he would like to discuss with his cardiologist first  11/29/17 chol 72,trig 47,hdl 24,ldl 39 on lipitor 80 mg and erythromycin base, previously recommended changing to crestor because of potential interaction, patient would like to discuss with cardiology first  12/5/17 declines to change from lipitor understanding potential interaction with erythromycin  6/7/18 chol 78,trig 68,hdl 25,ldl 39,cpk 69 on lipitor 80 mg  7/23/18 cardiology note asymptomatic, increased stress however, continues to smoke, follow-up 1 year continue atorvastatin plus erythromycin  12/3/18 chol 81,trig 85,hdl 26,ldl 38 on lipitor 80 mg  6/10/19 chol 64,trig 70,hdl 20,ldl 30 on lipitor 80 mg  6/10/20 chol 81,trig 74,hdl 25,ldl 41 on lipitor 80 mg  12/7/20 chol 72,trig 65,hdl 23,ldl 36 on lipitor 80 mg  6/8/21 chol 81,trig 54,hdl 27,ldl 41 on lipitor 80 mg  1/5/22 chol 83,trig 52,hdl 31,ldl 39 on lipitor 80 mg  6/1/22 chol 72,trig 59,hdl 27,ldl 31 on lipitor 80 mg  7/8/22 chol 73,trig 64,hdl 28,ldl 30 on lipitor 80 mg  12/7/22 chol 84,trig 54,hdl 30,ldl 41 on lipitor 80 mg  1/4/23 chol 71,trig 53,hdl 30,ldl 28 on lipitor 80 mg  5/4/23 chol 72,trig 54,hdl  28,ldl 33 on lipitor 80 mg  1/5/24 chol 62,trig 49,hdl 19,ldl 33 on lipitor 80 mg  7/1/24 chol 83,trig 49,hdl 28,ldl 45 on lipitor 80 mg  8/20/24 cardiology note      Diabetes  11/08 A1c 6.3%  8/10 A1c 7.9%  10/10 A1c 7.7%  12/10 A1c 8.0%  1/11 add metformin 500 mg bid  2/11 A1c 7.9%  4/11 A1c 8.2% increase metformin to 1000 mg bid  10/11 A1c 6.5 %  12/11 A1c 6.1% on metformin 1000 mg bid  4/16/13 A1c 6.2% on metformin 1000 mg bid; declines to check blood sugars at home; bs 194 non fasting; declines ACE  8/14/13 A1c 6.5% on metformin 1000 mg bid  12/9/13  eye exam grade 1 diabetic background retinopathy  4/16/14 A1c 7.0% per  on metformin 1000 mg bid  7/3/15 A1c 8.3% on metformin 1000 mg bid per endocrine   7/31/15 start lantus 5 units and continue metformin 1000 mg bid, declines ACE,statin,asa  8/8/15 hospital discharge on coreg 12.5 mg bid,lisinopril 5 mg, brilinta 90 mg bid, asa 81 mg,lipitor 80 mg; on lantus 8 units and metformin 1000 mg bid  8/28/15 declines nutrition consultation and diabetic education regarding diet  8/28/15 recommend increasing lantus to 10 units and metformin 1000 mg bid  11/24/15 A1c 6.3% on lantus 10 units and metformin 1000 mg bid  11/30/15 on lantus 12 units and metformin 1000 mg bid  5/20/16 A1c 6.3% on lantus 12 units and metformin 1000 mg bid  11/23/16 A1c 6.1% on lantus 12 units and metformin 1000 mg bid  1/9/17  eye exam  5/24/17 A1c 6.3% on lantus 12 units and metformin 1000 mg bid   5/24/17 urine mac 2.5 on lisinopril 2.5 mg  11/29/17 A1c 6.1% on lantus 12 units and metformin 1000 mg bid   6/7/18 A1c 5.9% on lantus 12 units and metformin 1000 mg bid   12/3/18 A1c 6.2% and urine mac< 3on lantus 12 units and metformin 1000 mg bid   6/4/19  eye exam, mild nonproliferative retinopathy  6/10/19 A1c 6.4% on lantus 12 units and metformin 1000 mg bid   12/5/19 A1c 6.2% on lantus 12 units and metformin 1000 mg bid   6/11/20 A1c 6.5% on  lantus 12 units and metformin 1000 mg bid   12/7/20 A1c 5.9% on lantus 12 units and metformin 1000 mg bid   6/8/21 A1c 6.0% on lantus 12 units and metformin 1000 mg bid   1/5/22 A1c 5.8% on lantus 12 units and metformin 1000 mg bid   2/14/22  eye exam mild nonproliferative retinopathy without macular edema  7/8/22 A1c 6.0% on lantus 12 units and metformin 1000 mg bid   1/4/23 A1c 5.8% on lantus 12 units and metformin 1000 mg bid   6/24/23 urine mac<1.2  6/21/23 A1c 5.6% on lantus 12 units and metformin 1000 mg bid   9/28/23  eye exam  1/5/24 A1c 5.5% on lantus 12 units and metformin 1000 mg bid   1/11/24 decrease lantus to 11 units and continue metformin 1000 mg bid  7/1/24 A1c 5.9% on lantus 11 units and metformin 1000 mg bid  10/17/24  [facility]  1/9/25 A1c 5.5% on lantus 11 units and metformin 1000 mg bid    b12 def  7/1/24 b12 256 on metformin   1/9/25 b12 198 on metformin      aortic insufficiency  6/19/20 echo mild concentric LVH, EF 65%, subtle inferior hypokinesis, normal diastolic function  10/5/21 myocardial perfusion study small fixed defect no ischemia, EF 47%  11/19/21 echo mild LVH, EF 60%, moderate aortic insufficiency, ascending aortic dilation 4.0 cm  1/16/23 echo EF 55%, normal diastolic function, moderate aortic insufficiency, trace MR, no significant change compared to previous, ascending aorta 3.9 cm  5/12/23 cardiology note discussed follow-up aortic regurgitation next visit  3/15/24 echo per cardiology mild LVH, normal systolic function EF 55%, grade 1 diastolic dysfunction, moderate aortic insufficiency, trace MR, aortic diameter 4.1 cm, normal aortic root for body surface  8/20/24 cardiology note repeat echo     aortic atherosclerosis  2/7/23 us aorta no evidence of AAA, mild ectasia of distal abdominal aorta, atherosclerotic plaque  3/15/24 echo per cardiology mild LVH, normal systolic function EF 55%, grade 1 diastolic dysfunction, moderate aortic  "insufficiency, trace MR, aortic diameter 4.1 cm, normal aortic root for body surface  8/20/24 cardiology note repeat echo     abn liver enzymes  7/7/22 AST 30,ALT 30  10/26/22 AST 47,ALT 57 per endocrine  1/4/23 AST 44,ALT 24,acute hepatitis panel negative,iron 205,%sat 29,ferritin 73,ELISEO negative  6/24/23 AST 39,ALT 36,alk phos 115  1/5/24 alk phos 114  7/1/24 alk phos 94  7/1/24 BALLARD fibrosure fibrosis score 0.53 (F2),steatosis score 0.22 (S0),BALLARD 0.0 (N0)         Patient Active Problem List   Diagnosis    History of allergic rhinitis    Gastroparesis    S/p green light photovaporization prostate    Interstitial cystitis    Hypothyroid    On angiotensin-converting enzyme (ACE) inhibitors (for diabetes, not HTN)    Diabetes mellitus type 2, controlled (HCC)    Preventative health care    History of shoulder pain    Insomnia    Dyslipidemia    Tobacco dependence    History of MI (myocardial infarction)    Atherosclerosis of native coronary artery with angina pectoris, unspecified whether native or transplanted heart (HCC)    PAD (peripheral artery disease) (HCC)    Stented coronary artery    Ascending aorta dilatation (HCC)    Aortic insufficiency    Abnormal alkaline phosphatase test    Aortic atherosclerosis (HCC)    Post-polio syndrome    B12 deficiency        Fall Risk Assessment  Has the patient had two or more falls in the last year or any fall with injury in the last year?  No                 Patient Care Team:  Charbel Jaffe M.D. as PCP - General  Alex Stein M.D. (Inactive) as Consulting Physician (Endocrinology)      ROS           Objective     /60 (BP Location: Left arm, Patient Position: Sitting, BP Cuff Size: Adult)   Pulse 64   Temp 36.6 °C (97.9 °F) (Temporal)   Ht 1.68 m (5' 6.14\")   Wt 68.3 kg (150 lb 9.6 oz)   SpO2 98%   BMI 24.20 kg/m²      Physical Exam  Vitals and nursing note reviewed.   Constitutional:       General: He is not in acute distress.     Appearance: Normal " appearance. He is not ill-appearing.   HENT:      Head: Normocephalic and atraumatic.      Left Ear: External ear normal.      Nose: Nose normal.   Eyes:      Conjunctiva/sclera: Conjunctivae normal.   Cardiovascular:      Rate and Rhythm: Normal rate and regular rhythm.      Heart sounds: Murmur heard.   Pulmonary:      Effort: Pulmonary effort is normal. No respiratory distress.      Breath sounds: Normal breath sounds. No wheezing.   Abdominal:      General: There is no distension.   Musculoskeletal:         General: No swelling.      Cervical back: Neck supple.   Skin:     Coloration: Skin is not jaundiced.      Findings: No bruising.   Neurological:      General: No focal deficit present.      Mental Status: He is alert.   Psychiatric:         Mood and Affect: Mood normal.         Behavior: Behavior normal.                             Assessment & Plan   Assessment  #!  Diabetes mellitus type 2 insulin-dependent, most recent labs 1/9/25 A1c 5.5% on lantus 11 units and metformin 1000 mg bid and sugars have been stable, no hypoglycemia other than 1 blood sugar 68, sugars have remained stable and well-controlled, also on low-dose lisinopril 2.5 mg, urine microalbumin negative    #2  CAD followed by cardiology no recurrent chest pain, most recent cardiology visit in August    #3 hypothyroid followed by endocrinology    #4 mild aortic insufficiency on echo in March, follow-up echocardiogram scheduled prior to his upcoming appoint with cardiology    #5 B12 deficiency 1/9/25 b12 198 likely related to metformin, B12 was normal last year    #6 dyslipidemia 1/9/25 chol 65,trig 73,hdl 21,ldl 29 on lipitor 80 mg    #7 tobacco pack per day, denies any shortness of breath, has declined stop smoking therapy, CT lung cancer screening, PFT    #8 elevated liver enzyme alkaline phosphatase 7/1/24 BALLARD fibrosure fibrosis score 0.53 (F2),steatosis score 0.22 (S0),BALLARD 0.0 (N0), 8/1/24 AST 46,ALT 38,alk phos 111, patient does not  drink, patient did bring an article indicating lead exposure increased risk for fatty liver disease which I will review     #9 aortic atherosclerosis       Plan  #! Cmp non fasting ordered    #2 ultrasound elastography to evaluate liver fibrosis    #3 start B12 1000 mcg once a day, recheck labs in 4 weeks for B12 and secondary workup    #4 continue check blood sugar daily, continue working on good nutrition program, try to increase his activity during the winter months, no change with insulin and metformin    #5 recommend smoking cessation as long-term smoking does increase risk for lung disease, cardiovascular disease, peripheral arterial disease, cancer, he understands, offered to stop smoking therapy, CT lung cancer screening, PFTs, he declines    #6 declines other vaccines    #7 echo scheduled and pending appoint with cardiology scheduled    #8 follow-up endocrinology    #9 continue with good nutrition program     #10 reviewed labs from January 9th with him    #11 follow-up 6 months

## 2025-01-23 DIAGNOSIS — E03.9 HYPOTHYROIDISM, ACQUIRED: ICD-10-CM

## 2025-01-23 RX ORDER — LIOTHYRONINE SODIUM 25 UG/1
12.5 TABLET ORAL 2 TIMES DAILY
Qty: 45 TABLET | Refills: 0 | Status: SHIPPED | OUTPATIENT
Start: 2025-01-23

## 2025-01-24 RX ORDER — INSULIN GLARGINE 100 [IU]/ML
12 INJECTION, SOLUTION SUBCUTANEOUS EVERY EVENING
Qty: 12 ML | Refills: 3 | Status: SHIPPED | OUTPATIENT
Start: 2025-01-24 | End: 2026-02-28

## 2025-01-24 RX ORDER — INSULIN GLARGINE 100 [IU]/ML
12 INJECTION, SOLUTION SUBCUTANEOUS EVERY EVENING
Qty: 12 ML | Refills: 3 | Status: SHIPPED | OUTPATIENT
Start: 2025-01-24 | End: 2025-01-24 | Stop reason: SDUPTHER

## 2025-01-24 NOTE — TELEPHONE ENCOUNTER
Is the patient due for a refill? Yes    Was the patient seen the past year? Yes    Date of last office visit: 01/30/2024    Does the patient have an upcoming appointment?  Yes   If yes, When? 08/15/44361    Provider to refill:TW    Does the patients insurance require a 100 day supply?  No     PRINCIPAL DISCHARGE DIAGNOSIS  Diagnosis: RSV bronchiolitis  Assessment and Plan of Treatment: Bronchiolitis, Pediatric  Bronchiolitis is pain, redness, and swelling (inflammation) of the small air passages in the lungs (bronchioles). The condition causes breathing problems that are usually mild to moderate but can sometimes be severe to life threatening. It may also cause an increase of mucus production, which can block the bronchioles.  Bronchiolitis is one of the most common illnesses of infancy. It typically occurs in the first 3 years of life.  What are the causes?  This condition can be caused by a number of viruses. Children can come into contact with one of these viruses by:  Breathing in droplets that an infected person released through a cough or sneeze.  Touching an item or a surface where the droplets fell and then touching the nose or mouth.  What increases the risk?  Your child is more likely to develop this condition if he or she:  Is exposed to cigarette smoke.  Was born prematurely.  Has a history of lung disease, such as asthma.  Has a history of heart disease.  Has Down syndrome.  Is not .  Has siblings.  Has an immune system disorder.  Has a neuromuscular disorder such as cerebral palsy.  Had a low birth weight.  General instructions   Have your child drink enough fluid to keep his or her urine clear or pale yellow. This will prevent dehydration. Children with this condition are at increased risk for dehydration because they may breathe harder and faster than normal.  Carefully watch your child's condition. It can change quickly.  Keep all follow-up visits as told by your child's health care provider. This is important.  How is this prevented?  This condition may be prevented by:  Breastfeeding your child.  Limiting your child's exposure to others who may be sick.  Not allowing smoking at home or near your child.  Teaching your child good hand hygiene. Encourage hand washing with soap and water, or hand  if water is not available.  Making sure your child is up to date on routine immunizations, including an annual flu shot.  Contact a health      SECONDARY DISCHARGE DIAGNOSES  Diagnosis: Pneumonia  Assessment and Plan of Treatment: Please have your child take the antibiotic amoxicillin as prescribed for 7 more days.

## 2025-01-29 RX ORDER — LORATADINE 5 MG/5 ML
SOLUTION, ORAL ORAL
Qty: 100 EACH | Refills: 11 | Status: SHIPPED | OUTPATIENT
Start: 2025-01-29

## 2025-02-01 DIAGNOSIS — E78.5 DYSLIPIDEMIA: Chronic | ICD-10-CM

## 2025-02-03 RX ORDER — ATORVASTATIN CALCIUM 80 MG/1
80 TABLET, FILM COATED ORAL DAILY
Qty: 90 TABLET | Refills: 1 | Status: SHIPPED | OUTPATIENT
Start: 2025-02-03

## 2025-02-03 NOTE — TELEPHONE ENCOUNTER
Is the patient due for a refill? Yes    Was the patient seen the last 15 months? Yes    Date of last office visit: 08.20.2024    Does the patient have an upcoming appointment?  No      Provider to refill: TW    Does the patient have assisted Plus and need 100-day supply? (This applies to ALL medications) Patient does not have SCP

## 2025-02-21 ENCOUNTER — HOSPITAL ENCOUNTER (OUTPATIENT)
Dept: CARDIOLOGY | Facility: MEDICAL CENTER | Age: 75
End: 2025-02-21
Attending: INTERNAL MEDICINE
Payer: MEDICARE

## 2025-02-21 DIAGNOSIS — I35.1 NONRHEUMATIC AORTIC VALVE INSUFFICIENCY: ICD-10-CM

## 2025-02-21 PROCEDURE — 93306 TTE W/DOPPLER COMPLETE: CPT

## 2025-02-24 ENCOUNTER — HOSPITAL ENCOUNTER (OUTPATIENT)
Dept: LAB | Facility: MEDICAL CENTER | Age: 75
End: 2025-02-24
Payer: MEDICARE

## 2025-02-24 DIAGNOSIS — E53.8 B12 DEFICIENCY: ICD-10-CM

## 2025-02-24 DIAGNOSIS — E03.9 HYPOTHYROIDISM, ACQUIRED: ICD-10-CM

## 2025-02-24 DIAGNOSIS — E78.5 DYSLIPIDEMIA: Chronic | ICD-10-CM

## 2025-02-24 DIAGNOSIS — E55.9 VITAMIN D DEFICIENCY: ICD-10-CM

## 2025-02-24 LAB — FOLATE SERPL-MCNC: 8.2 NG/ML

## 2025-02-24 PROCEDURE — 82306 VITAMIN D 25 HYDROXY: CPT

## 2025-02-24 PROCEDURE — 84443 ASSAY THYROID STIM HORMONE: CPT

## 2025-02-24 PROCEDURE — 86340 INTRINSIC FACTOR ANTIBODY: CPT

## 2025-02-24 PROCEDURE — 80053 COMPREHEN METABOLIC PANEL: CPT

## 2025-02-24 PROCEDURE — 83921 ORGANIC ACID SINGLE QUANT: CPT

## 2025-02-24 PROCEDURE — 84481 FREE ASSAY (FT-3): CPT

## 2025-02-24 PROCEDURE — 82746 ASSAY OF FOLIC ACID SERUM: CPT

## 2025-02-24 PROCEDURE — 84439 ASSAY OF FREE THYROXINE: CPT

## 2025-02-24 PROCEDURE — 82607 VITAMIN B-12: CPT

## 2025-02-24 PROCEDURE — 36415 COLL VENOUS BLD VENIPUNCTURE: CPT

## 2025-02-25 LAB
25(OH)D3 SERPL-MCNC: 36 NG/ML (ref 30–100)
ALBUMIN SERPL BCP-MCNC: 4 G/DL (ref 3.2–4.9)
ALBUMIN/GLOB SERPL: 1.5 G/DL
ALP SERPL-CCNC: 112 U/L (ref 30–99)
ALT SERPL-CCNC: 42 U/L (ref 2–50)
ANION GAP SERPL CALC-SCNC: 11 MMOL/L (ref 7–16)
AST SERPL-CCNC: 43 U/L (ref 12–45)
BILIRUB SERPL-MCNC: 0.4 MG/DL (ref 0.1–1.5)
BUN SERPL-MCNC: 19 MG/DL (ref 8–22)
CALCIUM ALBUM COR SERPL-MCNC: 8.8 MG/DL (ref 8.5–10.5)
CALCIUM SERPL-MCNC: 8.8 MG/DL (ref 8.5–10.5)
CHLORIDE SERPL-SCNC: 106 MMOL/L (ref 96–112)
CO2 SERPL-SCNC: 22 MMOL/L (ref 20–33)
CREAT SERPL-MCNC: 1 MG/DL (ref 0.5–1.4)
GFR SERPLBLD CREATININE-BSD FMLA CKD-EPI: 78 ML/MIN/1.73 M 2
GLOBULIN SER CALC-MCNC: 2.7 G/DL (ref 1.9–3.5)
GLUCOSE SERPL-MCNC: 102 MG/DL (ref 65–99)
POTASSIUM SERPL-SCNC: 5.1 MMOL/L (ref 3.6–5.5)
PROT SERPL-MCNC: 6.7 G/DL (ref 6–8.2)
SODIUM SERPL-SCNC: 139 MMOL/L (ref 135–145)
T3FREE SERPL-MCNC: 2.85 PG/ML (ref 2–4.4)
T4 FREE SERPL-MCNC: 0.93 NG/DL (ref 0.93–1.7)
TSH SERPL-ACNC: 0.17 UIU/ML (ref 0.35–5.5)
VIT B12 SERPL-MCNC: 740 PG/ML (ref 211–911)

## 2025-02-26 LAB — IF BLOCK AB SER QL RIA: NEGATIVE

## 2025-02-27 LAB — METHYLMALONATE SERPL-SCNC: 0.15 UMOL/L (ref 0–0.4)

## 2025-02-28 ENCOUNTER — HOSPITAL ENCOUNTER (OUTPATIENT)
Dept: RADIOLOGY | Facility: MEDICAL CENTER | Age: 75
End: 2025-02-28
Attending: INTERNAL MEDICINE
Payer: MEDICARE

## 2025-02-28 DIAGNOSIS — R79.89 ABNORMAL LIVER FUNCTION TEST: ICD-10-CM

## 2025-02-28 PROCEDURE — 76981 USE PARENCHYMA: CPT | Mod: XU

## 2025-03-02 ENCOUNTER — RESULTS FOLLOW-UP (OUTPATIENT)
Dept: MEDICAL GROUP | Facility: MEDICAL CENTER | Age: 75
End: 2025-03-02
Payer: MEDICARE

## 2025-03-02 PROBLEM — N28.1 RENAL CYST: Status: ACTIVE | Noted: 2025-03-02

## 2025-03-02 PROBLEM — K82.4 GALLBLADDER POLYP: Status: ACTIVE | Noted: 2025-03-02

## 2025-03-02 PROBLEM — R93.2 ABNORMAL FINDING ON IMAGING OF LIVER: Status: ACTIVE | Noted: 2025-03-02

## 2025-03-02 PROBLEM — I77.819 AORTIC ECTASIA (HCC): Status: ACTIVE | Noted: 2025-03-02

## 2025-03-03 ENCOUNTER — OFFICE VISIT (OUTPATIENT)
Dept: ENDOCRINOLOGY | Facility: MEDICAL CENTER | Age: 75
End: 2025-03-03
Payer: MEDICARE

## 2025-03-03 VITALS
SYSTOLIC BLOOD PRESSURE: 120 MMHG | OXYGEN SATURATION: 97 % | WEIGHT: 155 LBS | DIASTOLIC BLOOD PRESSURE: 66 MMHG | BODY MASS INDEX: 24.91 KG/M2 | HEART RATE: 76 BPM | HEIGHT: 66 IN

## 2025-03-03 DIAGNOSIS — R74.8 ELEVATED LIVER ENZYMES: ICD-10-CM

## 2025-03-03 DIAGNOSIS — K31.84 GASTROPARESIS: ICD-10-CM

## 2025-03-03 DIAGNOSIS — I25.10 CORONARY ARTERY DISEASE DUE TO CALCIFIED CORONARY LESION: ICD-10-CM

## 2025-03-03 DIAGNOSIS — I25.84 CORONARY ARTERY DISEASE DUE TO CALCIFIED CORONARY LESION: ICD-10-CM

## 2025-03-03 DIAGNOSIS — E78.5 DYSLIPIDEMIA: ICD-10-CM

## 2025-03-03 DIAGNOSIS — E03.9 HYPOTHYROIDISM, ACQUIRED: ICD-10-CM

## 2025-03-03 DIAGNOSIS — E55.9 VITAMIN D DEFICIENCY: ICD-10-CM

## 2025-03-03 DIAGNOSIS — E11.65 TYPE 2 DIABETES MELLITUS WITH HYPERGLYCEMIA, WITHOUT LONG-TERM CURRENT USE OF INSULIN (HCC): ICD-10-CM

## 2025-03-03 PROCEDURE — 3074F SYST BP LT 130 MM HG: CPT

## 2025-03-03 PROCEDURE — 3078F DIAST BP <80 MM HG: CPT

## 2025-03-03 PROCEDURE — 99214 OFFICE O/P EST MOD 30 MIN: CPT

## 2025-03-03 PROCEDURE — 99211 OFF/OP EST MAY X REQ PHY/QHP: CPT

## 2025-03-03 RX ORDER — LIOTHYRONINE SODIUM 25 UG/1
12.5 TABLET ORAL 2 TIMES DAILY
Qty: 90 TABLET | Refills: 3 | Status: SHIPPED | OUTPATIENT
Start: 2025-03-03

## 2025-03-03 ASSESSMENT — FIBROSIS 4 INDEX: FIB4 SCORE: 2.61

## 2025-03-03 NOTE — PROGRESS NOTES
Chief Complaint: Follow-up for the following conditions  HPI:   Orlando Arreguin is a 73 y.o. male   He was previously followed by Chapito CASILLAS    1.Acquired hypothyroidism:  History of Hypothyroidism diagnosed on for the past 20 years and is here for initial evaluation.    He is currently on Levothyroxine 112 mcg daily and Cytomel 12.5 mcg (cut in half) in the am and pm. He has been on these doses for many years.     He reports Good compliance and takes his thyroid hormone daily before breakfast.    Although at times he forgets to take his morning Cytomel for which he develops fatigue around lunchtime    He denies taking any iron, calcium supplements or antacids.   Denies taking any Biotin      He denies fatigue, weight gain, constipation, tremors, swelling, feeling slow, losing hair, anxiousness, feeling excessive energy, palpitations and sweating.    Hx of gastroparesis,   He denies lumps or enlargement in the neck.    History of coronary artery disease with an MI in the past and 2 with stents   Latest Reference Range & Units 02/24/25 10:21   TSH 0.350 - 5.500 uIU/mL 0.171 (L)   Free T-4 0.93 - 1.70 ng/dL 0.93   T3,Free 2.00 - 4.40 pg/mL 2.85        Latest Reference Range & Units 02/24/25 10:23   GFR (CKD-EPI) >60 mL/min/1.73 m 2 78       2.  Coronary artery disease due to calcified coronary lesion:  Reports history of 2 heart attacks in the past  This is followed by cardiology  Family history of cardiovascular disease with his mom dying at 62 after her third heart attack    3.  Type 2 diabetes mellitus with hyperglycemia, unspecified whether long term insulin use:  Reports history of type 2 diabetes mellitus  This is followed by primary care   Latest Reference Range & Units 01/09/25 10:30   Glycohemoglobin 4.0 - 5.6 % 5.5      Latest Reference Range & Units 07/01/24 10:39   Glycohemoglobin 4.0 - 5.6 % 5.9 (H)     A1c on 1/4/2023 at 5.8%  Metformin 1000 mg twice/day   Lantus 12  units/night    Dyslipidemia  Currently taking atorvastatin 80 mg every evening   Latest Reference Range & Units 01/09/25 10:30   Cholesterol,Tot 100 - 199 mg/dL 65 (L)   Triglycerides 0 - 149 mg/dL 73   HDL >=40 mg/dL 21 !   LDL <100 mg/dL 29        Latest Reference Range & Units 01/09/25 10:30   Micro Alb Creat Ratio 0 - 30 mg/g see below   Creatinine, Urine mg/dL 89.62   Microalbumin, Urine Random mg/dL <1.2   Urobilinogen, Urine <=1.0 EU/dL 0.2     4.  Gastroparesis:  Reports history of gastroparesis for the past 18 to 20 years  This is followed by primary care  This condition was diagnosed when he was living in [facility] after a gastric emptying scintigraphy was done  He was prescribed Reglan originally but after reading the side effects of Tardive Dyskinesis he decided not to take it  He was then prescribed Erythromycin and provided relief, he took erythromycin for her gastroparesis management for 18 years and he stopped 3 months ago due to high cost  Currently treatment for gastroparesis is gingerroot tablets    He reports that when his free T4 and free T3 are mid normal levels, his gastroparesis is better controlled    5.  Vitamin D deficiency:  Currently taking Vitamin D 3 3 caps daily    Latest Reference Range & Units 02/24/25 10:21   25-Hydroxy   Vitamin D 25 30 - 100 ng/mL 36        Latest Reference Range & Units 02/24/25 10:23   Calcium 8.5 - 10.5 mg/dL 8.8   Correct Calcium 8.5 - 10.5 mg/dL 8.8       7.  Elevated liver enzymes:  Elevated liver enzyme and blood panel  Denies heavy alcohol consumption  This is followed by pcp   Latest Reference Range & Units 02/24/25 10:23   AST(SGOT) 12 - 45 U/L 43   ALT(SGPT) 2 - 50 U/L 42      Latest Reference Range & Units 07/01/24 10:39 08/21/24 10:45   AST (SGOT) 0 - 40 IU/L 50 (H)    ALT (SGPT) 0 - 55 IU/L 45    AST(SGOT) 12 - 45 U/L 41 46 (H)   ALT(SGPT) 2 - 50 U/L 41 38         Patient's medications, allergies, and social histories were reviewed and  updated as appropriate.      ROS:     CONS:     No fever, no chills, no weight loss, no fatigue   EYES:      No diplopia, no blurry vision, no redness of eyes, no swelling of eyelids   ENT:    No hearing loss, No ear pain, No sore throat, no dysphagia, no neck swelling   CV:     No chest pain, no palpitations, no claudication, no orthopnea, no PND   PULM:    No SOB, no cough, no hemoptysis, no wheezing    GI:   No nausea, no vomiting, no diarrhea, no constipation, no bloody stools   :  Passing urine well, no dysuria, no hematuria   ENDO:   No polyuria, no polydipsia, no heat intolerance, no cold intolerance   NEURO: No headaches, no dizziness, no convulsions, no tremors   MUSC:  No joint swellings, no arthralgias, no myalgias, no weakness   SKIN:   No rash, no ulcers, no dry skin   PSYCH:   No depression, no anxiety, no difficulty sleeping       Past Medical History:  Patient Active Problem List    Diagnosis Date Noted    Gallbladder polyp 03/02/2025    Renal cyst 03/02/2025    Aortic ectasia (HCC) 03/02/2025    High risk liver fibrosis/cirrhosis 03/02/2025    B12 deficiency 01/10/2025    Post-polio syndrome 07/07/2024    Aortic atherosclerosis ([facility]) 02/07/2023    Abnormal alkaline phosphatase test 01/07/2023    Aortic insufficiency 12/03/2021    Ascending aorta dilatation ([facility]) 11/19/2021    Stented coronary artery 10/01/2020    PAD (peripheral artery disease) ([facility]) 12/28/2017    Atherosclerosis of native coronary artery with angina pectoris, unspecified whether native or transplanted heart ([facility])     History of MI (myocardial infarction) 08/06/2015    Tobacco dependence 07/29/2015    Dyslipidemia 05/07/2013    Insomnia 04/18/2012    Preventative health care 12/09/2010    History of shoulder pain 12/09/2010    Diabetes mellitus type 2, controlled ([facility]) 09/10/2010    History of allergic rhinitis 08/21/2010    Gastroparesis 08/21/2010    S/p green light photovaporization prostate 08/21/2010    Interstitial cystitis  08/21/2010    Hypothyroid 08/21/2010    On angiotensin-converting enzyme (ACE) inhibitors (for diabetes, not HTN) 08/21/2010       Past Surgical History:  Past Surgical History:   Procedure Laterality Date    Z CARDIAC CATH  8/6/15    YESIKA to LAD.  Has RCA 70% occulsion.    APPENDECTOMY      OTHER      L shoulder surgery (arthroscopic, [place], 2011)    NC TRANSURETHRAL ELEC-SURG PROSTATECTOM          Allergies:  Brilinta [ticagrelor]; Pcn [penicillins]; Other environmental; Tdap [dipth, acell pertus, tetanus]; and Tetanus toxoid     Current Medications:    Current Outpatient Medications:     liothyronine (CYTOMEL) 25 MCG Tab, Take 0.5 Tablets by mouth 2 times a day., Disp: 90 Tablet, Rfl: 3    LANTUS SOLOSTAR 100 UNIT/ML Solution Pen-injector injection, Inject 12 Units under the skin every evening., Disp: 12 mL, Rfl: 3    atorvastatin (LIPITOR) 80 MG tablet, Take 1 tablet by mouth once daily, Disp: 90 Tablet, Rfl: 1    ReliOn Lancets Micro-Thin 33G Misc, Lancets to check blood sugar once a day. E11.9, Disp: 100 Each, Rfl: 11    cyanocobalamin (VITAMIN B12) 1000 MCG Tab, Take 1 Tablet by mouth every day. Indications: low vitamin b12, Disp: 100 Tablet, Rfl: 2    levothyroxine (SYNTHROID) 112 MCG Tab, Take 1 Tablet by mouth every morning on an empty stomach., Disp: 90 Tablet, Rfl: 0    lisinopril (PRINIVIL) 2.5 MG Tab, Take 1 tablet by mouth once daily, Disp: 90 Tablet, Rfl: 3    Insulin Pen Needle (RELION PEN NEEDLES) 31G X 6 MM Misc, Inject 1 Device under the skin every day. E11.9, Disp: 100 Each, Rfl: 11    LANTUS SOLOSTAR 100 UNIT/ML Solution Pen-injector injection, INJECT 12 UNITS SUBCUTANEOUSLY IN THE EVENING, Disp: , Rfl:     metformin (GLUCOPHAGE) 1000 MG tablet, Take 1 Tablet by mouth 2 times a day with meals., Disp: 180 Tablet, Rfl: 1    ReliOn Lancets Micro-Thin 33G Misc, 1 Device every day. Check blood sugar once a day E11.9, Disp: 100 Each, Rfl: 1    Calcium Carb-Cholecalciferol (CALCIUM 600+D HIGH  POTENCY PO), Take  by mouth., Disp: , Rfl:     metoprolol tartrate (LOPRESSOR) 25 MG Tab, Take 0.5 Tablets by mouth 2 times a day. (Patient taking differently: Take 12.5 mg by mouth every day.), Disp: 45 Tablet, Rfl: 6    glucose blood (RELION PRIME TEST) strip, USE 1 STRIP TO CHECK GLUCOSE ONCE DAILY BEFORE A MEAL, E11.9, Disp: 100 Strip, Rfl: 11    ALPRAZolam (XANAX) 0.25 MG Tab, Take 0.25 mg by mouth at bedtime as needed for Sleep., Disp: , Rfl:     aspirin (ASA) 81 MG Chew Tab chewable tablet, Chew 1 Tablet every day., Disp: 100 Tablet, Rfl: 11    Ginger, Zingiber officinalis, (GINGER PO), Take  by mouth., Disp: , Rfl:     Insulin Pen Needle 31G X 6 MM Misc, , Disp: , Rfl:     B-D UF III MINI PEN NEEDLES 31G X 5 MM Misc, USE PEN ONCE DAILY WITH LANTUS PEN, Disp: , Rfl:     Insulin Pen Needle 31G X 6 MM Misc, , Disp: , Rfl:     Cholecalciferol (VITAMIN D3 PO), Take 1 Dose by mouth every day. Unknown OTC Strength., Disp: , Rfl:     Riboflavin (VITAMIN B-2 PO), Take 1 Dose by mouth every day. Unknown OTC Strength., Disp: , Rfl:     Social History:  Social History     Socioeconomic History    Marital status: Single     Spouse name: Not on file    Number of children: Not on file    Years of education: Not on file    Highest education level: Not on file   Occupational History    Not on file   Tobacco Use    Smoking status: Every Day     Current packs/day: 1.00     Average packs/day: 1 pack/day for 30.0 years (30.0 ttl pk-yrs)     Types: Cigarettes    Smokeless tobacco: Never   Vaping Use    Vaping status: Never Used   Substance and Sexual Activity    Alcohol use: No     Alcohol/week: 0.0 oz    Drug use: No    Sexual activity: Not on file   Other Topics Concern    Not on file   Social History Narrative    Not on file     Social Drivers of Health     Financial Resource Strain: Not on file   Food Insecurity: Not on file   Transportation Needs: Not on file   Physical Activity: Not on file   Stress: Not on file   Social  "Connections: Not on file   Intimate Partner Violence: Not on file   Housing Stability: Not on file        Family History:   Family History   Problem Relation Age of Onset    Heart Disease Mother     Heart Attack Mother 55        heart attack         PHYSICAL EXAM:   Vital signs: /66 (BP Location: Left arm, Patient Position: Sitting, BP Cuff Size: Adult)   Pulse 76   Ht 1.676 m (5' 6\")   Wt 70.3 kg (155 lb)   SpO2 97%   BMI 25.02 kg/m²   GENERAL: Well-developed, well-nourished  in no apparent distress.     Labs:  Lab Results   Component Value Date/Time    CHOLSTRLTOT 83 (L) 01/04/2022 0736    TRIGLYCERIDE 52 01/04/2022 0736    HDL 31 (L) 01/04/2022 0736    LDL 36 12/07/2020 1051       Lab Results   Component Value Date/Time    TSHULTRASEN 0.010 (L) 05/05/2022 1004     Lab Results   Component Value Date/Time    FREET4 1.44 05/05/2022 1004     Lab Results   Component Value Date/Time    FREET3 3.50 05/05/2022 1004           Imaging:      ASSESSMENT/PLAN:   1. Hypothyroidism, acquired  Unstable  TSH slightly suppressed  I have recommended patient repeat the blood work for dose adjustments, however he would like to wait another 3 months prior to repeating the blood work he believes that his Medicare will not be covering his thyroid levels so soon  Medication:  Levothyroxine 112 mcg daily - continue  Cytomel 12.5 mcg BID - continue  Discussed risk of thyroid overtreatment such as cardiovascular disease and osteoporosis  - liothyronine (CYTOMEL) 25 MCG Tab; Take 0.5 Tablets by mouth 2 times a day.  Dispense: 90 Tablet; Refill: 3  - TSH; Future  - FREE THYROXINE; Future  - T3 FREE; Future    2. Vitamin D deficiency  Stable  Continue current regimen-see HPI  - VITAMIN D,25 HYDROXY (DEFICIENCY); Future    3. Coronary artery disease due to calcified coronary lesion  Unstable  Followed by cardiology    4. Type 2 diabetes mellitus with hyperglycemia, without long-term current use of insulin ([facility])  Stable  A1c " 5.5%  Continue current regimen-see HPI  Followed by pcp  - Comp Metabolic Panel; Future    5. Gastroparesis  Unstable  Followed by PCP    6. Dyslipidemia  Stable  Continue current regimen-see HPI    7. Elevated liver enzymes  stable   We will continue to monitor    Disposition: Return in about 6 months (around 9/3/2025).  Do your blood work 2 weeks prior to appointment      Thank you for allowing me to participate in the thyroid care plan for this patient.    AYSHA MasonPDebbyRDebbyN.  3/3/25    CC:   Charbel Jaffe M.D.

## 2025-03-04 ENCOUNTER — RESULTS FOLLOW-UP (OUTPATIENT)
Dept: CARDIOLOGY | Facility: MEDICAL CENTER | Age: 75
End: 2025-03-04

## 2025-03-04 ENCOUNTER — TELEPHONE (OUTPATIENT)
Dept: MEDICAL GROUP | Facility: MEDICAL CENTER | Age: 75
End: 2025-03-04
Payer: MEDICARE

## 2025-03-04 DIAGNOSIS — Z11.59 ENCOUNTER FOR SCREENING FOR OTHER VIRAL DISEASES: ICD-10-CM

## 2025-03-04 DIAGNOSIS — K74.00 HEPATIC FIBROSIS: ICD-10-CM

## 2025-03-04 LAB
LV EJECT FRACT MOD 2C 99903: 64.34
LV EJECT FRACT MOD 4C 99902: 60.89
LV EJECT FRACT MOD BP 99901: 61.39

## 2025-03-11 DIAGNOSIS — E11.9 DIABETES MELLITUS WITHOUT COMPLICATION (HCC): ICD-10-CM

## 2025-03-11 NOTE — TELEPHONE ENCOUNTER
Received request via: Pharmacy    Was the patient seen in the last year in this department? Yes    Does the patient have an active prescription (recently filled or refills available) for medication(s) requested? No    Pharmacy Name: [facility]     Does the patient have residential Plus and need 100-day supply? (This applies to ALL medications) Patient does not have SCP   amitriptyline 75 mg oral tablet: 1 tab(s) orally once a day (at bedtime) MDD: 75 mg  FLUoxetine 20 mg oral capsule: 1 cap(s) orally once a day MDD: 20mg

## 2025-03-19 RX ORDER — LORATADINE 5 MG/5 ML
SOLUTION, ORAL ORAL
Qty: 100 EACH | Refills: 11 | Status: SHIPPED | OUTPATIENT
Start: 2025-03-19

## 2025-03-27 ENCOUNTER — OFFICE VISIT (OUTPATIENT)
Dept: CARDIOLOGY | Facility: MEDICAL CENTER | Age: 75
End: 2025-03-27
Attending: INTERNAL MEDICINE
Payer: MEDICARE

## 2025-03-27 VITALS
OXYGEN SATURATION: 100 % | RESPIRATION RATE: 16 BRPM | DIASTOLIC BLOOD PRESSURE: 62 MMHG | HEART RATE: 71 BPM | WEIGHT: 154 LBS | SYSTOLIC BLOOD PRESSURE: 106 MMHG | HEIGHT: 66 IN | BODY MASS INDEX: 24.75 KG/M2

## 2025-03-27 DIAGNOSIS — Z95.5 STENTED CORONARY ARTERY: ICD-10-CM

## 2025-03-27 DIAGNOSIS — I77.810 ASCENDING AORTA DILATATION (HCC): ICD-10-CM

## 2025-03-27 DIAGNOSIS — I73.9 PAD (PERIPHERAL ARTERY DISEASE) (HCC): ICD-10-CM

## 2025-03-27 DIAGNOSIS — F17.200 TOBACCO DEPENDENCE: ICD-10-CM

## 2025-03-27 DIAGNOSIS — E78.5 DYSLIPIDEMIA: Chronic | ICD-10-CM

## 2025-03-27 PROCEDURE — 99213 OFFICE O/P EST LOW 20 MIN: CPT | Performed by: INTERNAL MEDICINE

## 2025-03-27 ASSESSMENT — ENCOUNTER SYMPTOMS
ORTHOPNEA: 0
WEIGHT LOSS: 0
DYSPNEA ON EXERTION: 0
PND: 0
NAUSEA: 0
ALTERED MENTAL STATUS: 0
PALPITATIONS: 0
ABDOMINAL PAIN: 0
VOMITING: 0
CLAUDICATION: 0
BLURRED VISION: 0
HEARTBURN: 0
COUGH: 0
IRREGULAR HEARTBEAT: 0
DEPRESSION: 0
FEVER: 0
DIZZINESS: 0
SHORTNESS OF BREATH: 0
FLANK PAIN: 0
DECREASED APPETITE: 0
BACK PAIN: 0
NEAR-SYNCOPE: 0
CONSTIPATION: 0
SYNCOPE: 0
DIARRHEA: 0
WEIGHT GAIN: 0

## 2025-03-27 ASSESSMENT — FIBROSIS 4 INDEX: FIB4 SCORE: 2.61

## 2025-03-27 NOTE — PROGRESS NOTES
Cardiology Note    Chief Complaint   Patient presents with    Aortic Stenosis     Nonrheumatic aortic valve insufficiency    Hypertension    Coronary Artery Disease      Coronary artery disease due to calcified coronary lesion       History of Present Illness: Orlando Arreguin is a 75 y.o. male PMH CAD s/p PCI LM/LAD 2015 and PCI RCA 2020, polio limiting ambulation, SVT, DM2, HTN, HLD who presents for follow up.    Doing fairly well. Complains of fatigue however no specific cardiac complaints. He takes metoprolol only once a day. He does not want long acting formulation. Compliant with remaining medications and denies adverse effects. Continues to smoke tobacco. Refuses to quit. He is worried about associated stress and weight gain.     Review of Systems   Constitutional: Negative for decreased appetite, fever, malaise/fatigue, weight gain and weight loss.   HENT:  Negative for congestion and nosebleeds.    Eyes:  Negative for blurred vision.   Cardiovascular:  Negative for chest pain, claudication, dyspnea on exertion, irregular heartbeat, leg swelling, near-syncope, orthopnea, palpitations, paroxysmal nocturnal dyspnea and syncope.   Respiratory:  Negative for cough and shortness of breath.    Endocrine: Negative for cold intolerance and heat intolerance.   Skin:  Negative for rash.   Musculoskeletal:  Negative for back pain.   Gastrointestinal:  Negative for abdominal pain, constipation, diarrhea, heartburn, melena, nausea and vomiting.   Genitourinary:  Negative for dysuria, flank pain and hematuria.   Neurological:  Negative for dizziness.   Psychiatric/Behavioral:  Negative for altered mental status and depression.          Past Medical History:   Diagnosis Date    Coronary artery disease due to calcified coronary lesion 8/11/2015    Diabetes mellitus type II     Gastroparesis     Hyperlipidemia     Hypothyroid     Interstitial cystitis     Sciatic neuritis          Past Surgical History:   Procedure Laterality  Date    ZZZ CARDIAC CATH  8/6/15    YESIKA to LAD.  Has RCA 70% occulsion.    APPENDECTOMY      OTHER      L shoulder surgery (arthroscopic, [place], 2011)    MS TRANSURETHRAL ELEC-SURG PROSTATECTOM           Current Outpatient Medications   Medication Sig Dispense Refill    Ascorbic Acid (VITAMIN C PO) Take 600 mg by mouth every day.      ReliOn Lancets Micro-Thin 33G Misc USE LANCET  TO CHECK GLUCOSE ONCE DAILY 100 Each 11    metformin (GLUCOPHAGE) 1000 MG tablet TAKE 1 TABLET BY MOUTH TWICE DAILY WITH MEALS 200 Tablet 2    liothyronine (CYTOMEL) 25 MCG Tab Take 0.5 Tablets by mouth 2 times a day. 90 Tablet 3    atorvastatin (LIPITOR) 80 MG tablet Take 1 tablet by mouth once daily 90 Tablet 1    ReliOn Lancets Micro-Thin 33G Misc Lancets to check blood sugar once a day. E11.9 100 Each 11    LANTUS SOLOSTAR 100 UNIT/ML Solution Pen-injector injection Inject 12 Units under the skin every evening. 12 mL 3    cyanocobalamin (VITAMIN B12) 1000 MCG Tab Take 1 Tablet by mouth every day. Indications: low vitamin b12 100 Tablet 2    levothyroxine (SYNTHROID) 112 MCG Tab Take 1 Tablet by mouth every morning on an empty stomach. 90 Tablet 0    lisinopril (PRINIVIL) 2.5 MG Tab Take 1 tablet by mouth once daily 90 Tablet 3    Insulin Pen Needle (RELION PEN NEEDLES) 31G X 6 MM Misc Inject 1 Device under the skin every day. E11.9 100 Each 11    LANTUS SOLOSTAR 100 UNIT/ML Solution Pen-injector injection INJECT 12 UNITS SUBCUTANEOUSLY IN THE EVENING      Calcium Carb-Cholecalciferol (CALCIUM 600+D HIGH POTENCY PO) Take  by mouth.      metoprolol tartrate (LOPRESSOR) 25 MG Tab Take 0.5 Tablets by mouth 2 times a day. (Patient taking differently: Take 12.5 mg by mouth every day.) 45 Tablet 6    glucose blood (RELION PRIME TEST) strip USE 1 STRIP TO CHECK GLUCOSE ONCE DAILY BEFORE A MEAL, E11.9 100 Strip 11    ALPRAZolam (XANAX) 0.25 MG Tab Take 0.25 mg by mouth at bedtime as needed for Sleep.      aspirin (ASA) 81 MG Chew Tab chewable  tablet Chew 1 Tablet every day. 100 Tablet 11    Carolee, Zingiber officinalis, (CAROLEE PO) Take  by mouth.      Insulin Pen Needle 31G X 6 MM Misc       B-D UF III MINI PEN NEEDLES 31G X 5 MM Misc USE PEN ONCE DAILY WITH LANTUS PEN      Insulin Pen Needle 31G X 6 MM Misc       Cholecalciferol (VITAMIN D3 PO) Take 1 Dose by mouth every day. Unknown OTC Strength.      Riboflavin (VITAMIN B-2 PO) Take 1 Dose by mouth every day. Unknown OTC Strength.       No current facility-administered medications for this visit.         Allergies   Allergen Reactions    Brilinta [Ticagrelor] Shortness of Breath     Total Sleep Apnea per patient; SOB    Pcn [Penicillins] Anaphylaxis    Other Environmental      Hayfever    Tdap [Dipth, Acell Pertus, Tetanus]      tdap    Tetanus Toxoid          Family History   Problem Relation Age of Onset    Heart Disease Mother     Heart Attack Mother 55        heart attack         Social History     Socioeconomic History    Marital status: Single     Spouse name: Not on file    Number of children: Not on file    Years of education: Not on file    Highest education level: Not on file   Occupational History    Not on file   Tobacco Use    Smoking status: Every Day     Current packs/day: 1.00     Average packs/day: 1 pack/day for 30.0 years (30.0 ttl pk-yrs)     Types: Cigarettes    Smokeless tobacco: Never   Vaping Use    Vaping status: Never Used   Substance and Sexual Activity    Alcohol use: No     Alcohol/week: 0.0 oz    Drug use: No    Sexual activity: Not on file   Other Topics Concern    Not on file   Social History Narrative    Not on file     Social Drivers of Health     Financial Resource Strain: Not on file   Food Insecurity: Not on file   Transportation Needs: Not on file   Physical Activity: Not on file   Stress: Not on file   Social Connections: Not on file   Intimate Partner Violence: Not on file   Housing Stability: Not on file         Physical Exam:  Ambulatory Vitals  /62 (BP  "Location: Left arm, Patient Position: Sitting, BP Cuff Size: Adult)   Pulse 71   Resp 16   Ht 1.676 m (5' 6\")   Wt 69.9 kg (154 lb)   SpO2 100%    BP Readings from Last 4 Encounters:   03/27/25 106/62   03/03/25 120/66   01/16/25 122/60   08/29/24 110/62     Weight/BMI:   Vitals:    03/27/25 0938   BP: 106/62   Weight: 69.9 kg (154 lb)   Height: 1.676 m (5' 6\")    Body mass index is 24.86 kg/m².  Wt Readings from Last 4 Encounters:   03/27/25 69.9 kg (154 lb)   03/03/25 70.3 kg (155 lb)   01/16/25 68.3 kg (150 lb 9.6 oz)   08/29/24 67.6 kg (149 lb)       Physical Exam  Constitutional:       General: He is not in acute distress.  HENT:      Head: Normocephalic and atraumatic.   Eyes:      Conjunctiva/sclera: Conjunctivae normal.      Pupils: Pupils are equal, round, and reactive to light.   Neck:      Vascular: No JVD.   Cardiovascular:      Rate and Rhythm: Normal rate and regular rhythm.      Heart sounds: Normal heart sounds. No murmur heard.     No friction rub. No gallop.   Pulmonary:      Effort: Pulmonary effort is normal. No respiratory distress.      Breath sounds: Normal breath sounds. No wheezing or rales.   Chest:      Chest wall: No tenderness.   Abdominal:      General: Bowel sounds are normal. There is no distension.      Palpations: Abdomen is soft.   Musculoskeletal:      Cervical back: Normal range of motion and neck supple.   Skin:     General: Skin is warm and dry.   Neurological:      Mental Status: He is alert and oriented to person, place, and time.   Psychiatric:         Mood and Affect: Affect normal.         Judgment: Judgment normal.         Lab Data Review:  Lab Results   Component Value Date/Time    CHOLSTRLTOT 65 (L) 01/09/2025 10:30 AM    LDL 29 01/09/2025 10:30 AM    HDL 21 (A) 01/09/2025 10:30 AM    TRIGLYCERIDE 73 01/09/2025 10:30 AM       Lab Results   Component Value Date/Time    SODIUM 139 02/24/2025 10:23 AM    POTASSIUM 5.1 02/24/2025 10:23 AM    CHLORIDE 106 02/24/2025 " "10:23 AM    CO2 22 02/24/2025 10:23 AM    GLUCOSE 102 (H) 02/24/2025 10:23 AM    BUN 19 02/24/2025 10:23 AM    CREATININE 1.00 02/24/2025 10:23 AM    BUNCREATRAT 21 12/07/2022 07:00 AM     CrCl cannot be calculated (Patient's most recent lab result is older than the maximum 7 days allowed.).  Lab Results   Component Value Date/Time    ALKPHOSPHAT 112 (H) 02/24/2025 10:23 AM    ASTSGOT 43 02/24/2025 10:23 AM    ALTSGPT 42 02/24/2025 10:23 AM    TBILIRUBIN 0.4 02/24/2025 10:23 AM      Lab Results   Component Value Date/Time    WBC 4.9 01/09/2025 10:30 AM     Lab Results   Component Value Date/Time    HBA1C 5.5 01/09/2025 10:30 AM     No components found for: \"TROP\"      Cardiac Imaging and Procedures Review:      TTE 2/21/25  CONCLUSIONS  Compared to the prior study on 3/13/2024 - no significant changes   accounting for technique.  The left ventricle is normal in size.  The ejection fraction is measured to be 61 % by Mcelroy's biplane.  Moderate aortic insufficiency. Pressure half time is 444 msec.  Normal inferior vena cava size and inspiratory collapse.    JOSSUE 7/2019   Conclusions   JOSSUE is within normal limits at rest.    Following exercise, there is evidence of  mild to moderate arterial    disease.    Medical Decision Making:  Problem List Items Addressed This Visit       Dyslipidemia (Chronic)    Tobacco dependence    PAD (peripheral artery disease) (HCC)    Stented coronary artery    Ascending aorta dilatation (HCC)       CAD / PCI - stable. Continue aspirin and statin. Continue metoprolol. Continue to offer long acting or increase to twice daily compliance.    HLD - threshold goal LDL <70 and trig <150. Controlled. Continue statin.    AR - serial TTE.      JOSSUE - continue aspirin and statin. Stable. Continue to stay active.    It was my pleasure to meet with Debby Rodríguez.    Today's visit is associated with medical care services that serve as the continuing focal point for all necessary health care services " related to the patient's single, serious condition or multiple chronic complex conditions.  This includes providing services to the patient on an ongoing basis that results in care that is collaborative and personalized to the patient.  The services result in a comprehensive, longitudinal, and continuous relationship with the patient and involve delivery of team-based care that is accessible, coordinated with other practitioners and providers, and integrated with the broader health care landscape.

## 2025-03-28 PROBLEM — K74.00 HEPATIC FIBROSIS: Status: ACTIVE | Noted: 2025-03-02

## 2025-04-10 NOTE — TELEPHONE ENCOUNTER
Received request via: Patient    Was the patient seen in the last year in this department? Yes    Does the patient have an active prescription (recently filled or refills available) for medication(s) requested? No    Pharmacy Name: [id number] - [place], [place] - [address]     Does the patient have long term Plus and need 100-day supply? (This applies to ALL medications) Patient does not have SCP

## 2025-04-11 DIAGNOSIS — E03.9 HYPOTHYROIDISM, ACQUIRED: ICD-10-CM

## 2025-04-11 RX ORDER — BLOOD SUGAR DIAGNOSTIC
STRIP MISCELLANEOUS
Qty: 100 STRIP | Refills: 11 | Status: SHIPPED | OUTPATIENT
Start: 2025-04-11

## 2025-04-14 RX ORDER — LEVOTHYROXINE SODIUM 112 UG/1
112 TABLET ORAL
Qty: 90 TABLET | Refills: 0 | Status: SHIPPED | OUTPATIENT
Start: 2025-04-14

## 2025-04-19 DIAGNOSIS — R94.39 ABNORMAL STRESS TEST: ICD-10-CM

## 2025-04-19 DIAGNOSIS — I25.10 CORONARY ARTERY DISEASE DUE TO CALCIFIED CORONARY LESION: ICD-10-CM

## 2025-04-19 DIAGNOSIS — I25.84 CORONARY ARTERY DISEASE DUE TO CALCIFIED CORONARY LESION: ICD-10-CM

## 2025-04-21 NOTE — TELEPHONE ENCOUNTER
Is the patient due for a refill? Yes    Was the patient seen the last 15 months? Yes    Date of last office visit: 3/27/2025    Does the patient have an upcoming appointment?  Yes   If yes, When? 9/24/25    Provider to refill:VR     Does the patient have long-term Plus and need 100-day supply? (This applies to ALL medications) Patient does not have SCP

## 2025-04-22 RX ORDER — METOPROLOL TARTRATE 25 MG/1
12.5 TABLET, FILM COATED ORAL 2 TIMES DAILY
Qty: 45 TABLET | Refills: 3 | Status: SHIPPED | OUTPATIENT
Start: 2025-04-22

## 2025-07-06 DIAGNOSIS — E03.9 HYPOTHYROIDISM, ACQUIRED: ICD-10-CM

## 2025-07-06 RX ORDER — LEVOTHYROXINE SODIUM 112 UG/1
112 TABLET ORAL
Qty: 90 TABLET | Refills: 0 | Status: SHIPPED | OUTPATIENT
Start: 2025-07-06

## 2025-07-09 ENCOUNTER — APPOINTMENT (OUTPATIENT)
Dept: LAB | Facility: MEDICAL CENTER | Age: 75
End: 2025-07-09
Payer: MEDICARE

## 2025-07-09 DIAGNOSIS — K74.00 HEPATIC FIBROSIS: ICD-10-CM

## 2025-07-09 DIAGNOSIS — Z11.59 ENCOUNTER FOR SCREENING FOR OTHER VIRAL DISEASES: ICD-10-CM

## 2025-07-09 LAB
ALBUMIN SERPL BCP-MCNC: 4.1 G/DL (ref 3.2–4.9)
ALBUMIN/GLOB SERPL: 1.8 G/DL
ALP SERPL-CCNC: 110 U/L (ref 30–99)
ALT SERPL-CCNC: 42 U/L (ref 2–50)
ANION GAP SERPL CALC-SCNC: 10 MMOL/L (ref 7–16)
AST SERPL-CCNC: 43 U/L (ref 12–45)
BASOPHILS # BLD AUTO: 0.6 % (ref 0–1.8)
BASOPHILS # BLD: 0.03 K/UL (ref 0–0.12)
BILIRUB SERPL-MCNC: 0.6 MG/DL (ref 0.1–1.5)
BUN SERPL-MCNC: 13 MG/DL (ref 8–22)
CALCIUM ALBUM COR SERPL-MCNC: 8.7 MG/DL (ref 8.5–10.5)
CALCIUM SERPL-MCNC: 8.8 MG/DL (ref 8.5–10.5)
CHLORIDE SERPL-SCNC: 110 MMOL/L (ref 96–112)
CO2 SERPL-SCNC: 19 MMOL/L (ref 20–33)
CREAT SERPL-MCNC: 0.9 MG/DL (ref 0.5–1.4)
EOSINOPHIL # BLD AUTO: 0.09 K/UL (ref 0–0.51)
EOSINOPHIL NFR BLD: 1.7 % (ref 0–6.9)
ERYTHROCYTE [DISTWIDTH] IN BLOOD BY AUTOMATED COUNT: 46.9 FL (ref 35.9–50)
GFR SERPLBLD CREATININE-BSD FMLA CKD-EPI: 89 ML/MIN/1.73 M 2
GLOBULIN SER CALC-MCNC: 2.3 G/DL (ref 1.9–3.5)
GLUCOSE SERPL-MCNC: 92 MG/DL (ref 65–99)
HCT VFR BLD AUTO: 43.8 % (ref 42–52)
HGB BLD-MCNC: 14 G/DL (ref 14–18)
IMM GRANULOCYTES # BLD AUTO: 0.02 K/UL (ref 0–0.11)
IMM GRANULOCYTES NFR BLD AUTO: 0.4 % (ref 0–0.9)
INR PPP: 1.16 (ref 0.87–1.13)
LYMPHOCYTES # BLD AUTO: 1.4 K/UL (ref 1–4.8)
LYMPHOCYTES NFR BLD: 25.7 % (ref 22–41)
MCH RBC QN AUTO: 29.8 PG (ref 27–33)
MCHC RBC AUTO-ENTMCNC: 32 G/DL (ref 32.3–36.5)
MCV RBC AUTO: 93.2 FL (ref 81.4–97.8)
MONOCYTES # BLD AUTO: 0.46 K/UL (ref 0–0.85)
MONOCYTES NFR BLD AUTO: 8.4 % (ref 0–13.4)
NEUTROPHILS # BLD AUTO: 3.45 K/UL (ref 1.82–7.42)
NEUTROPHILS NFR BLD: 63.2 % (ref 44–72)
NRBC # BLD AUTO: 0 K/UL
NRBC BLD-RTO: 0 /100 WBC (ref 0–0.2)
PLATELET # BLD AUTO: 202 K/UL (ref 164–446)
PMV BLD AUTO: 11.1 FL (ref 9–12.9)
POTASSIUM SERPL-SCNC: 4.9 MMOL/L (ref 3.6–5.5)
PROT SERPL-MCNC: 6.4 G/DL (ref 6–8.2)
PROTHROMBIN TIME: 14.9 SEC (ref 12–14.6)
RBC # BLD AUTO: 4.7 M/UL (ref 4.7–6.1)
SODIUM SERPL-SCNC: 139 MMOL/L (ref 135–145)
WBC # BLD AUTO: 5.5 K/UL (ref 4.8–10.8)

## 2025-07-09 PROCEDURE — 80053 COMPREHEN METABOLIC PANEL: CPT

## 2025-07-09 PROCEDURE — 36415 COLL VENOUS BLD VENIPUNCTURE: CPT

## 2025-07-09 PROCEDURE — 82105 ALPHA-FETOPROTEIN SERUM: CPT

## 2025-07-09 PROCEDURE — 85610 PROTHROMBIN TIME: CPT

## 2025-07-09 PROCEDURE — 85025 COMPLETE CBC W/AUTO DIFF WBC: CPT

## 2025-07-09 PROCEDURE — 86708 HEPATITIS A ANTIBODY: CPT

## 2025-07-11 LAB
AFP-TM SERPL-MCNC: 1 NG/ML (ref 0–9)
HAV AB SER QL IA: NEGATIVE

## 2025-07-17 ENCOUNTER — OFFICE VISIT (OUTPATIENT)
Dept: MEDICAL GROUP | Facility: MEDICAL CENTER | Age: 75
End: 2025-07-17
Payer: MEDICARE

## 2025-07-17 VITALS
WEIGHT: 151.8 LBS | DIASTOLIC BLOOD PRESSURE: 58 MMHG | OXYGEN SATURATION: 98 % | SYSTOLIC BLOOD PRESSURE: 120 MMHG | HEIGHT: 66 IN | TEMPERATURE: 98.1 F | BODY MASS INDEX: 24.4 KG/M2 | HEART RATE: 67 BPM

## 2025-07-17 DIAGNOSIS — K74.00 HEPATIC FIBROSIS: ICD-10-CM

## 2025-07-17 DIAGNOSIS — I73.9 PVD (PERIPHERAL VASCULAR DISEASE) (HCC): ICD-10-CM

## 2025-07-17 DIAGNOSIS — E78.5 DYSLIPIDEMIA: Primary | Chronic | ICD-10-CM

## 2025-07-17 DIAGNOSIS — I73.9 PAD (PERIPHERAL ARTERY DISEASE) (HCC): ICD-10-CM

## 2025-07-17 DIAGNOSIS — E11.65 CONTROLLED TYPE 2 DIABETES MELLITUS WITH HYPERGLYCEMIA, WITHOUT LONG-TERM CURRENT USE OF INSULIN (HCC): Chronic | ICD-10-CM

## 2025-07-17 DIAGNOSIS — I70.0 ATHEROSCLEROSIS OF AORTA (HCC): ICD-10-CM

## 2025-07-17 DIAGNOSIS — I77.810 ASCENDING AORTA DILATATION (HCC): ICD-10-CM

## 2025-07-17 DIAGNOSIS — I25.2 HISTORY OF MI (MYOCARDIAL INFARCTION): Chronic | ICD-10-CM

## 2025-07-17 DIAGNOSIS — E11.9 DIABETES MELLITUS WITHOUT COMPLICATION (HCC): ICD-10-CM

## 2025-07-17 DIAGNOSIS — I70.0 AORTIC ATHEROSCLEROSIS (HCC): ICD-10-CM

## 2025-07-17 DIAGNOSIS — F17.200 TOBACCO DEPENDENCE: ICD-10-CM

## 2025-07-17 PROCEDURE — 3074F SYST BP LT 130 MM HG: CPT | Performed by: INTERNAL MEDICINE

## 2025-07-17 PROCEDURE — 3078F DIAST BP <80 MM HG: CPT | Performed by: INTERNAL MEDICINE

## 2025-07-17 PROCEDURE — 99214 OFFICE O/P EST MOD 30 MIN: CPT | Performed by: INTERNAL MEDICINE

## 2025-07-17 ASSESSMENT — FIBROSIS 4 INDEX: FIB4 SCORE: 2.46

## 2025-07-17 ASSESSMENT — PATIENT HEALTH QUESTIONNAIRE - PHQ9: CLINICAL INTERPRETATION OF PHQ2 SCORE: 0

## 2025-07-17 NOTE — PROGRESS NOTES
Subjective     Orlando Arreguin is a 75 y.o. male who presents with diabetes            HPI      Here follow up diabetes last A1c done in 1/9/25 was A1c 5.5% continues on lantus 12 units and metformin 1000 mg bid, no hypoglycemic spells, brings his blood sugar log from January through yesterday, checks his blood sugar usually around noon and his blood sugars have remained quite consistent going back to April of this year, through July of this week, blood sugars have been running 80s to 110. 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 Sees endocrine for hypothyroid, continues on Synthroid 112 mcg daily and Cytomel 12.5 mg twice daily, with TSH slightly suppressed on the dosing regimen, also maintains on low-dose lisinopril 2.5 mg daily, blood pressures have been stable, tries to try adequate water intake, the lisinopril low-dose is for renal protection and not for hypertension.  Peripheral vascular disease, does not have any calf pain with ambulation, has seen vascular surgery previously and has had arterial ultrasound showing posterior tibial, peroneal decreased blood flow but he has declined follow-up imaging or vascular surgery evaluation.  Continues to smoke a bit less than a pack a day 1 [employer] 11 days.  Dyslipidemia continue Lipitor 80 mg followed by cardiology most recently seen March, no previous muscle aches or pains with statin therapy.  Medications, allergies, medical history, surgical history, social history, family history  reviewed and updated          Current Medications[1]      Tobacco  7/31/15 tobacco 1 ppd not interested in stopping smoking classes or education  8/28/15 tobacco 1 ppd not interested in stopping smoking classes or education  8/28/15 declines  PFT    11/30/15 still smoking 20 cigs per day declines stop smoking classes or medications  5/31/16 declines stop smoking classes and medications, not interested in stop smoking classes, smoking ppd x 40 plus years, declines CT lung cancer screening, pulmonary function testing  11/29/16 still smoking 1 ppd, started smoking age 20, declines stop smoking classes and medication, declines lung cancer screening program and PFT  5/30/17 still smoking 1 ppd declines stop smoking classes and medications, and lung cancer screening program and PFT  12/5/17 still smokes 1 ppd declines stop smoking classes and medications, and lung cancer screening program and PFT   6/12/18  still smokes 1 ppd declines stop smoking classes and medications, and lung cancer screening program and PFT   6/18/19 still smokes 1 ppd declines stop smoking classes and medications, and lung cancer screening program and PFT   12/10/19 still smoking 1 pack/day, declines stop smoking classes, medications, lung cancer screening, PFT  7/1/20 cardiology note stable, urged to quit smoking, follow-up 3 months  12/15/20 tobacco 1/2 ppd not interested to stop smoking classes, medications, CT lung cancer screening, or PFTs  6/14/21 1 ppd tobacco, declines stop smoking classes, CT lung cancer screening, PFT  1/10/22 1 ppd tobacco, declines stop smoking classes, CT lung cancer screening, PFT  7/14/22 declines stop smoking classes  1/12/23 1 ppd tobacco, declines stop smoking classes, CT lung cancer screening, PFT   1/11/24 1 ppd tobacco, declines CT lung cancer screening and PFT   7/11/24 1 ppd tobacco, declines CT lung cancer screening and PFT  1/16/25 smoking a bit under one ppd, declines CT lung cancer screening and PFT     s/p greenlight photo vaporization  10/04 cystoscopy and green light photovaporization of prostate in [place], [place]  1/5/22 psa 1.4     Preventative health  1/12/16 zoster vaccine at [facility]  12/7/20 hep c ab negative  12/15/20 declines  colonoscopy  7/14/22 prevnar 20  1/3/23 hep b ab negative  7/1/24 vit d 35  7/1/24 BALLARD fibrosure fibrosis score 0.53 (F2),steatosis score 0.22 (S0),BALLARD 0.0 (N0)  7/11/24 declines cologuard  7/11/24 declines tdap and shingrx   10/16/24 covid  1/9/25 psa 2.0  7/9/25 hep a ab negative     Postpolio syndrome  6/04 saw neurology in [place] , notes indicate chronic non specific complaints with normal brain MRI, no evidence to support post polio syndrome     pad  12/22/17 ultrasound carotid less than 50% stenosis bilateral  12/28/17 JOSSUE lower extremities, normal at rest, post exercise right JOSSUE 0.8, left 0.8 mild-to-moderate arterial disease bilateral  12/28/17 ultrasound vascular lower extremities, no arterial occlusive disease from common femoral to popliteal bilateral, 50-75% stenosis proximal right external iliac, 50-75% stenosis distal left external iliac, patent right tibial arteries, left posterior tibial appears occluded distally, normal flow left anterior tibial, peroneal arteries retrograde suggesting proximal occlusion  7/11/19 ultrasound arterial lower extremities, duplex right stenosis distal external iliac greater than 50%, elevated velocities proximal portion posterior tibial and peroneal, left greater than 50% stenosis mid external iliac artery, occluded proximal portion posterior tibial artery with distal reconstitution ankle, short segment occlusion mid peroneal artery right JOSSUE 1.09, left JOSSUE 1.07  7/14/19 referral vascular surgery placed  12/10/19 declines vascular surgery follow-up asymptomatic  7/1/20 cardiology note stable, urged to quit smoking, follow-up 3 months  6/14/21 asymptomatic declines follow-up testing, declines vascular surgery evaluation  1/10/22 recommend referral to vascular surgery but he declines   7/14/22 declines vascular surgery evaluation  1/12/23 declines vascular surgery evaluation  7/11/24 declines vascular surgery evaluation, declines follow-up ultrasound  vascular     On ACE inhibitor  4/04 p.thallium no ischemia EF 62%  12/10 p.thallium no ischemia, EF 59%  5/9/11 rec ACE instead of atenolol patient declines  10/17/11 discontinued atenolol, rec start lotensin 10 mg; he declines  5/7/13 echo normal LV function, EF 60%, grade 1 diastolic dysfunction  5/7/13 p.thallium fixed defect mid inferior, inferoseptal, mid inferior walls suggest subdiaphragmatic uptake or prior infarction, no ischemia noted, EF 57%  8/8/15 hospital discharge on coreg 12.5 mg bid,lisinopril 5 mg, brilinta 90 mg bid, asa 81 mg,lipitor 80 mg  8/26/15 cardiology note, decrease coreg to 6.25 mg bid due to lower blood pressure continue lisinopril 5 mg    8/28/15 decrease lisinopril to 2.5 mg daily and cont coreg 6.25 mg bid  11/24/15 urine mac<2.5 on lisinopril 2.5 mg and coreg 6.25 mg bid  4/12/16 cardiology note decrease coreg to 3.125 mg bid consider discontinuation of coreg next evaluation and continue lisinopril 2.5 mg  5/20/16 urine mac <3 on lisinopril 2.5 mg  7/19/16 cardiology note clinically stable, episodes of chest tightness related to stress, blood pressure running low, discontinue carvedilol, follow-up in a few months  11/23/16 urine mac 3.2 on lisinopril 2.5 mg  11/29/17 urine mac<4 on lisinopril 2.5 mg  6/7/18 urine mac 3.8 on lisinopril 2.5 mg  6/10/19 urine mac<0.7 on lisinopril 2.5 mg  12/5/19 urine mac<0.7 on lisinopril 2.5 mg  6/11/20 urine mac<1.2 on lisinopril 2.5 mg  12/7/20 urine mac<1.2 on lisinopril 2.5 mg  6/8/21 urine mac<3 on lisinopril 2.5 mg  7/8/22 urine mac<10 on lisinopril 2.5 mg  1/4/23 urine mac 3 on lisinopril 2.5 mg  1/5/24 urine mac<1.2 on lisinopril 2.5 mg  7/1/24 urine mac<1.2 on lisinopril   1/9/25 urine mac<1.2 on lisinopril      Interstitial cystitis  5/02 urology note [facility] nonbacterial prostatitis vs interstitial cystitis given trial of flomax     Insomnia on Xanax as needed  On xanax 0.25 mg at night prn  3/8/18 refill xanax  5/30/19 refill  xanax  11/22/20 refill xanax  11/22/20   8/15/21 refill xanax  2/2/23 refill xanax     Hypothyroid  10/08 tsh 0.126,free t4 1.4,free t3 2.7  8/10 tsh 0.199 taking levothyroxine 0.125 6 days a week, and 2 tabs on john  9/10/10 increase to levothyroxine 0.137 mg daily, repeat tsh in 6 weeks  10/10 tsh 0.9  12/10 tsh 1.2, free t4 1.4, free t3 2.9  4/11 tsh 0.8, thyroxine 9.6, free thyroxine uptake 3.4, free t3 uptake 35%  9/11 tsh 0.5, free t4 0.9, free t3 2.4 on levothyroxine 137 mcg  12/11 tsh 0.3,free t4 1.5,free t3 2.7 on levothyroxine 137 mcg  4/16/13 tsh 0.28,free t4 1.1, free t3 2.3 (2.4-4.2); on levothyroxine 137 mcg; change to armour thyroid 60 mg qday  5/6/13 tsh 0.3, free t4 1.0, free t3 2.5 on levothyroxine 137 mcg ? Change to armour 60 mg  5/9/13  note decrease levothyroxine to 125 mcg and add cytomel 5 mg bid  6/13 tsh 0.45, free t4 1.2, free t3 2.9, on synthroid 125 mcg and cytomel 5 mcg bid per   8/14/13 tsh 0.235,free t4 1.1,free t3 2.8 on synthroid 125 mcg and cytomel 5 mcg bid per ;change to synthroid 150 mcg and stop cytomel per pt request  10/16/13 tsh 0.4, free t4 1.34, free t3 2.2 on synthroid 150 mcg  10/23/13  endo note; change to synthroid 125 mcg and cytomel 5 mcg daily  4/16/14 tsh 0.67,free t4 1.0,free t3 2.4  4/23/14  endocrine note, increase levothyroxine to 137 mcg and cont cytomel 5 mcg  10/28/14  endocrine note, tsh 0.6,free t4 1.0, free t3 2.7 on thyroxine 137 mcg and cytomel 5 mcg; gastroparesis symptoms improve with cytomel, will reduce thryoxine to 125 mcg and use cytomel 25 mcg to cut and take more than once per day as needed, return in 4 months  7/3/15 tsh 0.04, free t4 0.6 and free t3 3.0 on levothyroxine 125 mcg and cytomel 12.5 mcg daily  7/21/15  endocrine note; on levothyroxine 125 mcg and cytomel 12.5 mcg daily, tsh 0.04, free t4 0.6 and free t3 3.0, patient does not want to change dose, no  changes continue same dosing regimen; follow up 6 months  8/8/15  endocrine phone note, decrease levothyroxine to 112 mcg daily, continue cytomel 12.5 mg daily  1/13/16 tsh 0.016,free t4 0.9,free t3 2.5 on levothyroxine 112 mcg and cytomel 12.5 mg daily  1/22/16  endocrinology note on levothyroxine 112 mcg and cytomel 12.5 mg daily, patient declines to change dosing regimen  4/5/16 tsh 0.012,free t4 0.97,free t3 3.6 on levothyroxine 112 mcg and cytomel 12.5 mg daily  4/21/16  endocrinology note, no changes  10/13/16 tsh 0.14,free t4 0.87,free t3 3.1 on levothyroxine 112 mcg and cytomel 25 mg  4/17/17 tsh 0.016,free t4 0.9,free t3 4.0 on levothyroxine 112 mcg and cytomel 25 mg  4/19/17  endocrinology note no changes follow up 6 months  10/18/17  endocrine note no changes  4/12/18 tsh 0.012, free t4 0.99, free t3 4.7 on levothyroxine 112 mcg and cytomel 25 mg  4/18/18  endocrinology note, on levothyroxine 112 mcg daily and cytomel 12.5 mcg bid tsh 0.01, free 4 0.9, free t3 4.7, clinically feeling fine, no changes  10/15/18 endocrinology note repeat labs follow-up 6 months  10/8/19 tsh 0.010, free t4 0.80, free t3 2.9 on levothyroxine 112 mcg daily and cytomel 12.5 mcg bid per endocrinology  10/14/19 endocrinology note, continue same thyroid medication dosing no changes follow-up 6 months  4/20/20 endocrinology note patient not willing to adjust his thyroid as it has been successful in controlling gastroparesis, follow-up 6 months  6/12/20 tsh 0.006 on levothyroxine 112 mcg daily and cytomel 12.5 mcg bid per endocrinology  12/7/20 tsh 0.009, free t4 1.23, t3 187 () on levothyroxine 112 mcg daily and cytomel 12.5 mcg bid per endocrinology  4/16/21 tsh<0.005,free t4 1.3,free t3 2.9  5/4/21 endocrinology note on levothyroxine 112 mcg and cytomel 12.5 mcg bid, continue and follow up 6 months  11/3/21 tsh<0.005,free t4 1.27,free t3 3.4  1/5/21 A1c 5.8%  on lantus 12 units and metformin 1000 mg bid on levothyroxine 112 mcg and liothyronine 12.5 mcg bid, patient states that the thyroid dose is beneficial to her self and is reluctant to decrease the dose understanding that hyperthyroid can increase bone loss  11/10/21  endocrinology note he is retiring, follow-up with nurse practitioner  5/5/22 tsh 0.01,free t4 1.4,free t3 3.5 on levothyroxine 112 mcg and cytomel 25 mcg 1/2 bid per endocrine  5/13/22 endocrine note on levothyroxine 112 mcg and cytomel 25 mcg 1/2 bid discussed risk of decreased TSH and cardiovascular disease, osteoporosis, patient would like to remain on current regimen, follow-up 6 months  10/26/22 tsh 0.007,free t4 1.28,free t3 3.0 on synthroid 112 mcg and cytomel 25 mcg 1/2 bid per endocrinology   10/25/22 salivary cortisol 0.062, ACTH 5.4,prolactin 5.6,IGF 82 per endocrinology  5/4/23 tsh 0.01,free t4 1.15,free t3 2.99 on synthroid 112 mcg and cytomel 25 mcg 1/2 bid per endocrinology    5/22/23 endocrine note mildly suppressed with normal free T3 and free T4 understanding risk of overtreatment, continues on cytomel 25 mcg 1.2 tab bid and synthroid 112 mcg daily   4/25/24 tsh 0.09,free t4 0.97,free t3 2.7 on levothyroxine 112 mcg and cytomel 25 mcg 1/2 bid  5/29/24 endocrine note on levothyroxine 112 mcg and cytomel 25 mcg 1/2 bid, tsh suppressed at 0.9 patient understands risk of overtreatment and would like to maintain on the same dosing regimen, follow-up 3 months with labs  8/29/24 [facility] note thyroid levels unreliable due to lab interference, will repeat in 3 months, continue levothyroxine 112 mcg and cytomel 12.5 mcg bid, follow-up 6 months  12/2/24 tsh 0.448,free t4 0.75,free t3 2.58,TPO<9.0  3/3/25 [facility] note tsh slightly suppressed, recommend repeat blood test for dose adjustment however he would continues on levothyroxine 112 mcg and cytomel 12.5 mcg bid, follow-up 6 months     History shoulder pain  12/9/10  left shoulder pain,  note MRI pending  2/11 MRI left shoulder [facility] mild to moderate rotator cuff tendinopathy, adhesive capsulitis, small anterior and posterior labral tears  7/11 [facility] note  left shoulder adhesive capsulitis rec decompression  12/8/15 xray right shoulder at [facility] mild hydroxyapatite deposition adjacent to the greater tuberosity, no evidence of significant arthritis  5/11/16 MRI right shoulder thickening and increased signal with synovitis, suggestive of adhesive capsulitis, tendinopathy supraspinatus and infraspinatus, fatty atrophy paraspinous muscle  1/4/17 [facility] discharge note     History MI  8/6/15 hospital admission non-STEMI inverted T waves in aVL, ST depression in lead 1, initial troponin 3.4  8/6/15   8/6/15 cardiac catheterization left main free of disease, triple vessel disease prominently involving distal segment nature epicardial vessels and branches, 95% ostial LAD descending artery stenosis and 70% mid RCA stenosis, ejection fraction 35-40%, stenting ostial LAD with xience drug-eluting stent  8/7/15 echo normal LV function EF 65-70%, grade 1 diastolic dysfunction, moderate concentric LVH, mild MR and AR  8/8/15 hospital discharge on coreg 12.5 mg bid,lisinopril 5 mg, brilinta 90 mg bid, asa 81 mg,lipitor 80 mg  8/11/15 cardiology note; continue brilenta and asa for one year, some dyspnea, if no improvement could consider another antiplatelet therapy, will recheck BNP in 2 weeks with followup visit, ultimately will need myocardial perfusion scan but will defer for a few months  8/26/15 cardiology note, decrease coreg to 6.25 mg bid due to lower blood pressure,continue lisinopril 5 mg, asa, lipitor,start effient 10 mg for one year due to brilinta causing shortness of breath, followup 2 months  10/14/15 cardiology note no chnges, repeat myocardial perfusion scan 3 months  11/30/15 cardiac rehab program  1/13/15 cardiology note nitroglycerin when necessary  follow-up 3 months  1/12/16 persantine thallium small basal anterior inferior infarct with small ame-infarct ischemia, moderately sized moderate severity inferior, inferior lateral infarct with reversible ischemia  4/12/16 cardiology note decrease coreg to 3.125 mg bid consider discontinuation of coreg next evaluation and continue lisinopril 2.5 mg  7/19/16 cardiology note clinically stable, episodes of chest tightness related to stress, blood pressure running low, discontinue carvedilol, follow-up in a few months  11/3/16 cardiology note, isosorbide mononitrate with heavy exertion, follow-up in a few months to determine if further evaluation is necessary, could consider repeat perfusion study or dobutamine stress echo  1/5/17 cardiology note stable CAD, follow-up 6 months  7/12/17 cardiology note, likely stable discussed smoking cessation, patient declines to quit smoking, follow-up one year  12/22/17 ultrasound carotid less than 50% stenosis bilateral  7/23/18 cardiology note asymptomatic, increased stress however, continues to smoke, follow-up 1 year continue atorvastatin plus erythromycin  7/15/19 cardiology note stable continues to smoke advised to quit follow-up 1 year  6/19/20 hospital admission, 6/21/20 hospital discharge, admission for chest pain, cardiac catheterization performed, drug-eluting stent placement of RCA  6/19/20 echo mild concentric LVH, EF 65%, subtle inferior hypokinesis, normal diastolic function  6/19/20 cardiac catheterization left main mild distal tapering 10% stenosis, stent distal part widely patent, LAD widely patent ostial stent, diffuse moderate stenosis distal LAD and small diagonal branches, circumflex tortuous origin 50%, obtuse marginal 1 occluded fills by collaterals stable compared to 2015, obtuse marginal 2 diffusely diseased with mild stenosis lateral wall, distal circumflex/obtuse marginal 3 is small with severe stenosis terminal segment, dominant RCA 40% proximal  stenosis, 70% mid vessel stenosis, 40% distal stenosis, PDA multiple 50 to 70% stenosis mid and distal segments, successful mid RCA YESIKA placement, preintervention 70% stenosis, post intervention 0% residual stenosis  7/1/20 cardiology note stable, urged to quit smoking, follow-up 3 months  12/1/20 cardiology note side effects from metoprolol dry skin and cold fingers having already decreased from 25 mg to 12.5 mg daily, will discontinue metoprolol, continue asa, effient, lipitor, lisinopril  3/9/21 cardiology note consider increasing lisinopril, follow-up 6 months back to discontinue effient at that time  10/5/21 myocardial perfusion study small fixed defect no ischemia, EF 47%  11/19/21 echo mild LVH, EF 60%, moderate aortic insufficiency, ascending aortic dilation 4.0 cm  12/3/21  cardiology note recommend routine stress testing for left main PCI, patient would like to stop effient and has been 1 year since his stent was placed, patient understands that DAPT is standard treatment but patient would like to be off that medication, okay to discontinue effient and continue aspirin 81 mg indefinitely, follow-up 1 year  6/8/22  cardiology note recommend routine stress testing, and follow-up 6 months  12/13/22 cardiology note ordered zio, echo, stress test   12/21/22 to 1/4/23 zio event monitor predominant rhythm sinus, minimum heart rate 46, maximum heart rate 179, SVT runs fastest 6 beats maximum rate 179, longest 12.3 seconds average rate 109, PVC<1%  1/16/23 echo EF 55%, normal diastolic function, moderate aortic insufficiency, trace MR, no significant change compared to previous  1/18/23 myocardial perfusion study moderate sized area ischemia anterolateral base to mid LAD, resting ejection fraction 66%, stress ejection fraction 65%   5/12/23 cardiology note recommend stress test and coronary angiogram, patient declined since he is feeling well, recommendation beta-blocker, patient will  consider, discussed follow-up aortic regurgitation next visit  3/15/24 echo per cardiology mild LVH, normal systolic function EF 55%, grade 1 diastolic dysfunction, moderate aortic insufficiency, trace MR, aortic diameter 4.1 cm, normal aortic root for body surface  8/20/24 cardiology note repeat echo  3/4/25 echo normal LV size, mild LVH, 61%, normal wall motion, indeterminate diastolic function, mild MR, moderate aortic insufficiency, RVSP 24, ascending aorta 3.9 cm  3/27/25 cardiology note on metoprolol once a day, he does not want the long-acting form, continue aspirin, statin     history of mild nonproliferative diabetic retinopathy  6/4/19  eye exam, mild nonproliferative retinopathy  2/14/22  eye exam mild nonproliferative retinopathy without macular edema  9/28/23  eye exam no retinopathy  10/17/24  [facility] note    hepatic fibrosis  7/7/22 AST 30,ALT 30  10/26/22 AST 47,ALT 57 per endocrine  1/4/23 AST 44,ALT 24,acute hepatitis panel negative,iron 205,%sat 29,ferritin 73,ELISEO negative  6/24/23 AST 39,ALT 36,alk phos 115  1/5/24 alk phos 114  7/1/24 alk phos 94  7/1/24 fibrosure fibrosis score 0.53 (F2),steatosis score 0.22 (S0),BALLARD 0.0 (N0)  7/11/24 declines ultrasound liver  8/1/24 AST 46,ALT 38,alk phos 111  2/20/25 alk phos 112  2/20/25 ultrasound elastography liver 15.5 cm, mediation shear velocity 3.14 m/sec consistent with high risk for fibrosis/cirrhosis   7/9/25 hep a ab negative  7/9/25 AST 43,ALT 42,INR 1.16, AFP 1     Gastroparesis  4/04 gastric emptying study severe gatroparesis done in [place], no hx of DM or MS, started on erythromycin base  5/04 CT thorax in [place] negative  5/04 MRI brain in [place] no evid of MS  4/16/13 declined gastric stimulator  12/5/17 remains on erythromycin  1/12/23 off erythromycin due to cost      Dyslipidemia  5/13 chol 158,trig 204,hdl 36,ldl 81  8/8/15 chol 97,trig 117,hdl 26,ldl 48 started on lipitor 80 mg on hospital  discharge  8/21/15 chol 76,trig 85,hdl 24,ldl 35 on lipitor 80 mg  10/7/15 chol 67,trig 77,hdl 22,ldl 30 on lipitor 80 mg per cardiology  4/5/16 chol 64,trig 43,hdl 24,ldl 31 on lipitor 80 mg per cardiology  11/23/16 chol 83,trig 73,hdl 27,ldl 41 on lipitor 80 mg per cardiology  5/24/17 chol 85,trig 81,hdl 28,ldl 41 on lipitor 80 mg per cardiology  10/10/17 pharmacy known interaction with lipitor and erythromycin base, consider changing to crestor, offer made to patient to change to crestor but he would like to discuss with his cardiologist first  11/29/17 chol 72,trig 47,hdl 24,ldl 39 on lipitor 80 mg and erythromycin base, previously recommended changing to crestor because of potential interaction, patient would like to discuss with cardiology first  12/5/17 declines to change from lipitor understanding potential interaction with erythromycin  6/7/18 chol 78,trig 68,hdl 25,ldl 39,cpk 69 on lipitor 80 mg  7/23/18 cardiology note asymptomatic, increased stress however, continues to smoke, follow-up 1 year continue atorvastatin plus erythromycin  12/3/18 chol 81,trig 85,hdl 26,ldl 38 on lipitor 80 mg  6/10/19 chol 64,trig 70,hdl 20,ldl 30 on lipitor 80 mg  6/10/20 chol 81,trig 74,hdl 25,ldl 41 on lipitor 80 mg  12/7/20 chol 72,trig 65,hdl 23,ldl 36 on lipitor 80 mg  6/8/21 chol 81,trig 54,hdl 27,ldl 41 on lipitor 80 mg  1/5/22 chol 83,trig 52,hdl 31,ldl 39 on lipitor 80 mg  6/1/22 chol 72,trig 59,hdl 27,ldl 31 on lipitor 80 mg  7/8/22 chol 73,trig 64,hdl 28,ldl 30 on lipitor 80 mg  12/7/22 chol 84,trig 54,hdl 30,ldl 41 on lipitor 80 mg  1/4/23 chol 71,trig 53,hdl 30,ldl 28 on lipitor 80 mg  5/4/23 chol 72,trig 54,hdl 28,ldl 33 on lipitor 80 mg  1/5/24 chol 62,trig 49,hdl 19,ldl 33 on lipitor 80 mg  7/1/24 chol 83,trig 49,hdl 28,ldl 45 on lipitor 80 mg  8/20/24 cardiology note   1/9/25 chol 65,trig 73,hdl 21,ldl 29 on lipitor 80 mg  3/27/25 cardiology note on metoprolol once a day, he does not want the long-acting  form, continue aspirin, statin history     Diabetes  11/08 A1c 6.3%  8/10 A1c 7.9%  10/10 A1c 7.7%  12/10 A1c 8.0%  1/11 add metformin 500 mg bid  2/11 A1c 7.9%  4/11 A1c 8.2% increase metformin to 1000 mg bid  10/11 A1c 6.5 %  12/11 A1c 6.1% on metformin 1000 mg bid  4/16/13 A1c 6.2% on metformin 1000 mg bid; declines to check blood sugars at home; bs 194 non fasting; declines ACE  8/14/13 A1c 6.5% on metformin 1000 mg bid  12/9/13  eye exam grade 1 diabetic background retinopathy  4/16/14 A1c 7.0% per  on metformin 1000 mg bid  7/3/15 A1c 8.3% on metformin 1000 mg bid per endocrine   7/31/15 start lantus 5 units and continue metformin 1000 mg bid, declines ACE,statin,asa  8/8/15 hospital discharge on coreg 12.5 mg bid,lisinopril 5 mg, brilinta 90 mg bid, asa 81 mg,lipitor 80 mg; on lantus 8 units and metformin 1000 mg bid  8/28/15 declines nutrition consultation and diabetic education regarding diet  8/28/15 recommend increasing lantus to 10 units and metformin 1000 mg bid  11/24/15 A1c 6.3% on lantus 10 units and metformin 1000 mg bid  11/30/15 on lantus 12 units and metformin 1000 mg bid  5/20/16 A1c 6.3% on lantus 12 units and metformin 1000 mg bid  11/23/16 A1c 6.1% on lantus 12 units and metformin 1000 mg bid  1/9/17  eye exam  5/24/17 A1c 6.3% on lantus 12 units and metformin 1000 mg bid   5/24/17 urine mac 2.5 on lisinopril 2.5 mg  11/29/17 A1c 6.1% on lantus 12 units and metformin 1000 mg bid   6/7/18 A1c 5.9% on lantus 12 units and metformin 1000 mg bid   12/3/18 A1c 6.2% and urine mac< 3on lantus 12 units and metformin 1000 mg bid   6/4/19  eye exam, mild nonproliferative retinopathy  6/10/19 A1c 6.4% on lantus 12 units and metformin 1000 mg bid   12/5/19 A1c 6.2% on lantus 12 units and metformin 1000 mg bid   6/11/20 A1c 6.5% on lantus 12 units and metformin 1000 mg bid   12/7/20 A1c 5.9% on lantus 12 units and metformin 1000 mg bid   6/8/21 A1c 6.0%  on lantus 12 units and metformin 1000 mg bid   1/5/22 A1c 5.8% on lantus 12 units and metformin 1000 mg bid   2/14/22  eye exam mild nonproliferative retinopathy without macular edema  7/8/22 A1c 6.0% on lantus 12 units and metformin 1000 mg bid   1/4/23 A1c 5.8% on lantus 12 units and metformin 1000 mg bid   6/24/23 urine mac<1.2  6/21/23 A1c 5.6% on lantus 12 units and metformin 1000 mg bid   9/28/23  eye exam  1/5/24 A1c 5.5% on lantus 12 units and metformin 1000 mg bid   1/11/24 decrease lantus to 11 units and continue metformin 1000 mg bid  7/1/24 A1c 5.9% on lantus 11 units and metformin 1000 mg bid  10/17/24  [facility]  1/9/25 A1c 5.5% on lantus 11 units and metformin 1000 mg bid  1/9/25 b12 198 start 1000 mcg daily on metformin   2/24/25 b12 740,folate 8.2,MMA 0.15, intrinsic factor negative     b12 def  7/1/24 b12 256 on metformin   1/9/25 b12 198 on metformin   1/16/25 start b12 1000 mcg daily   2/24/25 b12 740 on 1000 mcg daily,folate 8.2,MMA 0.15, intrinsic factor negative    Ascending aorta dilation  6/19/20 echo ascending aorta 3.5 cm  10/5/21 myocardial perfusion study small fixed defect no ischemia, EF 47%  11/19/21 echo mild LVH, EF 60%, moderate aortic insufficiency, ascending aortic dilation 4.0 cm  1/16/23 echo EF moderate aortic insufficiency, ascending aorta 3.9 cm  2/7/23 ultrasound aorta no evidence of AAA, mild ectasia of distal abdominal aorta, atherosclerotic plaque  5/12/23 cardiology note discussed follow-up aortic regurgitation next visit  3/15/24 echo per cardiology mild LVH, normal systolic function EF 55%, grade 1 diastolic dysfunction, moderate aortic insufficiency, trace MR, aortic diameter 4.1 cm, normal aortic root for body surface  8/20/24 cardiology note repeat echo  3/27/25 cardiology note on metoprolol once a day, he does not want the long-acting form, continue aspirin, statin  3/4/25 echo normal LV size, mild LVH, 61%, normal wall motion,  indeterminate diastolic function, mild MR, moderate aortic insufficiency, RVSP 24, ascending aorta 3.9 cm  3/27/25 cardiology note on metoprolol once a day, he does not want the long-acting form, continue aspirin, statin     aortic insufficiency  6/19/20 echo mild concentric LVH, EF 65%, subtle inferior hypokinesis, normal diastolic function  10/5/21 myocardial perfusion study small fixed defect no ischemia, EF 47%  11/19/21 echo mild LVH, EF 60%, moderate aortic insufficiency, ascending aortic dilation 4.0 cm  1/16/23 echo EF 55%, normal diastolic function, moderate aortic insufficiency, trace MR, no significant change compared to previous, ascending aorta 3.9 cm  5/12/23 cardiology note discussed follow-up aortic regurgitation next visit  3/15/24 echo per cardiology mild LVH, normal systolic function EF 55%, grade 1 diastolic dysfunction, moderate aortic insufficiency, trace MR, aortic diameter 4.1 cm, normal aortic root for body surface  8/20/24 cardiology note repeat echo  3/4/25 echo normal LV size, mild LVH, 61%, normal wall motion, indeterminate diastolic function, mild MR, moderate aortic insufficiency, RVSP 24, ascending aorta 3.9 cm  3/27/25 cardiology note on metoprolol once a day, he does not want the long-acting form, continue aspirin, statin     aortic atherosclerosis  2/7/23 us aorta no evidence of AAA, mild ectasia of distal abdominal aorta, atherosclerotic plaque  3/15/24 echo per cardiology mild LVH, normal systolic function EF 55%, grade 1 diastolic dysfunction, moderate aortic insufficiency, trace MR, aortic diameter 4.1 cm, normal aortic root for body surface  8/20/24 cardiology note repeat echo  3/4/25 echo normal LV size, mild LVH, 61%, normal wall motion, indeterminate diastolic function, mild MR, moderate aortic insufficiency, RVSP 24, ascending aorta 3.9 cm     abn liver enzymes  7/7/22 AST 30,ALT 30  10/26/22 AST 47,ALT 57 per endocrine  1/4/23 AST 44,ALT 24,acute hepatitis panel  "negative,iron 205,%sat 29,ferritin 73,ELISEO negative  6/24/23 AST 39,ALT 36,alk phos 115  1/5/24 alk phos 114  7/1/24 alk phos 94  7/1/24 BALLARD fibrosure fibrosis score 0.53 (F2),steatosis score 0.22 (S0),BALLARD 0.0 (N0)  8/1/24 AST 46,ALT 38,alk phos 111  2/20/25 alk phos 112  2/20/25 ultrasound elastography liver 15.5 cm, mediation shear velocity 3.14 m/sec consistent with high risk for fibrosis/cirrhosis   7/9/25 alk phos 110       Problem List[2]    Depression Screening  Little interest or pleasure in doing things?  0 - not at all  Feeling down, depressed , or hopeless? 0 - not at all  Patient Health Questionnaire Score: 0                  Patient Care Team:  Charbel Jaffe M.D. as PCP - General  Alex Stein M.D. (Inactive) as Consulting Physician (Endocrinology)      ROS           Objective     /58   Pulse 67   Temp 36.7 °C (98.1 °F) (Temporal)   Ht 1.676 m (5' 6\")   Wt 68.9 kg (151 lb 12.8 oz)   SpO2 98%   BMI 24.50 kg/m²      Physical Exam  Vitals and nursing note reviewed.   Constitutional:       Appearance: Normal appearance.   HENT:      Head: Normocephalic and atraumatic.      Right Ear: External ear normal.      Left Ear: External ear normal.      Nose: Nose normal.   Eyes:      Conjunctiva/sclera: Conjunctivae normal.   Cardiovascular:      Rate and Rhythm: Normal rate.      Heart sounds: Normal heart sounds.   Pulmonary:      Effort: Pulmonary effort is normal. No respiratory distress.      Breath sounds: Normal breath sounds.   Abdominal:      General: There is no distension.   Musculoskeletal:         General: No swelling.      Cervical back: Neck supple.   Skin:     Coloration: Skin is not jaundiced.   Neurological:      General: No focal deficit present.      Mental Status: He is alert.   Psychiatric:         Mood and Affect: Mood normal.         Behavior: Behavior normal.          Monofilament testing with a 10 gram force: sensation intact: intact bilaterally  Visual Inspection: " Feet without maceration, ulcers, fissures.  Pedal pulses: intact bilaterally         Assessment & Plan       Assessment  #1 diabetes mellitus type II 1/9/25 A1c 5.5% on lantus 12 units and metformin 1000 mg bid, blood sugars still remain well-controlled without hypoglycemia, no history of retinopathy sees  from [facility] [place] regularly, most recent visit October, no history of diabetic nephropathy    #2 dyslipidemia 1/9/25 chol 65,trig 73,hdl 21,ldl 29 on lipitor 80 mg    #3 hypothyroid replacement per endocrinology 3/3/25 [facility] endocrine note tsh slightly suppressed, recommend repeat blood test for dose adjustment however he would continues on levothyroxine 112 mcg and cytomel 12.5 mcg bid, follow-up 6 months    #4 tobacco a bit less than 1 pack/day, asymptomatic as far as respiratory symptoms, declines stop smoking classes or medication, has declined CT lung cancer screening and PFT    #5 ascending aortic dilation most recent echo 3/4/25 echo normal LV size, mild LVH, 61%, normal wall motion, indeterminate diastolic function, mild MR, moderate aortic insufficiency, RVSP 24, ascending aorta 3.9 cm    #6 CAD status post coronary stenting most recent stenting June 2020 mid RCA YESIKA, last myocardial perfusion study 2023 offered catheterization at that time but patient declined since he was asymptomatic, most recent echo    #7 aortic atherosclerosis by ultrasound no aneurysm    #8 aortic ectasia 2/20/25 ultrasound elastography mild ectasia of distal infrarenal aorta     #9 ascending aorta dilation 3/4/25 echo normal LV size, mild LVH, 61%, normal wall motion, indeterminate diastolic function, mild MR, moderate aortic insufficiency, RVSP 24, ascending aorta 3.9 cm    #10 gastroparesis by gastric emptying study 20 years ago, has declined gastric stimulator, off erythromycin    #11 hepatic fibrosis, FibroSure testing a year ago 7/1/24 fibrosure fibrosis score 0.53 (F2),steatosis score 0.22 (S0),BALLARD 0.0 (N0), 2/20/25  ultrasound elastography liver 15.5 cm, mediation shear velocity 3.14 m/sec consistent with high risk for fibrosis/cirrhosis, no alcohol, recent labs July night AST 43, ALT 42, INR 1.1, AFP 1    #12 peripheral arterial disease, asymptomatic no pain with activity, has declined follow-up testing and follow-up with vascular surgery understanding smoking increases long-term risk of worsening peripheral arterial disease          Plan  #1  Reviewed labs from July 9 with him    #2 given FibroSure testing and ultrasound elastography, at high risk for developing fibrosis, will continue ultrasound surveillance 6 months, AFP every 6 months and INR every 6 months, continue no alcohol    #3 continue Lantus and metformin no changes, repeat labs ordered for August    #4 continue Lipitor, work on good nutrition, good exercise program    #5 continue check and record blood pressure and blood sugars, orthostatic precautions, ensure good water intake    #6 recommend patient consider stop smoking class or medication, he declines understanding long-term use of smoking may increase risk for cardiovascular disease, stroke, osteoporosis, COPD, increase in size of aortic aneurysm, peripheral arterial disease, malignancy, declines PFT and CT lung cancer screening    #7 continue using good footwear, socks, check feet daily    #8 has follow-up ultrasound right upper quadrant scheduled    #9 recommend hepatitis A and B vaccines at the pharmacy as Medicare does not cover that here through our office, he can also get the shingles and COVID-vaccine at the pharmacy as well    #10 follow-up cardiology    #11 recommend vascular ultrasound lower extremities, follow-up vascular surgery PAD, he declines, he will continue to check his feet, any pain or color changes to let us know or go straight to the emergency room    #12 follow-up endocrinology, annual eye exam with ophthalmology    #13 follow-up with myself 6 months    #14 echo next year follow-up  ascending aorta                   [1]   Current Outpatient Medications   Medication Sig Dispense Refill    levothyroxine (SYNTHROID) 112 MCG Tab TAKE 1 TABLET BY MOUTH IN THE MORNING ON AN EMPTY STOMACH 90 Tablet 0    metoprolol tartrate (LOPRESSOR) 25 MG Tab Take 1/2 (one-half) tablet by mouth twice daily 45 Tablet 3    glucose blood (RELION PRIME TEST) strip USE 1 STRIP TO CHECK GLUCOSE ONCE DAILY BEFORE A MEAL, E11.9 100 Strip 11    Ascorbic Acid (VITAMIN C PO) Take 600 mg by mouth every day.      ReliOn Lancets Micro-Thin 33G Misc USE LANCET  TO CHECK GLUCOSE ONCE DAILY 100 Each 11    metformin (GLUCOPHAGE) 1000 MG tablet TAKE 1 TABLET BY MOUTH TWICE DAILY WITH MEALS 200 Tablet 2    liothyronine (CYTOMEL) 25 MCG Tab Take 0.5 Tablets by mouth 2 times a day. 90 Tablet 3    atorvastatin (LIPITOR) 80 MG tablet Take 1 tablet by mouth once daily 90 Tablet 1    ReliOn Lancets Micro-Thin 33G Misc Lancets to check blood sugar once a day. E11.9 100 Each 11    LANTUS SOLOSTAR 100 UNIT/ML Solution Pen-injector injection Inject 12 Units under the skin every evening. 12 mL 3    cyanocobalamin (VITAMIN B12) 1000 MCG Tab Take 1 Tablet by mouth every day. Indications: low vitamin b12 100 Tablet 2    lisinopril (PRINIVIL) 2.5 MG Tab Take 1 tablet by mouth once daily 90 Tablet 3    Insulin Pen Needle (RELION PEN NEEDLES) 31G X 6 MM Misc Inject 1 Device under the skin every day. E11.9 100 Each 11    LANTUS SOLOSTAR 100 UNIT/ML Solution Pen-injector injection INJECT 12 UNITS SUBCUTANEOUSLY IN THE EVENING      Calcium Carb-Cholecalciferol (CALCIUM 600+D HIGH POTENCY PO) Take  by mouth.      ALPRAZolam (XANAX) 0.25 MG Tab Take 0.25 mg by mouth at bedtime as needed for Sleep.      aspirin (ASA) 81 MG Chew Tab chewable tablet Chew 1 Tablet every day. 100 Tablet 11    Ginger, Zingiber officinalis, (GINGER PO) Take  by mouth.      Insulin Pen Needle 31G X 6 MM Misc       B-D UF III MINI PEN NEEDLES 31G X 5 MM Misc USE PEN ONCE DAILY WITH  LANTUS PEN      Insulin Pen Needle 31G X 6 MM Misc       Cholecalciferol (VITAMIN D3 PO) Take 1 Dose by mouth every day. Unknown OTC Strength.      Riboflavin (VITAMIN B-2 PO) Take 1 Dose by mouth every day. Unknown OTC Strength.       No current facility-administered medications for this visit.   [2]   Patient Active Problem List  Diagnosis    History of allergic rhinitis    Gastroparesis    S/p green light photovaporization prostate    Interstitial cystitis    Hypothyroid    On angiotensin-converting enzyme (ACE) inhibitors (for diabetes, not HTN)    Diabetes mellitus type 2, controlled (HCC)    Preventative health care    History of shoulder pain    Insomnia    Dyslipidemia    Tobacco dependence    History of MI (myocardial infarction)    Atherosclerosis of native coronary artery with angina pectoris, unspecified whether native or transplanted heart (HCC)    PAD (peripheral artery disease) (HCC)    Stented coronary artery    Ascending aorta dilatation (HCC)    Aortic insufficiency    Abnormal alkaline phosphatase test    Aortic atherosclerosis (HCC)    Post-polio syndrome    B12 deficiency    Gallbladder polyp    Renal cyst    Aortic ectasia (HCC)    Hepatic fibrosis

## 2025-07-26 DIAGNOSIS — E78.5 DYSLIPIDEMIA: Chronic | ICD-10-CM

## 2025-07-28 RX ORDER — ATORVASTATIN CALCIUM 80 MG/1
80 TABLET, FILM COATED ORAL DAILY
Qty: 90 TABLET | Refills: 2 | Status: SHIPPED | OUTPATIENT
Start: 2025-07-28

## 2025-07-28 NOTE — TELEPHONE ENCOUNTER
Is the patient due for a refill? Yes    Was the patient seen the last 15 months? Yes    Date of last office visit: 3/27/2025    Does the patient have an upcoming appointment?  Yes   If yes, When? 9/24/2025    Provider to refill:VR     Does the patient have halfway Plus and need 100-day supply? (This applies to ALL medications) Patient does not have SCP

## 2025-08-27 ENCOUNTER — HOSPITAL ENCOUNTER (OUTPATIENT)
Dept: LAB | Facility: MEDICAL CENTER | Age: 75
End: 2025-08-27
Payer: MEDICARE

## 2025-08-27 ENCOUNTER — APPOINTMENT (OUTPATIENT)
Dept: LAB | Facility: MEDICAL CENTER | Age: 75
End: 2025-08-27
Payer: MEDICARE

## 2025-08-27 DIAGNOSIS — E03.9 HYPOTHYROIDISM, ACQUIRED: ICD-10-CM

## 2025-08-27 DIAGNOSIS — E55.9 VITAMIN D DEFICIENCY: ICD-10-CM

## 2025-08-27 DIAGNOSIS — E11.65 TYPE 2 DIABETES MELLITUS WITH HYPERGLYCEMIA, WITHOUT LONG-TERM CURRENT USE OF INSULIN (HCC): ICD-10-CM

## 2025-08-27 LAB
25(OH)D3 SERPL-MCNC: 46 NG/ML (ref 30–100)
ALBUMIN SERPL BCP-MCNC: 4.1 G/DL (ref 3.2–4.9)
ALBUMIN/GLOB SERPL: 1.8 G/DL
ALP SERPL-CCNC: 110 U/L (ref 30–99)
ALT SERPL-CCNC: 43 U/L (ref 2–50)
ANION GAP SERPL CALC-SCNC: 11 MMOL/L (ref 7–16)
AST SERPL-CCNC: 37 U/L (ref 12–45)
BILIRUB SERPL-MCNC: 0.3 MG/DL (ref 0.1–1.5)
BUN SERPL-MCNC: 17 MG/DL (ref 8–22)
CALCIUM ALBUM COR SERPL-MCNC: 8.8 MG/DL (ref 8.5–10.5)
CALCIUM SERPL-MCNC: 8.9 MG/DL (ref 8.5–10.5)
CHLORIDE SERPL-SCNC: 108 MMOL/L (ref 96–112)
CO2 SERPL-SCNC: 22 MMOL/L (ref 20–33)
CREAT SERPL-MCNC: 0.87 MG/DL (ref 0.5–1.4)
GFR SERPLBLD CREATININE-BSD FMLA CKD-EPI: 90 ML/MIN/1.73 M 2
GLOBULIN SER CALC-MCNC: 2.3 G/DL (ref 1.9–3.5)
GLUCOSE SERPL-MCNC: 99 MG/DL (ref 65–99)
POTASSIUM SERPL-SCNC: 4.6 MMOL/L (ref 3.6–5.5)
PROT SERPL-MCNC: 6.4 G/DL (ref 6–8.2)
SODIUM SERPL-SCNC: 141 MMOL/L (ref 135–145)
T3FREE SERPL-MCNC: 4.96 PG/ML (ref 2–4.4)
T4 FREE SERPL-MCNC: 1.28 NG/DL (ref 0.93–1.7)
TSH SERPL-ACNC: 0.01 UIU/ML (ref 0.38–5.33)

## 2025-08-27 PROCEDURE — 84481 FREE ASSAY (FT-3): CPT

## 2025-08-27 PROCEDURE — 36415 COLL VENOUS BLD VENIPUNCTURE: CPT

## 2025-08-27 PROCEDURE — 84443 ASSAY THYROID STIM HORMONE: CPT

## 2025-08-27 PROCEDURE — 80053 COMPREHEN METABOLIC PANEL: CPT

## 2025-08-27 PROCEDURE — 82306 VITAMIN D 25 HYDROXY: CPT

## 2025-08-27 PROCEDURE — 84439 ASSAY OF FREE THYROXINE: CPT
